# Patient Record
Sex: FEMALE | Race: WHITE | NOT HISPANIC OR LATINO | Employment: OTHER | URBAN - METROPOLITAN AREA
[De-identification: names, ages, dates, MRNs, and addresses within clinical notes are randomized per-mention and may not be internally consistent; named-entity substitution may affect disease eponyms.]

---

## 2020-11-24 ENCOUNTER — HOSPITAL ENCOUNTER (OUTPATIENT)
Dept: RADIOLOGY | Facility: HOSPITAL | Age: 65
Discharge: HOME | End: 2020-11-24
Attending: FAMILY MEDICINE
Payer: COMMERCIAL

## 2020-11-24 ENCOUNTER — OFFICE VISIT (OUTPATIENT)
Dept: PRIMARY CARE CLINIC | Facility: CLINIC | Age: 65
End: 2020-11-24
Payer: COMMERCIAL

## 2020-11-24 VITALS
WEIGHT: 232.56 LBS | BODY MASS INDEX: 38.75 KG/M2 | HEIGHT: 65 IN | HEART RATE: 79 BPM | SYSTOLIC BLOOD PRESSURE: 135 MMHG | DIASTOLIC BLOOD PRESSURE: 74 MMHG

## 2020-11-24 DIAGNOSIS — I10 ESSENTIAL HYPERTENSION: ICD-10-CM

## 2020-11-24 DIAGNOSIS — G47.30 SLEEP APNEA, UNSPECIFIED TYPE: ICD-10-CM

## 2020-11-24 DIAGNOSIS — K21.9 GASTROESOPHAGEAL REFLUX DISEASE, UNSPECIFIED WHETHER ESOPHAGITIS PRESENT: ICD-10-CM

## 2020-11-24 DIAGNOSIS — G89.29 CHRONIC PAIN OF LEFT KNEE: ICD-10-CM

## 2020-11-24 DIAGNOSIS — Z00.00 ROUTINE MEDICAL EXAM: Primary | ICD-10-CM

## 2020-11-24 DIAGNOSIS — E66.9 OBESITY, UNSPECIFIED CLASSIFICATION, UNSPECIFIED OBESITY TYPE, UNSPECIFIED WHETHER SERIOUS COMORBIDITY PRESENT: ICD-10-CM

## 2020-11-24 DIAGNOSIS — M25.562 CHRONIC PAIN OF LEFT KNEE: ICD-10-CM

## 2020-11-24 PROCEDURE — 73562 X-RAY EXAM OF KNEE 3: CPT | Mod: TC,PN,RT

## 2020-11-24 PROCEDURE — 99386 PREV VISIT NEW AGE 40-64: CPT | Mod: S$GLB,,, | Performed by: FAMILY MEDICINE

## 2020-11-24 PROCEDURE — 73564 X-RAY EXAM KNEE 4 OR MORE: CPT | Mod: 26,LT,, | Performed by: RADIOLOGY

## 2020-11-24 PROCEDURE — 3008F BODY MASS INDEX DOCD: CPT | Mod: CPTII,S$GLB,, | Performed by: FAMILY MEDICINE

## 2020-11-24 PROCEDURE — 73562 X-RAY EXAM OF KNEE 3: CPT | Mod: 26,59,RT, | Performed by: RADIOLOGY

## 2020-11-24 PROCEDURE — 3008F PR BODY MASS INDEX (BMI) DOCUMENTED: ICD-10-PCS | Mod: CPTII,S$GLB,, | Performed by: FAMILY MEDICINE

## 2020-11-24 PROCEDURE — 99999 PR PBB SHADOW E&M-NEW PATIENT-LVL IV: CPT | Mod: PBBFAC,,, | Performed by: FAMILY MEDICINE

## 2020-11-24 PROCEDURE — 99999 PR PBB SHADOW E&M-NEW PATIENT-LVL IV: ICD-10-PCS | Mod: PBBFAC,,, | Performed by: FAMILY MEDICINE

## 2020-11-24 PROCEDURE — 73564 XR KNEE ORTHO LEFT WITH FLEXION: ICD-10-PCS | Mod: 26,LT,, | Performed by: RADIOLOGY

## 2020-11-24 PROCEDURE — 73562 XR KNEE ORTHO LEFT WITH FLEXION: ICD-10-PCS | Mod: 26,59,RT, | Performed by: RADIOLOGY

## 2020-11-24 PROCEDURE — 99386 PR PREVENTIVE VISIT,NEW,40-64: ICD-10-PCS | Mod: S$GLB,,, | Performed by: FAMILY MEDICINE

## 2020-11-24 RX ORDER — LOVASTATIN 20 MG/1
20 TABLET ORAL NIGHTLY
COMMUNITY
End: 2020-11-25 | Stop reason: SDUPTHER

## 2020-11-24 RX ORDER — NICOTINE POLACRILEX 2 MG
GUM BUCCAL
COMMUNITY

## 2020-11-24 RX ORDER — HYDROGEN PEROXIDE 3 %
20 SOLUTION, NON-ORAL MISCELLANEOUS
COMMUNITY
End: 2022-11-01

## 2020-11-24 RX ORDER — PHENYLEPHRINE HCL 10 MG
500 TABLET ORAL DAILY
COMMUNITY
End: 2023-03-06

## 2020-11-24 RX ORDER — AMOXICILLIN 500 MG
CAPSULE ORAL DAILY
COMMUNITY

## 2020-11-24 RX ORDER — BENAZEPRIL/HYDROCHLOROTHIAZIDE 20 MG-25MG
1 TABLET ORAL DAILY
COMMUNITY
End: 2020-11-24

## 2020-11-24 NOTE — PROGRESS NOTES
Subjective:   Patient ID: Diane Garcia is a 65 y.o. female.    Chief Complaint: Establish Care      HPI    65-year-old female here to establish with new PCP has essential hypertension and hyperlipidemia      Has had some chronic left knee pain.    Health maintenance discussed    Had outside mammogram this year in [place]    Had colorectal cancer screening in [facility] 8 years ago with recommendations for having it again 10 years    Tetanus is up-to-date  Had recent influenza vaccine.  Had a reaction with her upper arm swollen and not just in a localized circular area        Patient queried and denies any further complaints.    ALLERGIES AND MEDICATIONS: updated and reviewed.  Review of patient's allergies indicates:   Allergen Reactions    Penicillins      nausea       Current Outpatient Medications:     biotin 1 mg Cap, Take by mouth., Disp: , Rfl:     cartilage/collagen/bor/hyalur (JOINT HEALTH ORAL), Take by mouth., Disp: , Rfl:     cinnamon bark (CINNAMON) 500 mg capsule, Take 500 mg by mouth once daily., Disp: , Rfl:     esomeprazole (NEXIUM) 20 MG capsule, Take 20 mg by mouth before breakfast., Disp: , Rfl:     lovastatin (MEVACOR) 40 MG tablet, Take 1 tablet (40 mg total) by mouth every evening., Disp: 90 tablet, Rfl: 3    omega-3 fatty acids/fish oil (FISH OIL-OMEGA-3 FATTY ACIDS) 300-1,000 mg capsule, Take by mouth once daily., Disp: , Rfl:     TURMERIC ORAL, Take by mouth., Disp: , Rfl:     hydroCHLOROthiazide (HYDRODIURIL) 25 MG tablet, Take 1 tablet (25 mg total) by mouth once daily., Disp: 90 tablet, Rfl: 3    olmesartan (BENICAR) 40 MG tablet, Take 1 tablet (40 mg total) by mouth once daily., Disp: 90 tablet, Rfl: 3    Review of Systems   Constitutional: Negative for activity change, appetite change, chills, diaphoresis, fatigue, fever and unexpected weight change.   HENT: Negative for congestion, ear discharge, ear pain, facial swelling, hearing loss, nosebleeds, postnasal drip, rhinorrhea,  "sinus pressure, sneezing, sore throat, tinnitus, trouble swallowing and voice change.    Eyes: Negative for photophobia, pain, discharge, redness, itching and visual disturbance.   Respiratory: Negative for cough, chest tightness, shortness of breath and wheezing.    Cardiovascular: Negative for chest pain, palpitations and leg swelling.   Gastrointestinal: Negative for abdominal distention, abdominal pain, anal bleeding, blood in stool, constipation, diarrhea, nausea, rectal pain and vomiting.   Endocrine: Negative for cold intolerance, heat intolerance, polydipsia, polyphagia and polyuria.   Genitourinary: Negative for difficulty urinating, dysuria and flank pain.   Musculoskeletal: Negative for arthralgias, back pain, joint swelling, myalgias and neck pain.   Skin: Negative for rash.   Neurological: Negative for dizziness, tremors, seizures, syncope, speech difficulty, weakness, light-headedness, numbness and headaches.   Psychiatric/Behavioral: Negative for behavioral problems, confusion, decreased concentration, dysphoric mood, sleep disturbance and suicidal ideas. The patient is not nervous/anxious and is not hyperactive.        No results found for: WBC, HGB, HCT, MCV, PLT      CMP  No results found for: NA, K, CL, CO2, GLU, BUN, CREATININE, CALCIUM, PROT, ALBUMIN, BILITOT, ALKPHOS, AST, ALT, ANIONGAP, ESTGFRAFRICA, EGFRNONAA     No results found for: LABA1C, HGBA1C     Objective:     Vitals:    11/24/20 1310   BP: 135/74   Pulse: 79   Weight: 105.5 kg (232 lb 9.4 oz)   Height: 5' 5" (1.651 m)   PF: 96 L/min     Body mass index is 38.7 kg/m².    Physical Exam  Vitals signs and nursing note reviewed.   Constitutional:       General: She is not in acute distress.     Appearance: She is well-developed. She is not diaphoretic.   HENT:      Head: Normocephalic and atraumatic.      Right Ear: Hearing, tympanic membrane, ear canal and external ear normal. No tenderness.      Left Ear: Hearing, tympanic membrane, ear " canal and external ear normal. No tenderness.      Nose: Nose normal.   Eyes:      General: Lids are normal. No scleral icterus.        Right eye: No discharge.         Left eye: No discharge.      Extraocular Movements:      Right eye: Normal extraocular motion.      Left eye: Normal extraocular motion.      Conjunctiva/sclera: Conjunctivae normal.      Right eye: Right conjunctiva is not injected.      Left eye: Left conjunctiva is not injected.      Pupils: Pupils are equal, round, and reactive to light.   Neck:      Musculoskeletal: Normal range of motion and neck supple. No edema.      Thyroid: No thyromegaly.      Vascular: No carotid bruit or JVD.      Trachea: No tracheal deviation.   Cardiovascular:      Rate and Rhythm: Normal rate and regular rhythm.      Pulses: Normal pulses.      Heart sounds: Normal heart sounds. No murmur. No friction rub.   Pulmonary:      Effort: Pulmonary effort is normal. No accessory muscle usage or respiratory distress.      Breath sounds: Normal breath sounds. No wheezing, rhonchi or rales.   Abdominal:      General: Bowel sounds are normal. There is no distension or abdominal bruit.      Palpations: Abdomen is soft. There is no mass or pulsatile mass.      Tenderness: There is no abdominal tenderness. There is no guarding or rebound. Negative signs include Gill's sign and McBurney's sign.   Lymphadenopathy:      Head:      Right side of head: No submandibular, preauricular or posterior auricular adenopathy.      Left side of head: No submandibular, preauricular or posterior auricular adenopathy.      Cervical: No cervical adenopathy.   Skin:     General: Skin is warm and dry.      Findings: No ecchymosis, erythema or rash. Rash is not urticarial.      Nails: There is no clubbing.     Neurological:      Mental Status: She is alert and oriented to person, place, and time.      GCS: GCS eye subscore is 4. GCS verbal subscore is 5. GCS motor subscore is 6.   Psychiatric:          Mood and Affect: Mood is not anxious or depressed. Affect is not angry or inappropriate.         Speech: Speech normal.         Behavior: Behavior normal. Behavior is cooperative.         Thought Content: Thought content normal.         Assessment and Plan:   Diane was seen today for establish care.    Diagnoses and all orders for this visit:    Routine medical exam  -     CBC Without Differential; Future  -     Comprehensive Metabolic Panel; Future  -     Hemoglobin A1C; Future  -     Lipid Panel; Future  -     TSH; Future  -     T4, Free; Future    Essential hypertension    Chronic pain of left knee  -     X-ray Knee Ortho Left with Flexion; Future  -     Ambulatory referral/consult to Physical/Occupational Therapy; Future  -     Ambulatory referral/consult to Orthopedics; Future    Obesity, unspecified classification, unspecified obesity type, unspecified whether serious comorbidity present    Sleep apnea, unspecified type  -     CPAP/BIPAP SUPPLIES    Gastroesophageal reflux disease, unspecified whether esophagitis present    Other orders  -     lovastatin (MEVACOR) 40 MG tablet; Take 1 tablet (40 mg total) by mouth every evening.  -     olmesartan (BENICAR) 40 MG tablet; Take 1 tablet (40 mg total) by mouth once daily.  -     hydroCHLOROthiazide (HYDRODIURIL) 25 MG tablet; Take 1 tablet (25 mg total) by mouth once daily.        No follow-ups on file.    THIS NOTE WILL BE SHARED WITH THE PATIENT.

## 2020-11-25 RX ORDER — LOVASTATIN 40 MG/1
40 TABLET ORAL NIGHTLY
Qty: 90 TABLET | Refills: 3 | Status: SHIPPED | OUTPATIENT
Start: 2020-11-25 | End: 2022-01-04 | Stop reason: SDUPTHER

## 2020-11-25 RX ORDER — OLMESARTAN MEDOXOMIL 40 MG/1
40 TABLET ORAL DAILY
Qty: 90 TABLET | Refills: 3 | Status: SHIPPED | OUTPATIENT
Start: 2020-11-25 | End: 2021-09-29

## 2020-11-25 RX ORDER — HYDROCHLOROTHIAZIDE 25 MG/1
25 TABLET ORAL DAILY
Qty: 90 TABLET | Refills: 3 | Status: SHIPPED | OUTPATIENT
Start: 2020-11-25 | End: 2021-09-27

## 2020-12-09 DIAGNOSIS — Z12.31 OTHER SCREENING MAMMOGRAM: ICD-10-CM

## 2021-01-04 ENCOUNTER — OFFICE VISIT (OUTPATIENT)
Dept: SPORTS MEDICINE | Facility: CLINIC | Age: 66
End: 2021-01-04
Payer: COMMERCIAL

## 2021-01-04 ENCOUNTER — PATIENT MESSAGE (OUTPATIENT)
Dept: ADMINISTRATIVE | Facility: HOSPITAL | Age: 66
End: 2021-01-04

## 2021-01-04 VITALS
SYSTOLIC BLOOD PRESSURE: 136 MMHG | HEIGHT: 65 IN | WEIGHT: 235.88 LBS | DIASTOLIC BLOOD PRESSURE: 79 MMHG | HEART RATE: 74 BPM | BODY MASS INDEX: 39.3 KG/M2

## 2021-01-04 DIAGNOSIS — M17.12 PRIMARY OSTEOARTHRITIS OF LEFT KNEE: Primary | ICD-10-CM

## 2021-01-04 PROCEDURE — 99999 PR PBB SHADOW E&M-EST. PATIENT-LVL III: ICD-10-PCS | Mod: PBBFAC,,, | Performed by: ORTHOPAEDIC SURGERY

## 2021-01-04 PROCEDURE — 3075F PR MOST RECENT SYSTOLIC BLOOD PRESS GE 130-139MM HG: ICD-10-PCS | Mod: CPTII,S$GLB,, | Performed by: ORTHOPAEDIC SURGERY

## 2021-01-04 PROCEDURE — 99203 PR OFFICE/OUTPT VISIT, NEW, LEVL III, 30-44 MIN: ICD-10-PCS | Mod: S$GLB,,, | Performed by: ORTHOPAEDIC SURGERY

## 2021-01-04 PROCEDURE — 99203 OFFICE O/P NEW LOW 30 MIN: CPT | Mod: S$GLB,,, | Performed by: ORTHOPAEDIC SURGERY

## 2021-01-04 PROCEDURE — 3008F PR BODY MASS INDEX (BMI) DOCUMENTED: ICD-10-PCS | Mod: CPTII,S$GLB,, | Performed by: ORTHOPAEDIC SURGERY

## 2021-01-04 PROCEDURE — 99999 PR PBB SHADOW E&M-EST. PATIENT-LVL III: CPT | Mod: PBBFAC,,, | Performed by: ORTHOPAEDIC SURGERY

## 2021-01-04 PROCEDURE — 3008F BODY MASS INDEX DOCD: CPT | Mod: CPTII,S$GLB,, | Performed by: ORTHOPAEDIC SURGERY

## 2021-01-04 PROCEDURE — 3078F DIAST BP <80 MM HG: CPT | Mod: CPTII,S$GLB,, | Performed by: ORTHOPAEDIC SURGERY

## 2021-01-04 PROCEDURE — 1126F AMNT PAIN NOTED NONE PRSNT: CPT | Mod: S$GLB,,, | Performed by: ORTHOPAEDIC SURGERY

## 2021-01-04 PROCEDURE — 3078F PR MOST RECENT DIASTOLIC BLOOD PRESSURE < 80 MM HG: ICD-10-PCS | Mod: CPTII,S$GLB,, | Performed by: ORTHOPAEDIC SURGERY

## 2021-01-04 PROCEDURE — 1126F PR PAIN SEVERITY QUANTIFIED, NO PAIN PRESENT: ICD-10-PCS | Mod: S$GLB,,, | Performed by: ORTHOPAEDIC SURGERY

## 2021-01-04 PROCEDURE — 3075F SYST BP GE 130 - 139MM HG: CPT | Mod: CPTII,S$GLB,, | Performed by: ORTHOPAEDIC SURGERY

## 2021-01-06 ENCOUNTER — TELEPHONE (OUTPATIENT)
Dept: PRIMARY CARE CLINIC | Facility: CLINIC | Age: 66
End: 2021-01-06

## 2021-01-27 ENCOUNTER — TELEPHONE (OUTPATIENT)
Dept: SPORTS MEDICINE | Facility: CLINIC | Age: 66
End: 2021-01-27

## 2021-01-29 ENCOUNTER — OFFICE VISIT (OUTPATIENT)
Dept: SPORTS MEDICINE | Facility: CLINIC | Age: 66
End: 2021-01-29
Payer: COMMERCIAL

## 2021-01-29 VITALS
DIASTOLIC BLOOD PRESSURE: 78 MMHG | WEIGHT: 236.88 LBS | HEIGHT: 65 IN | SYSTOLIC BLOOD PRESSURE: 134 MMHG | BODY MASS INDEX: 39.47 KG/M2 | HEART RATE: 69 BPM

## 2021-01-29 DIAGNOSIS — M17.12 PRIMARY OSTEOARTHRITIS OF LEFT KNEE: Primary | ICD-10-CM

## 2021-01-29 PROCEDURE — 99499 UNLISTED E&M SERVICE: CPT | Mod: S$GLB,,, | Performed by: PHYSICIAN ASSISTANT

## 2021-01-29 PROCEDURE — 3008F PR BODY MASS INDEX (BMI) DOCUMENTED: ICD-10-PCS | Mod: CPTII,S$GLB,, | Performed by: PHYSICIAN ASSISTANT

## 2021-01-29 PROCEDURE — 99999 PR PBB SHADOW E&M-EST. PATIENT-LVL III: ICD-10-PCS | Mod: PBBFAC,,, | Performed by: PHYSICIAN ASSISTANT

## 2021-01-29 PROCEDURE — 20610 PR DRAIN/INJECT LARGE JOINT/BURSA: ICD-10-PCS | Mod: LT,S$GLB,, | Performed by: PHYSICIAN ASSISTANT

## 2021-01-29 PROCEDURE — 1125F PR PAIN SEVERITY QUANTIFIED, PAIN PRESENT: ICD-10-PCS | Mod: S$GLB,,, | Performed by: PHYSICIAN ASSISTANT

## 2021-01-29 PROCEDURE — 3008F BODY MASS INDEX DOCD: CPT | Mod: CPTII,S$GLB,, | Performed by: PHYSICIAN ASSISTANT

## 2021-01-29 PROCEDURE — 99499 NO LOS: ICD-10-PCS | Mod: S$GLB,,, | Performed by: PHYSICIAN ASSISTANT

## 2021-01-29 PROCEDURE — 99999 PR PBB SHADOW E&M-EST. PATIENT-LVL III: CPT | Mod: PBBFAC,,, | Performed by: PHYSICIAN ASSISTANT

## 2021-01-29 PROCEDURE — 1125F AMNT PAIN NOTED PAIN PRSNT: CPT | Mod: S$GLB,,, | Performed by: PHYSICIAN ASSISTANT

## 2021-01-29 PROCEDURE — 20610 DRAIN/INJ JOINT/BURSA W/O US: CPT | Mod: LT,S$GLB,, | Performed by: PHYSICIAN ASSISTANT

## 2021-02-01 ENCOUNTER — OFFICE VISIT (OUTPATIENT)
Dept: URGENT CARE | Facility: CLINIC | Age: 66
End: 2021-02-01
Payer: COMMERCIAL

## 2021-02-01 VITALS
BODY MASS INDEX: 39.15 KG/M2 | HEART RATE: 69 BPM | SYSTOLIC BLOOD PRESSURE: 150 MMHG | WEIGHT: 235 LBS | TEMPERATURE: 98 F | OXYGEN SATURATION: 97 % | RESPIRATION RATE: 18 BRPM | DIASTOLIC BLOOD PRESSURE: 86 MMHG | HEIGHT: 65 IN

## 2021-02-01 DIAGNOSIS — R51.9 NONINTRACTABLE HEADACHE, UNSPECIFIED CHRONICITY PATTERN, UNSPECIFIED HEADACHE TYPE: ICD-10-CM

## 2021-02-01 DIAGNOSIS — H66.90 OTITIS MEDIA, UNSPECIFIED LATERALITY, UNSPECIFIED OTITIS MEDIA TYPE: ICD-10-CM

## 2021-02-01 DIAGNOSIS — Q24.8 PERICARDIAL CYST: Primary | ICD-10-CM

## 2021-02-01 DIAGNOSIS — R06.02 SHORTNESS OF BREATH: ICD-10-CM

## 2021-02-01 LAB
CTP QC/QA: YES
SARS-COV-2 RDRP RESP QL NAA+PROBE: NEGATIVE

## 2021-02-01 PROCEDURE — 3008F PR BODY MASS INDEX (BMI) DOCUMENTED: ICD-10-PCS | Mod: CPTII,S$GLB,, | Performed by: NURSE PRACTITIONER

## 2021-02-01 PROCEDURE — 71046 XR CHEST PA AND LATERAL: ICD-10-PCS | Mod: S$GLB,,, | Performed by: RADIOLOGY

## 2021-02-01 PROCEDURE — 3008F BODY MASS INDEX DOCD: CPT | Mod: CPTII,S$GLB,, | Performed by: NURSE PRACTITIONER

## 2021-02-01 PROCEDURE — 71046 X-RAY EXAM CHEST 2 VIEWS: CPT | Mod: S$GLB,,, | Performed by: RADIOLOGY

## 2021-02-01 PROCEDURE — 99214 PR OFFICE/OUTPT VISIT, EST, LEVL IV, 30-39 MIN: ICD-10-PCS | Mod: S$GLB,CS,, | Performed by: NURSE PRACTITIONER

## 2021-02-01 PROCEDURE — U0002: ICD-10-PCS | Mod: QW,S$GLB,, | Performed by: NURSE PRACTITIONER

## 2021-02-01 PROCEDURE — 99214 OFFICE O/P EST MOD 30 MIN: CPT | Mod: S$GLB,CS,, | Performed by: NURSE PRACTITIONER

## 2021-02-01 PROCEDURE — U0002 COVID-19 LAB TEST NON-CDC: HCPCS | Mod: QW,S$GLB,, | Performed by: NURSE PRACTITIONER

## 2021-02-01 RX ORDER — ALBUTEROL SULFATE 90 UG/1
2 AEROSOL, METERED RESPIRATORY (INHALATION) EVERY 6 HOURS PRN
Qty: 6.7 G | Refills: 0 | Status: SHIPPED | OUTPATIENT
Start: 2021-02-01 | End: 2021-02-23

## 2021-02-01 RX ORDER — DOXYCYCLINE 100 MG/1
100 CAPSULE ORAL 2 TIMES DAILY
Qty: 20 CAPSULE | Refills: 0 | Status: SHIPPED | OUTPATIENT
Start: 2021-02-01 | End: 2021-02-11

## 2021-02-02 ENCOUNTER — HOSPITAL ENCOUNTER (OUTPATIENT)
Dept: RADIOLOGY | Facility: HOSPITAL | Age: 66
Discharge: HOME | End: 2021-02-02
Attending: INTERNAL MEDICINE
Payer: COMMERCIAL

## 2021-02-02 ENCOUNTER — OFFICE VISIT (OUTPATIENT)
Dept: INTERNAL MEDICINE | Facility: CLINIC | Age: 66
End: 2021-02-02
Payer: COMMERCIAL

## 2021-02-02 VITALS
WEIGHT: 231 LBS | DIASTOLIC BLOOD PRESSURE: 60 MMHG | SYSTOLIC BLOOD PRESSURE: 120 MMHG | HEIGHT: 65 IN | BODY MASS INDEX: 38.49 KG/M2

## 2021-02-02 DIAGNOSIS — I10 ESSENTIAL HYPERTENSION: ICD-10-CM

## 2021-02-02 DIAGNOSIS — R91.8 LUNG MASS: ICD-10-CM

## 2021-02-02 DIAGNOSIS — G47.33 OSA (OBSTRUCTIVE SLEEP APNEA): ICD-10-CM

## 2021-02-02 DIAGNOSIS — Q24.8 PERICARDIAL CYST: Primary | ICD-10-CM

## 2021-02-02 DIAGNOSIS — K21.9 GASTROESOPHAGEAL REFLUX DISEASE, UNSPECIFIED WHETHER ESOPHAGITIS PRESENT: ICD-10-CM

## 2021-02-02 DIAGNOSIS — E78.2 MIXED HYPERLIPIDEMIA: ICD-10-CM

## 2021-02-02 DIAGNOSIS — Z87.891 FORMER SMOKER: ICD-10-CM

## 2021-02-02 DIAGNOSIS — Q24.8 PERICARDIAL CYST: ICD-10-CM

## 2021-02-02 LAB
CREAT SERPL-MCNC: 0.4 MG/DL (ref 0.5–1.4)
SAMPLE: ABNORMAL

## 2021-02-02 PROCEDURE — 25500020 PHARM REV CODE 255: Performed by: INTERNAL MEDICINE

## 2021-02-02 PROCEDURE — 99999 PR PBB SHADOW E&M-EST. PATIENT-LVL III: CPT | Mod: PBBFAC,,, | Performed by: INTERNAL MEDICINE

## 2021-02-02 PROCEDURE — 1126F AMNT PAIN NOTED NONE PRSNT: CPT | Mod: S$GLB,,, | Performed by: INTERNAL MEDICINE

## 2021-02-02 PROCEDURE — 3074F PR MOST RECENT SYSTOLIC BLOOD PRESSURE < 130 MM HG: ICD-10-PCS | Mod: CPTII,S$GLB,, | Performed by: INTERNAL MEDICINE

## 2021-02-02 PROCEDURE — 3078F DIAST BP <80 MM HG: CPT | Mod: CPTII,S$GLB,, | Performed by: INTERNAL MEDICINE

## 2021-02-02 PROCEDURE — 99214 OFFICE O/P EST MOD 30 MIN: CPT | Mod: S$GLB,,, | Performed by: INTERNAL MEDICINE

## 2021-02-02 PROCEDURE — 3074F SYST BP LT 130 MM HG: CPT | Mod: CPTII,S$GLB,, | Performed by: INTERNAL MEDICINE

## 2021-02-02 PROCEDURE — 3008F PR BODY MASS INDEX (BMI) DOCUMENTED: ICD-10-PCS | Mod: CPTII,S$GLB,, | Performed by: INTERNAL MEDICINE

## 2021-02-02 PROCEDURE — 71260 CT CHEST WITH CONTRAST: ICD-10-PCS | Mod: 26,,, | Performed by: RADIOLOGY

## 2021-02-02 PROCEDURE — 71260 CT THORAX DX C+: CPT | Mod: TC

## 2021-02-02 PROCEDURE — 3078F PR MOST RECENT DIASTOLIC BLOOD PRESSURE < 80 MM HG: ICD-10-PCS | Mod: CPTII,S$GLB,, | Performed by: INTERNAL MEDICINE

## 2021-02-02 PROCEDURE — 3008F BODY MASS INDEX DOCD: CPT | Mod: CPTII,S$GLB,, | Performed by: INTERNAL MEDICINE

## 2021-02-02 PROCEDURE — 1126F PR PAIN SEVERITY QUANTIFIED, NO PAIN PRESENT: ICD-10-PCS | Mod: S$GLB,,, | Performed by: INTERNAL MEDICINE

## 2021-02-02 PROCEDURE — 99214 PR OFFICE/OUTPT VISIT, EST, LEVL IV, 30-39 MIN: ICD-10-PCS | Mod: S$GLB,,, | Performed by: INTERNAL MEDICINE

## 2021-02-02 PROCEDURE — 99999 PR PBB SHADOW E&M-EST. PATIENT-LVL III: ICD-10-PCS | Mod: PBBFAC,,, | Performed by: INTERNAL MEDICINE

## 2021-02-02 PROCEDURE — 71260 CT THORAX DX C+: CPT | Mod: 26,,, | Performed by: RADIOLOGY

## 2021-02-02 RX ORDER — NICOTINE 4 MG/1
INHALANT RESPIRATORY (INHALATION)
COMMUNITY
Start: 2021-01-11 | End: 2021-02-23

## 2021-02-02 RX ADMIN — IOHEXOL 85 ML: 350 INJECTION, SOLUTION INTRAVENOUS at 11:02

## 2021-02-03 ENCOUNTER — PATIENT MESSAGE (OUTPATIENT)
Dept: INTERNAL MEDICINE | Facility: CLINIC | Age: 66
End: 2021-02-03

## 2021-02-03 ENCOUNTER — TELEPHONE (OUTPATIENT)
Dept: INTERNAL MEDICINE | Facility: CLINIC | Age: 66
End: 2021-02-03

## 2021-02-03 DIAGNOSIS — Q79.0 MORGAGNI HERNIA: ICD-10-CM

## 2021-02-03 DIAGNOSIS — A49.8 BACTERIAL INFECTION DUE TO MORGANELLA MORGANII: Primary | ICD-10-CM

## 2021-02-03 DIAGNOSIS — R91.8 MULTIPLE LUNG NODULES: ICD-10-CM

## 2021-02-08 ENCOUNTER — OFFICE VISIT (OUTPATIENT)
Dept: SPORTS MEDICINE | Facility: CLINIC | Age: 66
End: 2021-02-08
Payer: COMMERCIAL

## 2021-02-08 ENCOUNTER — OFFICE VISIT (OUTPATIENT)
Dept: SURGERY | Facility: CLINIC | Age: 66
End: 2021-02-08
Payer: COMMERCIAL

## 2021-02-08 VITALS
OXYGEN SATURATION: 97 % | RESPIRATION RATE: 18 BRPM | BODY MASS INDEX: 38.72 KG/M2 | DIASTOLIC BLOOD PRESSURE: 73 MMHG | HEIGHT: 65 IN | HEART RATE: 77 BPM | SYSTOLIC BLOOD PRESSURE: 158 MMHG | WEIGHT: 232.38 LBS

## 2021-02-08 VITALS
SYSTOLIC BLOOD PRESSURE: 141 MMHG | DIASTOLIC BLOOD PRESSURE: 81 MMHG | HEART RATE: 68 BPM | BODY MASS INDEX: 38.49 KG/M2 | WEIGHT: 231 LBS | HEIGHT: 65 IN

## 2021-02-08 DIAGNOSIS — Q79.0 MORGAGNI HERNIA: ICD-10-CM

## 2021-02-08 DIAGNOSIS — M17.12 PRIMARY OSTEOARTHRITIS OF LEFT KNEE: Primary | ICD-10-CM

## 2021-02-08 PROCEDURE — 3077F SYST BP >= 140 MM HG: CPT | Mod: CPTII,S$GLB,, | Performed by: SURGERY

## 2021-02-08 PROCEDURE — 3008F PR BODY MASS INDEX (BMI) DOCUMENTED: ICD-10-PCS | Mod: CPTII,S$GLB,, | Performed by: SURGERY

## 2021-02-08 PROCEDURE — 1126F PR PAIN SEVERITY QUANTIFIED, NO PAIN PRESENT: ICD-10-PCS | Mod: S$GLB,,, | Performed by: PHYSICIAN ASSISTANT

## 2021-02-08 PROCEDURE — 20610 DRAIN/INJ JOINT/BURSA W/O US: CPT | Mod: LT,S$GLB,, | Performed by: PHYSICIAN ASSISTANT

## 2021-02-08 PROCEDURE — 99204 PR OFFICE/OUTPT VISIT, NEW, LEVL IV, 45-59 MIN: ICD-10-PCS | Mod: S$GLB,,, | Performed by: SURGERY

## 2021-02-08 PROCEDURE — 20610 PR DRAIN/INJECT LARGE JOINT/BURSA: ICD-10-PCS | Mod: LT,S$GLB,, | Performed by: PHYSICIAN ASSISTANT

## 2021-02-08 PROCEDURE — 3078F DIAST BP <80 MM HG: CPT | Mod: CPTII,S$GLB,, | Performed by: SURGERY

## 2021-02-08 PROCEDURE — 99999 PR PBB SHADOW E&M-EST. PATIENT-LVL III: ICD-10-PCS | Mod: PBBFAC,,, | Performed by: PHYSICIAN ASSISTANT

## 2021-02-08 PROCEDURE — 3078F PR MOST RECENT DIASTOLIC BLOOD PRESSURE < 80 MM HG: ICD-10-PCS | Mod: CPTII,S$GLB,, | Performed by: SURGERY

## 2021-02-08 PROCEDURE — 3008F BODY MASS INDEX DOCD: CPT | Mod: CPTII,S$GLB,, | Performed by: SURGERY

## 2021-02-08 PROCEDURE — 99204 OFFICE O/P NEW MOD 45 MIN: CPT | Mod: S$GLB,,, | Performed by: SURGERY

## 2021-02-08 PROCEDURE — 1126F AMNT PAIN NOTED NONE PRSNT: CPT | Mod: S$GLB,,, | Performed by: SURGERY

## 2021-02-08 PROCEDURE — 99999 PR PBB SHADOW E&M-EST. PATIENT-LVL IV: CPT | Mod: PBBFAC,,, | Performed by: SURGERY

## 2021-02-08 PROCEDURE — 99499 NO LOS: ICD-10-PCS | Mod: S$GLB,,, | Performed by: PHYSICIAN ASSISTANT

## 2021-02-08 PROCEDURE — 3008F PR BODY MASS INDEX (BMI) DOCUMENTED: ICD-10-PCS | Mod: CPTII,S$GLB,, | Performed by: PHYSICIAN ASSISTANT

## 2021-02-08 PROCEDURE — 3008F BODY MASS INDEX DOCD: CPT | Mod: CPTII,S$GLB,, | Performed by: PHYSICIAN ASSISTANT

## 2021-02-08 PROCEDURE — 1126F AMNT PAIN NOTED NONE PRSNT: CPT | Mod: S$GLB,,, | Performed by: PHYSICIAN ASSISTANT

## 2021-02-08 PROCEDURE — 99999 PR PBB SHADOW E&M-EST. PATIENT-LVL IV: ICD-10-PCS | Mod: PBBFAC,,, | Performed by: SURGERY

## 2021-02-08 PROCEDURE — 3077F PR MOST RECENT SYSTOLIC BLOOD PRESSURE >= 140 MM HG: ICD-10-PCS | Mod: CPTII,S$GLB,, | Performed by: SURGERY

## 2021-02-08 PROCEDURE — 99499 UNLISTED E&M SERVICE: CPT | Mod: S$GLB,,, | Performed by: PHYSICIAN ASSISTANT

## 2021-02-08 PROCEDURE — 99999 PR PBB SHADOW E&M-EST. PATIENT-LVL III: CPT | Mod: PBBFAC,,, | Performed by: PHYSICIAN ASSISTANT

## 2021-02-08 PROCEDURE — 1126F PR PAIN SEVERITY QUANTIFIED, NO PAIN PRESENT: ICD-10-PCS | Mod: S$GLB,,, | Performed by: SURGERY

## 2021-02-11 ENCOUNTER — PATIENT OUTREACH (OUTPATIENT)
Dept: ADMINISTRATIVE | Facility: OTHER | Age: 66
End: 2021-02-11

## 2021-02-11 DIAGNOSIS — Z12.11 ENCOUNTER FOR FIT (FECAL IMMUNOCHEMICAL TEST) SCREENING: Primary | ICD-10-CM

## 2021-02-19 ENCOUNTER — OFFICE VISIT (OUTPATIENT)
Dept: SPORTS MEDICINE | Facility: CLINIC | Age: 66
End: 2021-02-19
Payer: COMMERCIAL

## 2021-02-19 ENCOUNTER — OFFICE VISIT (OUTPATIENT)
Dept: URGENT CARE | Facility: CLINIC | Age: 66
End: 2021-02-19
Payer: COMMERCIAL

## 2021-02-19 VITALS
TEMPERATURE: 98 F | SYSTOLIC BLOOD PRESSURE: 129 MMHG | DIASTOLIC BLOOD PRESSURE: 88 MMHG | OXYGEN SATURATION: 98 % | WEIGHT: 220 LBS | RESPIRATION RATE: 16 BRPM | HEART RATE: 78 BPM | HEIGHT: 65 IN | BODY MASS INDEX: 36.65 KG/M2

## 2021-02-19 VITALS — HEIGHT: 65 IN | WEIGHT: 220 LBS | BODY MASS INDEX: 36.65 KG/M2

## 2021-02-19 DIAGNOSIS — M17.11 PRIMARY OSTEOARTHRITIS OF RIGHT KNEE: ICD-10-CM

## 2021-02-19 DIAGNOSIS — R19.7 DIARRHEA, UNSPECIFIED TYPE: Primary | ICD-10-CM

## 2021-02-19 DIAGNOSIS — M17.12 PRIMARY OSTEOARTHRITIS OF LEFT KNEE: Primary | ICD-10-CM

## 2021-02-19 PROCEDURE — 3078F DIAST BP <80 MM HG: CPT | Mod: CPTII,S$GLB,, | Performed by: PHYSICIAN ASSISTANT

## 2021-02-19 PROCEDURE — 87209 SMEAR COMPLEX STAIN: CPT

## 2021-02-19 PROCEDURE — 3078F PR MOST RECENT DIASTOLIC BLOOD PRESSURE < 80 MM HG: ICD-10-PCS | Mod: CPTII,S$GLB,, | Performed by: PHYSICIAN ASSISTANT

## 2021-02-19 PROCEDURE — 99213 PR OFFICE/OUTPT VISIT, EST, LEVL III, 20-29 MIN: ICD-10-PCS | Mod: S$GLB,,, | Performed by: NURSE PRACTITIONER

## 2021-02-19 PROCEDURE — 1125F PR PAIN SEVERITY QUANTIFIED, PAIN PRESENT: ICD-10-PCS | Mod: S$GLB,,, | Performed by: PHYSICIAN ASSISTANT

## 2021-02-19 PROCEDURE — 99213 PR OFFICE/OUTPT VISIT, EST, LEVL III, 20-29 MIN: ICD-10-PCS | Mod: 25,S$GLB,, | Performed by: PHYSICIAN ASSISTANT

## 2021-02-19 PROCEDURE — 99213 OFFICE O/P EST LOW 20 MIN: CPT | Mod: 25,S$GLB,, | Performed by: PHYSICIAN ASSISTANT

## 2021-02-19 PROCEDURE — 87324 CLOSTRIDIUM AG IA: CPT

## 2021-02-19 PROCEDURE — 3008F BODY MASS INDEX DOCD: CPT | Mod: CPTII,S$GLB,, | Performed by: PHYSICIAN ASSISTANT

## 2021-02-19 PROCEDURE — 3074F SYST BP LT 130 MM HG: CPT | Mod: CPTII,S$GLB,, | Performed by: PHYSICIAN ASSISTANT

## 2021-02-19 PROCEDURE — 20610 DRAIN/INJ JOINT/BURSA W/O US: CPT | Mod: LT,S$GLB,, | Performed by: PHYSICIAN ASSISTANT

## 2021-02-19 PROCEDURE — 20610 PR DRAIN/INJECT LARGE JOINT/BURSA: ICD-10-PCS | Mod: LT,S$GLB,, | Performed by: PHYSICIAN ASSISTANT

## 2021-02-19 PROCEDURE — 99213 OFFICE O/P EST LOW 20 MIN: CPT | Mod: S$GLB,,, | Performed by: NURSE PRACTITIONER

## 2021-02-19 PROCEDURE — 99999 PR PBB SHADOW E&M-EST. PATIENT-LVL III: ICD-10-PCS | Mod: PBBFAC,,, | Performed by: PHYSICIAN ASSISTANT

## 2021-02-19 PROCEDURE — 3074F PR MOST RECENT SYSTOLIC BLOOD PRESSURE < 130 MM HG: ICD-10-PCS | Mod: CPTII,S$GLB,, | Performed by: PHYSICIAN ASSISTANT

## 2021-02-19 PROCEDURE — 99999 PR PBB SHADOW E&M-EST. PATIENT-LVL III: CPT | Mod: PBBFAC,,, | Performed by: PHYSICIAN ASSISTANT

## 2021-02-19 PROCEDURE — 3008F BODY MASS INDEX DOCD: CPT | Mod: CPTII,S$GLB,, | Performed by: NURSE PRACTITIONER

## 2021-02-19 PROCEDURE — 1125F AMNT PAIN NOTED PAIN PRSNT: CPT | Mod: S$GLB,,, | Performed by: PHYSICIAN ASSISTANT

## 2021-02-19 PROCEDURE — 3008F PR BODY MASS INDEX (BMI) DOCUMENTED: ICD-10-PCS | Mod: CPTII,S$GLB,, | Performed by: PHYSICIAN ASSISTANT

## 2021-02-19 PROCEDURE — 3008F PR BODY MASS INDEX (BMI) DOCUMENTED: ICD-10-PCS | Mod: CPTII,S$GLB,, | Performed by: NURSE PRACTITIONER

## 2021-02-19 PROCEDURE — 87449 NOS EACH ORGANISM AG IA: CPT

## 2021-02-19 RX ORDER — VANCOMYCIN HYDROCHLORIDE 125 MG/1
125 CAPSULE ORAL EVERY 6 HOURS
Qty: 40 CAPSULE | Refills: 0 | Status: SHIPPED | OUTPATIENT
Start: 2021-02-19 | End: 2021-03-01

## 2021-02-20 LAB
C DIFF GDH STL QL: NEGATIVE
C DIFF TOX A+B STL QL IA: NEGATIVE

## 2021-02-22 ENCOUNTER — TELEPHONE (OUTPATIENT)
Dept: URGENT CARE | Facility: CLINIC | Age: 66
End: 2021-02-22

## 2021-02-22 DIAGNOSIS — R19.7 DIARRHEA, UNSPECIFIED TYPE: Primary | ICD-10-CM

## 2021-02-23 ENCOUNTER — OFFICE VISIT (OUTPATIENT)
Dept: GASTROENTEROLOGY | Facility: CLINIC | Age: 66
End: 2021-02-23
Payer: COMMERCIAL

## 2021-02-23 ENCOUNTER — LAB VISIT (OUTPATIENT)
Dept: LAB | Facility: HOSPITAL | Age: 66
End: 2021-02-23
Attending: INTERNAL MEDICINE
Payer: COMMERCIAL

## 2021-02-23 VITALS — HEIGHT: 65 IN | WEIGHT: 226.44 LBS | BODY MASS INDEX: 37.73 KG/M2

## 2021-02-23 DIAGNOSIS — R19.7 ACUTE DIARRHEA: ICD-10-CM

## 2021-02-23 DIAGNOSIS — Q79.0 MORGAGNI HERNIA: ICD-10-CM

## 2021-02-23 DIAGNOSIS — R19.7 ACUTE DIARRHEA: Primary | ICD-10-CM

## 2021-02-23 LAB
BASOPHILS # BLD AUTO: 0.06 K/UL (ref 0–0.2)
BASOPHILS NFR BLD: 0.8 % (ref 0–1.9)
CRP SERPL-MCNC: 20 MG/L (ref 0–8.2)
DIFFERENTIAL METHOD: ABNORMAL
EOSINOPHIL # BLD AUTO: 0.3 K/UL (ref 0–0.5)
EOSINOPHIL NFR BLD: 4.2 % (ref 0–8)
ERYTHROCYTE [DISTWIDTH] IN BLOOD BY AUTOMATED COUNT: 11.9 % (ref 11.5–14.5)
HCT VFR BLD AUTO: 35.8 % (ref 37–48.5)
HGB BLD-MCNC: 12.3 G/DL (ref 12–16)
IMM GRANULOCYTES # BLD AUTO: 0.07 K/UL (ref 0–0.04)
IMM GRANULOCYTES NFR BLD AUTO: 0.9 % (ref 0–0.5)
LYMPHOCYTES # BLD AUTO: 1.9 K/UL (ref 1–4.8)
LYMPHOCYTES NFR BLD: 24.4 % (ref 18–48)
MCH RBC QN AUTO: 30.5 PG (ref 27–31)
MCHC RBC AUTO-ENTMCNC: 34.4 G/DL (ref 32–36)
MCV RBC AUTO: 89 FL (ref 82–98)
MONOCYTES # BLD AUTO: 0.7 K/UL (ref 0.3–1)
MONOCYTES NFR BLD: 9.2 % (ref 4–15)
NEUTROPHILS # BLD AUTO: 4.7 K/UL (ref 1.8–7.7)
NEUTROPHILS NFR BLD: 60.5 % (ref 38–73)
NRBC BLD-RTO: 0 /100 WBC
O+P STL MICRO: NORMAL
PLATELET # BLD AUTO: 341 K/UL (ref 150–350)
PMV BLD AUTO: 10.1 FL (ref 9.2–12.9)
RBC # BLD AUTO: 4.03 M/UL (ref 4–5.4)
WBC # BLD AUTO: 7.8 K/UL (ref 3.9–12.7)

## 2021-02-23 PROCEDURE — 83516 IMMUNOASSAY NONANTIBODY: CPT

## 2021-02-23 PROCEDURE — 99999 PR PBB SHADOW E&M-EST. PATIENT-LVL III: CPT | Mod: PBBFAC,,, | Performed by: INTERNAL MEDICINE

## 2021-02-23 PROCEDURE — 1126F PR PAIN SEVERITY QUANTIFIED, NO PAIN PRESENT: ICD-10-PCS | Mod: S$GLB,,, | Performed by: INTERNAL MEDICINE

## 2021-02-23 PROCEDURE — 86140 C-REACTIVE PROTEIN: CPT

## 2021-02-23 PROCEDURE — 99204 PR OFFICE/OUTPT VISIT, NEW, LEVL IV, 45-59 MIN: ICD-10-PCS | Mod: S$GLB,,, | Performed by: INTERNAL MEDICINE

## 2021-02-23 PROCEDURE — 85025 COMPLETE CBC W/AUTO DIFF WBC: CPT

## 2021-02-23 PROCEDURE — 99999 PR PBB SHADOW E&M-EST. PATIENT-LVL III: ICD-10-PCS | Mod: PBBFAC,,, | Performed by: INTERNAL MEDICINE

## 2021-02-23 PROCEDURE — 99204 OFFICE O/P NEW MOD 45 MIN: CPT | Mod: S$GLB,,, | Performed by: INTERNAL MEDICINE

## 2021-02-23 PROCEDURE — 1126F AMNT PAIN NOTED NONE PRSNT: CPT | Mod: S$GLB,,, | Performed by: INTERNAL MEDICINE

## 2021-02-23 PROCEDURE — 3008F PR BODY MASS INDEX (BMI) DOCUMENTED: ICD-10-PCS | Mod: CPTII,S$GLB,, | Performed by: INTERNAL MEDICINE

## 2021-02-23 PROCEDURE — 36415 COLL VENOUS BLD VENIPUNCTURE: CPT

## 2021-02-23 PROCEDURE — 82784 ASSAY IGA/IGD/IGG/IGM EACH: CPT

## 2021-02-23 PROCEDURE — 3008F BODY MASS INDEX DOCD: CPT | Mod: CPTII,S$GLB,, | Performed by: INTERNAL MEDICINE

## 2021-02-23 RX ORDER — DICYCLOMINE HYDROCHLORIDE 10 MG/1
10 CAPSULE ORAL
Qty: 60 CAPSULE | Refills: 1 | Status: SHIPPED | OUTPATIENT
Start: 2021-02-23 | End: 2021-07-19

## 2021-02-24 LAB — IGA SERPL-MCNC: 285 MG/DL (ref 40–350)

## 2021-02-25 LAB — TTG IGA SER-ACNC: 4 UNITS

## 2021-03-14 ENCOUNTER — PATIENT OUTREACH (OUTPATIENT)
Dept: ADMINISTRATIVE | Facility: OTHER | Age: 66
End: 2021-03-14

## 2021-03-15 ENCOUNTER — OFFICE VISIT (OUTPATIENT)
Dept: SPORTS MEDICINE | Facility: CLINIC | Age: 66
End: 2021-03-15
Payer: COMMERCIAL

## 2021-03-15 VITALS
DIASTOLIC BLOOD PRESSURE: 82 MMHG | HEIGHT: 65 IN | WEIGHT: 226 LBS | SYSTOLIC BLOOD PRESSURE: 142 MMHG | HEART RATE: 75 BPM | BODY MASS INDEX: 37.65 KG/M2

## 2021-03-15 DIAGNOSIS — M17.11 PRIMARY OSTEOARTHRITIS OF RIGHT KNEE: Primary | ICD-10-CM

## 2021-03-15 PROCEDURE — 99999 PR PBB SHADOW E&M-EST. PATIENT-LVL III: CPT | Mod: PBBFAC,,, | Performed by: PHYSICIAN ASSISTANT

## 2021-03-15 PROCEDURE — 99499 NO LOS: ICD-10-PCS | Mod: S$GLB,,, | Performed by: PHYSICIAN ASSISTANT

## 2021-03-15 PROCEDURE — 20610 DRAIN/INJ JOINT/BURSA W/O US: CPT | Mod: RT,S$GLB,, | Performed by: PHYSICIAN ASSISTANT

## 2021-03-15 PROCEDURE — 3008F BODY MASS INDEX DOCD: CPT | Mod: CPTII,S$GLB,, | Performed by: PHYSICIAN ASSISTANT

## 2021-03-15 PROCEDURE — 20610 PR DRAIN/INJECT LARGE JOINT/BURSA: ICD-10-PCS | Mod: RT,S$GLB,, | Performed by: PHYSICIAN ASSISTANT

## 2021-03-15 PROCEDURE — 1126F AMNT PAIN NOTED NONE PRSNT: CPT | Mod: S$GLB,,, | Performed by: PHYSICIAN ASSISTANT

## 2021-03-15 PROCEDURE — 99999 PR PBB SHADOW E&M-EST. PATIENT-LVL III: ICD-10-PCS | Mod: PBBFAC,,, | Performed by: PHYSICIAN ASSISTANT

## 2021-03-15 PROCEDURE — 99499 UNLISTED E&M SERVICE: CPT | Mod: S$GLB,,, | Performed by: PHYSICIAN ASSISTANT

## 2021-03-15 PROCEDURE — 3008F PR BODY MASS INDEX (BMI) DOCUMENTED: ICD-10-PCS | Mod: CPTII,S$GLB,, | Performed by: PHYSICIAN ASSISTANT

## 2021-03-15 PROCEDURE — 1126F PR PAIN SEVERITY QUANTIFIED, NO PAIN PRESENT: ICD-10-PCS | Mod: S$GLB,,, | Performed by: PHYSICIAN ASSISTANT

## 2021-03-16 DIAGNOSIS — Z12.11 SPECIAL SCREENING FOR MALIGNANT NEOPLASM OF COLON: Primary | ICD-10-CM

## 2021-03-22 ENCOUNTER — OFFICE VISIT (OUTPATIENT)
Dept: SPORTS MEDICINE | Facility: CLINIC | Age: 66
End: 2021-03-22
Payer: COMMERCIAL

## 2021-03-22 VITALS
BODY MASS INDEX: 36.74 KG/M2 | WEIGHT: 220.81 LBS | SYSTOLIC BLOOD PRESSURE: 138 MMHG | DIASTOLIC BLOOD PRESSURE: 77 MMHG | HEART RATE: 71 BPM

## 2021-03-22 DIAGNOSIS — M17.11 PRIMARY OSTEOARTHRITIS OF RIGHT KNEE: Primary | ICD-10-CM

## 2021-03-22 PROCEDURE — 99499 UNLISTED E&M SERVICE: CPT | Mod: S$GLB,,, | Performed by: PHYSICIAN ASSISTANT

## 2021-03-22 PROCEDURE — 99499 NO LOS: ICD-10-PCS | Mod: S$GLB,,, | Performed by: PHYSICIAN ASSISTANT

## 2021-03-22 PROCEDURE — 20610 PR DRAIN/INJECT LARGE JOINT/BURSA: ICD-10-PCS | Mod: RT,S$GLB,, | Performed by: PHYSICIAN ASSISTANT

## 2021-03-22 PROCEDURE — 99999 PR PBB SHADOW E&M-EST. PATIENT-LVL III: ICD-10-PCS | Mod: PBBFAC,,, | Performed by: PHYSICIAN ASSISTANT

## 2021-03-22 PROCEDURE — 3008F PR BODY MASS INDEX (BMI) DOCUMENTED: ICD-10-PCS | Mod: CPTII,S$GLB,, | Performed by: PHYSICIAN ASSISTANT

## 2021-03-22 PROCEDURE — 1126F PR PAIN SEVERITY QUANTIFIED, NO PAIN PRESENT: ICD-10-PCS | Mod: S$GLB,,, | Performed by: PHYSICIAN ASSISTANT

## 2021-03-22 PROCEDURE — 99999 PR PBB SHADOW E&M-EST. PATIENT-LVL III: CPT | Mod: PBBFAC,,, | Performed by: PHYSICIAN ASSISTANT

## 2021-03-22 PROCEDURE — 3008F BODY MASS INDEX DOCD: CPT | Mod: CPTII,S$GLB,, | Performed by: PHYSICIAN ASSISTANT

## 2021-03-22 PROCEDURE — 1126F AMNT PAIN NOTED NONE PRSNT: CPT | Mod: S$GLB,,, | Performed by: PHYSICIAN ASSISTANT

## 2021-03-22 PROCEDURE — 20610 DRAIN/INJ JOINT/BURSA W/O US: CPT | Mod: RT,S$GLB,, | Performed by: PHYSICIAN ASSISTANT

## 2021-03-24 ENCOUNTER — PATIENT OUTREACH (OUTPATIENT)
Dept: ADMINISTRATIVE | Facility: HOSPITAL | Age: 66
End: 2021-03-24

## 2021-03-26 ENCOUNTER — PATIENT OUTREACH (OUTPATIENT)
Dept: ADMINISTRATIVE | Facility: HOSPITAL | Age: 66
End: 2021-03-26

## 2021-03-29 ENCOUNTER — OFFICE VISIT (OUTPATIENT)
Dept: SPORTS MEDICINE | Facility: CLINIC | Age: 66
End: 2021-03-29
Payer: COMMERCIAL

## 2021-03-29 VITALS
HEART RATE: 74 BPM | BODY MASS INDEX: 36.8 KG/M2 | SYSTOLIC BLOOD PRESSURE: 148 MMHG | WEIGHT: 220.88 LBS | HEIGHT: 65 IN | DIASTOLIC BLOOD PRESSURE: 81 MMHG

## 2021-03-29 DIAGNOSIS — M17.11 PRIMARY OSTEOARTHRITIS OF RIGHT KNEE: Primary | ICD-10-CM

## 2021-03-29 PROCEDURE — 20610 DRAIN/INJ JOINT/BURSA W/O US: CPT | Mod: RT,S$GLB,, | Performed by: PHYSICIAN ASSISTANT

## 2021-03-29 PROCEDURE — 1126F PR PAIN SEVERITY QUANTIFIED, NO PAIN PRESENT: ICD-10-PCS | Mod: S$GLB,,, | Performed by: PHYSICIAN ASSISTANT

## 2021-03-29 PROCEDURE — 99999 PR PBB SHADOW E&M-EST. PATIENT-LVL III: CPT | Mod: PBBFAC,,, | Performed by: PHYSICIAN ASSISTANT

## 2021-03-29 PROCEDURE — 99499 NO LOS: ICD-10-PCS | Mod: S$GLB,,, | Performed by: PHYSICIAN ASSISTANT

## 2021-03-29 PROCEDURE — 1126F AMNT PAIN NOTED NONE PRSNT: CPT | Mod: S$GLB,,, | Performed by: PHYSICIAN ASSISTANT

## 2021-03-29 PROCEDURE — 3008F BODY MASS INDEX DOCD: CPT | Mod: CPTII,S$GLB,, | Performed by: PHYSICIAN ASSISTANT

## 2021-03-29 PROCEDURE — 3008F PR BODY MASS INDEX (BMI) DOCUMENTED: ICD-10-PCS | Mod: CPTII,S$GLB,, | Performed by: PHYSICIAN ASSISTANT

## 2021-03-29 PROCEDURE — 20610 PR DRAIN/INJECT LARGE JOINT/BURSA: ICD-10-PCS | Mod: RT,S$GLB,, | Performed by: PHYSICIAN ASSISTANT

## 2021-03-29 PROCEDURE — 99499 UNLISTED E&M SERVICE: CPT | Mod: S$GLB,,, | Performed by: PHYSICIAN ASSISTANT

## 2021-03-29 PROCEDURE — 99999 PR PBB SHADOW E&M-EST. PATIENT-LVL III: ICD-10-PCS | Mod: PBBFAC,,, | Performed by: PHYSICIAN ASSISTANT

## 2021-03-31 ENCOUNTER — IMMUNIZATION (OUTPATIENT)
Dept: PRIMARY CARE CLINIC | Facility: CLINIC | Age: 66
End: 2021-03-31

## 2021-03-31 DIAGNOSIS — Z23 NEED FOR VACCINATION: Primary | ICD-10-CM

## 2021-03-31 PROCEDURE — 91303 PR SARSCOV2 VAC AD26 .5ML IM: ICD-10-PCS | Mod: S$GLB,,, | Performed by: INTERNAL MEDICINE

## 2021-03-31 PROCEDURE — 91303 PR SARSCOV2 VAC AD26 .5ML IM: CPT | Mod: S$GLB,,, | Performed by: INTERNAL MEDICINE

## 2021-03-31 PROCEDURE — 0031A PR IMMUNIZ ADMIN, SARS-COV-2 COVID-19 VACC, 5X10VP/0.5ML: ICD-10-PCS | Mod: CV19,S$GLB,, | Performed by: INTERNAL MEDICINE

## 2021-03-31 PROCEDURE — 0031A PR IMMUNIZ ADMIN, SARS-COV-2 COVID-19 VACC, 5X10VP/0.5ML: CPT | Mod: CV19,S$GLB,, | Performed by: INTERNAL MEDICINE

## 2021-04-05 ENCOUNTER — PATIENT MESSAGE (OUTPATIENT)
Dept: ADMINISTRATIVE | Facility: HOSPITAL | Age: 66
End: 2021-04-05

## 2021-04-21 ENCOUNTER — PATIENT OUTREACH (OUTPATIENT)
Dept: ADMINISTRATIVE | Facility: OTHER | Age: 66
End: 2021-04-21

## 2021-05-11 ENCOUNTER — HOSPITAL ENCOUNTER (OUTPATIENT)
Dept: RADIOLOGY | Facility: HOSPITAL | Age: 66
Discharge: HOME | End: 2021-05-11
Attending: INTERNAL MEDICINE
Payer: COMMERCIAL

## 2021-05-11 ENCOUNTER — PATIENT MESSAGE (OUTPATIENT)
Dept: INTERNAL MEDICINE | Facility: CLINIC | Age: 66
End: 2021-05-11

## 2021-05-11 DIAGNOSIS — R91.8 MULTIPLE LUNG NODULES: ICD-10-CM

## 2021-05-11 PROCEDURE — 71250 CT THORAX DX C-: CPT | Mod: 26,,, | Performed by: RADIOLOGY

## 2021-05-11 PROCEDURE — 71250 CT CHEST WITHOUT CONTRAST: ICD-10-PCS | Mod: 26,,, | Performed by: RADIOLOGY

## 2021-05-11 PROCEDURE — 71250 CT THORAX DX C-: CPT | Mod: TC

## 2021-06-03 ENCOUNTER — PATIENT OUTREACH (OUTPATIENT)
Dept: ADMINISTRATIVE | Facility: OTHER | Age: 66
End: 2021-06-03

## 2021-06-03 ENCOUNTER — HOSPITAL ENCOUNTER (OUTPATIENT)
Dept: RADIOLOGY | Facility: HOSPITAL | Age: 66
Discharge: HOME | End: 2021-06-03
Attending: FAMILY MEDICINE
Payer: COMMERCIAL

## 2021-06-03 DIAGNOSIS — Z12.31 OTHER SCREENING MAMMOGRAM: ICD-10-CM

## 2021-06-03 PROCEDURE — 77063 MAMMO DIGITAL SCREENING BILAT WITH TOMO: ICD-10-PCS | Mod: 26,,, | Performed by: RADIOLOGY

## 2021-06-03 PROCEDURE — 77067 MAMMO DIGITAL SCREENING BILAT WITH TOMO: ICD-10-PCS | Mod: 26,,, | Performed by: RADIOLOGY

## 2021-06-03 PROCEDURE — 77067 SCR MAMMO BI INCL CAD: CPT | Mod: 26,,, | Performed by: RADIOLOGY

## 2021-06-03 PROCEDURE — 77067 SCR MAMMO BI INCL CAD: CPT | Mod: TC,PO

## 2021-06-03 PROCEDURE — 77063 BREAST TOMOSYNTHESIS BI: CPT | Mod: 26,,, | Performed by: RADIOLOGY

## 2021-06-04 ENCOUNTER — OFFICE VISIT (OUTPATIENT)
Dept: SPORTS MEDICINE | Facility: CLINIC | Age: 66
End: 2021-06-04
Payer: COMMERCIAL

## 2021-06-04 VITALS
BODY MASS INDEX: 37.13 KG/M2 | HEIGHT: 65 IN | WEIGHT: 222.88 LBS | DIASTOLIC BLOOD PRESSURE: 74 MMHG | HEART RATE: 69 BPM | SYSTOLIC BLOOD PRESSURE: 149 MMHG

## 2021-06-04 DIAGNOSIS — M17.11 PRIMARY OSTEOARTHRITIS OF RIGHT KNEE: Primary | ICD-10-CM

## 2021-06-04 DIAGNOSIS — M17.12 PRIMARY OSTEOARTHRITIS OF LEFT KNEE: ICD-10-CM

## 2021-06-04 PROCEDURE — 99999 PR PBB SHADOW E&M-EST. PATIENT-LVL IV: CPT | Mod: PBBFAC,,, | Performed by: PHYSICIAN ASSISTANT

## 2021-06-04 PROCEDURE — 99999 PR PBB SHADOW E&M-EST. PATIENT-LVL IV: ICD-10-PCS | Mod: PBBFAC,,, | Performed by: PHYSICIAN ASSISTANT

## 2021-06-04 PROCEDURE — 3008F PR BODY MASS INDEX (BMI) DOCUMENTED: ICD-10-PCS | Mod: CPTII,S$GLB,, | Performed by: PHYSICIAN ASSISTANT

## 2021-06-04 PROCEDURE — 3008F BODY MASS INDEX DOCD: CPT | Mod: CPTII,S$GLB,, | Performed by: PHYSICIAN ASSISTANT

## 2021-06-04 PROCEDURE — 1125F AMNT PAIN NOTED PAIN PRSNT: CPT | Mod: S$GLB,,, | Performed by: PHYSICIAN ASSISTANT

## 2021-06-04 PROCEDURE — 99214 PR OFFICE/OUTPT VISIT, EST, LEVL IV, 30-39 MIN: ICD-10-PCS | Mod: 25,S$GLB,, | Performed by: PHYSICIAN ASSISTANT

## 2021-06-04 PROCEDURE — 99214 OFFICE O/P EST MOD 30 MIN: CPT | Mod: 25,S$GLB,, | Performed by: PHYSICIAN ASSISTANT

## 2021-06-04 PROCEDURE — 1125F PR PAIN SEVERITY QUANTIFIED, PAIN PRESENT: ICD-10-PCS | Mod: S$GLB,,, | Performed by: PHYSICIAN ASSISTANT

## 2021-06-04 PROCEDURE — 20610 PR DRAIN/INJECT LARGE JOINT/BURSA: ICD-10-PCS | Mod: 50,S$GLB,, | Performed by: PHYSICIAN ASSISTANT

## 2021-06-04 PROCEDURE — 20610 DRAIN/INJ JOINT/BURSA W/O US: CPT | Mod: 50,S$GLB,, | Performed by: PHYSICIAN ASSISTANT

## 2021-06-04 RX ORDER — MELOXICAM 15 MG/1
TABLET ORAL
Qty: 21 TABLET | Refills: 0 | Status: SHIPPED | OUTPATIENT
Start: 2021-06-04 | End: 2021-06-28

## 2021-06-04 RX ADMIN — TRIAMCINOLONE ACETONIDE 40 MG: 40 INJECTION, SUSPENSION INTRA-ARTICULAR; INTRAMUSCULAR at 03:06

## 2021-06-06 RX ORDER — TRIAMCINOLONE ACETONIDE 40 MG/ML
40 INJECTION, SUSPENSION INTRA-ARTICULAR; INTRAMUSCULAR
Status: COMPLETED | OUTPATIENT
Start: 2021-06-04 | End: 2021-06-04

## 2021-06-21 ENCOUNTER — TELEPHONE (OUTPATIENT)
Dept: PRIMARY CARE CLINIC | Facility: CLINIC | Age: 66
End: 2021-06-21

## 2021-06-22 ENCOUNTER — PATIENT MESSAGE (OUTPATIENT)
Dept: PRIMARY CARE CLINIC | Facility: CLINIC | Age: 66
End: 2021-06-22

## 2021-06-28 ENCOUNTER — TELEPHONE (OUTPATIENT)
Dept: PRIMARY CARE CLINIC | Facility: CLINIC | Age: 66
End: 2021-06-28

## 2021-07-13 ENCOUNTER — PATIENT MESSAGE (OUTPATIENT)
Dept: ENDOSCOPY | Facility: HOSPITAL | Age: 66
End: 2021-07-13

## 2021-07-19 ENCOUNTER — OFFICE VISIT (OUTPATIENT)
Dept: PRIMARY CARE CLINIC | Facility: CLINIC | Age: 66
End: 2021-07-19
Payer: COMMERCIAL

## 2021-07-19 VITALS
SYSTOLIC BLOOD PRESSURE: 132 MMHG | HEART RATE: 71 BPM | WEIGHT: 225.75 LBS | HEIGHT: 65 IN | DIASTOLIC BLOOD PRESSURE: 72 MMHG | TEMPERATURE: 98 F | BODY MASS INDEX: 37.61 KG/M2 | OXYGEN SATURATION: 97 %

## 2021-07-19 DIAGNOSIS — Z87.891 FORMER SMOKER: ICD-10-CM

## 2021-07-19 DIAGNOSIS — M79.645 PAIN IN FINGER OF BOTH HANDS: Primary | ICD-10-CM

## 2021-07-19 DIAGNOSIS — G89.29 CHRONIC PAIN OF LEFT KNEE: ICD-10-CM

## 2021-07-19 DIAGNOSIS — E78.2 MIXED HYPERLIPIDEMIA: ICD-10-CM

## 2021-07-19 DIAGNOSIS — M79.644 PAIN IN FINGER OF BOTH HANDS: Primary | ICD-10-CM

## 2021-07-19 DIAGNOSIS — I10 ESSENTIAL HYPERTENSION: ICD-10-CM

## 2021-07-19 DIAGNOSIS — G47.33 OSA (OBSTRUCTIVE SLEEP APNEA): ICD-10-CM

## 2021-07-19 DIAGNOSIS — Z12.83 SKIN CANCER SCREENING: ICD-10-CM

## 2021-07-19 DIAGNOSIS — E66.9 OBESITY, UNSPECIFIED CLASSIFICATION, UNSPECIFIED OBESITY TYPE, UNSPECIFIED WHETHER SERIOUS COMORBIDITY PRESENT: ICD-10-CM

## 2021-07-19 DIAGNOSIS — K21.9 GASTROESOPHAGEAL REFLUX DISEASE, UNSPECIFIED WHETHER ESOPHAGITIS PRESENT: ICD-10-CM

## 2021-07-19 DIAGNOSIS — M25.562 CHRONIC PAIN OF LEFT KNEE: ICD-10-CM

## 2021-07-19 PROCEDURE — 99999 PR PBB SHADOW E&M-EST. PATIENT-LVL V: CPT | Mod: PBBFAC,,, | Performed by: FAMILY MEDICINE

## 2021-07-19 PROCEDURE — 1125F AMNT PAIN NOTED PAIN PRSNT: CPT | Mod: CPTII,S$GLB,, | Performed by: FAMILY MEDICINE

## 2021-07-19 PROCEDURE — 3078F PR MOST RECENT DIASTOLIC BLOOD PRESSURE < 80 MM HG: ICD-10-PCS | Mod: CPTII,S$GLB,, | Performed by: FAMILY MEDICINE

## 2021-07-19 PROCEDURE — 3008F PR BODY MASS INDEX (BMI) DOCUMENTED: ICD-10-PCS | Mod: CPTII,S$GLB,, | Performed by: FAMILY MEDICINE

## 2021-07-19 PROCEDURE — 99214 OFFICE O/P EST MOD 30 MIN: CPT | Mod: S$GLB,,, | Performed by: FAMILY MEDICINE

## 2021-07-19 PROCEDURE — 3008F BODY MASS INDEX DOCD: CPT | Mod: CPTII,S$GLB,, | Performed by: FAMILY MEDICINE

## 2021-07-19 PROCEDURE — 1125F PR PAIN SEVERITY QUANTIFIED, PAIN PRESENT: ICD-10-PCS | Mod: CPTII,S$GLB,, | Performed by: FAMILY MEDICINE

## 2021-07-19 PROCEDURE — 3075F SYST BP GE 130 - 139MM HG: CPT | Mod: CPTII,S$GLB,, | Performed by: FAMILY MEDICINE

## 2021-07-19 PROCEDURE — 99999 PR PBB SHADOW E&M-EST. PATIENT-LVL V: ICD-10-PCS | Mod: PBBFAC,,, | Performed by: FAMILY MEDICINE

## 2021-07-19 PROCEDURE — 99214 PR OFFICE/OUTPT VISIT, EST, LEVL IV, 30-39 MIN: ICD-10-PCS | Mod: S$GLB,,, | Performed by: FAMILY MEDICINE

## 2021-07-19 PROCEDURE — 3078F DIAST BP <80 MM HG: CPT | Mod: CPTII,S$GLB,, | Performed by: FAMILY MEDICINE

## 2021-07-19 PROCEDURE — 3075F PR MOST RECENT SYSTOLIC BLOOD PRESS GE 130-139MM HG: ICD-10-PCS | Mod: CPTII,S$GLB,, | Performed by: FAMILY MEDICINE

## 2021-07-19 RX ORDER — ALPRAZOLAM 0.25 MG/1
0.25 TABLET ORAL DAILY
COMMUNITY
Start: 2021-06-24 | End: 2021-08-05 | Stop reason: SDUPTHER

## 2021-07-19 RX ORDER — NICOTINE 4 MG/1
INHALANT RESPIRATORY (INHALATION)
COMMUNITY
Start: 2021-06-28 | End: 2021-11-11 | Stop reason: SDUPTHER

## 2021-07-23 ENCOUNTER — CLINICAL SUPPORT (OUTPATIENT)
Dept: REHABILITATION | Facility: HOSPITAL | Age: 66
End: 2021-07-23
Payer: COMMERCIAL

## 2021-07-23 DIAGNOSIS — M25.562 CHRONIC PAIN OF BOTH KNEES: ICD-10-CM

## 2021-07-23 DIAGNOSIS — M25.561 CHRONIC PAIN OF BOTH KNEES: ICD-10-CM

## 2021-07-23 DIAGNOSIS — G89.29 CHRONIC PAIN OF BOTH KNEES: ICD-10-CM

## 2021-07-23 DIAGNOSIS — R26.9 ABNORMAL GAIT: ICD-10-CM

## 2021-07-23 DIAGNOSIS — M17.11 PRIMARY OSTEOARTHRITIS OF RIGHT KNEE: ICD-10-CM

## 2021-07-23 DIAGNOSIS — R53.1 WEAKNESS: ICD-10-CM

## 2021-07-23 DIAGNOSIS — M17.12 PRIMARY OSTEOARTHRITIS OF LEFT KNEE: ICD-10-CM

## 2021-07-23 PROCEDURE — 97110 THERAPEUTIC EXERCISES: CPT | Performed by: PHYSICAL THERAPIST

## 2021-07-23 PROCEDURE — 97161 PT EVAL LOW COMPLEX 20 MIN: CPT | Performed by: PHYSICAL THERAPIST

## 2021-07-26 PROBLEM — M25.562 CHRONIC PAIN OF BOTH KNEES: Status: ACTIVE | Noted: 2021-07-26

## 2021-07-26 PROBLEM — R26.9 ABNORMAL GAIT: Status: ACTIVE | Noted: 2021-07-26

## 2021-07-26 PROBLEM — G89.29 CHRONIC PAIN OF BOTH KNEES: Status: ACTIVE | Noted: 2021-07-26

## 2021-07-26 PROBLEM — R53.1 WEAKNESS: Status: ACTIVE | Noted: 2021-07-26

## 2021-07-26 PROBLEM — M25.561 CHRONIC PAIN OF BOTH KNEES: Status: ACTIVE | Noted: 2021-07-26

## 2021-07-28 DIAGNOSIS — R52 PAIN: Primary | ICD-10-CM

## 2021-07-30 ENCOUNTER — PATIENT OUTREACH (OUTPATIENT)
Dept: ADMINISTRATIVE | Facility: OTHER | Age: 66
End: 2021-07-30

## 2021-08-02 ENCOUNTER — TELEPHONE (OUTPATIENT)
Dept: ORTHOPEDICS | Facility: CLINIC | Age: 66
End: 2021-08-02

## 2021-08-03 ENCOUNTER — PATIENT MESSAGE (OUTPATIENT)
Dept: PRIMARY CARE CLINIC | Facility: CLINIC | Age: 66
End: 2021-08-03

## 2021-08-05 ENCOUNTER — PATIENT MESSAGE (OUTPATIENT)
Dept: PRIMARY CARE CLINIC | Facility: CLINIC | Age: 66
End: 2021-08-05

## 2021-08-05 RX ORDER — ALPRAZOLAM 0.25 MG/1
0.25 TABLET ORAL DAILY
Qty: 30 TABLET | OUTPATIENT
Start: 2021-08-05

## 2021-08-05 RX ORDER — ALPRAZOLAM 0.25 MG/1
0.25 TABLET ORAL DAILY
Qty: 15 TABLET | Refills: 0 | Status: SHIPPED | OUTPATIENT
Start: 2021-08-05 | End: 2021-11-11

## 2021-10-05 ENCOUNTER — PATIENT MESSAGE (OUTPATIENT)
Dept: DERMATOLOGY | Facility: CLINIC | Age: 66
End: 2021-10-05

## 2021-10-06 ENCOUNTER — PATIENT OUTREACH (OUTPATIENT)
Dept: ADMINISTRATIVE | Facility: OTHER | Age: 66
End: 2021-10-06

## 2021-10-07 ENCOUNTER — OFFICE VISIT (OUTPATIENT)
Dept: DERMATOLOGY | Facility: CLINIC | Age: 66
End: 2021-10-07
Payer: COMMERCIAL

## 2021-10-07 DIAGNOSIS — D23.9 DERMATOFIBROMA: ICD-10-CM

## 2021-10-07 DIAGNOSIS — L81.4 LENTIGINES: ICD-10-CM

## 2021-10-07 DIAGNOSIS — L57.0 ACTINIC KERATOSIS: ICD-10-CM

## 2021-10-07 DIAGNOSIS — D22.30 MELANOCYTIC NEVI OF FACE: ICD-10-CM

## 2021-10-07 DIAGNOSIS — D22.5 MULTIPLE BENIGN MELANOCYTIC NEVI OF UPPER EXTREMITY, LOWER EXTREMITY, AND TRUNK: ICD-10-CM

## 2021-10-07 DIAGNOSIS — D22.70 MULTIPLE BENIGN MELANOCYTIC NEVI OF UPPER EXTREMITY, LOWER EXTREMITY, AND TRUNK: ICD-10-CM

## 2021-10-07 DIAGNOSIS — D18.01 ANGIOMA OF SKIN: ICD-10-CM

## 2021-10-07 DIAGNOSIS — D48.5 NEOPLASM OF UNCERTAIN BEHAVIOR OF SKIN: Primary | ICD-10-CM

## 2021-10-07 DIAGNOSIS — D22.60 MULTIPLE BENIGN MELANOCYTIC NEVI OF UPPER EXTREMITY, LOWER EXTREMITY, AND TRUNK: ICD-10-CM

## 2021-10-07 PROCEDURE — 4010F ACE/ARB THERAPY RXD/TAKEN: CPT | Mod: CPTII,S$GLB,, | Performed by: DERMATOLOGY

## 2021-10-07 PROCEDURE — 99203 OFFICE O/P NEW LOW 30 MIN: CPT | Mod: 25,S$GLB,, | Performed by: DERMATOLOGY

## 2021-10-07 PROCEDURE — 1160F PR REVIEW ALL MEDS BY PRESCRIBER/CLIN PHARMACIST DOCUMENTED: ICD-10-PCS | Mod: CPTII,S$GLB,, | Performed by: DERMATOLOGY

## 2021-10-07 PROCEDURE — 1160F RVW MEDS BY RX/DR IN RCRD: CPT | Mod: CPTII,S$GLB,, | Performed by: DERMATOLOGY

## 2021-10-07 PROCEDURE — 1159F PR MEDICATION LIST DOCUMENTED IN MEDICAL RECORD: ICD-10-PCS | Mod: CPTII,S$GLB,, | Performed by: DERMATOLOGY

## 2021-10-07 PROCEDURE — 11102 TANGNTL BX SKIN SINGLE LES: CPT | Mod: S$GLB,,, | Performed by: DERMATOLOGY

## 2021-10-07 PROCEDURE — 17000 PR DESTRUCTION(LASER SURGERY,CRYOSURGERY,CHEMOSURGERY),PREMALIGNANT LESIONS,FIRST LESION: ICD-10-PCS | Mod: 59,S$GLB,, | Performed by: DERMATOLOGY

## 2021-10-07 PROCEDURE — 1159F MED LIST DOCD IN RCRD: CPT | Mod: CPTII,S$GLB,, | Performed by: DERMATOLOGY

## 2021-10-07 PROCEDURE — 3044F HG A1C LEVEL LT 7.0%: CPT | Mod: CPTII,S$GLB,, | Performed by: DERMATOLOGY

## 2021-10-07 PROCEDURE — 17003 DESTRUCT PREMALG LES 2-14: CPT | Mod: S$GLB,,, | Performed by: DERMATOLOGY

## 2021-10-07 PROCEDURE — 11102 PR TANGENTIAL BIOPSY, SKIN, SINGLE LESION: ICD-10-PCS | Mod: S$GLB,,, | Performed by: DERMATOLOGY

## 2021-10-07 PROCEDURE — 17003 DESTRUCTION, PREMALIGNANT LESIONS; SECOND THROUGH 14 LESIONS: ICD-10-PCS | Mod: S$GLB,,, | Performed by: DERMATOLOGY

## 2021-10-07 PROCEDURE — 4010F PR ACE/ARB THEARPY RXD/TAKEN: ICD-10-PCS | Mod: CPTII,S$GLB,, | Performed by: DERMATOLOGY

## 2021-10-07 PROCEDURE — 3044F PR MOST RECENT HEMOGLOBIN A1C LEVEL <7.0%: ICD-10-PCS | Mod: CPTII,S$GLB,, | Performed by: DERMATOLOGY

## 2021-10-07 PROCEDURE — 88305 TISSUE EXAM BY PATHOLOGIST: ICD-10-PCS | Mod: 26,,, | Performed by: PATHOLOGY

## 2021-10-07 PROCEDURE — 17000 DESTRUCT PREMALG LESION: CPT | Mod: 59,S$GLB,, | Performed by: DERMATOLOGY

## 2021-10-07 PROCEDURE — 88305 TISSUE EXAM BY PATHOLOGIST: CPT | Performed by: PATHOLOGY

## 2021-10-07 PROCEDURE — 88305 TISSUE EXAM BY PATHOLOGIST: CPT | Mod: 26,,, | Performed by: PATHOLOGY

## 2021-10-07 PROCEDURE — 99203 PR OFFICE/OUTPT VISIT, NEW, LEVL III, 30-44 MIN: ICD-10-PCS | Mod: 25,S$GLB,, | Performed by: DERMATOLOGY

## 2021-10-11 ENCOUNTER — TELEPHONE (OUTPATIENT)
Dept: DERMATOLOGY | Facility: CLINIC | Age: 66
End: 2021-10-11

## 2021-10-11 LAB
FINAL PATHOLOGIC DIAGNOSIS: NORMAL
GROSS: NORMAL
Lab: NORMAL
MICROSCOPIC EXAM: NORMAL

## 2021-10-14 ENCOUNTER — PROCEDURE VISIT (OUTPATIENT)
Dept: DERMATOLOGY | Facility: CLINIC | Age: 66
End: 2021-10-14
Payer: COMMERCIAL

## 2021-10-14 DIAGNOSIS — D04.62 SQUAMOUS CELL CARCINOMA IN SITU (SCCIS) OF SKIN OF LEFT UPPER ARM: Primary | ICD-10-CM

## 2021-10-14 PROCEDURE — 99499 UNLISTED E&M SERVICE: CPT | Mod: S$GLB,,, | Performed by: DERMATOLOGY

## 2021-10-14 PROCEDURE — 17262 PR DESTR MALIG TRUNK,EXTREM 1.1-2 CM: ICD-10-PCS | Mod: 59,S$GLB,, | Performed by: DERMATOLOGY

## 2021-10-14 PROCEDURE — 99499 NO LOS: ICD-10-PCS | Mod: S$GLB,,, | Performed by: DERMATOLOGY

## 2021-10-14 PROCEDURE — 17262 DSTRJ MAL LES T/A/L 1.1-2.0: CPT | Mod: 59,S$GLB,, | Performed by: DERMATOLOGY

## 2021-11-11 ENCOUNTER — OFFICE VISIT (OUTPATIENT)
Dept: INTERNAL MEDICINE | Facility: CLINIC | Age: 66
End: 2021-11-11
Payer: COMMERCIAL

## 2021-11-11 VITALS
HEIGHT: 65 IN | OXYGEN SATURATION: 97 % | SYSTOLIC BLOOD PRESSURE: 110 MMHG | WEIGHT: 221.56 LBS | BODY MASS INDEX: 36.91 KG/M2 | HEART RATE: 77 BPM | DIASTOLIC BLOOD PRESSURE: 70 MMHG

## 2021-11-11 DIAGNOSIS — K21.9 GASTROESOPHAGEAL REFLUX DISEASE, UNSPECIFIED WHETHER ESOPHAGITIS PRESENT: ICD-10-CM

## 2021-11-11 DIAGNOSIS — F51.01 PRIMARY INSOMNIA: ICD-10-CM

## 2021-11-11 DIAGNOSIS — Q79.0 MORGAGNI HERNIA: ICD-10-CM

## 2021-11-11 DIAGNOSIS — I10 ESSENTIAL HYPERTENSION: ICD-10-CM

## 2021-11-11 DIAGNOSIS — Z87.891 FORMER SMOKER: ICD-10-CM

## 2021-11-11 DIAGNOSIS — Z12.11 COLON CANCER SCREENING: ICD-10-CM

## 2021-11-11 DIAGNOSIS — Z76.89 ENCOUNTER TO ESTABLISH CARE WITH NEW DOCTOR: Primary | ICD-10-CM

## 2021-11-11 DIAGNOSIS — M17.0 PRIMARY OSTEOARTHRITIS OF BOTH KNEES: ICD-10-CM

## 2021-11-11 DIAGNOSIS — E66.01 SEVERE OBESITY (BMI 35.0-39.9) WITH COMORBIDITY: ICD-10-CM

## 2021-11-11 PROCEDURE — 4010F PR ACE/ARB THEARPY RXD/TAKEN: ICD-10-PCS | Mod: CPTII,S$GLB,, | Performed by: STUDENT IN AN ORGANIZED HEALTH CARE EDUCATION/TRAINING PROGRAM

## 2021-11-11 PROCEDURE — 3078F PR MOST RECENT DIASTOLIC BLOOD PRESSURE < 80 MM HG: ICD-10-PCS | Mod: CPTII,S$GLB,, | Performed by: STUDENT IN AN ORGANIZED HEALTH CARE EDUCATION/TRAINING PROGRAM

## 2021-11-11 PROCEDURE — 1159F MED LIST DOCD IN RCRD: CPT | Mod: CPTII,S$GLB,, | Performed by: STUDENT IN AN ORGANIZED HEALTH CARE EDUCATION/TRAINING PROGRAM

## 2021-11-11 PROCEDURE — 3074F SYST BP LT 130 MM HG: CPT | Mod: CPTII,S$GLB,, | Performed by: STUDENT IN AN ORGANIZED HEALTH CARE EDUCATION/TRAINING PROGRAM

## 2021-11-11 PROCEDURE — 3074F PR MOST RECENT SYSTOLIC BLOOD PRESSURE < 130 MM HG: ICD-10-PCS | Mod: CPTII,S$GLB,, | Performed by: STUDENT IN AN ORGANIZED HEALTH CARE EDUCATION/TRAINING PROGRAM

## 2021-11-11 PROCEDURE — 4010F ACE/ARB THERAPY RXD/TAKEN: CPT | Mod: CPTII,S$GLB,, | Performed by: STUDENT IN AN ORGANIZED HEALTH CARE EDUCATION/TRAINING PROGRAM

## 2021-11-11 PROCEDURE — 99999 PR PBB SHADOW E&M-EST. PATIENT-LVL V: ICD-10-PCS | Mod: PBBFAC,,, | Performed by: STUDENT IN AN ORGANIZED HEALTH CARE EDUCATION/TRAINING PROGRAM

## 2021-11-11 PROCEDURE — 1160F RVW MEDS BY RX/DR IN RCRD: CPT | Mod: CPTII,S$GLB,, | Performed by: STUDENT IN AN ORGANIZED HEALTH CARE EDUCATION/TRAINING PROGRAM

## 2021-11-11 PROCEDURE — 99214 OFFICE O/P EST MOD 30 MIN: CPT | Mod: S$GLB,,, | Performed by: STUDENT IN AN ORGANIZED HEALTH CARE EDUCATION/TRAINING PROGRAM

## 2021-11-11 PROCEDURE — 99214 PR OFFICE/OUTPT VISIT, EST, LEVL IV, 30-39 MIN: ICD-10-PCS | Mod: S$GLB,,, | Performed by: STUDENT IN AN ORGANIZED HEALTH CARE EDUCATION/TRAINING PROGRAM

## 2021-11-11 PROCEDURE — 99999 PR PBB SHADOW E&M-EST. PATIENT-LVL V: CPT | Mod: PBBFAC,,, | Performed by: STUDENT IN AN ORGANIZED HEALTH CARE EDUCATION/TRAINING PROGRAM

## 2021-11-11 PROCEDURE — 3008F BODY MASS INDEX DOCD: CPT | Mod: CPTII,S$GLB,, | Performed by: STUDENT IN AN ORGANIZED HEALTH CARE EDUCATION/TRAINING PROGRAM

## 2021-11-11 PROCEDURE — 1159F PR MEDICATION LIST DOCUMENTED IN MEDICAL RECORD: ICD-10-PCS | Mod: CPTII,S$GLB,, | Performed by: STUDENT IN AN ORGANIZED HEALTH CARE EDUCATION/TRAINING PROGRAM

## 2021-11-11 PROCEDURE — 3044F HG A1C LEVEL LT 7.0%: CPT | Mod: CPTII,S$GLB,, | Performed by: STUDENT IN AN ORGANIZED HEALTH CARE EDUCATION/TRAINING PROGRAM

## 2021-11-11 PROCEDURE — 3008F PR BODY MASS INDEX (BMI) DOCUMENTED: ICD-10-PCS | Mod: CPTII,S$GLB,, | Performed by: STUDENT IN AN ORGANIZED HEALTH CARE EDUCATION/TRAINING PROGRAM

## 2021-11-11 PROCEDURE — 1160F PR REVIEW ALL MEDS BY PRESCRIBER/CLIN PHARMACIST DOCUMENTED: ICD-10-PCS | Mod: CPTII,S$GLB,, | Performed by: STUDENT IN AN ORGANIZED HEALTH CARE EDUCATION/TRAINING PROGRAM

## 2021-11-11 PROCEDURE — 3044F PR MOST RECENT HEMOGLOBIN A1C LEVEL <7.0%: ICD-10-PCS | Mod: CPTII,S$GLB,, | Performed by: STUDENT IN AN ORGANIZED HEALTH CARE EDUCATION/TRAINING PROGRAM

## 2021-11-11 PROCEDURE — 3078F DIAST BP <80 MM HG: CPT | Mod: CPTII,S$GLB,, | Performed by: STUDENT IN AN ORGANIZED HEALTH CARE EDUCATION/TRAINING PROGRAM

## 2021-11-11 RX ORDER — MELOXICAM 15 MG/1
TABLET ORAL
Qty: 21 TABLET | Refills: 3 | Status: SHIPPED | OUTPATIENT
Start: 2021-11-11 | End: 2022-01-27

## 2021-11-11 RX ORDER — NICOTINE 4 MG/1
1 INHALANT RESPIRATORY (INHALATION)
Qty: 168 EACH | Refills: 0 | Status: SHIPPED | OUTPATIENT
Start: 2021-11-11 | End: 2022-09-07 | Stop reason: ALTCHOICE

## 2021-11-11 RX ORDER — OLMESARTAN MEDOXOMIL 40 MG/1
40 TABLET ORAL DAILY
Qty: 90 TABLET | Refills: 3 | Status: SHIPPED | OUTPATIENT
Start: 2021-11-11 | End: 2022-12-12

## 2021-11-11 RX ORDER — TRAZODONE HYDROCHLORIDE 50 MG/1
50 TABLET ORAL NIGHTLY PRN
Qty: 90 TABLET | Refills: 3 | Status: SHIPPED | OUTPATIENT
Start: 2021-11-11 | End: 2022-03-07 | Stop reason: ALTCHOICE

## 2022-01-04 ENCOUNTER — PATIENT MESSAGE (OUTPATIENT)
Dept: INTERNAL MEDICINE | Facility: CLINIC | Age: 67
End: 2022-01-04
Payer: COMMERCIAL

## 2022-01-04 RX ORDER — LOVASTATIN 40 MG/1
40 TABLET ORAL NIGHTLY
Qty: 90 TABLET | Refills: 3 | Status: SHIPPED | OUTPATIENT
Start: 2022-01-04 | End: 2022-09-06

## 2022-01-04 RX ORDER — HYDROCHLOROTHIAZIDE 25 MG/1
25 TABLET ORAL DAILY
Qty: 90 TABLET | Refills: 0 | Status: SHIPPED | OUTPATIENT
Start: 2022-01-04 | End: 2022-04-25

## 2022-01-04 NOTE — TELEPHONE ENCOUNTER
No new care gaps identified.  Powered by [employer]. Reference number: [id number].   1/04/2022 2:57:19 PM CST

## 2022-01-04 NOTE — TELEPHONE ENCOUNTER
Refill Routing Note   Medication(s) are not appropriate for processing by [facility] for the following reason(s):      - Medication not previously prescribed by PCP    [facility] action(s):  Defer Medication-related problems identified: Requires labs     Medication Therapy Plan: pt est care 11/11/21; not prev written by pcp;   --->Care Gap information included in message below if applicable.   Medication reconciliation completed: No   Automatic Epic Generated Protocol Data:        Requested Prescriptions   Pending Prescriptions Disp Refills    hydroCHLOROthiazide (HYDRODIURIL) 25 MG tablet [Pharmacy Med Name: HYDROCHLOROTHIAZIDE 25MG TABLETS] 90 tablet 0     Sig: Take 1 tablet (25 mg total) by mouth once daily.       Cardiovascular: Diuretics - Thiazide Passed - 1/4/2022  8:08 AM        Passed - Patient is at least 18 years old        Passed - Last BP in normal range within 360 days     BP Readings from Last 1 Encounters:   11/11/21 110/70               Passed - Valid encounter within last 15 months     Recent Visits  Date Type Provider Dept   11/11/21 Office Visit Lacie Hinton MD [facility] Internal Medicine   07/19/21 Office Visit Cornell Giraldo MD [facility] Primary Care   11/24/20 Office Visit Cornell Giraldo MD [facility] Primary Care   Showing recent visits within past 720 days and meeting all other requirements  Future Appointments  No visits were found meeting these conditions.  Showing future appointments within next 150 days and meeting all other requirements      Future Appointments              In 1 week Carla Mojica MD [address] - Dermatology, [facility]                Passed - Cr is 1.39 or below and within 360 days     Lab Results   Component Value Date    CREATININE 0.6 02/02/2021    CREATININE 0.6 02/02/2021    POCCRE 0.4 (L) 02/02/2021              Passed - K in normal range and within 360 days     Potassium   Date Value Ref Range Status   02/02/2021 3.9 3.5 - 5.1 mmol/L Final               Passed - Na is between 130 and 148 and within 360 days     Sodium   Date Value Ref Range Status   02/02/2021 143 136 - 145 mmol/L Final              Passed - eGFR within 360 days     Lab Results   Component Value Date    EGFRNONAA >60.0 02/02/2021    EGFRNONAA >60.0 02/02/2021                      Appointments  past 12m or future 3m with PCP    Date Provider   Last Visit   11/11/2021 Lacie Hinton MD   Next Visit   Visit date not found Lacie Hinton MD   ED visits in past 90 days: 0        Note composed:10:36 AM 01/04/2022

## 2022-01-04 NOTE — TELEPHONE ENCOUNTER
No new care gaps identified.  Powered by [employer]. Reference number: [id number].   1/04/2022 8:09:20 AM CST

## 2022-01-11 ENCOUNTER — PATIENT MESSAGE (OUTPATIENT)
Dept: DERMATOLOGY | Facility: CLINIC | Age: 67
End: 2022-01-11
Payer: COMMERCIAL

## 2022-01-12 ENCOUNTER — PATIENT OUTREACH (OUTPATIENT)
Dept: ADMINISTRATIVE | Facility: OTHER | Age: 67
End: 2022-01-12
Payer: COMMERCIAL

## 2022-01-12 NOTE — PROGRESS NOTES
Care Everywhere: updated  Immunization: updated  Health Maintenance: updated  Media Review:  Legacy Review:   DIS:  Order placed:   Upcoming appts:  EFAX:  Task Tickets:  Referrals:

## 2022-02-15 ENCOUNTER — PATIENT MESSAGE (OUTPATIENT)
Dept: INTERNAL MEDICINE | Facility: CLINIC | Age: 67
End: 2022-02-15
Payer: COMMERCIAL

## 2022-02-16 ENCOUNTER — PATIENT OUTREACH (OUTPATIENT)
Dept: ADMINISTRATIVE | Facility: OTHER | Age: 67
End: 2022-02-16
Payer: COMMERCIAL

## 2022-02-18 ENCOUNTER — OFFICE VISIT (OUTPATIENT)
Dept: DERMATOLOGY | Facility: CLINIC | Age: 67
End: 2022-02-18
Payer: COMMERCIAL

## 2022-02-18 DIAGNOSIS — L82.1 SEBORRHEIC KERATOSIS: ICD-10-CM

## 2022-02-18 DIAGNOSIS — D48.5 NEOPLASM OF UNCERTAIN BEHAVIOR OF SKIN: Primary | ICD-10-CM

## 2022-02-18 DIAGNOSIS — L57.0 ACTINIC KERATOSIS: ICD-10-CM

## 2022-02-18 DIAGNOSIS — Z85.828 HISTORY OF NONMELANOMA SKIN CANCER: ICD-10-CM

## 2022-02-18 DIAGNOSIS — L81.4 LENTIGINES: ICD-10-CM

## 2022-02-18 PROCEDURE — 17000 DESTRUCT PREMALG LESION: CPT | Mod: 59,S$GLB,, | Performed by: DERMATOLOGY

## 2022-02-18 PROCEDURE — 4010F ACE/ARB THERAPY RXD/TAKEN: CPT | Mod: CPTII,S$GLB,, | Performed by: DERMATOLOGY

## 2022-02-18 PROCEDURE — 88342 IMHCHEM/IMCYTCHM 1ST ANTB: CPT | Performed by: PATHOLOGY

## 2022-02-18 PROCEDURE — 17003 DESTRUCTION, PREMALIGNANT LESIONS; SECOND THROUGH 14 LESIONS: ICD-10-PCS | Mod: S$GLB,,, | Performed by: DERMATOLOGY

## 2022-02-18 PROCEDURE — 88305 TISSUE EXAM BY PATHOLOGIST: ICD-10-PCS | Mod: 26,,, | Performed by: PATHOLOGY

## 2022-02-18 PROCEDURE — 88342 IMHCHEM/IMCYTCHM 1ST ANTB: CPT | Mod: 26,,, | Performed by: PATHOLOGY

## 2022-02-18 PROCEDURE — 4010F PR ACE/ARB THEARPY RXD/TAKEN: ICD-10-PCS | Mod: CPTII,S$GLB,, | Performed by: DERMATOLOGY

## 2022-02-18 PROCEDURE — 11301 PR SHAV SKIN LES 0.6-1.0 CM TRUNK,ARM,LEG: ICD-10-PCS | Mod: S$GLB,,, | Performed by: DERMATOLOGY

## 2022-02-18 PROCEDURE — 88305 TISSUE EXAM BY PATHOLOGIST: CPT | Performed by: PATHOLOGY

## 2022-02-18 PROCEDURE — 11301 SHAVE SKIN LESION 0.6-1.0 CM: CPT | Mod: S$GLB,,, | Performed by: DERMATOLOGY

## 2022-02-18 PROCEDURE — 17003 DESTRUCT PREMALG LES 2-14: CPT | Mod: S$GLB,,, | Performed by: DERMATOLOGY

## 2022-02-18 PROCEDURE — 88342 CHG IMMUNOCYTOCHEMISTRY: ICD-10-PCS | Mod: 26,,, | Performed by: PATHOLOGY

## 2022-02-18 PROCEDURE — 1159F PR MEDICATION LIST DOCUMENTED IN MEDICAL RECORD: ICD-10-PCS | Mod: CPTII,S$GLB,, | Performed by: DERMATOLOGY

## 2022-02-18 PROCEDURE — 88305 TISSUE EXAM BY PATHOLOGIST: CPT | Mod: 26,,, | Performed by: PATHOLOGY

## 2022-02-18 PROCEDURE — 99213 OFFICE O/P EST LOW 20 MIN: CPT | Mod: 25,S$GLB,, | Performed by: DERMATOLOGY

## 2022-02-18 PROCEDURE — 17000 PR DESTRUCTION(LASER SURGERY,CRYOSURGERY,CHEMOSURGERY),PREMALIGNANT LESIONS,FIRST LESION: ICD-10-PCS | Mod: 59,S$GLB,, | Performed by: DERMATOLOGY

## 2022-02-18 PROCEDURE — 1160F PR REVIEW ALL MEDS BY PRESCRIBER/CLIN PHARMACIST DOCUMENTED: ICD-10-PCS | Mod: CPTII,S$GLB,, | Performed by: DERMATOLOGY

## 2022-02-18 PROCEDURE — 1159F MED LIST DOCD IN RCRD: CPT | Mod: CPTII,S$GLB,, | Performed by: DERMATOLOGY

## 2022-02-18 PROCEDURE — 99213 PR OFFICE/OUTPT VISIT, EST, LEVL III, 20-29 MIN: ICD-10-PCS | Mod: 25,S$GLB,, | Performed by: DERMATOLOGY

## 2022-02-18 PROCEDURE — 1160F RVW MEDS BY RX/DR IN RCRD: CPT | Mod: CPTII,S$GLB,, | Performed by: DERMATOLOGY

## 2022-02-18 NOTE — PROGRESS NOTES
Patient Information  Name: Diane Garcia  : 1955  MRN: [id number]     Referring Physician:  No ref. provider found   Primary Care Physician:  Lacie Hinton MD   Date of Visit: 2022      Subjective:     History of Present lllness:    Diane Garcia is a 66 y.o. female who presents with a chief complaint of SCC and lesion.    Location: nose, upper lip  Duration: 1 year  Signs/Symptoms: rough  Relieving factors/Prior treatments: cryo x1    Location: left upper arm  Duration: 1 year  Signs/Symptoms: pink  Relieving factors/Prior treatments: ED&C    Patient was last seen: 10/14/2021.  Prior notes by myself reviewed.   Clinical documentation obtained by nursing staff reviewed.    Review of Systems   Skin: Negative for itching and rash.       Objective:   Physical Exam   Constitutional: She appears well-developed and well-nourished. No distress.   Neurological: She is alert and oriented to person, place, and time. She is not disoriented.   Psychiatric: She has a normal mood and affect.   Skin:   Areas Examined (abnormalities noted in diagram):   Head / Face Inspection Performed  RUE Inspected  LUE Inspection Performed                 Diagram Legend     Erythematous scaling macule/papule c/w actinic keratosis       Vascular papule c/w angioma      Pigmented verrucoid papule/plaque c/w seborrheic keratosis      Yellow umbilicated papule c/w sebaceous hyperplasia      Irregularly shaped tan macule c/w lentigo     1-2 mm smooth white papules consistent with Milia      Movable subcutaneous cyst with punctum c/w epidermal inclusion cyst      Subcutaneous movable cyst c/w pilar cyst      Firm pink to brown papule c/w dermatofibroma      Pedunculated fleshy papule(s) c/w skin tag(s)      Evenly pigmented macule c/w junctional nevus     Mildly variegated pigmented, slightly irregular-bordered macule c/w mildly atypical nevus      Flesh colored to evenly pigmented papule c/w intradermal nevus       Pink pearly  papule/plaque c/w basal cell carcinoma      Erythematous hyperkeratotic cursted plaque c/w SCC      Surgical scar with no sign of skin cancer recurrence      Open and closed comedones      Inflammatory papules and pustules      Verrucoid papule consistent consistent with wart     Erythematous eczematous patches and plaques     Dystrophic onycholytic nail with subungual debris c/w onychomycosis     Umbilicated papule    Erythematous-base heme-crusted tan verrucoid plaque consistent with inflamed seborrheic keratosis     Erythematous Silvery Scaling Plaque c/w Psoriasis     See annotation          [] Data reviewed  [] Prior external notes reviewed  [] Independent review of test  [] Management discussed with another provider  [] Independent historian    Assessment / Plan:      Pathology Orders:     Normal Orders This Visit    Specimen to Pathology, Dermatology     Comments:    Number of Specimens:->1  ------------------------->-------------------------  Spec 1 Procedure:->Biopsy  Spec 1 Clinical Impression:->r/o dysplastic nevus  Spec 1 Source:->left proximal upper arm    Questions:    Procedure Type: Dermatology and skin neoplasms    Number of Specimens: 1    ------------------------: -------------------------    Spec 1 Procedure: Biopsy    Spec 1 Clinical Impression: r/o dysplastic nevus    Spec 1 Source: left proximal upper arm    Release to patient:         Neoplasm of uncertain behavior of skin  -     Specimen to Pathology, Dermatology    Shave removal procedure note:  Risk, benefits, and alternatives of shave removal are discussed with the patient, including risk of infection, scar, recurrence, and need for additional treatment of site. The patient agrees to the procedure by verbal consent. The area is marked and prepped with alcohol.  Approximately 1 mL of lidocaine 1% with epinephrine is used for local anesthesia. A sharp blade is used to remove the entire lesion with a minimal margin of normal-appearing skin.  The specimen is sent to pathology for histologic confirmation. Hemostasis is obtained with aluminum chloride and/or monopolar hyfrecation if needed. The area is then dressed and bandaged. The patient tolerated the procedure well without adverse event. Written instructions on wound care were given and were reviewed with the patient, who is to call for any signs of bleeding or infection. The patient will be notified of the pathology results.  Size of lesion: 8 x 6 mm    Actinic keratosis  Cryosurgery procedure note:  Risk, benefits, and alternatives of cryosurgery are discussed with the patient, including but not limited to the risks of hypopigmentation, hyperpigmentation, scar, infection, recurrence of lesion(s), development of new lesion(s), and need for additional treatment of the lesion(s). Verbal consent obtained from patient. Liquid nitrogen cryosurgery applied to 2 lesion(s) to produce a freeze injury. Counseled patient that blisters may form, and instructed patient on wound care with gentle cleansing and use of Vaseline ointment to keep moist until healed. Handout was provided, and patient was instructed to return to clinic in 1-2 months if lesions do not completely resolve.    Seborrheic keratosis  These are benign, inherited growths without a malignant potential. Reassurance given to patient. No treatment is necessary.    Lentigines  These are benign sun spots which should be monitored for changes. Daily sun protection will reduce the number of new lesions.   Recommend using a broad-spectrum, water-resistant sunscreen with SPF of 30 or higher--reapply every 2 hours. Seek shade, wear sun-protective clothing, and perform regular skin self-exams.    History of nonmelanoma skin cancer   - stable and chronic  Area(s) of previous nonmelanoma skin cancer evaluated with no evidence of recurrence. Reassurance provided.  Recommend using a broad-spectrum, water-resistant sunscreen with SPF of 30 or higher--reapply  every 2 hours. Seek shade, wear sun-protective clothing, and perform regular skin self-exams.    Follow up in about 6 months (around 8/18/2022) for TBSE, or sooner dependent on pathology results, or if any new problems or changing lesions.      Carla Mojica MD, FAAD  [facility]

## 2022-02-28 LAB
FINAL PATHOLOGIC DIAGNOSIS: NORMAL
GROSS: NORMAL
Lab: NORMAL
MICROSCOPIC EXAM: NORMAL

## 2022-03-02 NOTE — PROGRESS NOTES
Final Pathologic Diagnosis Skin, left proximal upper arm, shave biopsy:   -LENTIGINOUS MELANOCYTIC NEVUS, IRRITATED

## 2022-03-07 ENCOUNTER — OFFICE VISIT (OUTPATIENT)
Dept: PRIMARY CARE CLINIC | Facility: CLINIC | Age: 67
End: 2022-03-07
Payer: COMMERCIAL

## 2022-03-07 VITALS
SYSTOLIC BLOOD PRESSURE: 132 MMHG | TEMPERATURE: 98 F | HEART RATE: 67 BPM | OXYGEN SATURATION: 98 % | BODY MASS INDEX: 37.57 KG/M2 | HEIGHT: 65 IN | DIASTOLIC BLOOD PRESSURE: 70 MMHG | WEIGHT: 225.5 LBS

## 2022-03-07 DIAGNOSIS — Z12.31 OTHER SCREENING MAMMOGRAM: Primary | ICD-10-CM

## 2022-03-07 DIAGNOSIS — K21.9 GASTROESOPHAGEAL REFLUX DISEASE, UNSPECIFIED WHETHER ESOPHAGITIS PRESENT: ICD-10-CM

## 2022-03-07 DIAGNOSIS — Z79.2 PROPHYLACTIC ANTIBIOTIC: ICD-10-CM

## 2022-03-07 DIAGNOSIS — K44.9 HIATAL HERNIA: ICD-10-CM

## 2022-03-07 DIAGNOSIS — I10 HYPERTENSION, UNSPECIFIED TYPE: ICD-10-CM

## 2022-03-07 DIAGNOSIS — R63.4 WEIGHT LOSS: ICD-10-CM

## 2022-03-07 DIAGNOSIS — Z00.00 ROUTINE GENERAL MEDICAL EXAMINATION AT A HEALTH CARE FACILITY: ICD-10-CM

## 2022-03-07 DIAGNOSIS — R91.1 LUNG NODULE: ICD-10-CM

## 2022-03-07 PROCEDURE — 99999 PR PBB SHADOW E&M-EST. PATIENT-LVL V: CPT | Mod: PBBFAC,,, | Performed by: INTERNAL MEDICINE

## 2022-03-07 PROCEDURE — 1159F PR MEDICATION LIST DOCUMENTED IN MEDICAL RECORD: ICD-10-PCS | Mod: CPTII,S$GLB,, | Performed by: INTERNAL MEDICINE

## 2022-03-07 PROCEDURE — 4010F ACE/ARB THERAPY RXD/TAKEN: CPT | Mod: CPTII,S$GLB,, | Performed by: INTERNAL MEDICINE

## 2022-03-07 PROCEDURE — 3008F BODY MASS INDEX DOCD: CPT | Mod: CPTII,S$GLB,, | Performed by: INTERNAL MEDICINE

## 2022-03-07 PROCEDURE — 3075F SYST BP GE 130 - 139MM HG: CPT | Mod: CPTII,S$GLB,, | Performed by: INTERNAL MEDICINE

## 2022-03-07 PROCEDURE — 99214 PR OFFICE/OUTPT VISIT, EST, LEVL IV, 30-39 MIN: ICD-10-PCS | Mod: S$GLB,,, | Performed by: INTERNAL MEDICINE

## 2022-03-07 PROCEDURE — 3008F PR BODY MASS INDEX (BMI) DOCUMENTED: ICD-10-PCS | Mod: CPTII,S$GLB,, | Performed by: INTERNAL MEDICINE

## 2022-03-07 PROCEDURE — 4010F PR ACE/ARB THEARPY RXD/TAKEN: ICD-10-PCS | Mod: CPTII,S$GLB,, | Performed by: INTERNAL MEDICINE

## 2022-03-07 PROCEDURE — 1160F PR REVIEW ALL MEDS BY PRESCRIBER/CLIN PHARMACIST DOCUMENTED: ICD-10-PCS | Mod: CPTII,S$GLB,, | Performed by: INTERNAL MEDICINE

## 2022-03-07 PROCEDURE — 3078F DIAST BP <80 MM HG: CPT | Mod: CPTII,S$GLB,, | Performed by: INTERNAL MEDICINE

## 2022-03-07 PROCEDURE — 99999 PR PBB SHADOW E&M-EST. PATIENT-LVL V: ICD-10-PCS | Mod: PBBFAC,,, | Performed by: INTERNAL MEDICINE

## 2022-03-07 PROCEDURE — 3075F PR MOST RECENT SYSTOLIC BLOOD PRESS GE 130-139MM HG: ICD-10-PCS | Mod: CPTII,S$GLB,, | Performed by: INTERNAL MEDICINE

## 2022-03-07 PROCEDURE — 1160F RVW MEDS BY RX/DR IN RCRD: CPT | Mod: CPTII,S$GLB,, | Performed by: INTERNAL MEDICINE

## 2022-03-07 PROCEDURE — 3078F PR MOST RECENT DIASTOLIC BLOOD PRESSURE < 80 MM HG: ICD-10-PCS | Mod: CPTII,S$GLB,, | Performed by: INTERNAL MEDICINE

## 2022-03-07 PROCEDURE — 1159F MED LIST DOCD IN RCRD: CPT | Mod: CPTII,S$GLB,, | Performed by: INTERNAL MEDICINE

## 2022-03-07 PROCEDURE — 99214 OFFICE O/P EST MOD 30 MIN: CPT | Mod: S$GLB,,, | Performed by: INTERNAL MEDICINE

## 2022-03-07 RX ORDER — PREDNISONE 20 MG/1
TABLET ORAL
COMMUNITY
Start: 2021-10-27 | End: 2022-03-07 | Stop reason: ALTCHOICE

## 2022-03-07 RX ORDER — FAMOTIDINE 40 MG/1
TABLET, FILM COATED ORAL
COMMUNITY
Start: 2021-10-27 | End: 2022-03-07 | Stop reason: ALTCHOICE

## 2022-03-07 RX ORDER — AZITHROMYCIN 250 MG/1
TABLET, FILM COATED ORAL
Qty: 6 TABLET | Refills: 0 | Status: SHIPPED | OUTPATIENT
Start: 2022-03-07 | End: 2022-03-12

## 2022-03-07 RX ORDER — HYDROXYZINE HYDROCHLORIDE 25 MG/1
TABLET, FILM COATED ORAL
COMMUNITY
Start: 2021-10-27 | End: 2022-03-07 | Stop reason: ALTCHOICE

## 2022-03-07 NOTE — PATIENT INSTRUCTIONS
Tdap today  Schedule for shingles vaccine #1  Schedule for PPSV23 vaccine (please check your records to make sure that you received PCV15)  Labs are fasting, no eating for 6-8 hrs

## 2022-03-07 NOTE — PROGRESS NOTES
Answers for HPI/ROS submitted by the patient on 3/1/2022  activity change: No  unexpected weight change: No  rhinorrhea: No  trouble swallowing: No  visual disturbance: No  chest tightness: No  polyuria: No  difficulty urinating: No  menstrual problem: No  joint swelling: No  arthralgias: No  confusion: No  dysphoric mood: No    Subjective:      Patient ID: Diane Garcia is a 66 y.o. female.    Chief Complaint: Establish Care    Here today for general exam. Recently moved here from [place]    HTN: BP at office well controlled at 132/70. Normally runs around similar number at home. Denies CP/SOB/lightheadedness/dizziness. Continue HCTZ and Olmesartan    GERD:  Does have a history of gastric reflux most likely secondary to large hiatal hernia. She was seeing gastroenterologist for opinions in regards to this but since she has recently moved she would like to be set up with one in [facility]. Continues on nexium at this time.     HLD: Continue lovastatin and omega 3    Obesity with BMI 37.5, Interested in seeing bariatrics to help with weight loss. Referrals made    Travel:  She is going to travel to [place] for 3 weeks. Requesting z-kahlil to take with her to travel.     Up to date with colonoscopy. Mammogram due June. Would like tetanus booster today.     Denies any chest pain, shortness of breath, nausea vomiting constipation diarrhea, blood in stool, heartburn    Review of Systems   Constitutional: Negative for chills, fever and weight loss.   HENT: Negative for congestion, ear pain, hearing loss and sore throat.    Eyes: Negative for double vision and discharge.   Respiratory: Negative for cough, shortness of breath and wheezing.    Cardiovascular: Negative for chest pain, palpitations and leg swelling.   Gastrointestinal: Negative for abdominal pain, blood in stool, constipation, diarrhea, heartburn, nausea and vomiting.   Genitourinary: Negative for dysuria and hematuria.   Musculoskeletal: Negative for neck pain.    Skin: Negative for rash.   Neurological: Negative for dizziness, tingling, weakness and headaches.   Endo/Heme/Allergies: Negative for polydipsia.   Psychiatric/Behavioral: Negative for depression.          Current Outpatient Medications:     biotin 1 mg Cap, Take by mouth., Disp: , Rfl:     cartilage/collagen/bor/hyalur (JOINT HEALTH ORAL), Take by mouth., Disp: , Rfl:     cinnamon bark 500 mg capsule, Take 500 mg by mouth once daily., Disp: , Rfl:     esomeprazole (NEXIUM) 20 MG capsule, Take 20 mg by mouth before breakfast., Disp: , Rfl:     hydroCHLOROthiazide (HYDRODIURIL) 25 MG tablet, Take 1 tablet (25 mg total) by mouth once daily., Disp: 90 tablet, Rfl: 0    lovastatin (MEVACOR) 40 MG tablet, Take 1 tablet (40 mg total) by mouth every evening., Disp: 90 tablet, Rfl: 3    NICOTROL 10 mg Crtg, Inhale 1 puff into the lungs as needed (nicotine cravings)., Disp: 168 each, Rfl: 0    olmesartan (BENICAR) 40 MG tablet, Take 1 tablet (40 mg total) by mouth once daily., Disp: 90 tablet, Rfl: 3    omega-3 fatty acids/fish oil (FISH OIL-OMEGA-3 FATTY ACIDS) 300-1,000 mg capsule, Take by mouth once daily., Disp: , Rfl:     TURMERIC ORAL, Take by mouth., Disp: , Rfl:     azithromycin (Z-SARY) 250 MG tablet, Take 2 tablets by mouth on day 1; Take 1 tablet by mouth on days 2-5, Disp: 6 tablet, Rfl: 0    Lab Results   Component Value Date    HGBA1C 5.6 02/02/2021     No results found for: MICALBCREAT  Lab Results   Component Value Date    LDLCALC 94.8 02/02/2021    CHOL 171 02/02/2021    HDL 40 02/02/2021    TRIG 181 (H) 02/02/2021       Lab Results   Component Value Date     02/02/2021    K 3.9 02/02/2021     02/02/2021    CO2 27 02/02/2021     (H) 02/02/2021    BUN 12 02/02/2021    CREATININE 0.6 02/02/2021    CREATININE 0.6 02/02/2021    CALCIUM 9.3 02/02/2021    PROT 7.2 02/02/2021    ALBUMIN 4.0 02/02/2021    BILITOT 1.1 (H) 02/02/2021    ALKPHOS 52 (L) 02/02/2021    AST 16 02/02/2021     "ALT 30 02/02/2021    ANIONGAP 11 02/02/2021    ESTGFRAFRICA >60.0 02/02/2021    ESTGFRAFRICA >60.0 02/02/2021    EGFRNONAA >60.0 02/02/2021    EGFRNONAA >60.0 02/02/2021    WBC 7.80 02/23/2021    HGB 12.3 02/23/2021    HGB 12.4 02/02/2021    HCT 35.8 (L) 02/23/2021    MCV 89 02/23/2021     02/23/2021    TSH 1.554 02/02/2021       No results found for: LH, FSH, TOTALTESTOST, PROGESTERONE, ESTRADIOL, VWLYDZEX28FX, AIHBCQMH67, FERRITIN, IRON, TRANSFERRIN, TIBC, FESATURATED, ZINC      Past Medical History:   Diagnosis Date    Hyperlipidemia     Hypertension      Past Surgical History:   Procedure Laterality Date    HYSTERECTOMY       Social History     Social History Narrative    Not on file     Family History   Problem Relation Age of Onset    Cancer Mother         Lung    COPD Father     No Known Problems Sister     Melanoma Neg Hx      Vitals:    03/07/22 1053 03/07/22 1125   BP: (!) 144/78 132/70   Pulse: 67    Temp: 98.1 °F (36.7 °C)    TempSrc: Oral    SpO2: 98%    Weight: 102.3 kg (225 lb 8.5 oz)    Height: 5' 5" (1.651 m)    PainSc: 0-No pain      Objective:   Physical Exam  Vitals reviewed.   Constitutional:       Appearance: Normal appearance.   HENT:      Head: Normocephalic.      Right Ear: Tympanic membrane, ear canal and external ear normal.      Left Ear: Tympanic membrane, ear canal and external ear normal.      Nose: Nose normal.      Mouth/Throat:      Mouth: Mucous membranes are moist.      Pharynx: Oropharynx is clear.   Eyes:      Conjunctiva/sclera: Conjunctivae normal.      Pupils: Pupils are equal, round, and reactive to light.   Cardiovascular:      Rate and Rhythm: Normal rate and regular rhythm.      Pulses: Normal pulses.   Pulmonary:      Effort: Pulmonary effort is normal.      Breath sounds: Normal breath sounds.   Abdominal:      General: Abdomen is flat. Bowel sounds are normal.      Palpations: Abdomen is soft.   Musculoskeletal:      Cervical back: Neck supple.   Skin:   "   General: Skin is warm.   Neurological:      General: No focal deficit present.      Mental Status: She is alert.   Psychiatric:         Mood and Affect: Mood normal.       Assessment/Plan     Diane Garcia is a 66 y.o.female with:    Other screening mammogram  -     Mammo Digital Screening Bilat; Future; Expected date: 06/07/2022    Gastroesophageal reflux disease, unspecified whether esophagitis present  -     Ambulatory referral/consult to Gastroenterology; Future; Expected date: 03/14/2022    Hiatal hernia  -     Ambulatory referral/consult to Gastroenterology; Future; Expected date: 03/14/2022    Lung nodule  -     X-Ray Chest PA And Lateral; Future; Expected date: 03/07/2022    Routine general medical examination at a health care facility  -     CBC Auto Differential; Future; Expected date: 03/07/2022  -     TSH; Future; Expected date: 03/07/2022  -     Hepatitis C Antibody; Future; Expected date: 03/07/2022    Hypertension, unspecified type  -     Comprehensive Metabolic Panel; Future; Expected date: 03/07/2022  -     TSH; Future; Expected date: 03/07/2022  -     Lipid Panel; Future; Expected date: 03/07/2022    Weight loss  -     Ambulatory referral/consult to Bariatric Medicine; Future; Expected date: 03/14/2022    Prophylactic antibiotic  -     azithromycin (Z-SARY) 250 MG tablet; Take 2 tablets by mouth on day 1; Take 1 tablet by mouth on days 2-5  Dispense: 6 tablet; Refill: 0         Chronic conditions status updated as per HPI.  Other than changes above, cont current medications and maintain follow up with specialists.  Return to clinic in Follow up in about 6 months (around 9/7/2022).      Beverly Oakes MD  [facility]    Patient Instructions   1. Tdap today  2. Schedule for shingles vaccine #1  3. Schedule for PPSV23 vaccine (please check your records to make sure that you received PCV15)  4. Labs are fasting, no eating for 6-8 hrs

## 2022-03-09 ENCOUNTER — PATIENT MESSAGE (OUTPATIENT)
Dept: PRIMARY CARE CLINIC | Facility: CLINIC | Age: 67
End: 2022-03-09
Payer: COMMERCIAL

## 2022-03-09 RX ORDER — MELOXICAM 15 MG/1
15 TABLET ORAL DAILY PRN
Qty: 30 TABLET | Refills: 6 | Status: SHIPPED | OUTPATIENT
Start: 2022-03-09 | End: 2022-06-29 | Stop reason: SDUPTHER

## 2022-03-09 NOTE — TELEPHONE ENCOUNTER
Medication put back in to your chart. I also went ahead and ordered some refills in case you need them.

## 2022-03-09 NOTE — TELEPHONE ENCOUNTER
Patient noticed that during her last visit, Meloxicam 15MG, was discontinued on 3/7/2022.   Patient stated that she still need the medication for Knee inflammation.   She is requesting that Meloxicam 15MG is replaced back on her medication list please.

## 2022-03-09 NOTE — TELEPHONE ENCOUNTER
The following is the original script;    meloxicam (MOBIC) 15 MG tablet (Discontinued) 21 tablet 3 1/27/2022 3/7/2022 No   Sig: TAKE 1 TABLET BY MOUTH EVERY DAY AS NEEDED FOR KNEE PAIN   Sent to pharmacy as: meloxicam (MOBIC) 15 MG tablet   Class: Normal   Notes to Pharmacy: ZERO refills remain on this prescription. Your patient is requesting advance approval of refills for this medication to PREVENT ANY MISSED DOSES   Reason for Discontinue: Therapy completed   Order: [id number]   Date/Time Signed: 1/27/2022 16:37       E-Prescribing Status: Receipt confirmed by pharmacy (1/27/2022  4:37 PM CST)

## 2022-03-11 ENCOUNTER — HOSPITAL ENCOUNTER (OUTPATIENT)
Dept: RADIOLOGY | Facility: HOSPITAL | Age: 67
Discharge: HOME | End: 2022-03-11
Attending: INTERNAL MEDICINE
Payer: COMMERCIAL

## 2022-03-11 DIAGNOSIS — R91.1 LUNG NODULE: ICD-10-CM

## 2022-03-11 PROCEDURE — 71046 XR CHEST PA AND LATERAL: ICD-10-PCS | Mod: 26,,, | Performed by: RADIOLOGY

## 2022-03-11 PROCEDURE — 71046 X-RAY EXAM CHEST 2 VIEWS: CPT | Mod: TC,PN

## 2022-03-11 PROCEDURE — 71046 X-RAY EXAM CHEST 2 VIEWS: CPT | Mod: 26,,, | Performed by: RADIOLOGY

## 2022-03-11 NOTE — PROGRESS NOTES
Hello!    Just got your chest X ray results. They look great. They do show some arthritis on your bones. No significant nodules were observed in the x-ray. We will continue to monitor your hernia as well.

## 2022-03-15 ENCOUNTER — PATIENT MESSAGE (OUTPATIENT)
Dept: ENDOSCOPY | Facility: HOSPITAL | Age: 67
End: 2022-03-15
Payer: COMMERCIAL

## 2022-03-25 DIAGNOSIS — M25.562 PAIN IN BOTH KNEES, UNSPECIFIED CHRONICITY: Primary | ICD-10-CM

## 2022-03-25 DIAGNOSIS — M25.561 PAIN IN BOTH KNEES, UNSPECIFIED CHRONICITY: Primary | ICD-10-CM

## 2022-03-27 ENCOUNTER — PATIENT OUTREACH (OUTPATIENT)
Dept: ADMINISTRATIVE | Facility: OTHER | Age: 67
End: 2022-03-27
Payer: COMMERCIAL

## 2022-03-28 ENCOUNTER — HOSPITAL ENCOUNTER (OUTPATIENT)
Dept: RADIOLOGY | Facility: HOSPITAL | Age: 67
Discharge: HOME | End: 2022-03-28
Attending: PHYSICIAN ASSISTANT
Payer: COMMERCIAL

## 2022-03-28 ENCOUNTER — OFFICE VISIT (OUTPATIENT)
Dept: SPORTS MEDICINE | Facility: CLINIC | Age: 67
End: 2022-03-28
Payer: COMMERCIAL

## 2022-03-28 VITALS
SYSTOLIC BLOOD PRESSURE: 127 MMHG | WEIGHT: 228.19 LBS | HEIGHT: 65 IN | BODY MASS INDEX: 38.02 KG/M2 | DIASTOLIC BLOOD PRESSURE: 83 MMHG | HEART RATE: 64 BPM

## 2022-03-28 DIAGNOSIS — M25.561 PAIN IN BOTH KNEES, UNSPECIFIED CHRONICITY: ICD-10-CM

## 2022-03-28 DIAGNOSIS — M17.12 PRIMARY OSTEOARTHRITIS OF LEFT KNEE: ICD-10-CM

## 2022-03-28 DIAGNOSIS — M25.562 PAIN IN BOTH KNEES, UNSPECIFIED CHRONICITY: ICD-10-CM

## 2022-03-28 DIAGNOSIS — M17.11 PRIMARY OSTEOARTHRITIS OF RIGHT KNEE: Primary | ICD-10-CM

## 2022-03-28 PROCEDURE — 73564 X-RAY EXAM KNEE 4 OR MORE: CPT | Mod: 26,,, | Performed by: RADIOLOGY

## 2022-03-28 PROCEDURE — 3074F PR MOST RECENT SYSTOLIC BLOOD PRESSURE < 130 MM HG: ICD-10-PCS | Mod: CPTII,S$GLB,, | Performed by: PHYSICIAN ASSISTANT

## 2022-03-28 PROCEDURE — 20610 PR DRAIN/INJECT LARGE JOINT/BURSA: ICD-10-PCS | Mod: 50,S$GLB,, | Performed by: PHYSICIAN ASSISTANT

## 2022-03-28 PROCEDURE — 4010F PR ACE/ARB THEARPY RXD/TAKEN: ICD-10-PCS | Mod: CPTII,S$GLB,, | Performed by: PHYSICIAN ASSISTANT

## 2022-03-28 PROCEDURE — 73564 X-RAY EXAM KNEE 4 OR MORE: CPT | Mod: TC,50,PN

## 2022-03-28 PROCEDURE — 73564 XR KNEE ORTHO BILAT WITH FLEXION: ICD-10-PCS | Mod: 26,,, | Performed by: RADIOLOGY

## 2022-03-28 PROCEDURE — 99214 OFFICE O/P EST MOD 30 MIN: CPT | Mod: 25,S$GLB,, | Performed by: PHYSICIAN ASSISTANT

## 2022-03-28 PROCEDURE — 20610 DRAIN/INJ JOINT/BURSA W/O US: CPT | Mod: 50,S$GLB,, | Performed by: PHYSICIAN ASSISTANT

## 2022-03-28 PROCEDURE — 1160F PR REVIEW ALL MEDS BY PRESCRIBER/CLIN PHARMACIST DOCUMENTED: ICD-10-PCS | Mod: CPTII,S$GLB,, | Performed by: PHYSICIAN ASSISTANT

## 2022-03-28 PROCEDURE — 3008F PR BODY MASS INDEX (BMI) DOCUMENTED: ICD-10-PCS | Mod: CPTII,S$GLB,, | Performed by: PHYSICIAN ASSISTANT

## 2022-03-28 PROCEDURE — 3079F PR MOST RECENT DIASTOLIC BLOOD PRESSURE 80-89 MM HG: ICD-10-PCS | Mod: CPTII,S$GLB,, | Performed by: PHYSICIAN ASSISTANT

## 2022-03-28 PROCEDURE — 99999 PR PBB SHADOW E&M-EST. PATIENT-LVL III: ICD-10-PCS | Mod: PBBFAC,,, | Performed by: PHYSICIAN ASSISTANT

## 2022-03-28 PROCEDURE — 3079F DIAST BP 80-89 MM HG: CPT | Mod: CPTII,S$GLB,, | Performed by: PHYSICIAN ASSISTANT

## 2022-03-28 PROCEDURE — 99214 PR OFFICE/OUTPT VISIT, EST, LEVL IV, 30-39 MIN: ICD-10-PCS | Mod: 25,S$GLB,, | Performed by: PHYSICIAN ASSISTANT

## 2022-03-28 PROCEDURE — 4010F ACE/ARB THERAPY RXD/TAKEN: CPT | Mod: CPTII,S$GLB,, | Performed by: PHYSICIAN ASSISTANT

## 2022-03-28 PROCEDURE — 99999 PR PBB SHADOW E&M-EST. PATIENT-LVL III: CPT | Mod: PBBFAC,,, | Performed by: PHYSICIAN ASSISTANT

## 2022-03-28 PROCEDURE — 1159F MED LIST DOCD IN RCRD: CPT | Mod: CPTII,S$GLB,, | Performed by: PHYSICIAN ASSISTANT

## 2022-03-28 PROCEDURE — 3008F BODY MASS INDEX DOCD: CPT | Mod: CPTII,S$GLB,, | Performed by: PHYSICIAN ASSISTANT

## 2022-03-28 PROCEDURE — 1160F RVW MEDS BY RX/DR IN RCRD: CPT | Mod: CPTII,S$GLB,, | Performed by: PHYSICIAN ASSISTANT

## 2022-03-28 PROCEDURE — 3074F SYST BP LT 130 MM HG: CPT | Mod: CPTII,S$GLB,, | Performed by: PHYSICIAN ASSISTANT

## 2022-03-28 PROCEDURE — 1159F PR MEDICATION LIST DOCUMENTED IN MEDICAL RECORD: ICD-10-PCS | Mod: CPTII,S$GLB,, | Performed by: PHYSICIAN ASSISTANT

## 2022-03-28 RX ORDER — TRIAMCINOLONE ACETONIDE 40 MG/ML
40 INJECTION, SUSPENSION INTRA-ARTICULAR; INTRAMUSCULAR
Status: COMPLETED | OUTPATIENT
Start: 2022-03-28 | End: 2022-03-28

## 2022-03-28 RX ADMIN — TRIAMCINOLONE ACETONIDE 40 MG: 40 INJECTION, SUSPENSION INTRA-ARTICULAR; INTRAMUSCULAR at 11:03

## 2022-03-28 NOTE — PROGRESS NOTES
CC: Left knee pain, patient is retired, referred by her neighbor Lawrence/Kojo Ferreira    66 y.o. Female with a history of Left pain who She states that the pain is severe and not responding to any conservative care.  Plans to travel to [place] tomorrow.    Pain has been present many years.     She completed bilateral knee euflexxa injections with no pain relief.     She is here today requesting bilateral knee CSI injections.     Previous:  Patient notes history of meniscus surgery in June of 2019 when she was living in [place]   She notes being prescribed a hinged knee brace that she wears when she goes for walks or plays pickle ball   She notes that steroid injection or gel injections were discussed prior to her moving to [place] and was never able to proceed with those treatment options     Neg mechanical symptoms, no instability  Denies any swelling   She notes being unable to pinpoint the location of her knee pain     Is affecting ADLs.    She notes pain with stairs or with activity     She notes taking motrin for her pain    SANE: 50    Review of Systems   Constitution: Negative. Negative for chills, fever and night sweats.   HENT: Negative for congestion and headaches.    Eyes: Negative for blurred vision, left vision loss and right vision loss.   Cardiovascular: Negative for chest pain and syncope.   Respiratory: Negative for cough and shortness of breath.    Endocrine: Negative for polydipsia, polyphagia and polyuria.   Hematologic/Lymphatic: Negative for bleeding problem. Does not bruise/bleed easily.   Skin: Negative for dry skin, itching and rash.   Musculoskeletal: Negative for falls. Positive for knee pain and muscle weakness.   Gastrointestinal: Negative for abdominal pain and bowel incontinence.   Genitourinary: Negative for bladder incontinence and nocturia.   Neurological: Negative for disturbances in coordination, loss of balance and seizures.   Psychiatric/Behavioral: Negative for  depression. The patient does not have insomnia.    Allergic/Immunologic: Negative for hives and persistent infections.     PAST MEDICAL HISTORY:   Past Medical History:   Diagnosis Date    Hyperlipidemia     Hypertension      PAST SURGICAL HISTORY:   Past Surgical History:   Procedure Laterality Date    HYSTERECTOMY       FAMILY HISTORY:   Family History   Problem Relation Age of Onset    Cancer Mother         Lung    COPD Father     No Known Problems Sister     Melanoma Neg Hx      SOCIAL HISTORY:   Social History     Socioeconomic History    Marital status:     Number of children: 1   Tobacco Use    Smoking status: Former Smoker     Packs/day: 2.00     Years: 25.00     Pack years: 50.00     Quit date: 2000     Years since quittin.3    Smokeless tobacco: Never Used   Substance and Sexual Activity    Alcohol use: Yes     Comment: A few drinks a week    Drug use: Not Currently     Types: Marijuana     Comment: last use >30 yrs ago     Sexual activity: Yes     Partners: Male       MEDICATIONS:   Current Outpatient Medications:     biotin 1 mg Cap, Take by mouth., Disp: , Rfl:     cartilage/collagen/bor/hyalur (JOINT HEALTH ORAL), Take by mouth., Disp: , Rfl:     cinnamon bark 500 mg capsule, Take 500 mg by mouth once daily., Disp: , Rfl:     diphth,pertus,acell,,tetanus (BOOSTRIX TDAP) 2.5-8-5 Lf-mcg-Lf/0.5mL Syrg injection, Inject into the muscle., Disp: 0.5 mL, Rfl: 0    esomeprazole (NEXIUM) 20 MG capsule, Take 20 mg by mouth before breakfast., Disp: , Rfl:     hydroCHLOROthiazide (HYDRODIURIL) 25 MG tablet, Take 1 tablet (25 mg total) by mouth once daily., Disp: 90 tablet, Rfl: 0    lovastatin (MEVACOR) 40 MG tablet, Take 1 tablet (40 mg total) by mouth every evening., Disp: 90 tablet, Rfl: 3    meloxicam (MOBIC) 15 MG tablet, Take 1 tablet (15 mg total) by mouth daily as needed for Pain., Disp: 30 tablet, Rfl: 6    NICOTROL 10 mg Crtg, Inhale 1 puff into the lungs as  needed (nicotine cravings)., Disp: 168 each, Rfl: 0    olmesartan (BENICAR) 40 MG tablet, Take 1 tablet (40 mg total) by mouth once daily., Disp: 90 tablet, Rfl: 3    omega-3 fatty acids/fish oil (FISH OIL-OMEGA-3 FATTY ACIDS) 300-1,000 mg capsule, Take by mouth once daily., Disp: , Rfl:     TURMERIC ORAL, Take by mouth., Disp: , Rfl:   ALLERGIES:   Review of patient's allergies indicates:   Allergen Reactions    Penicillins      nausea    Tetanus vaccines and toxoid Other (See Comments)     Arm swelling  No angioedema or anaphylaxis       VITAL SIGNS: There were no vitals taken for this visit.     PHYSICAL EXAMINATION  VITAL SIGNS: There were no vitals taken for this visit.   General:  The patient is alert and oriented x 3.  Mood is pleasant.  Observation of ears, eyes and nose reveal no gross abnormalities.  HEENT: NCAT, sclera nonicteric  Lungs: Respirations are equal and unlabored.    Left KNEE EXAMINATION     OBSERVATION / INSPECTION   Gait:   Nonantalgic   Alignment:  Neutral   Scars:   None   Muscle atrophy: Mild  Effusion:  None   Warmth:  None   Discoloration:   none     TENDERNESS / CREPITUS (T / C):          T / C      T / C   Patella   - / -   Lateral joint line   - / -    Peripatellar medial  -  Medial joint line    - / -    Peripatellar lateral -  Medial plica   - / -    Patellar tendon -   Popliteal fossa   - / -    Quad tendon   -   Gastrocnemius   -   Prepatellar Bursa - / -   Quadricep   -   Tibial tubercle  -  Thigh/hamstring  -   Pes anserine/HS -  Fibula    -   ITB   - / -  Tibia     -   Tib/fib joint  - / -  LCL    -     MFC   - / -   MCL: Proximal  -    LFC   - / -    Distal   -          ROM: (* = pain)  PASSIVE   ACTIVE    Left :   5 / 0 / 135   5 / 0 / 135     Right :    5 / 0 / 135   5 / 0 / 135    PATELLOFEMORAL EXAMINATION:  See above noted areas of tenderness.   Patella position    Subluxation / dislocation: Centered           Sup. / Inf;   Normal   Crepitus (PF):    Absent    Patellar Mobility:       Medial-lateral:   Normal    Superior-inferior:  Normal    Inferior tilt   Normal    Patellar tendon:  Normal   Lateral tilt:    Normal   J-sign:     None   Patellofemoral grind:   No pain       MENISCAL SIGNS:     Pain on terminal extension:  Neg  Pain on terminal flexion:  Neg  Fans maneuver:  Neg  Squat     NT    LIGAMENT EXAMINATION:  ACL / Lachman:  normal (-1 to 2mm)    PCL-Post.  drawer: normal 0 to 2mm  MCL- Valgus:  normal 0 to 2mm  LCL- Varus:  normal 0 to 2mm  Pivot shift: normal (Equal)   Dial Test: difference c/w other side   At 30° flexion: normal (< 5°)    At 90° flexion: normal (< 5°)   Reverse Pivot Shift:   normal (Equal)     STRENGTH: (* = with pain) PAINFUL SIDE   Quadricep   5/5   Hamstrin/5    EXTREMITY NEURO-VASCULAR EXAMINATION:   Sensation:  Grossly intact to light touch all dermatomal regions.   Motor Function:  Fully intact motor function at hip, knee, foot and ankle    DTRs;  quadriceps and  achilles 2+.  No clonus and downgoing Babinski.    Vascular status:  DP and PT pulses 2+, brisk capillary refill, symmetric.     Other Findings:       X-rays:  including standing, weight bearing AP and flexion bilateral knees, lateral and merchant views ordered and images reviewed by me show:  No fracture, dislocation  Osteoarthritis with mild-to-moderate joint space narrowing.     ASSESSMENT:    Left Knee osteoarthritis   Right knee osteoarthritis     PLAN:     1. PROCEDURE NOTE: bilateral KNEE INJECTION  After time out was performed, including verification of patient ID, procedure, site and side, availability of information and equipment, review of safety issues, and agreement with consent, the procedure site was marked and the patient was prepped aseptically. A diagnostic and therapeutic injection of 1cc 40 mg kenalog and 2cc of 1% lidocaine and 2cc of 0.25% bupivacaine was given under sterile technique using a 22g x 1.5 needle into the anterior lateral  aspect of the bilateral knees in seated position.   The patient had no adverse reactions to the medication. Pain decreased. The patient was instructed to apply ice to the joint for 20 minutes and avoid strenuous activities for 24-36 hours following the injection. Patient was warned of possible blood sugar and/or blood pressure changes during that time. Following that time, patient can resume regular activities.    2. Mobic daily with food as needed for pain.     3. PT ordered for pain for bilateral knee pain.     RTC prn pain.     Weight loss was discussed with the patient today. She currently weighs 235 lbs, we se a goal for her  to weigh 200 lbs by the end of the year.   All questions were answered, pt will contact us for questions or concerns in the interim.

## 2022-04-25 RX ORDER — HYDROCHLOROTHIAZIDE 25 MG/1
TABLET ORAL
Qty: 90 TABLET | Refills: 3 | Status: SHIPPED | OUTPATIENT
Start: 2022-04-25 | End: 2023-06-05

## 2022-04-25 NOTE — TELEPHONE ENCOUNTER
Refill Routing Note   Medication(s) are not appropriate for processing by [facility] for the following reason(s):      - Non-participating provider    [facility] action(s):  Route          Medication reconciliation completed: No     Appointments  past 12m or future 3m with PCP    Date Provider   Last Visit   11/11/2021 Lacie Hinton MD   Next Visit   Visit date not found Lacie Hinotn MD   ED visits in past 90 days: 0        Note composed:1:29 PM 04/25/2022

## 2022-05-05 ENCOUNTER — PATIENT OUTREACH (OUTPATIENT)
Dept: ADMINISTRATIVE | Facility: OTHER | Age: 67
End: 2022-05-05
Payer: COMMERCIAL

## 2022-05-05 NOTE — PROGRESS NOTES
Care Everywhere: updated  Immunization: updated  Health Maintenance: updated  Media Review:   Legacy Review:   DIS:  Order placed:   Upcoming appts:mammogram 6.30  EFAX:  Task Tickets:  Referrals:

## 2022-05-10 ENCOUNTER — TELEPHONE (OUTPATIENT)
Dept: ENDOSCOPY | Facility: HOSPITAL | Age: 67
End: 2022-05-10
Payer: COMMERCIAL

## 2022-05-10 NOTE — TELEPHONE ENCOUNTER
----- Message from Erica Floyd sent at 5/10/2022 10:20 AM CDT -----  Type:  Sooner Apoointment Request    Caller is requesting a sooner appointment.  Caller declined first available appointment listed below.  Caller will not accept being placed on the waitlist and is requesting a message be sent to doctor.  Name of Caller:Diane  When is the first available appointment?n/a  Symptoms:large hernia  Would the patient rather a call back or a response via [facility]?call back  Best Call Back Number:[phone]  Additional Information: Patient would like a sooner appointment if possible.

## 2022-05-11 NOTE — TELEPHONE ENCOUNTER
----- Message from Alberto Cordero sent at 5/10/2022  1:42 PM CDT -----  Regarding: Appt Access  Pt called to reschedule her missed her appt. Requesting a call back to schedule.      [phone] (home)

## 2022-05-20 ENCOUNTER — PATIENT MESSAGE (OUTPATIENT)
Dept: SPORTS MEDICINE | Facility: CLINIC | Age: 67
End: 2022-05-20
Payer: COMMERCIAL

## 2022-06-28 ENCOUNTER — OFFICE VISIT (OUTPATIENT)
Dept: URGENT CARE | Facility: CLINIC | Age: 67
End: 2022-06-28
Payer: COMMERCIAL

## 2022-06-28 VITALS
WEIGHT: 228 LBS | HEART RATE: 72 BPM | RESPIRATION RATE: 20 BRPM | SYSTOLIC BLOOD PRESSURE: 155 MMHG | TEMPERATURE: 99 F | OXYGEN SATURATION: 97 % | BODY MASS INDEX: 37.99 KG/M2 | DIASTOLIC BLOOD PRESSURE: 82 MMHG | HEIGHT: 65 IN
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
## 2022-06-28 DIAGNOSIS — M62.830 MUSCLE SPASM OF BACK: ICD-10-CM

## 2022-06-28 DIAGNOSIS — M54.6 ACUTE RIGHT-SIDED THORACIC BACK PAIN: Primary | ICD-10-CM

## 2022-06-28 PROCEDURE — 1159F MED LIST DOCD IN RCRD: CPT | Mod: CPTII,S$GLB,, | Performed by: NURSE PRACTITIONER

## 2022-06-28 PROCEDURE — 3008F BODY MASS INDEX DOCD: CPT | Mod: CPTII,S$GLB,, | Performed by: NURSE PRACTITIONER

## 2022-06-28 PROCEDURE — 3079F PR MOST RECENT DIASTOLIC BLOOD PRESSURE 80-89 MM HG: ICD-10-PCS | Mod: CPTII,S$GLB,, | Performed by: NURSE PRACTITIONER

## 2022-06-28 PROCEDURE — 3077F PR MOST RECENT SYSTOLIC BLOOD PRESSURE >= 140 MM HG: ICD-10-PCS | Mod: CPTII,S$GLB,, | Performed by: NURSE PRACTITIONER

## 2022-06-28 PROCEDURE — 3008F PR BODY MASS INDEX (BMI) DOCUMENTED: ICD-10-PCS | Mod: CPTII,S$GLB,, | Performed by: NURSE PRACTITIONER

## 2022-06-28 PROCEDURE — 72070 X-RAY EXAM THORAC SPINE 2VWS: CPT | Mod: FY,S$GLB,, | Performed by: RADIOLOGY

## 2022-06-28 PROCEDURE — 72070 XR THORACIC SPINE AP LATERAL: ICD-10-PCS | Mod: FY,S$GLB,, | Performed by: RADIOLOGY

## 2022-06-28 PROCEDURE — 99214 PR OFFICE/OUTPT VISIT, EST, LEVL IV, 30-39 MIN: ICD-10-PCS | Mod: S$GLB,,, | Performed by: NURSE PRACTITIONER

## 2022-06-28 PROCEDURE — 3077F SYST BP >= 140 MM HG: CPT | Mod: CPTII,S$GLB,, | Performed by: NURSE PRACTITIONER

## 2022-06-28 PROCEDURE — 4010F ACE/ARB THERAPY RXD/TAKEN: CPT | Mod: CPTII,S$GLB,, | Performed by: NURSE PRACTITIONER

## 2022-06-28 PROCEDURE — 1159F PR MEDICATION LIST DOCUMENTED IN MEDICAL RECORD: ICD-10-PCS | Mod: CPTII,S$GLB,, | Performed by: NURSE PRACTITIONER

## 2022-06-28 PROCEDURE — 4010F PR ACE/ARB THEARPY RXD/TAKEN: ICD-10-PCS | Mod: CPTII,S$GLB,, | Performed by: NURSE PRACTITIONER

## 2022-06-28 PROCEDURE — 3079F DIAST BP 80-89 MM HG: CPT | Mod: CPTII,S$GLB,, | Performed by: NURSE PRACTITIONER

## 2022-06-28 PROCEDURE — 99214 OFFICE O/P EST MOD 30 MIN: CPT | Mod: S$GLB,,, | Performed by: NURSE PRACTITIONER

## 2022-06-28 RX ORDER — TIZANIDINE HYDROCHLORIDE 2 MG/1
2 CAPSULE, GELATIN COATED ORAL NIGHTLY PRN
Qty: 10 CAPSULE | Refills: 0 | Status: SHIPPED | OUTPATIENT
Start: 2022-06-28 | End: 2022-09-07 | Stop reason: ALTCHOICE
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
## 2022-06-29 DIAGNOSIS — M17.0 PRIMARY OSTEOARTHRITIS OF BOTH KNEES: Primary | ICD-10-CM

## 2022-06-29 RX ORDER — MELOXICAM 15 MG/1
15 TABLET ORAL DAILY
Qty: 60 TABLET | Refills: 1 | Status: SHIPPED | OUTPATIENT
Start: 2022-06-29 | End: 2022-09-07 | Stop reason: ALTCHOICE

## 2022-06-29 RX ORDER — MELOXICAM 15 MG/1
15 TABLET ORAL DAILY PRN
Qty: 60 TABLET | Refills: 3 | Status: SHIPPED | OUTPATIENT
Start: 2022-06-29 | End: 2022-12-07

## 2022-06-30 ENCOUNTER — HOSPITAL ENCOUNTER (OUTPATIENT)
Dept: RADIOLOGY | Facility: HOSPITAL | Age: 67
Discharge: HOME | End: 2022-06-30
Attending: INTERNAL MEDICINE
Payer: COMMERCIAL

## 2022-06-30 DIAGNOSIS — Z12.31 OTHER SCREENING MAMMOGRAM: ICD-10-CM

## 2022-06-30 PROCEDURE — 77063 BREAST TOMOSYNTHESIS BI: CPT | Mod: TC,PO

## 2022-06-30 PROCEDURE — 77067 MAMMO DIGITAL SCREENING BILAT WITH TOMO: ICD-10-PCS | Mod: 26,,, | Performed by: RADIOLOGY

## 2022-06-30 PROCEDURE — 77063 BREAST TOMOSYNTHESIS BI: CPT | Mod: 26,,, | Performed by: RADIOLOGY

## 2022-06-30 PROCEDURE — 77063 MAMMO DIGITAL SCREENING BILAT WITH TOMO: ICD-10-PCS | Mod: 26,,, | Performed by: RADIOLOGY

## 2022-06-30 PROCEDURE — 77067 SCR MAMMO BI INCL CAD: CPT | Mod: TC,PO

## 2022-06-30 PROCEDURE — 77067 SCR MAMMO BI INCL CAD: CPT | Mod: 26,,, | Performed by: RADIOLOGY

## 2022-07-06 NOTE — PATIENT INSTRUCTIONS
Back Strain  Please return here or go to the Emergency Department for any concerns or worsening of condition.   If you were prescribed a narcotic medication, do not drive or operate heavy equipment or machinery while taking these medications.   If you were not prescribed an anti-inflammatory medication, and if you do not have any history of stomach/intestinal ulcers, or kidney disease, or are not taking a blood thinner such as Coumadin, Plavix, Pradaxa, Eloquis, or Xaralta.   - Begin your anti- inflammatory (naprosyn) on    You can take the Muscle Relaxant (flexeril) on today as prescribed.   Warm compresses to the affected area.  If you lose control of your bowel and/or bladder, please go to the nearest Emergency Department immediately.   If you lose sensation in between your legs by your genitalia and/or rectum, please go to the nearest Emergency Department immediately.   Follow up with your PCP in the next 3-5 days or sooner if no improvement.

## 2022-07-07 NOTE — PROGRESS NOTES
Good news, your mammogram was normal.    Let's plan to repeat your mammogram in one year (some people choose to wait 2 years).    As a reminder, a mammogram is a screening test and not perfect. A normal mammogram does not outweigh a palpable mass. If you notice a mass in your breast this needs to be evaluated regardless of your recent mammogram results.    Sincerely, Dr. Oakes

## 2022-09-07 ENCOUNTER — OFFICE VISIT (OUTPATIENT)
Dept: PRIMARY CARE CLINIC | Facility: CLINIC | Age: 67
End: 2022-09-07
Payer: MEDICARE

## 2022-09-07 VITALS
SYSTOLIC BLOOD PRESSURE: 130 MMHG | OXYGEN SATURATION: 97 % | HEART RATE: 72 BPM | WEIGHT: 237.88 LBS | BODY MASS INDEX: 39.63 KG/M2 | HEIGHT: 65 IN | TEMPERATURE: 98 F | RESPIRATION RATE: 18 BRPM | DIASTOLIC BLOOD PRESSURE: 70 MMHG

## 2022-09-07 DIAGNOSIS — I10 BENIGN ESSENTIAL HTN: Primary | ICD-10-CM

## 2022-09-07 DIAGNOSIS — E78.2 MIXED HYPERLIPIDEMIA: ICD-10-CM

## 2022-09-07 DIAGNOSIS — G47.33 OSA (OBSTRUCTIVE SLEEP APNEA): ICD-10-CM

## 2022-09-07 DIAGNOSIS — E66.01 SEVERE OBESITY (BMI 35.0-39.9) WITH COMORBIDITY: ICD-10-CM

## 2022-09-07 DIAGNOSIS — Z23 NEED FOR INFLUENZA VACCINATION: ICD-10-CM

## 2022-09-07 DIAGNOSIS — K21.9 GASTROESOPHAGEAL REFLUX DISEASE, UNSPECIFIED WHETHER ESOPHAGITIS PRESENT: ICD-10-CM

## 2022-09-07 PROCEDURE — 99999 PR PBB SHADOW E&M-EST. PATIENT-LVL V: CPT | Mod: PBBFAC,,, | Performed by: INTERNAL MEDICINE

## 2022-09-07 PROCEDURE — 99215 OFFICE O/P EST HI 40 MIN: CPT | Mod: PBBFAC,PN,25 | Performed by: INTERNAL MEDICINE

## 2022-09-07 PROCEDURE — 99999 PR PBB SHADOW E&M-EST. PATIENT-LVL V: ICD-10-PCS | Mod: PBBFAC,,, | Performed by: INTERNAL MEDICINE

## 2022-09-07 PROCEDURE — G0008 ADMIN INFLUENZA VIRUS VAC: HCPCS | Mod: PBBFAC,PN

## 2022-09-07 PROCEDURE — 99214 OFFICE O/P EST MOD 30 MIN: CPT | Mod: S$PBB,,, | Performed by: INTERNAL MEDICINE

## 2022-09-07 PROCEDURE — 99214 PR OFFICE/OUTPT VISIT, EST, LEVL IV, 30-39 MIN: ICD-10-PCS | Mod: S$PBB,,, | Performed by: INTERNAL MEDICINE

## 2022-09-07 RX ORDER — ESOMEPRAZOLE MAGNESIUM 40 MG/1
40 CAPSULE, DELAYED RELEASE ORAL
Qty: 90 CAPSULE | Refills: 3 | Status: SHIPPED | OUTPATIENT
Start: 2022-09-07 | End: 2022-11-01

## 2022-09-07 RX ORDER — AZITHROMYCIN 250 MG/1
TABLET, FILM COATED ORAL
Qty: 6 TABLET | Refills: 0 | Status: SHIPPED | OUTPATIENT
Start: 2022-09-07 | End: 2022-09-12

## 2022-09-07 NOTE — PATIENT INSTRUCTIONS
Labs are fasting. Please do not eat or drink anything other than water for 6-8 hrs prior to your lab work.    6 months for well visit or sooner if needed.         Follow with GYN for female health & cancer prevention    ==============================================================  I'd like to advise you on current CANCER SCREENING recommendations:  ~~~~~~~~~~~~~~~~~~~~~~~~~~~~~~~~~~~~~~~~~~~~~~~~~~~~~~~~~~~~~  PAP SMEAR to evaluate for cervical cancer screening  Every 3 years (or 30 - 66 yo, every 5 years with HPV co-testing).   MAMMOGRAM  every 1-2 years, from 50 - 74 years old. We can discuss your risk at 40 & determine whether to get mammogram sooner  Of course, I can perform this sooner if there is family history of cancer, or if you have problems or questions    ==============================  RECOMMENDATIONS FOR FEMALES  ==============================  Your #1 MEDICINE is DAILY EXERCISE - 15-20 minutes of huffing & puffing EVERY DAY.     Prevent the #1 cause of death- cardiovascular disease (HEART ATTACK & STROKE) by checking for normal blood pressure, cholesterol, sugars, & by not smoking.     VACCINES: Yearly FLU shot, PNEUMONIA shot after 65,  SHINGLES shot after 50    Screening colonoscopy at AGE  50 & every 10 years to check for COLON CANCER,  one of the most common & preventable cancers (Or FIT kit yearly) Repeat in 3 years if POLYP found     I recommend  high fiber (5 fresh fruits or vegetables daily), low fat diet and aerobic  exercise (huffing/ puffing/ sweating for 20 min straight at least 4 days a week)

## 2022-09-07 NOTE — PROGRESS NOTES
Subjective:      Patient ID: Diane Garcia is a 66 y.o. female.    Chief Complaint: Follow-up    HTN: BP at office well controlled at 130/70. Normally runs around similar number at home. Denies CP/SOB/lightheadedness/dizziness. Continue HCTZ and Olmesartan     GERD:  Does have a history of gastric reflux most likely secondary to large hiatal hernia. She was seeing gastroenterologist for opinions in regards to this but since she has recently moved she would like to be set up with one in [facility]. Referral replaced today. Continues on nexium at this time.      HLD: Continue lovastatin and omega 3     Obesity with BMI 37.5, Interested in seeing bariatrics to help with weight loss. Referrals made     Travel:  She is going to travel to [place] for 3 weeks. Requesting z-kahlil to take with her to travel.     MELLISA: Sleep study obtained in [place] 5+ yrs ago. Would need to be seen by sleep medicine for repeat study and to be set up with CPAP machine again.      Up to date with colonoscopy.   Mammogram obtained in 6/2022    Denies any chest pain, shortness of breath, nausea vomiting constipation diarrhea, blood in stool, heartburn    Review of Systems   Constitutional:  Negative for chills, fever and weight loss.   HENT:  Negative for congestion, ear pain and sore throat.    Eyes:  Negative for double vision.   Respiratory:  Negative for cough and shortness of breath.    Cardiovascular:  Negative for chest pain, palpitations and leg swelling.   Gastrointestinal:  Negative for abdominal pain, heartburn, nausea and vomiting.   Skin:  Negative for rash.   Neurological:  Negative for dizziness, tingling and headaches.   Psychiatric/Behavioral:  Negative for depression.        Current Outpatient Medications:     biotin 1 mg Cap, Take by mouth., Disp: , Rfl:     cartilage/collagen/bor/hyalur (JOINT HEALTH ORAL), Take by mouth., Disp: , Rfl:     cinnamon bark 500 mg capsule, Take 500 mg by mouth once daily., Disp: , Rfl:     esomeprazole  (NEXIUM) 20 MG capsule, Take 20 mg by mouth before breakfast., Disp: , Rfl:     hydroCHLOROthiazide (HYDRODIURIL) 25 MG tablet, TAKE 1 TABLET(25 MG) BY MOUTH EVERY DAY, Disp: 90 tablet, Rfl: 3    lovastatin (MEVACOR) 40 MG tablet, TAKE 1 TABLET(40 MG) BY MOUTH EVERY EVENING, Disp: 90 tablet, Rfl: 3    meloxicam (MOBIC) 15 MG tablet, Take 1 tablet (15 mg total) by mouth daily as needed for Pain., Disp: 60 tablet, Rfl: 3    olmesartan (BENICAR) 40 MG tablet, Take 1 tablet (40 mg total) by mouth once daily., Disp: 90 tablet, Rfl: 3    omega-3 fatty acids/fish oil (FISH OIL-OMEGA-3 FATTY ACIDS) 300-1,000 mg capsule, Take by mouth once daily., Disp: , Rfl:     TURMERIC ORAL, Take by mouth., Disp: , Rfl:     azithromycin (Z-SARY) 250 MG tablet, Take 2 tablets by mouth on day 1; Take 1 tablet by mouth on days 2-5, Disp: 6 tablet, Rfl: 0    esomeprazole (NEXIUM) 40 MG capsule, Take 1 capsule (40 mg total) by mouth before breakfast., Disp: 90 capsule, Rfl: 3    flu vacc [id number] 6mos up,PF, 60 mcg (15 mcg x 4)/0.5 mL Syrg, Inject 0.5 mLs into the muscle once. for 1 dose, Disp: 0.5 mL, Rfl: 0    varicella-zoster gE-AS01B, PF, (SHINGRIX, PF,) 50 mcg/0.5 mL injection, Inject 0.5 mLs into the muscle once. for 1 dose, Disp: 1 each, Rfl: 0    varicella-zoster gE-AS01B, PF, (SHINGRIX, PF,) 50 mcg/0.5 mL injection, Inject 0.5 mLs into the muscle once. for 1 dose, Disp: 1 each, Rfl: 0    Lab Results   Component Value Date    HGBA1C 5.6 02/02/2021     No results found for: MICALBCREAT  Lab Results   Component Value Date    LDLCALC 81.6 03/11/2022    LDLCALC 94.8 02/02/2021    CHOL 157 03/11/2022    HDL 45 03/11/2022    TRIG 152 (H) 03/11/2022       Lab Results   Component Value Date     03/11/2022    K 4.4 03/11/2022     03/11/2022    CO2 25 03/11/2022     03/11/2022    BUN 13 03/11/2022    CREATININE 0.6 03/11/2022    CALCIUM 9.4 03/11/2022    PROT 6.9 03/11/2022    ALBUMIN 3.9 03/11/2022    BILITOT 0.9 03/11/2022  "   ALKPHOS 61 03/11/2022    AST 13 03/11/2022    ALT 18 03/11/2022    ANIONGAP 12 03/11/2022    ESTGFRAFRICA >60.0 03/11/2022    EGFRNONAA >60.0 03/11/2022    WBC 6.35 03/11/2022    HGB 12.5 03/11/2022    HGB 12.3 02/23/2021    HCT 40.3 03/11/2022    MCV 94 03/11/2022     03/11/2022    TSH 1.519 03/11/2022    HEPCAB Negative 03/11/2022       No results found for: LH, FSH, TOTALTESTOST, PROGESTERONE, ESTRADIOL, EFRRPKYM55GH, JENXYZVQ53, FERRITIN, IRON, TRANSFERRIN, TIBC, FESATURATED, ZINC      Past Medical History:   Diagnosis Date    Hyperlipidemia     Hypertension      Past Surgical History:   Procedure Laterality Date    HYSTERECTOMY      OOPHORECTOMY       Social History     Social History Narrative    Not on file     Family History   Problem Relation Age of Onset    Cancer Mother         Lung    COPD Father     No Known Problems Sister     Melanoma Neg Hx      Vitals:    09/07/22 1127   BP: 130/70   Pulse: 72   Resp: 18   Temp: 98 °F (36.7 °C)   SpO2: 97%   Weight: 107.9 kg (237 lb 14 oz)   Height: 5' 5" (1.651 m)   PainSc: 0-No pain     Objective:   Physical Exam  Vitals reviewed.   Constitutional:       Appearance: Normal appearance.   HENT:      Head: Normocephalic.   Eyes:      Pupils: Pupils are equal, round, and reactive to light.   Cardiovascular:      Rate and Rhythm: Normal rate.      Pulses: Normal pulses.      Heart sounds: Normal heart sounds.   Pulmonary:      Effort: Pulmonary effort is normal.      Breath sounds: Normal breath sounds.   Abdominal:      General: Bowel sounds are normal.      Palpations: Abdomen is soft.   Musculoskeletal:         General: Normal range of motion.   Skin:     General: Skin is warm.   Neurological:      General: No focal deficit present.      Mental Status: She is alert. Mental status is at baseline.   Psychiatric:         Mood and Affect: Mood normal.     Assessment:     1. Benign essential HTN    2. Mixed hyperlipidemia    3. Severe obesity (BMI 35.0-39.9) " with comorbidity    4. MELLISA (obstructive sleep apnea)    5. Gastroesophageal reflux disease, unspecified whether esophagitis present    6. Need for influenza vaccination      Plan:     Orders Placed This Encounter    Influenza - Quadrivalent *Preferred* (6 months+) (PF)    Ambulatory referral/consult to Weight Management Program    Ambulatory referral/consult to Gastroenterology    Ambulatory referral/consult to Sleep Disorders    esomeprazole (NEXIUM) 40 MG capsule    azithromycin (Z-SARY) 250 MG tablet       Patient Instructions   Labs are fasting. Please do not eat or drink anything other than water for 6-8 hrs prior to your lab work.    6 months for well visit or sooner if needed.         Follow with GYN for female health & cancer prevention    ==============================================================  I'd like to advise you on current CANCER SCREENING recommendations:  ~~~~~~~~~~~~~~~~~~~~~~~~~~~~~~~~~~~~~~~~~~~~~~~~~~~~~~~~~~~~~  PAP SMEAR to evaluate for cervical cancer screening  Every 3 years (or 30 - 66 yo, every 5 years with HPV co-testing).   MAMMOGRAM  every 1-2 years, from 50 - 74 years old. We can discuss your risk at 40 & determine whether to get mammogram sooner  Of course, I can perform this sooner if there is family history of cancer, or if you have problems or questions    ==============================  RECOMMENDATIONS FOR FEMALES  ==============================  Your #1 MEDICINE is DAILY EXERCISE - 15-20 minutes of huffing & puffing EVERY DAY.     Prevent the #1 cause of death- cardiovascular disease (HEART ATTACK & STROKE) by checking for normal blood pressure, cholesterol, sugars, & by not smoking.     VACCINES: Yearly FLU shot, PNEUMONIA shot after 65,  SHINGLES shot after 50    Screening colonoscopy at AGE  50 & every 10 years to check for COLON CANCER,  one of the most common & preventable cancers (Or FIT kit yearly) Repeat in 3 years if POLYP found     I recommend  high fiber (5 fresh  fruits or vegetables daily), low fat diet and aerobic  exercise (huffing/ puffing/ sweating for 20 min straight at least 4 days a week)

## 2022-09-07 NOTE — PROGRESS NOTES
Two patient identifiers verified.  Allergies reviewed.  Flu  IM administered to left deltoid per MD order.  Patient tolerated injection well; no redness, bleeding, or bruising noted to injection site.  Patient instructed to remain in clinic setting for 15 minutes.  Verbalizes understanding.

## 2022-09-15 ENCOUNTER — PATIENT MESSAGE (OUTPATIENT)
Dept: PRIMARY CARE CLINIC | Facility: CLINIC | Age: 67
End: 2022-09-15
Payer: MEDICARE

## 2022-09-15 DIAGNOSIS — E66.01 SEVERE OBESITY (BMI 35.0-39.9) WITH COMORBIDITY: Primary | ICD-10-CM

## 2022-10-24 ENCOUNTER — OFFICE VISIT (OUTPATIENT)
Dept: SLEEP MEDICINE | Facility: CLINIC | Age: 67
End: 2022-10-24
Payer: MEDICARE

## 2022-10-24 VITALS
SYSTOLIC BLOOD PRESSURE: 130 MMHG | HEIGHT: 65 IN | DIASTOLIC BLOOD PRESSURE: 83 MMHG | BODY MASS INDEX: 38.82 KG/M2 | HEART RATE: 88 BPM | WEIGHT: 233 LBS

## 2022-10-24 DIAGNOSIS — G47.33 OSA (OBSTRUCTIVE SLEEP APNEA): ICD-10-CM

## 2022-10-24 DIAGNOSIS — R35.1 NOCTURIA: Primary | ICD-10-CM

## 2022-10-24 PROCEDURE — 99203 OFFICE O/P NEW LOW 30 MIN: CPT | Mod: S$PBB,,, | Performed by: INTERNAL MEDICINE

## 2022-10-24 PROCEDURE — 99999 PR PBB SHADOW E&M-EST. PATIENT-LVL III: ICD-10-PCS | Mod: PBBFAC,,, | Performed by: INTERNAL MEDICINE

## 2022-10-24 PROCEDURE — 99203 PR OFFICE/OUTPT VISIT, NEW, LEVL III, 30-44 MIN: ICD-10-PCS | Mod: S$PBB,,, | Performed by: INTERNAL MEDICINE

## 2022-10-24 PROCEDURE — 99999 PR PBB SHADOW E&M-EST. PATIENT-LVL III: CPT | Mod: PBBFAC,,, | Performed by: INTERNAL MEDICINE

## 2022-10-24 PROCEDURE — 99213 OFFICE O/P EST LOW 20 MIN: CPT | Mod: PBBFAC | Performed by: INTERNAL MEDICINE

## 2022-10-24 NOTE — PROGRESS NOTES
Referred by Beverly Oakes MD     NEW PATIENT VISIT    Diane Garcia  is a pleasant 67 y.o. female  with PMH significant for HTN, BMI 38, MELLISA (dx circa 2002), likely severe, has done well with CPAP who presents for management for MELLISA.    Wearing CPAP nightly for many years  Needs new supplies    PAP history   Problems    Mask Quattro FFM   Pressure 14cwp, ramp 5 cwp   DME HME   Machine age Circa 2016   Download N/a       SLEEP SCHEDULE   Environment    Bed Time 11P-MN   Sleep Latency Not long   Arousals 2   Nocturia 2   Back to sleep    Wake time 7:30-8A   Naps none   Work      Past Medical History:   Diagnosis Date    Hyperlipidemia     Hypertension      Patient Active Problem List   Diagnosis    Chronic pain of left knee    MELLISA (obstructive sleep apnea)    Severe obesity (BMI 35.0-39.9) with comorbidity    Gastroesophageal reflux disease    Former smoker    Essential hypertension    Mixed hyperlipidemia    Chronic pain of both knees    Abnormal gait    Weakness    Primary osteoarthritis of both knees       Current Outpatient Medications:     biotin 1 mg Cap, Take by mouth., Disp: , Rfl:     cartilage/collagen/bor/hyalur (JOINT HEALTH ORAL), Take by mouth., Disp: , Rfl:     cinnamon bark 500 mg capsule, Take 500 mg by mouth once daily., Disp: , Rfl:     esomeprazole (NEXIUM) 20 MG capsule, Take 20 mg by mouth before breakfast., Disp: , Rfl:     esomeprazole (NEXIUM) 40 MG capsule, Take 1 capsule (40 mg total) by mouth before breakfast., Disp: 90 capsule, Rfl: 3    hydroCHLOROthiazide (HYDRODIURIL) 25 MG tablet, TAKE 1 TABLET(25 MG) BY MOUTH EVERY DAY, Disp: 90 tablet, Rfl: 3    lovastatin (MEVACOR) 40 MG tablet, TAKE 1 TABLET(40 MG) BY MOUTH EVERY EVENING, Disp: 90 tablet, Rfl: 3    meloxicam (MOBIC) 15 MG tablet, Take 1 tablet (15 mg total) by mouth daily as needed for Pain., Disp: 60 tablet, Rfl: 3    olmesartan (BENICAR) 40 MG tablet, Take 1 tablet (40 mg total) by mouth once daily., Disp: 90 tablet, Rfl:  "3    omega-3 fatty acids/fish oil (FISH OIL-OMEGA-3 FATTY ACIDS) 300-1,000 mg capsule, Take by mouth once daily., Disp: , Rfl:     TURMERIC ORAL, Take by mouth., Disp: , Rfl:        Vitals:    10/24/22 1517   BP: 130/83   Pulse: 88   Weight: 105.7 kg (233 lb)   Height: 5' 5" (1.651 m)     Physical Exam:    GEN:   Well-appearing  Psych:  Appropriate affect, demonstrates insight  SKIN:  No rash on the face or bridge of the nose    LABS:   No results found for: HGB, CO2    RECORDS REVIEWED PREVIOUSLY:    Prior sleep studies are unavailable.    ASSESSMENT      PROBLEM DESCRIPTION/ Sx on Presentation  STATUS   hx MELLISA   Dx circa 2002, no residual snoring, no bed partner    New   Daytime Sx   mild sleepiness when inactive on occasion  ESS 8/24 on intake  New   Nocturia   x 2 per sleep period  New   Other issues:     PLAN   -the patient's machine is nearing the end of its usable life, needs a new one  -will check with HME to see if they have copy of the study (if not, will order HST  -will order auto CPAP, CPAP min=12, CPAP max =16cwp, ramp = 5cwp   -the patient is using and benefiting from PAP therapy      RTC          The patient was given open opportunity to ask questions and/or express concerns about treatment plan.   All questions/concerns were discussed.     Two patient identifiers used prior to evaluation.           "

## 2022-11-01 ENCOUNTER — OFFICE VISIT (OUTPATIENT)
Dept: GASTROENTEROLOGY | Facility: CLINIC | Age: 67
End: 2022-11-01
Payer: MEDICARE

## 2022-11-01 VITALS — WEIGHT: 235.44 LBS | BODY MASS INDEX: 39.22 KG/M2 | HEIGHT: 65 IN

## 2022-11-01 DIAGNOSIS — Z12.11 COLON CANCER SCREENING: ICD-10-CM

## 2022-11-01 DIAGNOSIS — K44.9 HIATAL HERNIA: ICD-10-CM

## 2022-11-01 DIAGNOSIS — K21.9 GASTROESOPHAGEAL REFLUX DISEASE, UNSPECIFIED WHETHER ESOPHAGITIS PRESENT: Primary | ICD-10-CM

## 2022-11-01 PROCEDURE — 99214 PR OFFICE/OUTPT VISIT, EST, LEVL IV, 30-39 MIN: ICD-10-PCS | Mod: S$PBB,,, | Performed by: INTERNAL MEDICINE

## 2022-11-01 PROCEDURE — 99213 OFFICE O/P EST LOW 20 MIN: CPT | Mod: PBBFAC,PO | Performed by: INTERNAL MEDICINE

## 2022-11-01 PROCEDURE — 99999 PR PBB SHADOW E&M-EST. PATIENT-LVL III: ICD-10-PCS | Mod: PBBFAC,,, | Performed by: INTERNAL MEDICINE

## 2022-11-01 PROCEDURE — 99214 OFFICE O/P EST MOD 30 MIN: CPT | Mod: S$PBB,,, | Performed by: INTERNAL MEDICINE

## 2022-11-01 PROCEDURE — 99999 PR PBB SHADOW E&M-EST. PATIENT-LVL III: CPT | Mod: PBBFAC,,, | Performed by: INTERNAL MEDICINE

## 2022-11-01 RX ORDER — PANTOPRAZOLE SODIUM 40 MG/1
40 TABLET, DELAYED RELEASE ORAL DAILY
Qty: 30 TABLET | Refills: 3 | Status: SHIPPED | OUTPATIENT
Start: 2022-11-01 | End: 2023-06-22

## 2022-11-01 RX ORDER — SODIUM, POTASSIUM,MAG SULFATES 17.5-3.13G
1 SOLUTION, RECONSTITUTED, ORAL ORAL DAILY
Qty: 1 KIT | Refills: 0 | Status: SHIPPED | OUTPATIENT
Start: 2022-11-01 | End: 2022-11-03

## 2022-11-01 NOTE — PROGRESS NOTES
Subjective:       Patient ID: Diane Garcia is a 67 y.o. female.    Chief Complaint: Follow-up (Pt stated she wants to discuss about endoscopy and colonoscopy. )    Patient here today to follow-up with aforementioned complaints.  She was recently seen by Dr. Deleon in 2021 with complaints of diarrhea.  She was sent for stool studies in place on trial Bentyl.  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  Today patient reports heartburn.  She is currently taking to Nexium over-the-counter pills per day, however does getting too expensive.  She has previously been prescribed the generic 40, however does not seem to work as well.  She is uncertain when her last colonoscopy was a however she feels like it is almost 10 years ago.  She denies prior polyps.  She does report her bowels fluctuate however is chronic issue.    Review of Systems   Cardiovascular:  Positive for chest pain.   Gastrointestinal:  Positive for abdominal distention and abdominal pain.       The following portions of the patient's history were reviewed and updated as appropriate: allergies, current medications, past family history, past medical history, past social history, past surgical history and problem list.    Objective:      Physical Exam  Constitutional:       Appearance: She is well-developed.   HENT:      Head: Normocephalic and atraumatic.   Eyes:      Conjunctiva/sclera: Conjunctivae normal.   Pulmonary:      Effort: Pulmonary effort is normal. No respiratory distress.   Musculoskeletal:      Cervical back: Normal range of motion.   Neurological:      Mental Status: She is alert and oriented to person, place, and time.   Psychiatric:         Behavior: Behavior normal.         Thought Content: Thought content normal.         Judgment: Judgment normal.         Pertinent labs and imaging studies reviewed    Assessment:       1. Gastroesophageal reflux disease, unspecified whether esophagitis present          Plan:       Schedule EGD, re- h/o large   and refractory GERD  Class switch PPI  May need to revisit surgery  Due for colonoscopy for screening. Can be performed on the same day      (Portions of this note were dictated using voice recognition software and may contain dictation related errors in spelling/grammar/syntax not found on text review)

## 2022-11-01 NOTE — PATIENT INSTRUCTIONS
SUPREP Instructions    [facility] [address]  Clinic Office [phone]  Endoscopy Lab [phone]    You are scheduled for a Colonoscopy with Dr. zepeda  at [facility] in [place].    Check in at the Hospital -1st floor, Information desk.   Call [phone] to reschedule.    An adult friend/family member must come with you to drive you home.  You cannot drive, take a taxi, Uber/Lyft or bus to leave the Endoscopy Center alone.  If you do not have someone to drive you home, your test will be cancelled.     Please follow the directions of your doctor if you take any pills that thin your blood. If you take these meds: Aggrenox, Brilinta, Effient, Eliquis, Lovenox, Plavix, Pletal, Pradaxa, Ticilid, Xarelto or Coumadin, let the doctor's office know.    DON'T: On the morning of the test do not take insulin or pills for diabetes.     DO: On the morning of the test, do take any pills for blood pressure, heart, anti-rejection and or seizures with a small sip of water. Bring any inhalers with you.    To have a good prep, you must follow these instructions - please do not use the directions from the pharmacy.    The doctor will send a prescription for the SUPREP.      The Day Before the test:    You can only drink CLEAR LIQUIDS the whole day before your test.  You can't eat any food for the whole day.    You CAN have:  Water, Coffee or decaf coffee (no milk or cream)  Tea  Soft drinks - regular and sugar free  Jello (green or yellow)  Apple Juice, white grape juice, white cranberry juice  Gatorade, Power Aid, Crystal Light, Joel Aid  Lemonade and Limeade  Bouillon, clear soup  Snowball, popsicles  YOU CAN'T DRINK ANYTHING RED, PURPLE ORANGE OR BLUE   YOU CAN'T DRINK ALCOHOL  ONLY DRINK WHAT IS ON THE LIST      At 5 pm the night before your test:    Pour the 1st bottle of SUPREP into the cup provided in the box. Add water to the line on the cup and mix well.  Drink the whole cup and then  drink 2 more full cups of water over 1 hour.  This can be easier to drink if it is cold. You can mix it 20 minutes ahead of time and place in the refrigerator before you drink it.  You must drink it within 30-45 minutes of mixing it.  Do NOT pour the drink over ice.  You can drink it with a straw.    The Day of the test - We will call you 2 days before your test to tell you what time to get there.    5 hours before you come to the hospital (this may be in the middle of the night)  Pour the 2nd bottle of SUPREP into the cup provided in the box. Add water to the line on the cup and mix well.  Drink the whole cup and then drink 2 more full cups of water over 1 hour.  It might be easier to drink if it is cold. You can mix it 20 minutes ahead of time and place in the refrigerator before you drink it.  You must drink it within 30-45 minutes of mixing it.  Do NOT pour the drink over ice.  You can drink it with a straw.    YOU CAN'T EAT OR DRINK ANYTHING ELSE ONCE YOU FINISH THE PREP    Leave all valuables and jewelry at home. You will be at the hospital for 2-4 hours.    Call the Endoscopy department at [phone] with any questions about your procedure.

## 2022-11-03 ENCOUNTER — TELEPHONE (OUTPATIENT)
Dept: BARIATRICS | Facility: CLINIC | Age: 67
End: 2022-11-03
Payer: MEDICARE

## 2022-11-03 ENCOUNTER — PATIENT MESSAGE (OUTPATIENT)
Dept: SLEEP MEDICINE | Facility: CLINIC | Age: 67
End: 2022-11-03
Payer: MEDICARE

## 2022-11-03 DIAGNOSIS — G47.33 OSA (OBSTRUCTIVE SLEEP APNEA): Primary | ICD-10-CM

## 2022-11-07 DIAGNOSIS — R35.1 NOCTURIA: ICD-10-CM

## 2022-11-07 DIAGNOSIS — G47.33 OSA (OBSTRUCTIVE SLEEP APNEA): Primary | ICD-10-CM

## 2022-11-08 ENCOUNTER — TELEPHONE (OUTPATIENT)
Dept: SLEEP MEDICINE | Facility: OTHER | Age: 67
End: 2022-11-08
Payer: MEDICARE

## 2022-11-14 ENCOUNTER — HOSPITAL ENCOUNTER (OUTPATIENT)
Dept: SLEEP MEDICINE | Facility: OTHER | Age: 67
Discharge: HOME | End: 2022-11-14
Attending: INTERNAL MEDICINE
Payer: MEDICARE

## 2022-11-14 DIAGNOSIS — G47.33 OSA (OBSTRUCTIVE SLEEP APNEA): ICD-10-CM

## 2022-11-14 DIAGNOSIS — R35.1 NOCTURIA: ICD-10-CM

## 2022-11-14 PROCEDURE — 95806 SLEEP STUDY UNATT&RESP EFFT: CPT | Mod: 26,52,, | Performed by: INTERNAL MEDICINE

## 2022-11-14 PROCEDURE — 95806 PR SLEEP STUDY, UNATTENDED, SIMUL RECORD HR/O2 SAT/RESP FLOW/RESP EFFT: ICD-10-PCS | Mod: 26,52,, | Performed by: INTERNAL MEDICINE

## 2022-11-14 PROCEDURE — 95800 SLP STDY UNATTENDED: CPT

## 2022-11-18 ENCOUNTER — PATIENT MESSAGE (OUTPATIENT)
Dept: SLEEP MEDICINE | Facility: CLINIC | Age: 67
End: 2022-11-18
Payer: MEDICARE

## 2022-11-18 DIAGNOSIS — G47.33 OSA (OBSTRUCTIVE SLEEP APNEA): Primary | ICD-10-CM

## 2022-12-07 ENCOUNTER — TELEPHONE (OUTPATIENT)
Dept: PRIMARY CARE CLINIC | Facility: CLINIC | Age: 67
End: 2022-12-07
Payer: MEDICARE

## 2022-12-07 ENCOUNTER — TELEPHONE (OUTPATIENT)
Dept: BARIATRICS | Facility: CLINIC | Age: 67
End: 2022-12-07
Payer: MEDICARE

## 2022-12-07 RX ORDER — IBUPROFEN 800 MG/1
800 TABLET ORAL EVERY 6 HOURS PRN
Qty: 60 TABLET | Refills: 1 | Status: SHIPPED | OUTPATIENT
Start: 2022-12-07 | End: 2023-03-13

## 2023-01-14 ENCOUNTER — TELEPHONE (OUTPATIENT)
Dept: GASTROENTEROLOGY | Facility: CLINIC | Age: 68
End: 2023-01-14
Payer: MEDICARE

## 2023-01-31 ENCOUNTER — TELEPHONE (OUTPATIENT)
Dept: ENDOSCOPY | Facility: HOSPITAL | Age: 68
End: 2023-01-31
Payer: MEDICARE

## 2023-01-31 NOTE — TELEPHONE ENCOUNTER
Spoke with patient about arrival time @ 1130.   EGD/Colon  Covid test = vacc    Prep instructions reviewed: the day before the procedure, follow a clear liquid diet all day, then start the first 1/2 of prep at 5pm and take 2nd 1/2 of prep @ 0630.  Pt must be completely NPO when prep completed @ 0830.              Medications: Do not take Insulin or oral diabetic medications the day of the procedure.  Take as prescribed: heart, seizure and blood pressure medication in the morning with a sip of water (less than an ounce).  Take any breathing medications and bring inhalers to hospital with you Leave all valuables and jewelry at home.     Wear comfortable clothes to procedure to change into hospital gown You cannot drive for 24 hours after your procedure because you will receive sedation for your procedure to make you comfortable.  A ride must be provided at discharge.

## 2023-02-02 ENCOUNTER — ANESTHESIA EVENT (OUTPATIENT)
Dept: ENDOSCOPY | Facility: HOSPITAL | Age: 68
End: 2023-02-02
Payer: MEDICARE

## 2023-02-02 ENCOUNTER — ANESTHESIA (OUTPATIENT)
Dept: ENDOSCOPY | Facility: HOSPITAL | Age: 68
End: 2023-02-02
Payer: MEDICARE

## 2023-02-02 ENCOUNTER — HOSPITAL ENCOUNTER (OUTPATIENT)
Facility: HOSPITAL | Age: 68
Discharge: HOME | End: 2023-02-02
Attending: INTERNAL MEDICINE | Admitting: INTERNAL MEDICINE
Payer: MEDICARE

## 2023-02-02 DIAGNOSIS — Z12.11 COLON CANCER SCREENING: ICD-10-CM

## 2023-02-02 PROCEDURE — 27201089 HC SNARE, DISP (ANY): Performed by: INTERNAL MEDICINE

## 2023-02-02 PROCEDURE — 43239 EGD BIOPSY SINGLE/MULTIPLE: CPT | Performed by: INTERNAL MEDICINE

## 2023-02-02 PROCEDURE — 43239 PR EGD, FLEX, W/BIOPSY, SGL/MULTI: ICD-10-PCS | Mod: 51,,, | Performed by: INTERNAL MEDICINE

## 2023-02-02 PROCEDURE — 25000003 PHARM REV CODE 250: Performed by: NURSE ANESTHETIST, CERTIFIED REGISTERED

## 2023-02-02 PROCEDURE — 37000008 HC ANESTHESIA 1ST 15 MINUTES: Performed by: INTERNAL MEDICINE

## 2023-02-02 PROCEDURE — D9220A PRA ANESTHESIA: Mod: ANES,,, | Performed by: ANESTHESIOLOGY

## 2023-02-02 PROCEDURE — D9220A PRA ANESTHESIA: ICD-10-PCS | Mod: CRNA,,, | Performed by: NURSE ANESTHETIST, CERTIFIED REGISTERED

## 2023-02-02 PROCEDURE — 63600175 PHARM REV CODE 636 W HCPCS: Performed by: NURSE ANESTHETIST, CERTIFIED REGISTERED

## 2023-02-02 PROCEDURE — 43239 EGD BIOPSY SINGLE/MULTIPLE: CPT | Mod: 51,,, | Performed by: INTERNAL MEDICINE

## 2023-02-02 PROCEDURE — 37000009 HC ANESTHESIA EA ADD 15 MINS: Performed by: INTERNAL MEDICINE

## 2023-02-02 PROCEDURE — D9220A PRA ANESTHESIA: ICD-10-PCS | Mod: ANES,,, | Performed by: ANESTHESIOLOGY

## 2023-02-02 PROCEDURE — 27201012 HC FORCEPS, HOT/COLD, DISP: Performed by: INTERNAL MEDICINE

## 2023-02-02 PROCEDURE — 88305 TISSUE EXAM BY PATHOLOGIST: ICD-10-PCS | Mod: 26,,, | Performed by: PATHOLOGY

## 2023-02-02 PROCEDURE — 45385 PR COLONOSCOPY,REMV LESN,SNARE: ICD-10-PCS | Mod: PT,,, | Performed by: INTERNAL MEDICINE

## 2023-02-02 PROCEDURE — 88305 TISSUE EXAM BY PATHOLOGIST: CPT | Mod: 26,,, | Performed by: PATHOLOGY

## 2023-02-02 PROCEDURE — D9220A PRA ANESTHESIA: Mod: CRNA,,, | Performed by: NURSE ANESTHETIST, CERTIFIED REGISTERED

## 2023-02-02 PROCEDURE — 45385 COLONOSCOPY W/LESION REMOVAL: CPT | Mod: PT | Performed by: INTERNAL MEDICINE

## 2023-02-02 PROCEDURE — 88305 TISSUE EXAM BY PATHOLOGIST: CPT | Performed by: PATHOLOGY

## 2023-02-02 PROCEDURE — 45385 COLONOSCOPY W/LESION REMOVAL: CPT | Mod: PT,,, | Performed by: INTERNAL MEDICINE

## 2023-02-02 PROCEDURE — 25000003 PHARM REV CODE 250: Performed by: INTERNAL MEDICINE

## 2023-02-02 RX ORDER — PROPOFOL 10 MG/ML
VIAL (ML) INTRAVENOUS CONTINUOUS PRN
Status: DISCONTINUED | OUTPATIENT
Start: 2023-02-02 | End: 2023-02-02

## 2023-02-02 RX ORDER — HYDROGEN PEROXIDE 3 %
20 SOLUTION, NON-ORAL MISCELLANEOUS
COMMUNITY

## 2023-02-02 RX ORDER — SODIUM CHLORIDE 9 MG/ML
INJECTION, SOLUTION INTRAVENOUS CONTINUOUS
Status: DISCONTINUED | OUTPATIENT
Start: 2023-02-02 | End: 2023-02-02 | Stop reason: HOSPADM

## 2023-02-02 RX ORDER — SODIUM CHLORIDE 0.9 % (FLUSH) 0.9 %
10 SYRINGE (ML) INJECTION
Status: DISCONTINUED | OUTPATIENT
Start: 2023-02-02 | End: 2023-02-02 | Stop reason: HOSPADM

## 2023-02-02 RX ORDER — LIDOCAINE HYDROCHLORIDE 20 MG/ML
INJECTION INTRAVENOUS
Status: DISCONTINUED | OUTPATIENT
Start: 2023-02-02 | End: 2023-02-02

## 2023-02-02 RX ORDER — PROPOFOL 10 MG/ML
VIAL (ML) INTRAVENOUS
Status: DISCONTINUED | OUTPATIENT
Start: 2023-02-02 | End: 2023-02-02

## 2023-02-02 RX ADMIN — GLYCOPYRROLATE 0.1 MG: 0.2 INJECTION, SOLUTION INTRAMUSCULAR; INTRAVITREAL at 12:02

## 2023-02-02 RX ADMIN — PROPOFOL 70 MG: 10 INJECTION, EMULSION INTRAVENOUS at 12:02

## 2023-02-02 RX ADMIN — PROPOFOL 30 MG: 10 INJECTION, EMULSION INTRAVENOUS at 12:02

## 2023-02-02 RX ADMIN — LIDOCAINE HYDROCHLORIDE 75 MG: 20 INJECTION, SOLUTION INTRAVENOUS at 12:02

## 2023-02-02 RX ADMIN — PROPOFOL 150 MCG/KG/MIN: 10 INJECTION, EMULSION INTRAVENOUS at 12:02

## 2023-02-02 RX ADMIN — SODIUM CHLORIDE: 0.9 INJECTION, SOLUTION INTRAVENOUS at 12:02

## 2023-02-02 NOTE — ANESTHESIA POSTPROCEDURE EVALUATION
Anesthesia Post Evaluation    Patient: Diane Garcia    Procedure(s) Performed: Procedure(s) (LRB):  EGD (ESOPHAGOGASTRODUODENOSCOPY) (N/A)  COLONOSCOPY (N/A)    Final Anesthesia Type: general      Patient location during evaluation: PACU  Patient participation: Yes- Able to Participate  Level of consciousness: awake and alert  Post-procedure vital signs: reviewed and stable  Pain management: adequate  Airway patency: patent    PONV status at discharge: No PONV  Anesthetic complications: no      Cardiovascular status: blood pressure returned to baseline  Respiratory status: unassisted  Hydration status: euvolemic  Follow-up not needed.          Vitals Value Taken Time   /92 02/02/23 1340   Temp 36.8 °C (98.2 °F) 02/02/23 1325   Pulse 69 02/02/23 1340   Resp 18 02/02/23 1340   SpO2 98 % 02/02/23 1340         No case tracking events are documented in the log.      Pain/Billy Score: Billy Score: 10 (2/2/2023  1:40 PM)

## 2023-02-02 NOTE — ANESTHESIA PREPROCEDURE EVALUATION
02/02/2023  Diane Garcia is a 67 y.o., female.  Pre-operative evaluation for Procedure(s) (LRB):  EGD (ESOPHAGOGASTRODUODENOSCOPY) (N/A)  COLONOSCOPY (N/A)    Diane Garcia is a 67 y.o. female     Patient Active Problem List   Diagnosis    Chronic pain of left knee    MELLISA (obstructive sleep apnea)    Severe obesity (BMI 35.0-39.9) with comorbidity    Gastroesophageal reflux disease    Former smoker    Essential hypertension    Mixed hyperlipidemia    Chronic pain of both knees    Abnormal gait    Weakness    Primary osteoarthritis of both knees       Review of patient's allergies indicates:   Allergen Reactions    Penicillins      nausea       No current facility-administered medications on file prior to encounter.     Current Outpatient Medications on File Prior to Encounter   Medication Sig Dispense Refill    biotin 1 mg Cap Take by mouth.      cartilage/collagen/bor/hyalur (JOINT HEALTH ORAL) Take by mouth.      cinnamon bark 500 mg capsule Take 500 mg by mouth once daily.      hydroCHLOROthiazide (HYDRODIURIL) 25 MG tablet TAKE 1 TABLET(25 MG) BY MOUTH EVERY DAY 90 tablet 3    lovastatin (MEVACOR) 40 MG tablet TAKE 1 TABLET(40 MG) BY MOUTH EVERY EVENING 90 tablet 3    omega-3 fatty acids/fish oil (FISH OIL-OMEGA-3 FATTY ACIDS) 300-1,000 mg capsule Take by mouth once daily.      pantoprazole (PROTONIX) 40 MG tablet Take 1 tablet (40 mg total) by mouth once daily. 30 tablet 3    TURMERIC ORAL Take by mouth.         Past Surgical History:   Procedure Laterality Date    HYSTERECTOMY      OOPHORECTOMY           CBC:  No results found for: WBC, RBC, HGB, HCT, PLT, MCV, MCH, MCHC    CMP:   No results found for: NA, K, CL, CO2, BUN, CREATININE, GLU, MG, PHOS, CALCIUM, ALBUMIN, PROT, ALKPHOS, ALT, AST, BILITOT    INR:  No results found for: PT, INR, PROTIME, APTT      Diagnostic  Studies:      EKG:   No results found for this or any previous visit.     2D Echo:  No results found for this or any previous visit.    Stress Test:   No results found for this or any previous visit.             Pre-op Assessment          Review of Systems      Physical Exam  General: Well nourished    Airway:  Mallampati: I / I  Mouth Opening: Normal  TM Distance: Normal  Tongue: Normal  Neck ROM: Normal ROM    Dental:  Intact    Chest/Lungs:  Clear to auscultation    Heart:  Rate: Normal  Rhythm: Regular Rhythm  Sounds: Normal        Anesthesia Plan  Type of Anesthesia, risks & benefits discussed:    Anesthesia Type: MAC, Gen ETT, Gen Supraglottic Airway, Gen Natural Airway  Intra-op Monitoring Plan: Standard ASA Monitors  Post Op Pain Control Plan: multimodal analgesia and peripheral nerve block  Induction:  IV  Informed Consent: Informed consent signed with the Patient and all parties understand the risks and agree with anesthesia plan.  All questions answered.   ASA Score: 3  Day of Surgery Review of History & Physical: H&P Update referred to the surgeon/provider.    Ready For Surgery From Anesthesia Perspective.     .

## 2023-02-02 NOTE — TRANSFER OF CARE
"Anesthesia Transfer of Care Note    Patient: Diane Garcia    Procedure(s) Performed: Procedure(s) (LRB):  EGD (ESOPHAGOGASTRODUODENOSCOPY) (N/A)  COLONOSCOPY (N/A)    Patient location: GI    Anesthesia Type: general    Transport from OR: Transported from OR on room air with adequate spontaneous ventilation    Post pain: adequate analgesia    Post assessment: no apparent anesthetic complications and tolerated procedure well    Post vital signs: stable    Level of consciousness: responds to stimulation and sedated    Nausea/Vomiting: no nausea/vomiting    Complications: none    Transfer of care protocol was followed      Last vitals:   Visit Vitals  BP (!) 165/79 (BP Location: Left arm, Patient Position: Lying)   Pulse 71   Temp 36.1 °C (97 °F) (Skin)   Resp 18   Ht 5' 5" (1.651 m)   Wt 106.6 kg (235 lb)   SpO2 97%   Breastfeeding No   BMI 39.11 kg/m²     "

## 2023-02-02 NOTE — H&P
Short Stay Endoscopy History and Physical    PCP - Beverly Oakes MD    Procedure - EGD/colonoscopy  ASA - III  Mallampati - per anesthesia  History of Anesthesia problems - no  Family history Anesthesia problems - no     HPI:  This is a 67 y.o. female here for evaluation of : GERD. CRC screen    ROS:  Constitutional: No fevers, chills, No weight loss  ENT: No allergies  CV: No chest pain  Pulm: No shortness of breath  GI: see HPI  Derm: No rash    Medical History:  has a past medical history of Hyperlipidemia and Hypertension.    Surgical History:  has a past surgical history that includes Hysterectomy and Oophorectomy.    Family History: family history includes COPD in her father; Cancer in her mother; No Known Problems in her sister.. Otherwise no colon cancer, inflammatory bowel disease, or GI malignancies.    Social History:  reports that she quit smoking about 22 years ago. Her smoking use included cigarettes. She has a 50.00 pack-year smoking history. She has never used smokeless tobacco. She reports current alcohol use. She reports that she does not use drugs.    Review of patient's allergies indicates:   Allergen Reactions    Penicillins      nausea       Medications:   Medications Prior to Admission   Medication Sig Dispense Refill Last Dose    biotin 1 mg Cap Take by mouth.   Past Week    cartilage/collagen/bor/hyalur (JOINT HEALTH ORAL) Take by mouth.   2/1/2023    esomeprazole (NEXIUM) 20 MG capsule Take 20 mg by mouth before breakfast.   2/1/2023    hydroCHLOROthiazide (HYDRODIURIL) 25 MG tablet TAKE 1 TABLET(25 MG) BY MOUTH EVERY DAY 90 tablet 3 2/1/2023    ibuprofen (ADVIL,MOTRIN) 800 MG tablet Take 1 tablet (800 mg total) by mouth every 6 (six) hours as needed for Pain. 60 tablet 1 Past Week    lovastatin (MEVACOR) 40 MG tablet TAKE 1 TABLET(40 MG) BY MOUTH EVERY EVENING 90 tablet 3 2/1/2023    olmesartan (BENICAR) 40 MG tablet TAKE 1 TABLET(40 MG) BY MOUTH EVERY DAY 90 tablet 3 2/2/2023 at 0800     omega-3 fatty acids/fish oil (FISH OIL-OMEGA-3 FATTY ACIDS) 300-1,000 mg capsule Take by mouth once daily.   2/1/2023    TURMERIC ORAL Take by mouth.   2/1/2023    cinnamon bark 500 mg capsule Take 500 mg by mouth once daily.   More than a month    pantoprazole (PROTONIX) 40 MG tablet Take 1 tablet (40 mg total) by mouth once daily. 30 tablet 3          Objective Findings:    Vital Signs: see nursing notes  Physical Exam:  General Appearance: Well appearing in no acute distress  Eyes:    No scleral icterus  ENT: Neck supple  Lungs: CTA anteriorly  Heart:  S1, S2 normal, no murmurs heard  Abdomen: Soft, non tender, non distended with positive bowel sounds. No hepatosplenomegaly, ascites, or mass  Extremities: no edema  Skin: No rash      Labs:  Lab Results   Component Value Date    WBC 6.35 03/11/2022    HGB 12.5 03/11/2022    HCT 40.3 03/11/2022     03/11/2022    CHOL 157 03/11/2022    TRIG 152 (H) 03/11/2022    HDL 45 03/11/2022    ALT 18 03/11/2022    AST 13 03/11/2022     03/11/2022    K 4.4 03/11/2022     03/11/2022    CREATININE 0.6 03/11/2022    BUN 13 03/11/2022    CO2 25 03/11/2022    TSH 1.519 03/11/2022    HGBA1C 5.6 02/02/2021       I have explained the risks and benefits of endoscopy procedures to the patient including but not limited to bleeding, perforation, infection, and death.    Bob Grayson MD

## 2023-02-02 NOTE — PROVATION PATIENT INSTRUCTIONS
Discharge Summary/Instructions after an Endoscopic Procedure  Patient Name: Diane Garcia  Patient MRN: [id number]  Patient YOB: 1955 Thursday, February 2, 2023  Bob Grayson MD  Dear patient,  As a result of recent federal legislation (The Federal Cures Act), you may   receive lab or pathology results from your procedure in your [facility]   account before your physician is able to contact you. Your physician or   their representative will relay the results to you with their   recommendations at their soonest availability.  Thank you,  Your health is very important to us during the Covid Crisis. Following your   procedure today, you will receive a daily text for 2 weeks asking about   signs or symptoms of Covid 19.  Please respond to this text when you   receive it so we can follow up and keep you as safe as possible.   RESTRICTIONS:  During your procedure today, you received medications for sedation.  These   medications may affect your judgment, balance and coordination.  Therefore,   for 24 hours, you have the following restrictions:   - DO NOT drive a car, operate machinery, make legal/financial decisions,   sign important papers or drink alcohol.    ACTIVITY:  Today: no heavy lifting, straining or running due to procedural   sedation/anesthesia.  The following day: return to full activity including work.  DIET:  Eat and drink normally unless instructed otherwise.     TREATMENT FOR COMMON SIDE EFFECTS:  - Mild abdominal pain, nausea, belching, bloating or excessive gas:  rest,   eat lightly and use a heating pad.  - Sore Throat: treat with throat lozenges and/or gargle with warm salt   water.  - Because air was used during the procedure, expelling large amounts of air   from your rectum or belching is normal.  - If a bowel prep was taken, you may not have a bowel movement for 1-3 days.    This is normal.  SYMPTOMS TO WATCH FOR AND REPORT TO YOUR PHYSICIAN:  1. Abdominal pain or  bloating, other than gas cramps.  2. Chest pain.  3. Back pain.  4. Signs of infection such as: chills or fever occurring within 24 hours   after the procedure.  5. Rectal bleeding, which would show as bright red, maroon, or black stools.   (A tablespoon of blood from the rectum is not serious, especially if   hemorrhoids are present.)  6. Vomiting.  7. Weakness or dizziness.  GO DIRECTLY TO THE NEAREST EMERGENCY ROOM IF YOU HAVE ANY OF THE FOLLOWING:      Difficulty breathing              Chills and/or fever over 101 F   Persistent vomiting and/or vomiting blood   Severe abdominal pain   Severe chest pain   Black, tarry stools   Bleeding- more than one tablespoon   Any other symptom or condition that you feel may need urgent attention  Your doctor recommends these additional instructions:  If any biopsies were taken, your doctors clinic will contact you in 1 to 2   weeks with any results.  - Discharge patient to home.   - Resume previous diet.   - Continue present medications.   - Await pathology results.   - Repeat colonoscopy in 3 years for surveillance.   - Patient has a contact number available for emergencies.  The signs and   symptoms of potential delayed complications were discussed with the   patient.  Return to normal activities tomorrow.  Written discharge   instructions were provided to the patient.  For questions, problems or results please call your physician - Bob Grayson MD.  EMERGENCY PHONE NUMBER: [phone],  LAB RESULTS: [phone]  IF A COMPLICATION OR EMERGENCY SITUATION ARISES AND YOU ARE UNABLE TO REACH   YOUR PHYSICIAN - GO DIRECTLY TO THE EMERGENCY ROOM.  Bob Grayson MD  2/2/2023 1:27:25 PM  This report has been verified and signed electronically.  Dear patient,  As a result of recent federal legislation (The Federal Cures Act), you may   receive lab or pathology results from your procedure in your [facility]   account before your physician is able to  contact you. Your physician or   their representative will relay the results to you with their   recommendations at their soonest availability.  Thank you,  PROVATION

## 2023-02-02 NOTE — PROVATION PATIENT INSTRUCTIONS
Discharge Summary/Instructions after an Endoscopic Procedure  Patient Name: Diane Garcia  Patient MRN: [id number]  Patient YOB: 1955 Thursday, February 2, 2023  Bob Grayson MD  Dear patient,  As a result of recent federal legislation (The Federal Cures Act), you may   receive lab or pathology results from your procedure in your [facility]   account before your physician is able to contact you. Your physician or   their representative will relay the results to you with their   recommendations at their soonest availability.  Thank you,  Your health is very important to us during the Covid Crisis. Following your   procedure today, you will receive a daily text for 2 weeks asking about   signs or symptoms of Covid 19.  Please respond to this text when you   receive it so we can follow up and keep you as safe as possible.   RESTRICTIONS:  During your procedure today, you received medications for sedation.  These   medications may affect your judgment, balance and coordination.  Therefore,   for 24 hours, you have the following restrictions:   - DO NOT drive a car, operate machinery, make legal/financial decisions,   sign important papers or drink alcohol.    ACTIVITY:  Today: no heavy lifting, straining or running due to procedural   sedation/anesthesia.  The following day: return to full activity including work.  DIET:  Eat and drink normally unless instructed otherwise.     TREATMENT FOR COMMON SIDE EFFECTS:  - Mild abdominal pain, nausea, belching, bloating or excessive gas:  rest,   eat lightly and use a heating pad.  - Sore Throat: treat with throat lozenges and/or gargle with warm salt   water.  - Because air was used during the procedure, expelling large amounts of air   from your rectum or belching is normal.  - If a bowel prep was taken, you may not have a bowel movement for 1-3 days.    This is normal.  SYMPTOMS TO WATCH FOR AND REPORT TO YOUR PHYSICIAN:  1. Abdominal pain or  bloating, other than gas cramps.  2. Chest pain.  3. Back pain.  4. Signs of infection such as: chills or fever occurring within 24 hours   after the procedure.  5. Rectal bleeding, which would show as bright red, maroon, or black stools.   (A tablespoon of blood from the rectum is not serious, especially if   hemorrhoids are present.)  6. Vomiting.  7. Weakness or dizziness.  GO DIRECTLY TO THE NEAREST EMERGENCY ROOM IF YOU HAVE ANY OF THE FOLLOWING:      Difficulty breathing              Chills and/or fever over 101 F   Persistent vomiting and/or vomiting blood   Severe abdominal pain   Severe chest pain   Black, tarry stools   Bleeding- more than one tablespoon   Any other symptom or condition that you feel may need urgent attention  Your doctor recommends these additional instructions:  If any biopsies were taken, your doctors clinic will contact you in 1 to 2   weeks with any results.  - Discharge patient to home.   - Resume previous diet.   - Continue present medications.   - Await pathology results.   - Perform a colonoscopy as previously scheduled.  For questions, problems or results please call your physician - Bob Grayson MD.  EMERGENCY PHONE NUMBER: [phone],  LAB RESULTS: [phone]  IF A COMPLICATION OR EMERGENCY SITUATION ARISES AND YOU ARE UNABLE TO REACH   YOUR PHYSICIAN - GO DIRECTLY TO THE EMERGENCY ROOM.  Bob Grayson MD  2/2/2023 12:59:46 PM  This report has been verified and signed electronically.  Dear patient,  As a result of recent federal legislation (The Federal Cures Act), you may   receive lab or pathology results from your procedure in your [facility]   account before your physician is able to contact you. Your physician or   their representative will relay the results to you with their   recommendations at their soonest availability.  Thank you,  PROVATION

## 2023-02-03 VITALS
TEMPERATURE: 98 F | WEIGHT: 235 LBS | HEIGHT: 65 IN | HEART RATE: 74 BPM | BODY MASS INDEX: 39.15 KG/M2 | RESPIRATION RATE: 18 BRPM | SYSTOLIC BLOOD PRESSURE: 123 MMHG | OXYGEN SATURATION: 99 % | DIASTOLIC BLOOD PRESSURE: 96 MMHG

## 2023-02-08 LAB
FINAL PATHOLOGIC DIAGNOSIS: NORMAL
GROSS: NORMAL
Lab: NORMAL

## 2023-02-15 ENCOUNTER — OFFICE VISIT (OUTPATIENT)
Dept: URGENT CARE | Facility: CLINIC | Age: 68
End: 2023-02-15
Payer: MEDICARE

## 2023-02-15 VITALS
TEMPERATURE: 98 F | WEIGHT: 230 LBS | BODY MASS INDEX: 38.32 KG/M2 | SYSTOLIC BLOOD PRESSURE: 156 MMHG | HEIGHT: 65 IN | RESPIRATION RATE: 17 BRPM | HEART RATE: 67 BPM | DIASTOLIC BLOOD PRESSURE: 76 MMHG | OXYGEN SATURATION: 97 %

## 2023-02-15 DIAGNOSIS — R05.1 ACUTE COUGH: Primary | ICD-10-CM

## 2023-02-15 DIAGNOSIS — J40 BRONCHITIS: ICD-10-CM

## 2023-02-15 LAB
CTP QC/QA: YES
SARS-COV-2 AG RESP QL IA.RAPID: NEGATIVE

## 2023-02-15 PROCEDURE — 99213 PR OFFICE/OUTPT VISIT, EST, LEVL III, 20-29 MIN: ICD-10-PCS | Mod: S$GLB,CS,, | Performed by: FAMILY MEDICINE

## 2023-02-15 PROCEDURE — 99213 OFFICE O/P EST LOW 20 MIN: CPT | Mod: S$GLB,CS,, | Performed by: FAMILY MEDICINE

## 2023-02-15 PROCEDURE — 87811 SARS-COV-2 COVID19 W/OPTIC: CPT | Mod: QW,S$GLB,, | Performed by: FAMILY MEDICINE

## 2023-02-15 PROCEDURE — 87811 SARS CORONAVIRUS 2 ANTIGEN POCT, MANUAL READ: ICD-10-PCS | Mod: QW,S$GLB,, | Performed by: FAMILY MEDICINE

## 2023-02-15 RX ORDER — PROMETHAZINE HYDROCHLORIDE AND DEXTROMETHORPHAN HYDROBROMIDE 6.25; 15 MG/5ML; MG/5ML
5 SYRUP ORAL EVERY 4 HOURS PRN
Qty: 240 ML | Refills: 0 | Status: SHIPPED | OUTPATIENT
Start: 2023-02-15 | End: 2023-02-25

## 2023-02-15 RX ORDER — PREDNISONE 20 MG/1
40 TABLET ORAL DAILY
Qty: 10 TABLET | Refills: 0 | Status: SHIPPED | OUTPATIENT
Start: 2023-02-15 | End: 2023-02-20

## 2023-02-15 RX ORDER — BENZONATATE 200 MG/1
200 CAPSULE ORAL 3 TIMES DAILY PRN
Qty: 30 CAPSULE | Refills: 0 | Status: SHIPPED | OUTPATIENT
Start: 2023-02-15 | End: 2023-02-25

## 2023-02-15 RX ORDER — ALBUTEROL SULFATE 90 UG/1
2 AEROSOL, METERED RESPIRATORY (INHALATION) EVERY 6 HOURS PRN
Qty: 18 G | Refills: 0 | Status: SHIPPED | OUTPATIENT
Start: 2023-02-15 | End: 2023-03-06

## 2023-02-15 NOTE — PROGRESS NOTES
"Subjective:       Patient ID: Diane Garcia is a 67 y.o. female.    Vitals:  height is 5' 5" (1.651 m) and weight is 104.3 kg (230 lb). Her oral temperature is 98.1 °F (36.7 °C). Her blood pressure is 156/76 (abnormal) and her pulse is 67. Her respiration is 17 and oxygen saturation is 97%.     Chief Complaint: Cough (Entered by patient) and Sinus Problem    Pt states cough and congestion x 6 days.    Cough  This is a new problem. The current episode started in the past 7 days. The problem has been gradually worsening. The problem occurs every few minutes. The cough is Non-productive. Associated symptoms include nasal congestion. Pertinent negatives include no chest pain, chills, ear congestion, ear pain, fever, headaches, heartburn, hemoptysis, myalgias, postnasal drip, rash, rhinorrhea, sore throat, shortness of breath, sweats, weight loss or wheezing. The symptoms are aggravated by lying down. Treatments tried: Zpak.   Sinus Problem  Associated symptoms include coughing. Pertinent negatives include no chills, ear pain, headaches, shortness of breath or sore throat.     Constitution: Negative for chills and fever.   HENT:  Negative for ear pain, postnasal drip and sore throat.    Cardiovascular:  Negative for chest pain.   Respiratory:  Positive for cough. Negative for bloody sputum, shortness of breath and wheezing.    Gastrointestinal:  Negative for heartburn.   Musculoskeletal:  Negative for muscle ache.   Skin:  Negative for rash.   Neurological:  Negative for headaches.     Objective:      Vitals:    02/15/23 1244   BP: (!) 156/76   Pulse: 67   Resp: 17   Temp: 98.1 °F (36.7 °C)   TempSrc: Oral   SpO2: 97%   Weight: 104.3 kg (230 lb)   Height: 5' 5" (1.651 m)      Physical Exam   Constitutional: She is oriented to person, place, and time. She appears well-developed. She is cooperative.  Non-toxic appearance. She does not appear ill. No distress.   HENT:   Head: Normocephalic and atraumatic.   Ears:   Right " Ear: Hearing, tympanic membrane, external ear and ear canal normal.   Left Ear: Hearing, tympanic membrane, external ear and ear canal normal.   Nose: Congestion present. No mucosal edema, rhinorrhea or nasal deformity. No epistaxis. Right sinus exhibits no maxillary sinus tenderness and no frontal sinus tenderness. Left sinus exhibits no maxillary sinus tenderness and no frontal sinus tenderness.   Mouth/Throat: Uvula is midline, oropharynx is clear and moist and mucous membranes are normal. No trismus in the jaw. Normal dentition. No uvula swelling. No oropharyngeal exudate, posterior oropharyngeal edema or posterior oropharyngeal erythema.   Eyes: Conjunctivae and lids are normal. No scleral icterus.   Neck: Trachea normal and phonation normal. Neck supple. No edema present. No erythema present. No neck rigidity present.   Cardiovascular: Normal rate, regular rhythm, normal heart sounds and normal pulses.   Pulmonary/Chest: Effort normal and breath sounds normal. No respiratory distress. She has no decreased breath sounds. She has no rhonchi.   Abdominal: Normal appearance and bowel sounds are normal. Soft.   Musculoskeletal: Normal range of motion.         General: Normal range of motion.   Neurological: no focal deficit. She is alert and oriented to person, place, and time. She exhibits normal muscle tone. Coordination normal.   Skin: Skin is warm, intact and not diaphoretic. Capillary refill takes less than 2 seconds.   Psychiatric: Her speech is normal and behavior is normal. Judgment and thought content normal.   Nursing note and vitals reviewed.      Results for orders placed or performed in visit on 02/15/23   SARS Coronavirus 2 Antigen, POCT Manual Read   Result Value Ref Range    SARS Coronavirus 2 Antigen Negative Negative     Acceptable Yes       Assessment:       1. Acute cough    2. Bronchitis          Plan:         Acute cough  -     SARS Coronavirus 2 Antigen, POCT Manual Read  -      promethazine-dextromethorphan (PROMETHAZINE-DM) 6.25-15 mg/5 mL Syrp; Take 5 mLs by mouth every 4 (four) hours as needed (cough).  Dispense: 240 mL; Refill: 0  -     benzonatate (TESSALON) 200 MG capsule; Take 1 capsule (200 mg total) by mouth 3 (three) times daily as needed for Cough.  Dispense: 30 capsule; Refill: 0    2. Bronchitis  -     albuterol (VENTOLIN HFA) 90 mcg/actuation inhaler; Inhale 2 puffs into the lungs every 6 (six) hours as needed for Wheezing. Rescue. Ok to substitute based on availability: Proair, Proventil, ventolin  Dispense: 18 g; Refill: 0  -     promethazine-dextromethorphan (PROMETHAZINE-DM) 6.25-15 mg/5 mL Syrp; Take 5 mLs by mouth every 4 (four) hours as needed (cough).  Dispense: 240 mL; Refill: 0  -     benzonatate (TESSALON) 200 MG capsule; Take 1 capsule (200 mg total) by mouth 3 (three) times daily as needed for Cough.  Dispense: 30 capsule; Refill: 0  -     predniSONE (DELTASONE) 20 MG tablet; Take 2 tablets (40 mg total) by mouth once daily. for 5 days  Dispense: 10 tablet; Refill: 0  Discussed alarm symptoms which will require reevaluation + Chest X-ray. She will finish her course of Azithromycin which she already started.    Patient Instructions   Follow up with primary care provider within 7 days  Seek immediate care in the emergency room in the event of severe abdominal pain, chest pain, respiratory distress, fever unresponsive to antipyretic, dehydration, loss of consciousness, seizure.

## 2023-02-15 NOTE — PATIENT INSTRUCTIONS
Follow up with primary care provider within 7 days  Seek immediate care in the emergency room in the event of severe abdominal pain, chest pain, respiratory distress, fever unresponsive to antipyretic, dehydration, loss of consciousness, seizure.

## 2023-02-22 NOTE — PROGRESS NOTES
Pathologic findings for recent EGD/colonoscopy reviewed:    - No evidence of H pylori infection identified   - Some mild inflammation was noted.    - Colon polyp(s) benign    Recommendations  - Continue current medications  - Repeat colonoscopy in 3 years  - Follow up as needed

## 2023-02-28 ENCOUNTER — TELEPHONE (OUTPATIENT)
Dept: GASTROENTEROLOGY | Facility: CLINIC | Age: 68
End: 2023-02-28
Payer: MEDICARE

## 2023-03-06 ENCOUNTER — OFFICE VISIT (OUTPATIENT)
Dept: PRIMARY CARE CLINIC | Facility: CLINIC | Age: 68
End: 2023-03-06
Payer: MEDICARE

## 2023-03-06 VITALS
DIASTOLIC BLOOD PRESSURE: 65 MMHG | WEIGHT: 239.63 LBS | RESPIRATION RATE: 18 BRPM | SYSTOLIC BLOOD PRESSURE: 140 MMHG | TEMPERATURE: 98 F | HEART RATE: 78 BPM | BODY MASS INDEX: 38.51 KG/M2 | HEIGHT: 66 IN | OXYGEN SATURATION: 99 %

## 2023-03-06 DIAGNOSIS — I10 ESSENTIAL HYPERTENSION: ICD-10-CM

## 2023-03-06 DIAGNOSIS — E66.01 SEVERE OBESITY (BMI 35.0-39.9) WITH COMORBIDITY: ICD-10-CM

## 2023-03-06 DIAGNOSIS — Z87.891 FORMER SMOKER: ICD-10-CM

## 2023-03-06 DIAGNOSIS — E78.2 MIXED HYPERLIPIDEMIA: ICD-10-CM

## 2023-03-06 DIAGNOSIS — H04.129 DRY EYE: ICD-10-CM

## 2023-03-06 DIAGNOSIS — K21.9 GASTROESOPHAGEAL REFLUX DISEASE, UNSPECIFIED WHETHER ESOPHAGITIS PRESENT: ICD-10-CM

## 2023-03-06 DIAGNOSIS — Z00.00 ROUTINE GENERAL MEDICAL EXAMINATION AT A HEALTH CARE FACILITY: Primary | ICD-10-CM

## 2023-03-06 DIAGNOSIS — R78.71 ABNORMAL LEAD LEVEL IN BLOOD: ICD-10-CM

## 2023-03-06 DIAGNOSIS — G47.33 OSA (OBSTRUCTIVE SLEEP APNEA): ICD-10-CM

## 2023-03-06 DIAGNOSIS — Z12.31 OTHER SCREENING MAMMOGRAM: ICD-10-CM

## 2023-03-06 PROCEDURE — 99397 PER PM REEVAL EST PAT 65+ YR: CPT | Mod: GZ,S$PBB,, | Performed by: INTERNAL MEDICINE

## 2023-03-06 PROCEDURE — 90677 PCV20 VACCINE IM: CPT | Mod: PBBFAC,PN

## 2023-03-06 PROCEDURE — 99397 PR PREVENTIVE VISIT,EST,65 & OVER: ICD-10-PCS | Mod: GZ,S$PBB,, | Performed by: INTERNAL MEDICINE

## 2023-03-06 PROCEDURE — 99215 OFFICE O/P EST HI 40 MIN: CPT | Mod: PBBFAC,PN | Performed by: INTERNAL MEDICINE

## 2023-03-06 PROCEDURE — 99999 PR PBB SHADOW E&M-EST. PATIENT-LVL V: ICD-10-PCS | Mod: PBBFAC,,, | Performed by: INTERNAL MEDICINE

## 2023-03-06 PROCEDURE — 99999 PR PBB SHADOW E&M-EST. PATIENT-LVL V: CPT | Mod: PBBFAC,,, | Performed by: INTERNAL MEDICINE

## 2023-03-06 NOTE — PROGRESS NOTES
Subjective:      Patient ID: Diane Garcia is a 67 y.o. female.    Chief Complaint: Annual Exam and Establish Care    68 yo female here for follow up.     HTN: BP at office well controlled at 130/70. Normally runs around similar number at home. Denies CP/SOB/lightheadedness/dizziness. Continue HCTZ and Olmesartan     GERD:  Does have a history of gastric reflux most likely secondary to large hiatal hernia. She has followed with Dr. Grayson. EGD/colonoscopy scheduled for 2/2023 which were completed. She reports that she is managing well as far as hernia is concerned. She also discussed this with Dr. Grayson who did not recommend re-surgery for the hiatal hernia.     TA and Hyperplastic polyp: Colonoscopy revealed TA and HP. Colonoscopy recommended in 3 years for follow up.      HLD: Continue lovastatin and omega 3     Obesity with BMI 37.5, Interested in seeing bariatrics to help with weight loss. Referrals made, appointment pending for 3/13/2023.      MELLISA: She has followed up with sleep medicine, moderately severe MELLISA confirmed via sleep test. Compliant with CPAP.     Knee pain: Hx of meniscus tear on left knee. Does have bilateral OA of knee. Knee X-ray from 2022 reviewed. Patient interested in joint injection.     Up to date with colonoscopy.   Mammogram obtained in 6/2022, due 6/2023     Denies any chest pain, shortness of breath, nausea vomiting constipation diarrhea, blood in stool, heartburn    Review of Systems   Constitutional:  Negative for chills, fever and weight loss.   HENT:  Negative for congestion, ear pain, hearing loss and sore throat.    Eyes:  Negative for double vision and discharge.   Respiratory:  Negative for cough, shortness of breath and wheezing.    Cardiovascular:  Negative for chest pain, palpitations and leg swelling.   Gastrointestinal:  Negative for abdominal pain, blood in stool, constipation, diarrhea, heartburn, nausea and vomiting.   Genitourinary:  Negative for dysuria and  hematuria.   Musculoskeletal:  Negative for neck pain.   Skin:  Negative for rash.   Neurological:  Negative for dizziness, tingling, weakness and headaches.   Endo/Heme/Allergies:  Negative for polydipsia.   Psychiatric/Behavioral:  Negative for depression.        Current Outpatient Medications:     biotin 1 mg Cap, Take by mouth., Disp: , Rfl:     cartilage/collagen/bor/hyalur (JOINT HEALTH ORAL), Take by mouth., Disp: , Rfl:     esomeprazole (NEXIUM) 20 MG capsule, Take 20 mg by mouth before breakfast., Disp: , Rfl:     hydroCHLOROthiazide (HYDRODIURIL) 25 MG tablet, TAKE 1 TABLET(25 MG) BY MOUTH EVERY DAY, Disp: 90 tablet, Rfl: 3    ibuprofen (ADVIL,MOTRIN) 800 MG tablet, Take 1 tablet (800 mg total) by mouth every 6 (six) hours as needed for Pain., Disp: 60 tablet, Rfl: 1    lovastatin (MEVACOR) 40 MG tablet, TAKE 1 TABLET(40 MG) BY MOUTH EVERY EVENING, Disp: 90 tablet, Rfl: 3    olmesartan (BENICAR) 40 MG tablet, TAKE 1 TABLET(40 MG) BY MOUTH EVERY DAY, Disp: 90 tablet, Rfl: 3    omega-3 fatty acids/fish oil (FISH OIL-OMEGA-3 FATTY ACIDS) 300-1,000 mg capsule, Take by mouth once daily., Disp: , Rfl:     pantoprazole (PROTONIX) 40 MG tablet, Take 1 tablet (40 mg total) by mouth once daily., Disp: 30 tablet, Rfl: 3    TURMERIC ORAL, Take by mouth., Disp: , Rfl:     Lab Results   Component Value Date    HGBA1C 5.6 02/02/2021     No results found for: MICALBCREAT  Lab Results   Component Value Date    LDLCALC 81.6 03/11/2022    LDLCALC 94.8 02/02/2021    CHOL 157 03/11/2022    HDL 45 03/11/2022    TRIG 152 (H) 03/11/2022       Lab Results   Component Value Date     03/11/2022    K 4.4 03/11/2022     03/11/2022    CO2 25 03/11/2022     03/11/2022    BUN 13 03/11/2022    CREATININE 0.6 03/11/2022    CALCIUM 9.4 03/11/2022    PROT 6.9 03/11/2022    ALBUMIN 3.9 03/11/2022    BILITOT 0.9 03/11/2022    ALKPHOS 61 03/11/2022    AST 13 03/11/2022    ALT 18 03/11/2022    ANIONGAP 12 03/11/2022     "ESTGFRAFRICA >60.0 03/11/2022    EGFRNONAA >60.0 03/11/2022    WBC 6.35 03/11/2022    HGB 12.5 03/11/2022    HGB 12.3 02/23/2021    HCT 40.3 03/11/2022    MCV 94 03/11/2022     03/11/2022    TSH 1.519 03/11/2022    HEPCAB Negative 03/11/2022       No results found for: LH, FSH, TOTALTESTOST, PROGESTERONE, ESTRADIOL, IWUMKLQE33CV, VHNFJANE56, FERRITIN, IRON, TRANSFERRIN, TIBC, FESATURATED, ZINC      Past Medical History:   Diagnosis Date    Hyperlipidemia     Hypertension      Past Surgical History:   Procedure Laterality Date    COLONOSCOPY N/A 2/2/2023    Procedure: COLONOSCOPY;  Surgeon: Bob Grayson MD;  Location: [facility];  Service: Endoscopy;  Laterality: N/A;    ESOPHAGOGASTRODUODENOSCOPY N/A 2/2/2023    Procedure: EGD (ESOPHAGOGASTRODUODENOSCOPY);  Surgeon: Bob Grayson MD;  Location: [facility];  Service: Endoscopy;  Laterality: N/A;    HYSTERECTOMY      OOPHORECTOMY       Social History     Social History Narrative    Not on file     Family History   Problem Relation Age of Onset    Cancer Mother         Lung    COPD Father     No Known Problems Sister     Melanoma Neg Hx      Vitals:    03/06/23 1435   BP: (!) 145/74   Pulse: 77   Resp: 18   Temp: 98 °F (36.7 °C)   SpO2: 99%   Weight: 108.7 kg (239 lb 10.2 oz)   Height: 5' 6" (1.676 m)   PainSc:   4     Objective:   Physical Exam  Vitals reviewed.   Constitutional:       Appearance: Normal appearance.   HENT:      Head: Normocephalic.      Right Ear: Tympanic membrane, ear canal and external ear normal.      Left Ear: Tympanic membrane, ear canal and external ear normal.      Nose: Nose normal.      Mouth/Throat:      Mouth: Mucous membranes are moist.      Pharynx: Oropharynx is clear.   Eyes:      Conjunctiva/sclera: Conjunctivae normal.      Pupils: Pupils are equal, round, and reactive to light.   Cardiovascular:      Rate and Rhythm: Normal rate and regular rhythm.      Pulses: Normal pulses.   Pulmonary:      Effort: " Pulmonary effort is normal.      Breath sounds: Normal breath sounds.   Abdominal:      General: Abdomen is flat. Bowel sounds are normal.      Palpations: Abdomen is soft.   Musculoskeletal:      Cervical back: Neck supple.   Skin:     General: Skin is warm.   Neurological:      General: No focal deficit present.      Mental Status: She is alert.   Psychiatric:         Mood and Affect: Mood normal.     Assessment:     1. Routine general medical examination at a health care facility    2. Essential hypertension    3. Mixed hyperlipidemia    4. Severe obesity (BMI 35.0-39.9) with comorbidity    5. Gastroesophageal reflux disease, unspecified whether esophagitis present    6. MELLISA (obstructive sleep apnea)    7. Former smoker    8. Dry eye    9. Other screening mammogram    10. Abnormal lead level in blood      Plan:     Orders Placed This Encounter    (In Office Administered) Pneumococcal Conjugate Vaccine (20 Valent) (IM)    CBC Auto Differential    Comprehensive Metabolic Panel    TSH    Lipid Panel    Hemoglobin A1C    Ambulatory referral/consult to Optometry    Ambulatory referral/consult to Dermatology       There are no Patient Instructions on file for this visit.  Tests to Keep You Healthy    Mammogram: Met on 6/30/2022  Colon Cancer Screening: Met on 2/2/2023  Last Blood Pressure <= 139/89 (3/6/2023): NO                          Answers submitted by the patient for this visit:  Review of Systems Questionnaire (Submitted on 3/4/2023)  activity change: No  unexpected weight change: Yes  rhinorrhea: No  trouble swallowing: No  visual disturbance: No  chest tightness: No  polyuria: No  difficulty urinating: No  menstrual problem: No  joint swelling: Yes  arthralgias: Yes  confusion: No  dysphoric mood: No

## 2023-03-06 NOTE — PROGRESS NOTES
Two patient identifiers verified.  Allergies reviewed. Parva  20 IM administered to Left arm per MD order.  Patient tolerated injection well; no redness, bleeding, or bruising noted to injection site.  Patient instructed to remain in clinic setting for 15 minutes.

## 2023-03-09 ENCOUNTER — LAB VISIT (OUTPATIENT)
Dept: LAB | Facility: HOSPITAL | Age: 68
End: 2023-03-09
Attending: INTERNAL MEDICINE
Payer: MEDICARE

## 2023-03-09 DIAGNOSIS — R78.71 ABNORMAL LEAD LEVEL IN BLOOD: ICD-10-CM

## 2023-03-09 DIAGNOSIS — E78.2 MIXED HYPERLIPIDEMIA: ICD-10-CM

## 2023-03-09 DIAGNOSIS — Z00.00 ROUTINE GENERAL MEDICAL EXAMINATION AT A HEALTH CARE FACILITY: ICD-10-CM

## 2023-03-09 DIAGNOSIS — I10 ESSENTIAL HYPERTENSION: ICD-10-CM

## 2023-03-09 DIAGNOSIS — E66.01 SEVERE OBESITY (BMI 35.0-39.9) WITH COMORBIDITY: ICD-10-CM

## 2023-03-09 LAB
ALBUMIN SERPL BCP-MCNC: 3.9 G/DL (ref 3.5–5.2)
ALP SERPL-CCNC: 67 U/L (ref 55–135)
ALT SERPL W/O P-5'-P-CCNC: 19 U/L (ref 10–44)
ANION GAP SERPL CALC-SCNC: 11 MMOL/L (ref 8–16)
AST SERPL-CCNC: 13 U/L (ref 10–40)
BASOPHILS # BLD AUTO: 0.06 K/UL (ref 0–0.2)
BASOPHILS NFR BLD: 0.8 % (ref 0–1.9)
BILIRUB SERPL-MCNC: 1.2 MG/DL (ref 0.1–1)
BUN SERPL-MCNC: 14 MG/DL (ref 8–23)
CALCIUM SERPL-MCNC: 9.5 MG/DL (ref 8.7–10.5)
CHLORIDE SERPL-SCNC: 104 MMOL/L (ref 95–110)
CHOLEST SERPL-MCNC: 154 MG/DL (ref 120–199)
CHOLEST/HDLC SERPL: 3.7 {RATIO} (ref 2–5)
CO2 SERPL-SCNC: 26 MMOL/L (ref 23–29)
CREAT SERPL-MCNC: 0.6 MG/DL (ref 0.5–1.4)
DIFFERENTIAL METHOD: NORMAL
EOSINOPHIL # BLD AUTO: 0.4 K/UL (ref 0–0.5)
EOSINOPHIL NFR BLD: 4.9 % (ref 0–8)
ERYTHROCYTE [DISTWIDTH] IN BLOOD BY AUTOMATED COUNT: 13.3 % (ref 11.5–14.5)
EST. GFR  (NO RACE VARIABLE): >60 ML/MIN/1.73 M^2
ESTIMATED AVG GLUCOSE: 126 MG/DL (ref 68–131)
GLUCOSE SERPL-MCNC: 129 MG/DL (ref 70–110)
HBA1C MFR BLD: 6 % (ref 4–5.6)
HCT VFR BLD AUTO: 38.4 % (ref 37–48.5)
HDLC SERPL-MCNC: 42 MG/DL (ref 40–75)
HDLC SERPL: 27.3 % (ref 20–50)
HGB BLD-MCNC: 12.3 G/DL (ref 12–16)
IMM GRANULOCYTES # BLD AUTO: 0.02 K/UL (ref 0–0.04)
IMM GRANULOCYTES NFR BLD AUTO: 0.3 % (ref 0–0.5)
LDLC SERPL CALC-MCNC: 80.8 MG/DL (ref 63–159)
LYMPHOCYTES # BLD AUTO: 1.6 K/UL (ref 1–4.8)
LYMPHOCYTES NFR BLD: 21.3 % (ref 18–48)
MCH RBC QN AUTO: 29.7 PG (ref 27–31)
MCHC RBC AUTO-ENTMCNC: 32 G/DL (ref 32–36)
MCV RBC AUTO: 93 FL (ref 82–98)
MONOCYTES # BLD AUTO: 0.5 K/UL (ref 0.3–1)
MONOCYTES NFR BLD: 6.1 % (ref 4–15)
NEUTROPHILS # BLD AUTO: 4.9 K/UL (ref 1.8–7.7)
NEUTROPHILS NFR BLD: 66.6 % (ref 38–73)
NONHDLC SERPL-MCNC: 112 MG/DL
NRBC BLD-RTO: 0 /100 WBC
PLATELET # BLD AUTO: 319 K/UL (ref 150–450)
PMV BLD AUTO: 10.5 FL (ref 9.2–12.9)
POTASSIUM SERPL-SCNC: 4.2 MMOL/L (ref 3.5–5.1)
PROT SERPL-MCNC: 7.3 G/DL (ref 6–8.4)
RBC # BLD AUTO: 4.14 M/UL (ref 4–5.4)
SODIUM SERPL-SCNC: 141 MMOL/L (ref 136–145)
TRIGL SERPL-MCNC: 156 MG/DL (ref 30–150)
TSH SERPL DL<=0.005 MIU/L-ACNC: 1.57 UIU/ML (ref 0.4–4)
WBC # BLD AUTO: 7.38 K/UL (ref 3.9–12.7)

## 2023-03-09 PROCEDURE — 84443 ASSAY THYROID STIM HORMONE: CPT | Performed by: INTERNAL MEDICINE

## 2023-03-09 PROCEDURE — 85025 COMPLETE CBC W/AUTO DIFF WBC: CPT | Performed by: INTERNAL MEDICINE

## 2023-03-09 PROCEDURE — 83036 HEMOGLOBIN GLYCOSYLATED A1C: CPT | Performed by: INTERNAL MEDICINE

## 2023-03-09 PROCEDURE — 36415 COLL VENOUS BLD VENIPUNCTURE: CPT | Mod: PN | Performed by: INTERNAL MEDICINE

## 2023-03-09 PROCEDURE — 80053 COMPREHEN METABOLIC PANEL: CPT | Performed by: INTERNAL MEDICINE

## 2023-03-09 PROCEDURE — 80061 LIPID PANEL: CPT | Performed by: INTERNAL MEDICINE

## 2023-03-13 ENCOUNTER — OFFICE VISIT (OUTPATIENT)
Dept: BARIATRICS | Facility: CLINIC | Age: 68
End: 2023-03-13
Payer: MEDICARE

## 2023-03-13 VITALS
DIASTOLIC BLOOD PRESSURE: 80 MMHG | HEART RATE: 71 BPM | BODY MASS INDEX: 39.67 KG/M2 | SYSTOLIC BLOOD PRESSURE: 142 MMHG | HEIGHT: 65 IN | OXYGEN SATURATION: 95 % | WEIGHT: 238.13 LBS

## 2023-03-13 DIAGNOSIS — M17.0 PRIMARY OSTEOARTHRITIS OF BOTH KNEES: ICD-10-CM

## 2023-03-13 DIAGNOSIS — I10 ESSENTIAL HYPERTENSION: ICD-10-CM

## 2023-03-13 DIAGNOSIS — Z71.3 ENCOUNTER FOR WEIGHT LOSS COUNSELING: ICD-10-CM

## 2023-03-13 DIAGNOSIS — E66.01 CLASS 2 SEVERE OBESITY DUE TO EXCESS CALORIES WITH SERIOUS COMORBIDITY AND BODY MASS INDEX (BMI) OF 39.0 TO 39.9 IN ADULT: Primary | ICD-10-CM

## 2023-03-13 DIAGNOSIS — E78.2 MIXED HYPERLIPIDEMIA: ICD-10-CM

## 2023-03-13 DIAGNOSIS — R73.03 PREDIABETES: ICD-10-CM

## 2023-03-13 DIAGNOSIS — G47.33 OSA (OBSTRUCTIVE SLEEP APNEA): ICD-10-CM

## 2023-03-13 PROCEDURE — 99204 OFFICE O/P NEW MOD 45 MIN: CPT | Mod: S$PBB,,, | Performed by: STUDENT IN AN ORGANIZED HEALTH CARE EDUCATION/TRAINING PROGRAM

## 2023-03-13 PROCEDURE — 99999 PR PBB SHADOW E&M-EST. PATIENT-LVL IV: ICD-10-PCS | Mod: PBBFAC,,, | Performed by: STUDENT IN AN ORGANIZED HEALTH CARE EDUCATION/TRAINING PROGRAM

## 2023-03-13 PROCEDURE — 99214 OFFICE O/P EST MOD 30 MIN: CPT | Mod: PBBFAC | Performed by: STUDENT IN AN ORGANIZED HEALTH CARE EDUCATION/TRAINING PROGRAM

## 2023-03-13 PROCEDURE — 99204 PR OFFICE/OUTPT VISIT, NEW, LEVL IV, 45-59 MIN: ICD-10-PCS | Mod: S$PBB,,, | Performed by: STUDENT IN AN ORGANIZED HEALTH CARE EDUCATION/TRAINING PROGRAM

## 2023-03-13 PROCEDURE — 99999 PR PBB SHADOW E&M-EST. PATIENT-LVL IV: CPT | Mod: PBBFAC,,, | Performed by: STUDENT IN AN ORGANIZED HEALTH CARE EDUCATION/TRAINING PROGRAM

## 2023-03-13 RX ORDER — SEMAGLUTIDE 1.34 MG/ML
0.5 INJECTION, SOLUTION SUBCUTANEOUS
Qty: 1.5 ML | Refills: 2 | Status: SHIPPED | OUTPATIENT
Start: 2023-03-13 | End: 2023-05-18

## 2023-03-13 NOTE — PATIENT INSTRUCTIONS
Start Ozempic once a week. Start with 0.25mg once a week x 4 weeks, then 0.5 mg weekly.     Decrease portions as soon as you start Ozempic. Avoid fried, greasy, fatty foods.     Some nausea in the first 2 weeks is not unusual.     If you get pain across the upper abdomen and around to your back, please call the office.             Copyright © 2011, [facility]. For more information about The Healthy Eating Plate, please see The Nutrition Source, Department of Nutrition, [employer], www.thenutritionsource.org, and [employer], www.health.[place].edu.      Meal Planning & Grocery Shopping    Meal planning builds the foundation for healthy eating. When you have structured ideas for healthy meals and foods available at home to prepare those meals, weight control becomes easier.  If only healthy foods are available at home, then you will be much more likely to eat healthy foods. And you will be less likely to go to a restaurant or  a fast food meal, which tend to be unhealthy and higher in calories than meals prepared at home.      Take 5-10 minutes each week to plan meals for the next 7 days.  Make a grocery list based on the meal plan.    Grocery Shopping Tips:  Shop on a full stomach.  Schedule your shopping for times when you are most motivated and able to be disciplined about your purchases. For example, after a stressful day at work it may be difficult to make the healthiest choices. Shopping at other times, such as early in the morning or after dinner, may be easier.  Focus your shopping on the outside aisles of the store, which tend to contain more fresh foods and lower calorie foods. The inside aisles tend to have more processed foods.  Stick to your list. Avoid buying unhealthy items just because they are on sale.   Compare nutrition labels to check the number of calories and percentage of fat.      What to buy:    Vegetables  Fresh vegetables  Frozen  vegetables with no sauce or added salt  Canned vegetables with no sauce or added salt    Protein  Lean meats, such as chicken and turkey  Limit red meats, such as beef to no more than 1x/week  Limit processed meats, such as cold cuts, culver, sausage, and hot dogs. Look for brands that have no nitrites and are minimally processed. Consider turkey sausage or turkey culver.  Fish and Shellfish  Eggs  Dried beans  Canned beans (reduced sodium)    Fat  Use healthy oils, such as olive oil or canola oil, for cooking, salad dressings, etc.  Unflavored nuts and seeds  Nut butters (no added sugar)    Dairy  Yogurt (no sugar added)  Cheese  Low-fat milk  Unsweetened nondairy milks (almond milk, soy milk, etc)    Fruit  Fresh Fruit  Frozen fruit with no added sugar  Canned fruit with no added sugar  Dried fruit with no added sugar  100% fruit juice    Whole Grains  Single ingredient grains, such as oats, quinoa, brown rice  Whole-wheat pasta  Sprouted whole-grain bread    What to avoid:  - Avoid fried foods  - Avoid foods with added sugar  - Avoid sugar-sweetened beverages  - Avoid ultra-processed foods      Lifestyle Activity    Lifestyle activity consists of all the activities that burn calories during the course of a normal day. Using the stairs, washing the dishes, or even getting up to turn off the television are all examples of lifestyle activity. All activities, no matter how small, burn calories. Increasing lifestyle activity can help with weight control, so building physical activity into your everyday routine is important.     A pedometer is a tool that can help you track how much lifestyle activity you are getting. It can help you stay active. A pedometer counts each step you take and displays your total steps on the screen. Many smartphones, including iPhones and Androids, have applications that automatically count your steps. If you decide to use this method, make sure you are holding or wearing your smartphone so  it can count all of your steps.     During the next 2 weeks, aim for 5,000 or more steps per day. Each week aim to increase your daily step goal by 250 so that you will reach an average of 10,000 steps per day (equal to walking 4 to 5 miles).     Start making more active choices in your routine in order to increase the amount of walking you do each day.     Strategies to Increase Lifestyle Activity:    Take several 5-10-minute walks during the day.   Set a reminder to take a 5 minute break from your desk every hour.   Choose the farthest entrance to a building that you are entering.   Host walking meetings at work.   Walk down the galo to contact co-workers face-to-face, instead of emailing or calling.  Walk to a restroom, water cooler, or copy machine on a different floor at work.   Walk during your lunch break.   Park farther away in parking lots.   Get off the bus or train earlier and walk farther to your destination.   Take the stairs rather than the elevator or the escalator.   Start a walking club with co-workers or neighbors.   Walk - don't drive - for trips less than one mile.   Take an after-dinner walk with family.   Go for a walk while talking on your wireless phone.   Walk the dog more often.   If you prefer to stay indoors because of the weather, try walking in a shopping mall or doing laps around a large store.   Purchase a treadmill to use at home.   Schedule time for walking every week and stick to it like any other appointment.   Or think of your own strategy: _______________________________       Physical Activity    Physical activity improves your health and helps with weight control, especially weight loss maintenance.      When starting to incorporate an exercise routine into your day, it is helpful to set specific goals.  For example, I will walk at moderate intensity from 12:30-12:45pm on Monday, Wednesday, and Friday.  Schedule your exercise time into your calendar.  Think of any  potential barriers that may come up and prevent you from exercising.  Develop solutions or back-up plans for when these barriers may arise.    Walking is an ideal activity to start with if you do not currently have an exercise routine. You can make the activity more fun by doing it with a friend or listening to music or a book on tape while you do it. If you don't enjoy walking, think of other activities you like, such as bike riding or swimming.  Other alternatives include group fitness classes or exercise at home using DVDs, Apps, etc.    If you are new to exercising, then start with 10 minutes 3-4 days per week.  After two weeks, increase to 15 minutes 3-4 days per week.  If you already exercise more than this, continue at your higher level, or increase by 10-15 minutes if able.         Aerobic Activity  Aerobic Activity gets you breathing harder and your heart beating faster.  Eventually, you should work up to 150 - 300 minutes of moderate-intensity aerobic activity every week or 75 - 150 minutes of vigorous-intensity aerobic activity every week.  An easy way to gauge the intensity of your activity is with the Talk Test.  During moderate activity, you should be able to talk, but not sing without having to pause for a breath.  During vigorous activity, you shouldn't be able to say more than a few words without pausing for a breath.  You should not push yourself until you are completely out of breath, tired, or sore.    Examples of Aerobic Activity:  Walking  Running  Swimming  Biking  Playing sports like tennis or basketball  Dancing  Jumping Rope      Strength Training  It is also recommended that you perform muscle-strengthening activities at least 2 days per week.  Be sure to include activities that work all major muscle groups (legs, arms, abdomen, back, buttocks, chest, and shoulders)    Examples of Strength Training  Lifting weights  Working with resistance band  Using your body weight for resistance  (squats, push-ups, sit-ups)  Pilates  Yoga      https://health.gov/moveyourway/activity-planner/      Remember to keep a record of your activity to track your progress.

## 2023-03-13 NOTE — PROGRESS NOTES
Subjective:       Patient ID: Diane Garcia is a 67 y.o. female.    Chief Complaint: Consult and Obesity    Patient presents for treatment of obesity.   Has tried multiple diets, but always gains weight back plus some    Co-morbidities   HTN  HLD  Prediabetes  MELLISA  OA, bilateral knees      Current Physical Activity  Cycling 3 nights per week, 10-12 miles (social)  Pickle ball 3x/week    Current Eating Habits  Breakfast - oatmeal with yogurt; eggs and english muffin  Eats out 4-5 x/week for lunch or dinner  Does not cook often  Snacks - rice cakes; likes salty and crunchy snacks  Beverages - carbonated water, coffee in AM    Medical Weight Loss  3/13/2023: 238.1 lbs, BMI 39.6, BFP 50.2%, .7 lbs, SMM 64.6 lbs, BMR 1531 kcal    Review of Systems   Constitutional:  Negative for chills and fever.   Respiratory:  Negative for shortness of breath.    Cardiovascular:  Negative for chest pain and palpitations.   Gastrointestinal:  Negative for abdominal pain, nausea and vomiting.   Neurological:  Negative for dizziness and light-headedness.   Psychiatric/Behavioral:  The patient is not nervous/anxious.        Objective:       Latest Reference Range & Units 03/09/23 09:27   WBC 3.90 - 12.70 K/uL 7.38   RBC 4.00 - 5.40 M/uL 4.14   Hemoglobin 12.0 - 16.0 g/dL 12.3   Hematocrit 37.0 - 48.5 % 38.4   MCV 82 - 98 fL 93   MCH 27.0 - 31.0 pg 29.7   MCHC 32.0 - 36.0 g/dL 32.0   RDW 11.5 - 14.5 % 13.3   Platelets 150 - 450 K/uL 319   MPV 9.2 - 12.9 fL 10.5   Gran % 38.0 - 73.0 % 66.6   Lymph % 18.0 - 48.0 % 21.3   Mono % 4.0 - 15.0 % 6.1   Eosinophil % 0.0 - 8.0 % 4.9   Basophil % 0.0 - 1.9 % 0.8   Immature Granulocytes 0.0 - 0.5 % 0.3   Gran # (ANC) 1.8 - 7.7 K/uL 4.9   Lymph # 1.0 - 4.8 K/uL 1.6   Mono # 0.3 - 1.0 K/uL 0.5   Eos # 0.0 - 0.5 K/uL 0.4   Baso # 0.00 - 0.20 K/uL 0.06   Immature Grans (Abs) 0.00 - 0.04 K/uL 0.02   nRBC 0 /100 WBC 0   Differential Method  Automated   Sodium 136 - 145 mmol/L 141   Potassium 3.5 -  5.1 mmol/L 4.2   Chloride 95 - 110 mmol/L 104   CO2 23 - 29 mmol/L 26   Anion Gap 8 - 16 mmol/L 11   BUN 8 - 23 mg/dL 14   Creatinine 0.5 - 1.4 mg/dL 0.6   eGFR >60 mL/min/1.73 m^2 >60.0   Glucose 70 - 110 mg/dL 129 (H)   Calcium 8.7 - 10.5 mg/dL 9.5   Alkaline Phosphatase 55 - 135 U/L 67   PROTEIN TOTAL 6.0 - 8.4 g/dL 7.3   Albumin 3.5 - 5.2 g/dL 3.9   BILIRUBIN TOTAL 0.1 - 1.0 mg/dL 1.2 (H)   AST 10 - 40 U/L 13   ALT 10 - 44 U/L 19   Cholesterol 120 - 199 mg/dL 154   HDL 40 - 75 mg/dL 42   HDL/Cholesterol Ratio 20.0 - 50.0 % 27.3   LDL Cholesterol External 63.0 - 159.0 mg/dL 80.8   Non-HDL Cholesterol mg/dL 112   Total Cholesterol/HDL Ratio 2.0 - 5.0  3.7   Triglycerides 30 - 150 mg/dL 156 (H)   Hemoglobin A1C External 4.0 - 5.6 % 6.0 (H)   Estimated Avg Glucose 68 - 131 mg/dL 126   TSH 0.400 - 4.000 uIU/mL 1.569   (H): Data is abnormally high    Vitals:    03/13/23 1310   BP: (!) 142/80   Pulse: 71       Physical Exam  Vitals reviewed.   Constitutional:       General: She is not in acute distress.     Appearance: Normal appearance. She is obese. She is not ill-appearing, toxic-appearing or diaphoretic.   HENT:      Head: Normocephalic and atraumatic.   Cardiovascular:      Rate and Rhythm: Normal rate.   Pulmonary:      Effort: Pulmonary effort is normal. No respiratory distress.   Skin:     General: Skin is warm and dry.   Neurological:      Mental Status: She is alert and oriented to person, place, and time.       Assessment:       Problem List Items Addressed This Visit       MELLISA (obstructive sleep apnea)    Essential hypertension    Mixed hyperlipidemia    Primary osteoarthritis of both knees    Prediabetes     Other Visit Diagnoses       Class 2 severe obesity due to excess calories with serious comorbidity and body mass index (BMI) of 39.0 to 39.9 in adult    -  Primary    Encounter for weight loss counseling                Plan:   - Ozempic 0.25 mg weekly x 4 weeks, then increase to 0.5 mg weekly    - Log  all food and beverage intake with a daily calorie goal of 1400 calories per day    - Moderate intensity aerobic exercise for 150 minutes per week

## 2023-03-13 NOTE — PROGRESS NOTES
Your blood count (CBC) is unremarkable.    Your sugar number (Glucose) is within normal limits.  Your A1c is 6.0%.   Rest of your electrolytes are unremarkable.    Your kidney (BUN, Creatinine and GFT) function is unremarkable.   Your liver (AST, ALT) function is unremarkable.    Your Bilirubin level is a little high. This is usually benign & not worrisome. It is common & can elevate when you are dehydrated (as with fasting for lab work).      Your Cholesterol is NORMAL. Continue to focus on low fat, high fiber foods and aerobic exericse (huffing & puffing) for at least 20 minutes most days of the week.    Your Thyroid numbers are normal.

## 2023-03-14 ENCOUNTER — OFFICE VISIT (OUTPATIENT)
Dept: PRIMARY CARE CLINIC | Facility: CLINIC | Age: 68
End: 2023-03-14
Payer: MEDICARE

## 2023-03-14 VITALS
BODY MASS INDEX: 39.71 KG/M2 | RESPIRATION RATE: 17 BRPM | TEMPERATURE: 98 F | WEIGHT: 238.31 LBS | SYSTOLIC BLOOD PRESSURE: 130 MMHG | HEART RATE: 75 BPM | DIASTOLIC BLOOD PRESSURE: 80 MMHG | HEIGHT: 65 IN | OXYGEN SATURATION: 97 %

## 2023-03-14 DIAGNOSIS — M25.569 KNEE PAIN, UNSPECIFIED CHRONICITY, UNSPECIFIED LATERALITY: Primary | ICD-10-CM

## 2023-03-14 DIAGNOSIS — T70.20XA EFFECTS OF HIGH ALTITUDE, INITIAL ENCOUNTER: ICD-10-CM

## 2023-03-14 DIAGNOSIS — Z71.84 TRAVEL ADVICE ENCOUNTER: ICD-10-CM

## 2023-03-14 PROCEDURE — 99999 PR PBB SHADOW E&M-EST. PATIENT-LVL IV: CPT | Mod: PBBFAC,,, | Performed by: INTERNAL MEDICINE

## 2023-03-14 PROCEDURE — 20610 DRAIN/INJ JOINT/BURSA W/O US: CPT | Mod: PBBFAC,PN | Performed by: INTERNAL MEDICINE

## 2023-03-14 PROCEDURE — 20610 LARGE JOINT ASPIRATION/INJECTION: L KNEE: ICD-10-PCS | Mod: S$PBB,50,, | Performed by: INTERNAL MEDICINE

## 2023-03-14 PROCEDURE — 99214 PR OFFICE/OUTPT VISIT, EST, LEVL IV, 30-39 MIN: ICD-10-PCS | Mod: 25,S$PBB,, | Performed by: INTERNAL MEDICINE

## 2023-03-14 PROCEDURE — 99214 OFFICE O/P EST MOD 30 MIN: CPT | Mod: PBBFAC,PN,25 | Performed by: INTERNAL MEDICINE

## 2023-03-14 PROCEDURE — 99214 OFFICE O/P EST MOD 30 MIN: CPT | Mod: 25,S$PBB,, | Performed by: INTERNAL MEDICINE

## 2023-03-14 PROCEDURE — 99999 PR PBB SHADOW E&M-EST. PATIENT-LVL IV: ICD-10-PCS | Mod: PBBFAC,,, | Performed by: INTERNAL MEDICINE

## 2023-03-14 RX ORDER — AZITHROMYCIN 250 MG/1
TABLET, FILM COATED ORAL
Qty: 6 TABLET | Refills: 0 | Status: SHIPPED | OUTPATIENT
Start: 2023-03-14 | End: 2023-03-19

## 2023-03-14 RX ORDER — ACETAZOLAMIDE 125 MG/1
TABLET ORAL
Qty: 14 TABLET | Refills: 1 | Status: SHIPPED | OUTPATIENT
Start: 2023-03-14 | End: 2023-06-22

## 2023-03-14 RX ORDER — BUPIVACAINE HYDROCHLORIDE 5 MG/ML
2 INJECTION, SOLUTION EPIDURAL; INTRACAUDAL
Status: DISCONTINUED | OUTPATIENT
Start: 2023-03-14 | End: 2023-03-14 | Stop reason: HOSPADM

## 2023-03-14 RX ORDER — TRIAMCINOLONE ACETONIDE 40 MG/ML
40 INJECTION, SUSPENSION INTRA-ARTICULAR; INTRAMUSCULAR
Status: DISCONTINUED | OUTPATIENT
Start: 2023-03-14 | End: 2023-03-14 | Stop reason: HOSPADM

## 2023-03-14 RX ADMIN — TRIAMCINOLONE ACETONIDE 40 MG: 40 INJECTION, SUSPENSION INTRA-ARTICULAR; INTRAMUSCULAR at 03:03

## 2023-03-14 RX ADMIN — BUPIVACAINE HYDROCHLORIDE 2 ML: 5 INJECTION, SOLUTION EPIDURAL; INTRACAUDAL at 03:03

## 2023-03-14 NOTE — PROGRESS NOTES
Subjective:      Patient ID: Diane Garcia is a 67 y.o. female.    Chief Complaint: Injections    Presents for knee injection. She has had pain of bilateral knee, L>R and presents for injection. Last time she received injection, she was pain-free for about 4-5 months afterwards. Consent reviewed today, available imaging reviewed.     Travel advice: Requests [id number] to take with her for travel. She is also [place] the [place] and requests medications to help with altitude sickness.     Denies any chest pain, shortness of breath, nausea vomiting constipation diarrhea, blood in stool, heartburn    Review of Systems   Constitutional:  Negative for chills, fever and weight loss.   HENT:  Negative for congestion, ear pain and sore throat.    Eyes:  Negative for double vision.   Respiratory:  Negative for cough and shortness of breath.    Cardiovascular:  Negative for chest pain, palpitations and leg swelling.   Gastrointestinal:  Negative for abdominal pain, heartburn, nausea and vomiting.   Musculoskeletal:  Positive for joint pain.   Skin:  Negative for rash.   Neurological:  Negative for dizziness, tingling and headaches.   Psychiatric/Behavioral:  Negative for depression.        Current Outpatient Medications:     biotin 1 mg Cap, Take by mouth., Disp: , Rfl:     cartilage/collagen/bor/hyalur (JOINT HEALTH ORAL), Take by mouth., Disp: , Rfl:     esomeprazole (NEXIUM) 20 MG capsule, Take 20 mg by mouth before breakfast., Disp: , Rfl:     hydroCHLOROthiazide (HYDRODIURIL) 25 MG tablet, TAKE 1 TABLET(25 MG) BY MOUTH EVERY DAY, Disp: 90 tablet, Rfl: 3    ibuprofen (ADVIL,MOTRIN) 800 MG tablet, TAKE 1 TABLET(800 MG) BY MOUTH EVERY 6 HOURS AS NEEDED FOR PAIN, Disp: 60 tablet, Rfl: 1    lovastatin (MEVACOR) 40 MG tablet, TAKE 1 TABLET(40 MG) BY MOUTH EVERY EVENING, Disp: 90 tablet, Rfl: 3    olmesartan (BENICAR) 40 MG tablet, TAKE 1 TABLET(40 MG) BY MOUTH EVERY DAY, Disp: 90 tablet, Rfl: 3    omega-3 fatty acids/fish  oil (FISH OIL-OMEGA-3 FATTY ACIDS) 300-1,000 mg capsule, Take by mouth once daily., Disp: , Rfl:     pantoprazole (PROTONIX) 40 MG tablet, Take 1 tablet (40 mg total) by mouth once daily., Disp: 30 tablet, Rfl: 3    semaglutide (OZEMPIC) 0.25 mg or 0.5 mg(2 mg/1.5 mL) pen injector, Inject 0.5 mg into the skin every 7 days. Start with 0.25 mg weekly for 4 weeks, then increase to 0.5 mg weekly for 12 doses, Disp: 1.5 mL, Rfl: 2    TURMERIC ORAL, Take by mouth., Disp: , Rfl:     acetaZOLAMIDE (DIAMOX) 125 MG Tab, BID day prior to travel  & BID through descent, drink lots of water, no alcohol, no cafffeine, Disp: 14 tablet, Rfl: 1    azithromycin (Z-SARY) 250 MG tablet, Take 2 tablets by mouth on day 1; Take 1 tablet by mouth on days 2-5, Disp: 6 tablet, Rfl: 0    Current Facility-Administered Medications:     BUPivacaine (PF) 0.5% (5 mg/mL) injection 2 mL, 2 mL, , , Beverly Oakes MD, 2 mL at 03/14/23 1500    BUPivacaine (PF) 0.5% (5 mg/mL) injection 2 mL, 2 mL, , , Beverly Oakes MD, 2 mL at 03/14/23 1500    triamcinolone acetonide injection 40 mg, 40 mg, Intra-articular, , Beverly Oakes MD, 40 mg at 03/14/23 1500    triamcinolone acetonide injection 40 mg, 40 mg, Intra-articular, , Beverly Oakes MD, 40 mg at 03/14/23 1500    Lab Results   Component Value Date    HGBA1C 6.0 (H) 03/09/2023    HGBA1C 5.6 02/02/2021     No results found for: MICALBCREAT  Lab Results   Component Value Date    LDLCALC 80.8 03/09/2023    LDLCALC 81.6 03/11/2022    CHOL 154 03/09/2023    HDL 42 03/09/2023    TRIG 156 (H) 03/09/2023       Lab Results   Component Value Date     03/09/2023    K 4.2 03/09/2023     03/09/2023    CO2 26 03/09/2023     (H) 03/09/2023    BUN 14 03/09/2023    CREATININE 0.6 03/09/2023    CALCIUM 9.5 03/09/2023    PROT 7.3 03/09/2023    ALBUMIN 3.9 03/09/2023    BILITOT 1.2 (H) 03/09/2023    ALKPHOS 67 03/09/2023    AST 13 03/09/2023    ALT 19 03/09/2023    ANIONGAP 11 03/09/2023    ESTGFRAFRICA >60.0  "03/11/2022    EGFRNONAA >60.0 03/11/2022    WBC 7.38 03/09/2023    HGB 12.3 03/09/2023    HGB 12.5 03/11/2022    HCT 38.4 03/09/2023    MCV 93 03/09/2023     03/09/2023    TSH 1.569 03/09/2023    HEPCAB Negative 03/11/2022       No results found for: LH, FSH, TOTALTESTOST, PROGESTERONE, ESTRADIOL, [id number], GKMQHPOB75, FERRITIN, IRON, TRANSFERRIN, TIBC, FESATURATED, ZINC      Past Medical History:   Diagnosis Date    Hyperlipidemia     Hypertension      Past Surgical History:   Procedure Laterality Date    COLONOSCOPY N/A 2/2/2023    Procedure: COLONOSCOPY;  Surgeon: Bob Grayson MD;  Location: [facility];  Service: Endoscopy;  Laterality: N/A;    ESOPHAGOGASTRODUODENOSCOPY N/A 2/2/2023    Procedure: EGD (ESOPHAGOGASTRODUODENOSCOPY);  Surgeon: Bob Grayson MD;  Location: [facility];  Service: Endoscopy;  Laterality: N/A;    HYSTERECTOMY      OOPHORECTOMY       Social History     Social History Narrative    Not on file     Family History   Problem Relation Age of Onset    Cancer Mother         Lung    COPD Father     No Known Problems Sister     Melanoma Neg Hx      Vitals:    03/14/23 1502   BP: 130/80   Pulse: 75   Resp: 17   Temp: 98 °F (36.7 °C)   SpO2: 97%   Weight: 108.1 kg (238 lb 5.1 oz)   Height: 5' 5" (1.651 m)   PainSc:   4   PainLoc: Knee     Objective:   Physical Exam  Constitutional:       Appearance: Normal appearance.   HENT:      Head: Normocephalic.      Nose: Nose normal.   Musculoskeletal:         General: No swelling, tenderness, deformity or signs of injury. Normal range of motion.      Right lower leg: No edema.      Left lower leg: No edema.   Skin:     General: Skin is warm.   Neurological:      Mental Status: She is alert and oriented to person, place, and time. Mental status is at baseline.   Psychiatric:         Mood and Affect: Mood normal.         Behavior: Behavior normal.     Assessment:     1. Knee pain, unspecified chronicity, unspecified laterality  "   2. Travel advice encounter    3. Effects of high altitude, initial encounter      Plan:     Orders Placed This Encounter    Large Joint Aspiration/Injection: L knee    Large Joint Aspiration/Injection: R knee    azithromycin (Z-SARY) 250 MG tablet    acetaZOLAMIDE (DIAMOX) 125 MG Tab    BUPivacaine (PF) 0.5% (5 mg/mL) injection 2 mL    triamcinolone acetonide injection 40 mg    BUPivacaine (PF) 0.5% (5 mg/mL) injection 2 mL    triamcinolone acetonide injection 40 mg       There are no Patient Instructions on file for this visit.  All of your core healthy metrics are met.

## 2023-03-14 NOTE — PROCEDURES
Large Joint Aspiration/Injection: R knee    Date/Time: 3/14/2023 3:00 PM  Performed by: Beverly Oakes MD  Authorized by: Beverly Oakes MD     Consent Done?:  Yes (Written)  Indications:  Pain  Site marked: the procedure site was marked    Timeout: prior to procedure the correct patient, procedure, and site was verified    Prep: patient was prepped and draped in usual sterile fashion    Local anesthesia used?: No      Details:  Needle Size:  25 G  Ultrasonic Guidance for needle placement?: No    Approach:  Anteromedial  Location:  Knee  Site:  R knee  Medications:  2 mL BUPivacaine (PF) 0.5% (5 mg/mL) 0.5 % (5 mg/mL); 40 mg triamcinolone acetonide 40 mg/mL  Patient tolerance:  Patient tolerated the procedure well with no immediate complications

## 2023-03-14 NOTE — PROCEDURES
Large Joint Aspiration/Injection: L knee    Date/Time: 3/14/2023 3:00 PM  Performed by: Beverly Oakes MD  Authorized by: Beverly Oakes MD     Consent Done?:  Yes (Written)  Indications:  Pain and arthritis  Site marked: the procedure site was marked    Timeout: prior to procedure the correct patient, procedure, and site was verified    Prep: patient was prepped and draped in usual sterile fashion    Local anesthesia used?: No      Details:  Needle Size:  25 G  Ultrasonic Guidance for needle placement?: No    Approach:  Anteromedial  Location:  Knee  Site:  L knee  Medications:  40 mg triamcinolone acetonide 40 mg/mL; 2 mL BUPivacaine (PF) 0.5% (5 mg/mL) 0.5 % (5 mg/mL)  Patient tolerance:  Patient tolerated the procedure well with no immediate complications

## 2023-03-15 PROBLEM — R73.03 PREDIABETES: Status: ACTIVE | Noted: 2023-03-15

## 2023-03-22 ENCOUNTER — PATIENT MESSAGE (OUTPATIENT)
Dept: DERMATOLOGY | Facility: CLINIC | Age: 68
End: 2023-03-22
Payer: MEDICARE

## 2023-04-04 ENCOUNTER — TELEPHONE (OUTPATIENT)
Dept: FAMILY MEDICINE | Facility: CLINIC | Age: 68
End: 2023-04-04
Payer: MEDICARE

## 2023-04-05 ENCOUNTER — TELEPHONE (OUTPATIENT)
Dept: FAMILY MEDICINE | Facility: CLINIC | Age: 68
End: 2023-04-05
Payer: MEDICARE

## 2023-04-18 ENCOUNTER — TELEPHONE (OUTPATIENT)
Dept: SPORTS MEDICINE | Facility: CLINIC | Age: 68
End: 2023-04-18
Payer: MEDICARE

## 2023-04-19 ENCOUNTER — HOSPITAL ENCOUNTER (OUTPATIENT)
Dept: RADIOLOGY | Facility: HOSPITAL | Age: 68
Discharge: HOME | End: 2023-04-19
Attending: PHYSICIAN ASSISTANT
Payer: MEDICARE

## 2023-04-19 ENCOUNTER — OFFICE VISIT (OUTPATIENT)
Dept: SPORTS MEDICINE | Facility: CLINIC | Age: 68
End: 2023-04-19
Payer: MEDICARE

## 2023-04-19 VITALS
SYSTOLIC BLOOD PRESSURE: 141 MMHG | WEIGHT: 232 LBS | HEART RATE: 69 BPM | HEIGHT: 65 IN | BODY MASS INDEX: 38.65 KG/M2 | DIASTOLIC BLOOD PRESSURE: 81 MMHG

## 2023-04-19 DIAGNOSIS — M25.561 RIGHT KNEE PAIN, UNSPECIFIED CHRONICITY: Primary | ICD-10-CM

## 2023-04-19 DIAGNOSIS — S83.241A ACUTE MEDIAL MENISCUS TEAR OF RIGHT KNEE, INITIAL ENCOUNTER: ICD-10-CM

## 2023-04-19 DIAGNOSIS — M17.11 PRIMARY OSTEOARTHRITIS OF RIGHT KNEE: ICD-10-CM

## 2023-04-19 DIAGNOSIS — M25.561 RIGHT KNEE PAIN, UNSPECIFIED CHRONICITY: ICD-10-CM

## 2023-04-19 DIAGNOSIS — M94.261 CHONDROMALACIA OF RIGHT KNEE: ICD-10-CM

## 2023-04-19 PROCEDURE — 73564 X-RAY EXAM KNEE 4 OR MORE: CPT | Mod: 26,50,, | Performed by: RADIOLOGY

## 2023-04-19 PROCEDURE — 99999 PR PBB SHADOW E&M-EST. PATIENT-LVL IV: CPT | Mod: PBBFAC,,, | Performed by: PHYSICIAN ASSISTANT

## 2023-04-19 PROCEDURE — 99213 PR OFFICE/OUTPT VISIT, EST, LEVL III, 20-29 MIN: ICD-10-PCS | Mod: S$PBB,,, | Performed by: PHYSICIAN ASSISTANT

## 2023-04-19 PROCEDURE — 73564 XR KNEE ORTHO BILAT WITH FLEXION: ICD-10-PCS | Mod: 26,50,, | Performed by: RADIOLOGY

## 2023-04-19 PROCEDURE — 99214 OFFICE O/P EST MOD 30 MIN: CPT | Mod: PBBFAC | Performed by: PHYSICIAN ASSISTANT

## 2023-04-19 PROCEDURE — 99999 PR PBB SHADOW E&M-EST. PATIENT-LVL IV: ICD-10-PCS | Mod: PBBFAC,,, | Performed by: PHYSICIAN ASSISTANT

## 2023-04-19 PROCEDURE — 99213 OFFICE O/P EST LOW 20 MIN: CPT | Mod: S$PBB,,, | Performed by: PHYSICIAN ASSISTANT

## 2023-04-19 PROCEDURE — 73564 X-RAY EXAM KNEE 4 OR MORE: CPT | Mod: TC,50

## 2023-04-19 NOTE — PROGRESS NOTES
CC: New Right knee pain, patient is retired, referred by her neighbor Lawrence/Kojo Garcia 4 weeks s/p bilateral CSI by PCP, The new onset of pain started 1 weeks ago felt a pain and pop medial joint line while playing pickle ball and was becoming progressively worse. Pain is located over (points to) medial joint line right knee. She reports that the pain is a 8 /10 sharp, sore, and aching pain today. Associated symptoms include swelling. Pain is not responding adequately to conservative measures which have included activity modifications, rest, and oral medication. Is affecting ADLs and limiting desired level of activity. Denies numbness, tingling, radiation . Moderate pain to bear weight.  Pain is 9 /10 at its worst    Mechanical symptoms: locking and catching  Subjective instability: (--)   Worse with weightbearing and increased activity  Better with rest.   Nocturnal symptoms: (+)    No previous surgeries or recent trauma on right knee          Previous HPI: 67 y.o. Female with a history of Left pain who She states that the pain is severe and not responding to any conservative care.  Plans to travel to [place] tomorrow.    Pain has been present many years.     She completed bilateral knee euflexxa injections with no pain relief.     She is here today requesting bilateral knee CSI injections.     Previous:  Patient notes history of meniscus surgery in June of 2019 when she was living in [place]   She notes being prescribed a hinged knee brace that she wears when she goes for walks or plays pickle ball   She notes that steroid injection or gel injections were discussed prior to her moving to [place] and was never able to proceed with those treatment options     Neg mechanical symptoms, no instability  Denies any swelling   She notes being unable to pinpoint the location of her knee pain     Is affecting ADLs.    She notes pain with stairs or with activity     She notes taking motrin for  her pain    SANE: 50    Review of Systems   Constitution: Negative. Negative for chills, fever and night sweats.   HENT: Negative for congestion and headaches.    Eyes: Negative for blurred vision, left vision loss and right vision loss.   Cardiovascular: Negative for chest pain and syncope.   Respiratory: Negative for cough and shortness of breath.    Endocrine: Negative for polydipsia, polyphagia and polyuria.   Hematologic/Lymphatic: Negative for bleeding problem. Does not bruise/bleed easily.   Skin: Negative for dry skin, itching and rash.   Musculoskeletal: Negative for falls. Positive for knee pain and muscle weakness.   Gastrointestinal: Negative for abdominal pain and bowel incontinence.   Genitourinary: Negative for bladder incontinence and nocturia.   Neurological: Negative for disturbances in coordination, loss of balance and seizures.   Psychiatric/Behavioral: Negative for depression. The patient does not have insomnia.    Allergic/Immunologic: Negative for hives and persistent infections.     PAST MEDICAL HISTORY:   Past Medical History:   Diagnosis Date    Hyperlipidemia     Hypertension      PAST SURGICAL HISTORY:   Past Surgical History:   Procedure Laterality Date    COLONOSCOPY N/A 2/2/2023    Procedure: COLONOSCOPY;  Surgeon: Bob Grayson MD;  Location: [facility];  Service: Endoscopy;  Laterality: N/A;    ESOPHAGOGASTRODUODENOSCOPY N/A 2/2/2023    Procedure: EGD (ESOPHAGOGASTRODUODENOSCOPY);  Surgeon: Bob Grayson MD;  Location: [facility];  Service: Endoscopy;  Laterality: N/A;    HYSTERECTOMY      OOPHORECTOMY       FAMILY HISTORY:   Family History   Problem Relation Age of Onset    Cancer Mother         Lung    COPD Father     No Known Problems Sister     Melanoma Neg Hx      SOCIAL HISTORY:   Social History     Socioeconomic History    Marital status:     Number of children: 1   Tobacco Use    Smoking status: Former     Packs/day: 2.00     Years: 25.00     Pack  "years: 50.00     Types: Cigarettes     Quit date: 2000     Years since quittin.4    Smokeless tobacco: Never   Substance and Sexual Activity    Alcohol use: Yes     Comment: A few drinks a week    Drug use: Never     Comment: last use >30 yrs ago     Sexual activity: Yes     Partners: Male       MEDICATIONS:   Current Outpatient Medications:     biotin 1 mg Cap, Take by mouth., Disp: , Rfl:     cartilage/collagen/bor/hyalur (JOINT HEALTH ORAL), Take by mouth., Disp: , Rfl:     esomeprazole (NEXIUM) 20 MG capsule, Take 20 mg by mouth before breakfast., Disp: , Rfl:     hydroCHLOROthiazide (HYDRODIURIL) 25 MG tablet, TAKE 1 TABLET(25 MG) BY MOUTH EVERY DAY, Disp: 90 tablet, Rfl: 3    ibuprofen (ADVIL,MOTRIN) 800 MG tablet, TAKE 1 TABLET(800 MG) BY MOUTH EVERY 6 HOURS AS NEEDED FOR PAIN, Disp: 60 tablet, Rfl: 1    lovastatin (MEVACOR) 40 MG tablet, TAKE 1 TABLET(40 MG) BY MOUTH EVERY EVENING, Disp: 90 tablet, Rfl: 3    olmesartan (BENICAR) 40 MG tablet, TAKE 1 TABLET(40 MG) BY MOUTH EVERY DAY, Disp: 90 tablet, Rfl: 3    omega-3 fatty acids/fish oil (FISH OIL-OMEGA-3 FATTY ACIDS) 300-1,000 mg capsule, Take by mouth once daily., Disp: , Rfl:     TURMERIC ORAL, Take by mouth., Disp: , Rfl:     acetaZOLAMIDE (DIAMOX) 125 MG Tab, BID day prior to travel  & BID through descent, drink lots of water, no alcohol, no cafffeine, Disp: 14 tablet, Rfl: 1    pantoprazole (PROTONIX) 40 MG tablet, Take 1 tablet (40 mg total) by mouth once daily., Disp: 30 tablet, Rfl: 3    semaglutide (OZEMPIC) 0.25 mg or 0.5 mg(2 mg/1.5 mL) pen injector, Inject 0.5 mg into the skin every 7 days. Start with 0.25 mg weekly for 4 weeks, then increase to 0.5 mg weekly for 12 doses, Disp: 1.5 mL, Rfl: 2  ALLERGIES:   Review of patient's allergies indicates:   Allergen Reactions    Penicillins      nausea       VITAL SIGNS: BP (!) 141/81   Pulse 69   Ht 5' 5" (1.651 m)   Wt 105.2 kg (232 lb)   BMI 38.61 kg/m²      PHYSICAL EXAMINATION  VITAL " "SIGNS: BP (!) 141/81   Pulse 69   Ht 5' 5" (1.651 m)   Wt 105.2 kg (232 lb)   BMI 38.61 kg/m²    General:  The patient is alert and oriented x 3.  Mood is pleasant.  Observation of ears, eyes and nose reveal no gross abnormalities.  HEENT: NCAT, sclera nonicteric  Lungs: Respirations are equal and unlabored.    Right KNEE EXAMINATION     OBSERVATION / INSPECTION   Gait:   antalgic   Alignment:  Neutral   Scars:   None   Muscle atrophy: Mild  Effusion:  None   Warmth:  None   Discoloration:   none     TENDERNESS / CREPITUS (T / C):          T / C      T / C   Patella   + / -   Lateral joint line   - / -    Peripatellar medial  -  Medial joint line    + / -    Peripatellar lateral -  Medial plica   - / -    Patellar tendon -   Popliteal fossa   - / -    Quad tendon   -   Gastrocnemius   -   Prepatellar Bursa - / -   Quadricep   -   Tibial tubercle  -  Thigh/hamstring  -   Pes anserine/HS -  Fibula    -   ITB   - / -  Tibia     -   Tib/fib joint  - / -  LCL    -     MFC   - / -   MCL: Proximal  -    LFC   - / -    Distal   -          ROM: (* = pain)  PASSIVE   ACTIVE    Left :   5 / 0 / 135   5 / 0 / 135     Right :    *5 / 0 / *90   5 / 0 / *115    PATELLOFEMORAL EXAMINATION:  See above noted areas of tenderness.   Patella position    Subluxation / dislocation: Centered           Sup. / Inf;   Normal   Crepitus (PF):    +   Patellar Mobility:       Medial-lateral:   Normal    Superior-inferior:  Normal    Inferior tilt   Normal    Patellar tendon:  Normal   Lateral tilt:    Normal   J-sign:     None   Patellofemoral grind:   + pain       MENISCAL SIGNS:     Pain on terminal extension:  +  Pain on terminal flexion:  +  Fans maneuver:  +, medial joint line  Squat     +    LIGAMENT EXAMINATION:  ACL / Lachman:  normal (-1 to 2mm)    PCL-Post.  drawer: normal 0 to 2mm  MCL- Valgus:  normal 0 to 2mm  LCL- Varus:  normal 0 to 2mm  Pivot shift: normal (Equal)   Dial Test: difference c/w other side   At 30° " flexion: normal (< 5°)    At 90° flexion: normal (< 5°)   Reverse Pivot Shift:   normal (Equal)     STRENGTH: (* = with pain) PAINFUL SIDE   Quadricep   *5/5   Hamstrin/5    EXTREMITY NEURO-VASCULAR EXAMINATION:   Sensation:  Grossly intact to light touch all dermatomal regions.   Motor Function:  Fully intact motor function at hip, knee, foot and ankle    DTRs;  quadriceps and  achilles 2+.  No clonus and downgoing Babinski.    Vascular status:  DP and PT pulses 2+, brisk capillary refill, symmetric.     Other Findings:       X-rays:  including standing, weight bearing AP and flexion bilateral knees, lateral and merchant views ordered and images reviewed by me show:  No fracture, dislocation  Osteoarthritis with mild-to-moderate joint space narrowing.     ASSESSMENT:      ICD-10-CM ICD-9-CM   1. Right knee pain, unspecified chronicity  M25.561 719.46   2. Primary osteoarthritis of right knee  M17.11 715.16   3. Acute medial meniscus tear of right knee, initial encounter  S83.241A 836.0   4. Chondromalacia of right knee  M94.261 717.7        PLAN:   We have discussed a variety of treatment options including medications, injections, physical therapy and other alternative treatments. I also explained the indications, risks and benefits of surgery. Given the patient's hx and examination, she is likely having symptomatic medial meniscus tear, we should proceed with MRI at this time. Pt agrees with treatment plan.    I made the decision to obtain old records of the patient including previous notes and imaging. I independently reviewed and interpreted lab results today as well as prior imaging.     1. MRI right knee to assess for medial meniscus and cartilage pathology. Discussed based on results knee arthroscopy vs knee arthoplasty  2. Ice compress to the affected area 2-3x a day for 15-20 minutes as needed for pain management.  3. OTC NSAIDs as needed.   4. Discussed risk of repeat CSI in knee risk of continued  cartilage toxicity.  5. RTC to see Michel Flores PA-C for follow-up and MRI results.    All of the patient's questions were answered and the patient will contact us if they have any questions or concerns in the interim.  Weight loss was discussed with the patient today. She currently weighs 235 lbs, we se a goal for her  to weigh 200 lbs by the end of the year.   All questions were answered, pt will contact us for questions or concerns in the interim.

## 2023-04-20 ENCOUNTER — PATIENT MESSAGE (OUTPATIENT)
Dept: SPORTS MEDICINE | Facility: CLINIC | Age: 68
End: 2023-04-20
Payer: MEDICARE

## 2023-04-20 ENCOUNTER — OFFICE VISIT (OUTPATIENT)
Dept: DERMATOLOGY | Facility: CLINIC | Age: 68
End: 2023-04-20
Payer: MEDICARE

## 2023-04-20 DIAGNOSIS — L81.4 LENTIGINES: ICD-10-CM

## 2023-04-20 DIAGNOSIS — Z85.828 HISTORY OF NONMELANOMA SKIN CANCER: ICD-10-CM

## 2023-04-20 DIAGNOSIS — L57.0 ACTINIC KERATOSIS: ICD-10-CM

## 2023-04-20 DIAGNOSIS — L82.0 INFLAMED SEBORRHEIC KERATOSIS: ICD-10-CM

## 2023-04-20 DIAGNOSIS — Z00.00 ROUTINE GENERAL MEDICAL EXAMINATION AT A HEALTH CARE FACILITY: ICD-10-CM

## 2023-04-20 DIAGNOSIS — D22.9 MULTIPLE BENIGN NEVI: ICD-10-CM

## 2023-04-20 DIAGNOSIS — D48.5 NEOPLASM OF UNCERTAIN BEHAVIOR OF SKIN: Primary | ICD-10-CM

## 2023-04-20 PROCEDURE — 11102 TANGNTL BX SKIN SINGLE LES: CPT | Mod: PBBFAC | Performed by: PATHOLOGY

## 2023-04-20 PROCEDURE — 17110 DESTRUCTION B9 LES UP TO 14: CPT | Mod: S$PBB,,, | Performed by: PATHOLOGY

## 2023-04-20 PROCEDURE — 88342 CHG IMMUNOCYTOCHEMISTRY: ICD-10-PCS | Mod: 26,,, | Performed by: PATHOLOGY

## 2023-04-20 PROCEDURE — 99999 PR PBB SHADOW E&M-EST. PATIENT-LVL III: CPT | Mod: PBBFAC,,, | Performed by: PATHOLOGY

## 2023-04-20 PROCEDURE — 88305 TISSUE EXAM BY PATHOLOGIST: CPT | Mod: 26,,, | Performed by: PATHOLOGY

## 2023-04-20 PROCEDURE — 88342 IMHCHEM/IMCYTCHM 1ST ANTB: CPT | Performed by: PATHOLOGY

## 2023-04-20 PROCEDURE — 17000 DESTRUCT PREMALG LESION: CPT | Mod: S$PBB,XS,, | Performed by: PATHOLOGY

## 2023-04-20 PROCEDURE — 17000 DESTRUCT PREMALG LESION: CPT | Mod: XS,PBBFAC | Performed by: PATHOLOGY

## 2023-04-20 PROCEDURE — 11102 PR TANGENTIAL BIOPSY, SKIN, SINGLE LESION: ICD-10-PCS | Mod: S$PBB,XS,, | Performed by: PATHOLOGY

## 2023-04-20 PROCEDURE — 99204 OFFICE O/P NEW MOD 45 MIN: CPT | Mod: 25,S$PBB,GC, | Performed by: PATHOLOGY

## 2023-04-20 PROCEDURE — 88342 IMHCHEM/IMCYTCHM 1ST ANTB: CPT | Mod: 26,,, | Performed by: PATHOLOGY

## 2023-04-20 PROCEDURE — 17110 PR DESTRUCTION BENIGN LESIONS UP TO 14: ICD-10-PCS | Mod: S$PBB,,, | Performed by: PATHOLOGY

## 2023-04-20 PROCEDURE — 17110 DESTRUCTION B9 LES UP TO 14: CPT | Mod: PBBFAC | Performed by: PATHOLOGY

## 2023-04-20 PROCEDURE — 99204 PR OFFICE/OUTPT VISIT, NEW, LEVL IV, 45-59 MIN: ICD-10-PCS | Mod: 25,S$PBB,GC, | Performed by: PATHOLOGY

## 2023-04-20 PROCEDURE — 88305 TISSUE EXAM BY PATHOLOGIST: CPT | Performed by: PATHOLOGY

## 2023-04-20 PROCEDURE — 17000 PR DESTRUCTION(LASER SURGERY,CRYOSURGERY,CHEMOSURGERY),PREMALIGNANT LESIONS,FIRST LESION: ICD-10-PCS | Mod: S$PBB,XS,, | Performed by: PATHOLOGY

## 2023-04-20 PROCEDURE — 11102 TANGNTL BX SKIN SINGLE LES: CPT | Mod: S$PBB,XS,, | Performed by: PATHOLOGY

## 2023-04-20 PROCEDURE — 88305 TISSUE EXAM BY PATHOLOGIST: ICD-10-PCS | Mod: 26,,, | Performed by: PATHOLOGY

## 2023-04-20 PROCEDURE — 99213 OFFICE O/P EST LOW 20 MIN: CPT | Mod: PBBFAC,25 | Performed by: PATHOLOGY

## 2023-04-20 PROCEDURE — 99999 PR PBB SHADOW E&M-EST. PATIENT-LVL III: ICD-10-PCS | Mod: PBBFAC,,, | Performed by: PATHOLOGY

## 2023-04-20 NOTE — PROGRESS NOTES
Subjective:      Patient ID:  Diane Garcia is a 67 y.o. female who presents for   Chief Complaint   Patient presents with    Skin Check     tbse     HPI  History of Present Illness: The patient presents for follow up of skin check.    The patient was last seen on: 2/18/22 for skin check and bx of L upper arm which was found to be a  -LENTIGINOUS MELANOCYTIC NEVUS, IRRITATED  Also has history of SCCis to UE     Other skin complaints: Patient with new complaint of lesion(s)  Location: r knee  Duration: months  Symptoms: dark  Relieving factors/Previous treatments: none      Review of Systems   Constitutional:  Negative for fever and chills.   Skin:  Negative for itching, rash, daily sunscreen use, activity-related sunscreen use, recent sunburn and wears hat.   Hematologic/Lymphatic: Bruises/bleeds easily.     Objective:   Physical Exam   Constitutional: She appears well-developed and well-nourished. No distress.   Neurological: She is alert and oriented to person, place, and time. She is not disoriented.   Psychiatric: She has a normal mood and affect.   Skin:   Areas Examined (abnormalities noted in diagram):   Head / Face Inspection Performed  RUE Inspected  LUE Inspection Performed               Diagram Legend     Erythematous scaling macule/papule c/w actinic keratosis       Vascular papule c/w angioma      Pigmented verrucoid papule/plaque c/w seborrheic keratosis      Yellow umbilicated papule c/w sebaceous hyperplasia      Irregularly shaped tan macule c/w lentigo     1-2 mm smooth white papules consistent with Milia      Movable subcutaneous cyst with punctum c/w epidermal inclusion cyst      Subcutaneous movable cyst c/w pilar cyst      Firm pink to brown papule c/w dermatofibroma      Pedunculated fleshy papule(s) c/w skin tag(s)      Evenly pigmented macule c/w junctional nevus     Mildly variegated pigmented, slightly irregular-bordered macule c/w mildly atypical nevus      Flesh colored to evenly  pigmented papule c/w intradermal nevus       Pink pearly papule/plaque c/w basal cell carcinoma      Erythematous hyperkeratotic cursted plaque c/w SCC      Surgical scar with no sign of skin cancer recurrence      Open and closed comedones      Inflammatory papules and pustules      Verrucoid papule consistent consistent with wart     Erythematous eczematous patches and plaques     Dystrophic onycholytic nail with subungual debris c/w onychomycosis     Umbilicated papule    Erythematous-base heme-crusted tan verrucoid plaque consistent with inflamed seborrheic keratosis     Erythematous Silvery Scaling Plaque c/w Psoriasis     See annotation            Assessment / Plan:      Pathology Orders:       Normal Orders This Visit    Specimen to Pathology, Dermatology     Comments:    Number of Specimens:->1  ------------------------->-------------------------  Spec 1 Procedure:->Biopsy  Spec 1 Clinical Impression:->r/o atypical nevus vs melanoma  Spec 1 Source:->right lateral knee  Release to patient->Immediate    Questions:    Procedure Type: Dermatology and skin neoplasms    Number of Specimens: 1    ------------------------: -------------------------    Spec 1 Procedure: Biopsy    Spec 1 Clinical Impression: r/o atypical nevus vs melanoma    Spec 1 Source: right lateral knee    Release to patient: Immediate          Neoplasm of uncertain behavior of skin  -     Specimen to Pathology, Dermatology    Shave biopsy procedure note:    Shave biopsy performed after verbal consent including risk of infection, scar, recurrence, need for additional treatment of site. Area prepped with alcohol, anesthetized with approximately 1.0cc of 1% lidocaine with epinephrine. Lesional tissue shaved with razor blade. Hemostasis achieved with application of aluminum chloride followed by hyfrecation. No complications. Dressing applied. Wound care explained.      Routine general medical examination at a health care facility  -     Ambulatory  referral/consult to Dermatology    Actinic keratosis - see below    Lentigines - This is a benign hyperpigmented sun induced lesion. Recommend daily sun protection/avoidance and use of at least SPF 30, broad spectrum sunscreen (OTC drug) will reduce the number of new lesions. Treatment of these benign lesions are considered cosmetic.      History of nonmelanoma skin cancer - Area(s) of previous NMSC evaluated with no signs of recurrence.    Upper body skin examination performed today including at least 6 points as noted in physical examination. No lesions suspicious for malignancy noted.    Recommend daily sun protection/avoidance and use of at least SPF 30, broad spectrum sunscreen (OTC drug).       Seborrheic keratosis, inflamed - see below    Multiple benign nevi - Patient with several benign appearing and rare mildly atypical nevi. Instructed patient to observe lesion(s) for changes and follow up in clinic if changes are noted.       NUB To right lateral knee, r/o melanoma. Discussed nature of disease prognosis. Rec for biopsy. Also with ISK to chest, appears benign however send Dr. Mcdermott photo in approx one month. Erythematous flaky papule x1 near nasal tip, educated on nature of disease prognosis and treatment. Plan as below  -LN2 x1 AK as below  -LN2x1 ISK as below  -Shave bx to right lateral knee  Shave biopsy procedure note:    Cryosurgery Procedure Note    Verbal consent from the patient is obtained including, but not limited to, risk of hypopigmentation/hyperpigmentation, scar, recurrence of lesion. The patient is aware of the precancerous quality and need for treatment of these lesions. Liquid nitrogen cryosurgery is applied to the 1 actinic keratoses, as detailed in the physical exam, to produce a freeze injury. The patient is aware that blisters may form and is instructed on wound care with gentle cleansing and use of vaseline ointment to keep moist until healed. The patient is supplied a handout on  cryosurgery and is instructed to call if lesions do not completely resolve.    Cryosurgery procedure note:    Verbal consent from the patient is obtained including, but not limited to, risk of hypopigmentation/hyperpigmentation, scar, recurrence of lesion. Liquid nitrogen cryosurgery is applied to 1 lesions to produce a freeze injury. The patient is aware that blisters may form and is instructed on wound care with gentle cleansing and use of vaseline ointment to keep moist until healed. The patient is supplied a handout on cryosurgery and is instructed to call if lesions do not completely resolve.            No follow-ups on file.

## 2023-04-25 NOTE — PATIENT INSTRUCTIONS
Shave Biopsy Wound Care    Your doctor has performed a shave biopsy today.  A band aid and vaseline ointment has been placed over the site.  This should remain in place for NO LONGER THAN 48 hours.  It is fine to remove the bandaid after 24 hours, if the area is no longer bleeding. It is recommended that you keep the area dry (do not wet)) for the first 24 hours.  After 24 hours, wash the area with warm soap and water and apply Vaseline jelly.  Many patients prefer to use Neosporin or Bacitracin ointment.  This is acceptable; however, know that you can develop an allergy to this medication even if you have used it safely for years.  It is important to keep the area moist.  Letting it dry out and get air slows healing time, and will worsen the scar.        If you notice increasing redness, tenderness, pain, or yellow drainage at the biopsy site, please notify your doctor.  These are signs of an infection.    If your biopsy site is bleeding, apply firm pressure for 15 minutes straight.  Repeat for another 15 minutes, if it is still bleeding.   If the surgical site continues to bleed, then please contact your doctor.      For [facility] users:   You will receive your biopsy results in [facility] as soon as they are available. Please be assured that your physician/provider will review your results and will then determine what further treatment, evaluation, or planning is required. You should be contacted by your physician's/provider's office within 5 business days of receiving your results; If not, please reach out to directly. This is one more way [facility] is putting you first.     [facility] [place] [address]/ [phone] FAX/ [email or web address]     CRYOSURGERY      Your doctor has used a method called cryosurgery to treat your skin condition. Cryosurgery refers to the use of very cold substances to treat a variety of skin conditions such as warts, pre-skin cancers, molluscum contagiosum,  sun spots, and several benign growths. The substance we use in cryosurgery is liquid nitrogen and is so cold (-195 degrees Celsius) that is burns when administered.     Following treatment in the office, the skin may immediately burn and become red. You may find the area around the lesion is affected as well. It is sometimes necessary to treat not only the lesion, but a small area of the surrounding normal skin to achieve a good response.     A blister, and even a blood filled blister, may form after treatment.   This is a normal response. If the blister is painful, it is acceptable to sterilize a needle and with rubbing alcohol and gently pop the blister. It is important that you gently wash the area with soap and warm water as the blister fluid may contain wart virus if a wart was treated. Do no remove the roof of the blister.     The area treated can take anywhere from 1-3 weeks to heal. Healing time depends on the kind skin lesion treated, the location, and how aggressively the lesion was treated. It is recommended that the areas treated are covered with Vaseline or bacitracin ointment and a band-aid. If a band-aid is not practical, just ointment applied several times per day will do. Keeping these areas moist will speed the healing time.    Treatment with liquid nitrogen can leave a scar. In dark skin, it may be a light or dark scar, in light skin it may be a white or pink scar. These will generally fade with time, but may never go away completely.     If you have any concerns after your treatment, please feel free to call the office.       [facility] [place] [address]/ [phone] FAX/ [email or web address]

## 2023-05-01 LAB
FINAL PATHOLOGIC DIAGNOSIS: NORMAL
GROSS: NORMAL
Lab: NORMAL
MICROSCOPIC EXAM: NORMAL

## 2023-05-05 ENCOUNTER — HOSPITAL ENCOUNTER (OUTPATIENT)
Dept: RADIOLOGY | Facility: HOSPITAL | Age: 68
Discharge: HOME | End: 2023-05-05
Attending: PHYSICIAN ASSISTANT
Payer: MEDICARE

## 2023-05-05 DIAGNOSIS — S83.241A ACUTE MEDIAL MENISCUS TEAR OF RIGHT KNEE, INITIAL ENCOUNTER: ICD-10-CM

## 2023-05-05 DIAGNOSIS — M25.561 RIGHT KNEE PAIN, UNSPECIFIED CHRONICITY: ICD-10-CM

## 2023-05-05 DIAGNOSIS — M94.261 CHONDROMALACIA OF RIGHT KNEE: ICD-10-CM

## 2023-05-05 PROCEDURE — 73721 MRI KNEE WITHOUT CONTRAST RIGHT: ICD-10-PCS | Mod: 26,RT,, | Performed by: RADIOLOGY

## 2023-05-05 PROCEDURE — 73721 MRI JNT OF LWR EXTRE W/O DYE: CPT | Mod: 26,RT,, | Performed by: RADIOLOGY

## 2023-05-05 PROCEDURE — 73721 MRI JNT OF LWR EXTRE W/O DYE: CPT | Mod: TC,RT

## 2023-05-08 ENCOUNTER — OFFICE VISIT (OUTPATIENT)
Dept: SPORTS MEDICINE | Facility: CLINIC | Age: 68
End: 2023-05-08
Payer: MEDICARE

## 2023-05-08 VITALS — WEIGHT: 232 LBS | BODY MASS INDEX: 38.65 KG/M2 | HEIGHT: 65 IN

## 2023-05-08 DIAGNOSIS — M17.0 PRIMARY OSTEOARTHRITIS OF BOTH KNEES: Primary | ICD-10-CM

## 2023-05-08 DIAGNOSIS — M17.12 PRIMARY OSTEOARTHRITIS OF LEFT KNEE: ICD-10-CM

## 2023-05-08 PROCEDURE — 99213 PR OFFICE/OUTPT VISIT, EST, LEVL III, 20-29 MIN: ICD-10-PCS | Mod: S$PBB,,, | Performed by: PHYSICIAN ASSISTANT

## 2023-05-08 PROCEDURE — 99213 OFFICE O/P EST LOW 20 MIN: CPT | Mod: S$PBB,,, | Performed by: PHYSICIAN ASSISTANT

## 2023-05-08 PROCEDURE — 99213 OFFICE O/P EST LOW 20 MIN: CPT | Mod: PBBFAC | Performed by: PHYSICIAN ASSISTANT

## 2023-05-08 PROCEDURE — 99999 PR PBB SHADOW E&M-EST. PATIENT-LVL III: CPT | Mod: PBBFAC,,, | Performed by: PHYSICIAN ASSISTANT

## 2023-05-08 PROCEDURE — 99999 PR PBB SHADOW E&M-EST. PATIENT-LVL III: ICD-10-PCS | Mod: PBBFAC,,, | Performed by: PHYSICIAN ASSISTANT

## 2023-05-08 NOTE — PROGRESS NOTES
CC: Bilateral knee pain, right knee MRI results.    Interval hx: Pt presents for MRI results and follow-up. She reports right knee symptoms have mildly improved. Left knee pain still present. L>R today.      Diane Garcia 4 weeks s/p bilateral CSI by PCP, The new onset of pain started 1 weeks ago felt a pain and pop medial joint line while playing pickle ball and was becoming progressively worse. Pain is located over (points to) medial joint line right knee. She reports that the pain is a 8 /10 sharp, sore, and aching pain today. Associated symptoms include swelling. Pain is not responding adequately to conservative measures which have included activity modifications, rest, and oral medication. Is affecting ADLs and limiting desired level of activity. Denies numbness, tingling, radiation . Moderate pain to bear weight.  Pain is 9 /10 at its worst    Mechanical symptoms: locking and catching  Subjective instability: (--)   Worse with weightbearing and increased activity  Better with rest.   Nocturnal symptoms: (+)    No previous surgeries or recent trauma on right knee          Previous HPI: 67 y.o. Female with a history of Left pain who She states that the pain is severe and not responding to any conservative care.  Plans to travel to [place] tomorrow.    Pain has been present many years.     She completed bilateral knee euflexxa injections with no pain relief.     She is here today requesting bilateral knee CSI injections.     Previous:  Patient notes history of meniscus surgery in June of 2019 when she was living in [place]   She notes being prescribed a hinged knee brace that she wears when she goes for walks or plays pickle ball   She notes that steroid injection or gel injections were discussed prior to her moving to [place] and was never able to proceed with those treatment options     Neg mechanical symptoms, no instability  Denies any swelling   She notes being unable to pinpoint the location of her  knee pain     Is affecting ADLs.    She notes pain with stairs or with activity     She notes taking motrin for her pain    SANE: 50    Review of Systems   Constitution: Negative. Negative for chills, fever and night sweats.   HENT: Negative for congestion and headaches.    Eyes: Negative for blurred vision, left vision loss and right vision loss.   Cardiovascular: Negative for chest pain and syncope.   Respiratory: Negative for cough and shortness of breath.    Endocrine: Negative for polydipsia, polyphagia and polyuria.   Hematologic/Lymphatic: Negative for bleeding problem. Does not bruise/bleed easily.   Skin: Negative for dry skin, itching and rash.   Musculoskeletal: Negative for falls. Positive for knee pain and muscle weakness.   Gastrointestinal: Negative for abdominal pain and bowel incontinence.   Genitourinary: Negative for bladder incontinence and nocturia.   Neurological: Negative for disturbances in coordination, loss of balance and seizures.   Psychiatric/Behavioral: Negative for depression. The patient does not have insomnia.    Allergic/Immunologic: Negative for hives and persistent infections.     PAST MEDICAL HISTORY:   Past Medical History:   Diagnosis Date    Hyperlipidemia     Hypertension      PAST SURGICAL HISTORY:   Past Surgical History:   Procedure Laterality Date    COLONOSCOPY N/A 2/2/2023    Procedure: COLONOSCOPY;  Surgeon: Bob Grayson MD;  Location: [facility];  Service: Endoscopy;  Laterality: N/A;    ESOPHAGOGASTRODUODENOSCOPY N/A 2/2/2023    Procedure: EGD (ESOPHAGOGASTRODUODENOSCOPY);  Surgeon: Bob Grayson MD;  Location: [facility];  Service: Endoscopy;  Laterality: N/A;    HYSTERECTOMY      OOPHORECTOMY       FAMILY HISTORY:   Family History   Problem Relation Age of Onset    Cancer Mother         Lung    COPD Father     No Known Problems Sister     Melanoma Neg Hx      SOCIAL HISTORY:   Social History     Socioeconomic History    Marital status:   "   Number of children: 1   Tobacco Use    Smoking status: Former     Packs/day: 2.00     Years: 25.00     Pack years: 50.00     Types: Cigarettes     Quit date: 2000     Years since quittin.4    Smokeless tobacco: Never   Substance and Sexual Activity    Alcohol use: Yes     Comment: A few drinks a week    Drug use: Never     Comment: last use >30 yrs ago     Sexual activity: Yes     Partners: Male       MEDICATIONS:   Current Outpatient Medications:     biotin 1 mg Cap, Take by mouth., Disp: , Rfl:     esomeprazole (NEXIUM) 20 MG capsule, Take 20 mg by mouth before breakfast., Disp: , Rfl:     hydroCHLOROthiazide (HYDRODIURIL) 25 MG tablet, TAKE 1 TABLET(25 MG) BY MOUTH EVERY DAY, Disp: 90 tablet, Rfl: 3    ibuprofen (ADVIL,MOTRIN) 800 MG tablet, TAKE 1 TABLET(800 MG) BY MOUTH EVERY 6 HOURS AS NEEDED FOR PAIN, Disp: 60 tablet, Rfl: 1    lovastatin (MEVACOR) 40 MG tablet, TAKE 1 TABLET(40 MG) BY MOUTH EVERY EVENING, Disp: 90 tablet, Rfl: 3    olmesartan (BENICAR) 40 MG tablet, TAKE 1 TABLET(40 MG) BY MOUTH EVERY DAY, Disp: 90 tablet, Rfl: 3    omega-3 fatty acids/fish oil (FISH OIL-OMEGA-3 FATTY ACIDS) 300-1,000 mg capsule, Take by mouth once daily., Disp: , Rfl:     TURMERIC ORAL, Take by mouth., Disp: , Rfl:     acetaZOLAMIDE (DIAMOX) 125 MG Tab, BID day prior to travel  & BID through descent, drink lots of water, no alcohol, no cafffeine, Disp: 14 tablet, Rfl: 1    cartilage/collagen/bor/hyalur (JOINT HEALTH ORAL), Take by mouth., Disp: , Rfl:     pantoprazole (PROTONIX) 40 MG tablet, Take 1 tablet (40 mg total) by mouth once daily., Disp: 30 tablet, Rfl: 3    semaglutide (OZEMPIC) 0.25 mg or 0.5 mg(2 mg/1.5 mL) pen injector, Inject 0.5 mg into the skin every 7 days. Start with 0.25 mg weekly for 4 weeks, then increase to 0.5 mg weekly for 12 doses, Disp: 1.5 mL, Rfl: 2  ALLERGIES:   Review of patient's allergies indicates:   Allergen Reactions    Penicillins      nausea       VITAL SIGNS: Ht 5' 5" " "(1.651 m)   Wt 105.2 kg (232 lb)   BMI 38.61 kg/m²      PHYSICAL EXAMINATION  VITAL SIGNS: Ht 5' 5" (1.651 m)   Wt 105.2 kg (232 lb)   BMI 38.61 kg/m²    General:  The patient is alert and oriented x 3.  Mood is pleasant.  Observation of ears, eyes and nose reveal no gross abnormalities.  HEENT: NCAT, sclera nonicteric  Lungs: Respirations are equal and unlabored.    B/L KNEE EXAMINATION     OBSERVATION / INSPECTION   Gait:   antalgic   Alignment:  Neutral   Scars:   None   Muscle atrophy: Mild  Effusion:  None   Warmth:  None   Discoloration:   none     TENDERNESS / CREPITUS (T / C):          T / C      T / C   Patella   + R&L / + R&L   Lateral joint line   - / -    Peripatellar medial  -  Medial joint line    + R&L / -    Peripatellar lateral -  Medial plica   - / -    Patellar tendon -   Popliteal fossa   - / -    Quad tendon   -   Gastrocnemius   -   Prepatellar Bursa - / -   Quadricep   -   Tibial tubercle  -  Thigh/hamstring  -   Pes anserine/HS -  Fibula    -   ITB   - / -  Tibia     -   Tib/fib joint  - / -  LCL    -     MFC   - / -   MCL: Proximal  -    LFC   + R&L / -    Distal   -          ROM: (* = pain)  PASSIVE   ACTIVE    Left :   *5 / 0 / *100   5 / 0 / *115     Right :    *5 / 0 / *90   5 / 0 / *115    PATELLOFEMORAL EXAMINATION:  See above noted areas of tenderness.   Patella position    Subluxation / dislocation: Centered           Sup. / Inf;   Normal   Crepitus (PF):    +   Patellar Mobility:       Medial-lateral:   Normal    Superior-inferior:  Normal    Inferior tilt   Normal    Patellar tendon:  Normal   Lateral tilt:    Normal   J-sign:     None   Patellofemoral grind:   + pain       MENISCAL SIGNS:     Pain on terminal extension:  +R&L   Pain on terminal flexion:  +R&L   Fans maneuver:  +, medial joint line  Squat     +    LIGAMENT EXAMINATION:  ACL / Lachman:  normal (-1 to 2mm)    PCL-Post.  drawer: normal 0 to 2mm  MCL- Valgus:  normal 0 to 2mm  LCL- Varus:  normal 0 to " 2mm  Pivot shift: normal (Equal)   Dial Test: difference c/w other side   At 30° flexion: normal (< 5°)    At 90° flexion: normal (< 5°)   Reverse Pivot Shift:   normal (Equal)     STRENGTH: (* = with pain) PAINFUL SIDE   Quadricep   *5/5   Hamstrin/5    EXTREMITY NEURO-VASCULAR EXAMINATION:   Sensation:  Grossly intact to light touch all dermatomal regions.   Motor Function:  Fully intact motor function at hip, knee, foot and ankle    DTRs;  quadriceps and  achilles 2+.  No clonus and downgoing Babinski.    Vascular status:  DP and PT pulses 2+, brisk capillary refill, symmetric.     Other Findings:   Results for orders placed during the hospital encounter of 23    MRI Knee Without Contrast Right    Narrative  EXAMINATION:  MRI KNEE WITHOUT CONTRAST RIGHT    CLINICAL HISTORY:  Meniscal tear, untreated, new symptoms;assess for medial meniscus tear and cartilage pathology;    TECHNIQUE:  Routine MRI evaluation of the right knee without contrast.    COMPARISON:  Radiograph 2023.    FINDINGS:  Menisci: There is a complex tear of the body segment and posterior horn of the medial meniscus with associated extrusion and surrounding synovitis.  There is a complex free edge tear of the posterior horn of the lateral meniscus and mild superior articular surface fraying of the adjacent posterior body.  Intact anterior and posterior root ligaments.    Ligaments: ACL mucoid degeneration with an associated intrasubstance ganglion cyst.  Edema signal about the superficial MCL likely representing reactive synovitis.  PCL and posterolateral corner structures are intact.    Extensor Mechanism: Patellofemoral alignment is maintained.  Quadriceps and patellar tendons are intact.  MPFL and medial/lateral retinacula are normal.    Cartilage:    *Patellofemoral: Prominent full-thickness chondral loss with scattered subchondral cystic change at the medial patellar facet, patellar eminence and medial/central  trochlea.  *Medial tibiofemoral: High-grade partial to full-thickness chondral loss at the weight-bearing femoral condyle with a small intra-articular/central osteophyte.  Partial-thickness chondral fissuring throughout the weight-bearing tibial plateau.  *Lateral tibiofemoral: Chondral signal heterogeneity and superficial chondral fissuring throughout the weight-bearing femoral condyle and tibial plateau.  No full-thickness defects or subchondral edema.  Bones: Tricompartmental marginal osteophytes.  No fracture.  No avascular necrosis.  No marrow infiltrative process.    Miscellaneous: There is a joint effusion with associated synovitis.  0.6 cm ossified loose body posterior to the posterior horn of the lateral meniscus, likely osteocartilaginous in nature.  No popliteal cyst.  Medial gastrocnemius, lateral gastrocnemius, distal semimembranosus and visualized pes anserine tendons are intact.  Distal iliotibial band is normal.  Synovitis along the course of the infrapatellar plica.  Visualized neurovascular structures demonstrate no significant abnormalities.    Impression  1. Tricompartmental osteoarthritis most pronounced at the patellofemoral and medial tibiofemoral compartments.  2. Complex tear body segment/posterior horn medial meniscus with associated extrusion and surrounding synovitis.  3. Complex free edge tear posterior horn lateral meniscus.  4. ACL mucoid degeneration with an intrasubstance ganglion cyst.  5. Joint effusion with synovitis.      Electronically signed by: Jorge L Santos MD  Date:    05/06/2023  Time:    10:35       X-rays:  including standing, weight bearing AP and flexion bilateral knees, lateral and merchant views ordered and images reviewed by me show:  No fracture, dislocation  Osteoarthritis with mild-to-moderate joint space narrowing.     ASSESSMENT:      ICD-10-CM ICD-9-CM   1. Primary osteoarthritis of both knees  M17.0 715.16   2. Primary osteoarthritis of left knee  M17.12  715.16          PLAN:   1. MRI results reviewed today. We reviewed with Diane Garcia today, the pathology and natural history of her diagnosis. We have discussed a variety of treatment options including medications, physical therapy and other alternative treatments. I also explained the indications, risks and benefits of surgery. After discussion, she decided to proceed with surgery. The decision was made to go forward with      Staged    Left total knee arthroplasty    Then Right total knee arthroplasty    We also discussed, given their PMHx, medically optimization and clearance from their primary care physician and/or preop center.     The details of the surgical procedure were explained, including the location of probable incisions and a description of likely hardware and/or grafts to be used.  The patient understands the likely convalescence after surgery.  Also, we have thoroughly discussed the risks, benefits and alternatives to surgery, including, but not limited to, the risk of infection, joint stiffness, blood clot (including DVT and/or pulmonary embolus), neurologic and vascular injury.  It was explained that, if tissue has been repaired or reconstructed, there is a chance of failure, which may require further management.    I made the decision to obtain old records of the patient including previous notes and imaging. I independently reviewed and interpreted lab results today as well as prior imaging.     2. Ice compress to the affected area 2-3x a day for 15-20 minutes as needed for pain management.  3. Ambulatory referral to physical therapy for pre-habilitation.  4. OTC NSAIDs as needed.  5. RTC to see NOY Greenwood MD for surgical discussion.      All of the patient's questions were answered and the patient will contact us if they have any questions or concerns in the interim.  Weight loss was discussed with the patient today. She currently weighs 235 lbs, we se a goal for her  to weigh 200 lbs by the  end of the year.   All questions were answered, pt will contact us for questions or concerns in the interim.

## 2023-05-18 ENCOUNTER — OFFICE VISIT (OUTPATIENT)
Dept: BARIATRICS | Facility: CLINIC | Age: 68
End: 2023-05-18
Payer: MEDICARE

## 2023-05-18 VITALS
DIASTOLIC BLOOD PRESSURE: 81 MMHG | SYSTOLIC BLOOD PRESSURE: 144 MMHG | WEIGHT: 236 LBS | HEART RATE: 67 BPM | HEIGHT: 65 IN | BODY MASS INDEX: 39.32 KG/M2 | OXYGEN SATURATION: 97 %

## 2023-05-18 DIAGNOSIS — E66.01 CLASS 2 SEVERE OBESITY DUE TO EXCESS CALORIES WITH SERIOUS COMORBIDITY AND BODY MASS INDEX (BMI) OF 39.0 TO 39.9 IN ADULT: Primary | ICD-10-CM

## 2023-05-18 DIAGNOSIS — E78.2 MIXED HYPERLIPIDEMIA: ICD-10-CM

## 2023-05-18 DIAGNOSIS — Z71.3 ENCOUNTER FOR WEIGHT LOSS COUNSELING: ICD-10-CM

## 2023-05-18 DIAGNOSIS — I10 ESSENTIAL HYPERTENSION: ICD-10-CM

## 2023-05-18 DIAGNOSIS — R73.03 PREDIABETES: ICD-10-CM

## 2023-05-18 DIAGNOSIS — G47.33 OSA (OBSTRUCTIVE SLEEP APNEA): ICD-10-CM

## 2023-05-18 PROCEDURE — 99999 PR PBB SHADOW E&M-EST. PATIENT-LVL III: ICD-10-PCS | Mod: PBBFAC,,, | Performed by: STUDENT IN AN ORGANIZED HEALTH CARE EDUCATION/TRAINING PROGRAM

## 2023-05-18 PROCEDURE — 99999 PR PBB SHADOW E&M-EST. PATIENT-LVL III: CPT | Mod: PBBFAC,,, | Performed by: STUDENT IN AN ORGANIZED HEALTH CARE EDUCATION/TRAINING PROGRAM

## 2023-05-18 PROCEDURE — 99213 PR OFFICE/OUTPT VISIT, EST, LEVL III, 20-29 MIN: ICD-10-PCS | Mod: S$PBB,,, | Performed by: STUDENT IN AN ORGANIZED HEALTH CARE EDUCATION/TRAINING PROGRAM

## 2023-05-18 PROCEDURE — 99213 OFFICE O/P EST LOW 20 MIN: CPT | Mod: PBBFAC | Performed by: STUDENT IN AN ORGANIZED HEALTH CARE EDUCATION/TRAINING PROGRAM

## 2023-05-18 PROCEDURE — 99213 OFFICE O/P EST LOW 20 MIN: CPT | Mod: S$PBB,,, | Performed by: STUDENT IN AN ORGANIZED HEALTH CARE EDUCATION/TRAINING PROGRAM

## 2023-05-18 RX ORDER — METFORMIN HYDROCHLORIDE 500 MG/1
500 TABLET, EXTENDED RELEASE ORAL
Qty: 90 TABLET | Refills: 0 | Status: SHIPPED | OUTPATIENT
Start: 2023-05-18 | End: 2023-08-31 | Stop reason: SDUPTHER

## 2023-05-18 NOTE — PROGRESS NOTES
Subjective:       Patient ID: Diane Garcia is a 67 y.o. female.    Chief Complaint: Follow-up, Obesity, and Weight Check    Patient presents for treatment of obesity.   Has tried multiple diets, but always gains weight back plus some    Co-morbidities   HTN  HLD  Prediabetes  MELLISA  GERD  OA, bilateral knees      Current Physical Activity  Cycling 3 nights per week, 10-12 miles (social)  Pickle ball 3x/week    Current Eating Habits  Breakfast - oatmeal with yogurt; eggs and english muffin  Eats out 4-5 x/week for lunch or dinner  Does not cook often  Snacks - rice cakes; likes salty and crunchy snacks  Beverages - carbonated water, coffee in AM    Medical Weight Loss  3/13/2023: 238.1 lbs, BMI 39.6, BFP 50.2%, .7 lbs, SMM 64.6 lbs, BMR 1531 kcal  5/18/2023: 236 lbs, BMI 39.3, BFP 50.2%, .5 lbs, SMM 63.7 lbs, BMR 1522 kcal    Insurance would not cover Ozempic    Review of Systems   Constitutional:  Negative for chills and fever.   Respiratory:  Negative for shortness of breath.    Cardiovascular:  Negative for chest pain and palpitations.   Gastrointestinal:  Negative for abdominal pain, nausea and vomiting.   Neurological:  Negative for dizziness and light-headedness.   Psychiatric/Behavioral:  The patient is not nervous/anxious.        Objective:       Latest Reference Range & Units 03/09/23 09:27   WBC 3.90 - 12.70 K/uL 7.38   RBC 4.00 - 5.40 M/uL 4.14   Hemoglobin 12.0 - 16.0 g/dL 12.3   Hematocrit 37.0 - 48.5 % 38.4   MCV 82 - 98 fL 93   MCH 27.0 - 31.0 pg 29.7   MCHC 32.0 - 36.0 g/dL 32.0   RDW 11.5 - 14.5 % 13.3   Platelets 150 - 450 K/uL 319   MPV 9.2 - 12.9 fL 10.5   Gran % 38.0 - 73.0 % 66.6   Lymph % 18.0 - 48.0 % 21.3   Mono % 4.0 - 15.0 % 6.1   Eosinophil % 0.0 - 8.0 % 4.9   Basophil % 0.0 - 1.9 % 0.8   Immature Granulocytes 0.0 - 0.5 % 0.3   Gran # (ANC) 1.8 - 7.7 K/uL 4.9   Lymph # 1.0 - 4.8 K/uL 1.6   Mono # 0.3 - 1.0 K/uL 0.5   Eos # 0.0 - 0.5 K/uL 0.4   Baso # 0.00 - 0.20 K/uL 0.06    Immature Grans (Abs) 0.00 - 0.04 K/uL 0.02   nRBC 0 /100 WBC 0   Differential Method  Automated   Sodium 136 - 145 mmol/L 141   Potassium 3.5 - 5.1 mmol/L 4.2   Chloride 95 - 110 mmol/L 104   CO2 23 - 29 mmol/L 26   Anion Gap 8 - 16 mmol/L 11   BUN 8 - 23 mg/dL 14   Creatinine 0.5 - 1.4 mg/dL 0.6   eGFR >60 mL/min/1.73 m^2 >60.0   Glucose 70 - 110 mg/dL 129 (H)   Calcium 8.7 - 10.5 mg/dL 9.5   Alkaline Phosphatase 55 - 135 U/L 67   PROTEIN TOTAL 6.0 - 8.4 g/dL 7.3   Albumin 3.5 - 5.2 g/dL 3.9   BILIRUBIN TOTAL 0.1 - 1.0 mg/dL 1.2 (H)   AST 10 - 40 U/L 13   ALT 10 - 44 U/L 19   Cholesterol 120 - 199 mg/dL 154   HDL 40 - 75 mg/dL 42   HDL/Cholesterol Ratio 20.0 - 50.0 % 27.3   LDL Cholesterol External 63.0 - 159.0 mg/dL 80.8   Non-HDL Cholesterol mg/dL 112   Total Cholesterol/HDL Ratio 2.0 - 5.0  3.7   Triglycerides 30 - 150 mg/dL 156 (H)   Hemoglobin A1C External 4.0 - 5.6 % 6.0 (H)   Estimated Avg Glucose 68 - 131 mg/dL 126   TSH 0.400 - 4.000 uIU/mL 1.569   (H): Data is abnormally high    Vitals:    05/18/23 1349   BP: (!) 144/81   Pulse: 67       Physical Exam  Vitals reviewed.   Constitutional:       General: She is not in acute distress.     Appearance: Normal appearance. She is obese. She is not ill-appearing, toxic-appearing or diaphoretic.   HENT:      Head: Normocephalic and atraumatic.   Cardiovascular:      Rate and Rhythm: Normal rate.   Pulmonary:      Effort: Pulmonary effort is normal. No respiratory distress.   Skin:     General: Skin is warm and dry.   Neurological:      Mental Status: She is alert and oriented to person, place, and time.       Assessment:       Problem List Items Addressed This Visit       MELLISA (obstructive sleep apnea)    Essential hypertension    Mixed hyperlipidemia    Prediabetes     Other Visit Diagnoses       Class 2 severe obesity due to excess calories with serious comorbidity and body mass index (BMI) of 39.0 to 39.9 in adult    -  Primary    Encounter for weight loss  counseling                  Plan:   - Metformin  mg daily    - Log all food and beverage intake with a daily calorie goal of 1400 calories per day    - Moderate intensity aerobic exercise for 150 minutes per week

## 2023-05-22 ENCOUNTER — TELEPHONE (OUTPATIENT)
Dept: DERMATOLOGY | Facility: CLINIC | Age: 68
End: 2023-05-22
Payer: MEDICARE

## 2023-05-22 NOTE — TELEPHONE ENCOUNTER
Appt scheduled for pt----- Message from Yesenia Waller MA sent at 4/20/2023  2:12 PM CDT -----  Good afternoon,     Would you be able get this pt in for a chemical peel? She has seen you'll before.

## 2023-05-24 NOTE — PROGRESS NOTES
CC: Left knee pain    67 y.o. Female who presents as a new patient to me. Complaint is left knee pain x 4 years with an atraumatic onset. Pain localizes to medial knee.  Endorses swelling and effusions. No prominent mechanical symptoms. Denies instability. Worse with activities. Better with rest. Treatment thus far has included rest, activity modifications, oral medications and multiple steroid injections (last 3/14/23) and has also had HA injection. She takes meloxicam or ibuprofen for the pain and without taking this medication pain is quite severe. Here today to discuss diagnosis and treatment options. She states left knee pain is severe, right knee pain is usually minimal and more recently started hurting. Due to recent pain, MRI of the right knee was recently ordered by the mid-level provider but patient states this is not her problem at this point versus the chronic left knee pain.     No back or radicular symptoms reported.  No ipsilateral groin or hip pain.    Referral from Michel Flores PA-C for possible L TKA.    PMHx notable for BMI 39  Negative for tobacco. (Remote smoking history)  Negative for diabetes.    REVIEW OF SYSTEMS:   Constitution: Negative. Negative for chills, fever and night sweats.    Hematologic/Lymphatic: Negative for bleeding problem. Does not bruise/bleed easily.   Skin: Negative for dry skin, itching and rash.   Musculoskeletal: Negative for falls. Positive for left knee pain and muscle weakness.     All other review of symptoms were reviewed and found to be noncontributory.     PAST MEDICAL HISTORY:   Past Medical History:   Diagnosis Date    Hyperlipidemia     Hypertension      PAST SURGICAL HISTORY:   Past Surgical History:   Procedure Laterality Date    COLONOSCOPY N/A 2/2/2023    Procedure: COLONOSCOPY;  Surgeon: Bob Grayson MD;  Location: [facility];  Service: Endoscopy;  Laterality: N/A;    ESOPHAGOGASTRODUODENOSCOPY N/A 2/2/2023    Procedure: EGD  (ESOPHAGOGASTRODUODENOSCOPY);  Surgeon: Bob Grayson MD;  Location: [facility];  Service: Endoscopy;  Laterality: N/A;    HYSTERECTOMY      OOPHORECTOMY       FAMILY HISTORY:   Family History   Problem Relation Age of Onset    Cancer Mother         Lung    COPD Father     No Known Problems Sister     Melanoma Neg Hx      SOCIAL HISTORY:   Social History     Socioeconomic History    Marital status:     Number of children: 1   Tobacco Use    Smoking status: Former     Packs/day: 2.00     Years: 25.00     Pack years: 50.00     Types: Cigarettes     Quit date: 2000     Years since quittin.5    Smokeless tobacco: Never   Substance and Sexual Activity    Alcohol use: Yes     Comment: A few drinks a week    Drug use: Never     Comment: last use >30 yrs ago     Sexual activity: Yes     Partners: Male     MEDICATIONS:     Current Outpatient Medications:     biotin 1 mg Cap, Take by mouth., Disp: , Rfl:     cartilage/collagen/bor/hyalur (JOINT HEALTH ORAL), Take by mouth., Disp: , Rfl:     esomeprazole (NEXIUM) 20 MG capsule, Take 20 mg by mouth before breakfast., Disp: , Rfl:     ibuprofen (ADVIL,MOTRIN) 800 MG tablet, TAKE 1 TABLET(800 MG) BY MOUTH EVERY 6 HOURS AS NEEDED FOR PAIN, Disp: 60 tablet, Rfl: 1    lovastatin (MEVACOR) 40 MG tablet, TAKE 1 TABLET(40 MG) BY MOUTH EVERY EVENING, Disp: 90 tablet, Rfl: 3    metFORMIN (GLUCOPHAGE-XR) 500 MG ER 24hr tablet, Take 1 tablet (500 mg total) by mouth daily with breakfast., Disp: 90 tablet, Rfl: 0    olmesartan (BENICAR) 40 MG tablet, TAKE 1 TABLET(40 MG) BY MOUTH EVERY DAY, Disp: 90 tablet, Rfl: 3    omega-3 fatty acids/fish oil (FISH OIL-OMEGA-3 FATTY ACIDS) 300-1,000 mg capsule, Take by mouth once daily., Disp: , Rfl:     TURMERIC ORAL, Take by mouth., Disp: , Rfl:     acetaZOLAMIDE (DIAMOX) 125 MG Tab, BID day prior to travel  & BID through descent, drink lots of water, no alcohol, no cafffeine, Disp: 14 tablet, Rfl: 1    hydroCHLOROthiazide  "(HYDRODIURIL) 25 MG tablet, TAKE 1 TABLET(25 MG) BY MOUTH EVERY DAY, Disp: 90 tablet, Rfl: 3    pantoprazole (PROTONIX) 40 MG tablet, Take 1 tablet (40 mg total) by mouth once daily., Disp: 30 tablet, Rfl: 3    ALLERGIES:   Review of patient's allergies indicates:   Allergen Reactions    Penicillins      nausea      PHYSICAL EXAMINATION:  BP (!) 154/78   Pulse 66   Ht 5' 5" (1.651 m)   Wt 107 kg (236 lb)   BMI 39.27 kg/m²   General: Well-developed well-nourished 67 y.o. femalein no acute distress   Cardiovascular: Regular rhythm by palpation of distal pulse, normal color and temperature, no concerning varicosities on symptomatic side   Lungs: No labored breathing or wheezing appreciated   Neuro: Alert and oriented ×3   Psychiatric: well oriented to person, place and time, demonstrates normal mood and affect   Skin: No rashes, lesions or ulcers, normal temperature, turgor, and texture on involved extremity    Ortho/SPM Exam  Examination of the left knee demonstrates intact extensor mechanism. No large effusion. Central patellar tracking. No patellar apprehension. Decreased patellar mobility.  5° short of full extension. Flexion to 120. No pain with forced flexion. Does have pain with forced extension. Prominent tenderness along the medial> lateral joint line.  Pain medially with varus load. Stable to varus/valgus stress testing at 0 and 30 deg. Ligamentously stable.  No significant peripheral vascular disease.  2+ dorsalis pedis pulse.    IMAGING:  X-rays including standing, weight bearing AP and flexion bilateral knees, left knee lateral and sunrise views ordered and images reviewed by me show:    Severe tricompartmental djd in bilateral knees worst on left. Worse in medial compartment.      Right Knee MRI 5/5/23:  1. Tricompartmental osteoarthritis most pronounced at the patellofemoral and medial tibiofemoral compartments.  2. Complex tear body segment/posterior horn medial meniscus with associated extrusion " and surrounding synovitis.  3. Complex free edge tear posterior horn lateral meniscus.  4. ACL mucoid degeneration with an intrasubstance ganglion cyst.  5. Joint effusion with synovitis.    ASSESSMENT:      ICD-10-CM ICD-9-CM   1. Primary osteoarthritis of left knee  M17.12 715.16       PLAN:     Findings discussed with the patient.  She has severe, advanced tricompartmental DJD, left and right knee involvement.  She has failed prior conservative treatment and now has been referred to me for discussion of a possible total knee replacement.  The details of that surgery were discussed include the expected postop rehab and recovery course.  Outlined risks of surgery include but are not limited to continued or recurrent pain and upwards of 30% of patients, fracture, aseptic loosening, DVT/PE, infection among others.  I expect her to do well.  Patient specific factors taken into account and the [employer] triathlon system would be best fitting in this case.  Given the extent and duration of her complaints she would like to proceed with the surgery.    Plan is left total knee arthroplasty with the [employer] triathlon system.  Universal base plate.  Non X 3 poly.  Regular cement.    Clearance per the preop center.  She denies any dental issues.    Informed Consent:    The details of the surgical procedure were explained, including the location of probable incisions and a description of possible hardware and/or grafts to be used. Alternatives to both operative and non-operative options with associated risks and benefits were discussed. The patient understands the likely convalescence after surgery and, in particular, the expected postop rehab and recovery course. The outlined risks and potential complications of the proposed procedure include but are not limited to: infection, poor wound healing, scarring, deformity, stiffness, swelling, continued or recurrent pain, instability, hardware or prosthetic failure if implanted,  symptomatic hardware requiring removal, weakness, neurovascular injury, numbness, chronic regional pain disorder, tissue nonhealing/irreparability/retear, subsequent contralateral limb injury or pathology, chondral injury, arthritis, fracture, blood clot formation, inability to return to previous level of activity, anesthetic or regional block complication up to death, need for additional procedure as indicated intraoperatively, and potential need for further surgery.    The patient was also informed and understands that the risks of surgery are greater for patients with a current condition or history of heart disease, obesity, clotting disorders, recurrent infections, steroid use, current or past smoking, and factors such as sedentary lifestyle and noncompliance with medications, therapy or follow-up. The degree of the increased risk is hard to estimate with any degree of precision. If applicable, smoking cessation was discussed.     All questions were answered. The patient has verbalized understanding of these issues and wishes to proceed with the surgery as discussed.

## 2023-05-25 ENCOUNTER — OFFICE VISIT (OUTPATIENT)
Dept: SPORTS MEDICINE | Facility: CLINIC | Age: 68
End: 2023-05-25
Payer: MEDICARE

## 2023-05-25 VITALS
HEART RATE: 66 BPM | DIASTOLIC BLOOD PRESSURE: 78 MMHG | HEIGHT: 65 IN | SYSTOLIC BLOOD PRESSURE: 154 MMHG | WEIGHT: 236 LBS | BODY MASS INDEX: 39.32 KG/M2

## 2023-05-25 DIAGNOSIS — M17.12 PRIMARY OSTEOARTHRITIS OF LEFT KNEE: Primary | ICD-10-CM

## 2023-05-25 PROCEDURE — 99214 PR OFFICE/OUTPT VISIT, EST, LEVL IV, 30-39 MIN: ICD-10-PCS | Mod: S$PBB,,, | Performed by: ORTHOPAEDIC SURGERY

## 2023-05-25 PROCEDURE — 99214 OFFICE O/P EST MOD 30 MIN: CPT | Mod: S$PBB,,, | Performed by: ORTHOPAEDIC SURGERY

## 2023-05-25 PROCEDURE — 99213 OFFICE O/P EST LOW 20 MIN: CPT | Mod: PBBFAC | Performed by: ORTHOPAEDIC SURGERY

## 2023-05-25 PROCEDURE — 99999 PR PBB SHADOW E&M-EST. PATIENT-LVL III: CPT | Mod: PBBFAC,,, | Performed by: ORTHOPAEDIC SURGERY

## 2023-05-25 PROCEDURE — 99999 PR PBB SHADOW E&M-EST. PATIENT-LVL III: ICD-10-PCS | Mod: PBBFAC,,, | Performed by: ORTHOPAEDIC SURGERY

## 2023-06-01 ENCOUNTER — PATIENT MESSAGE (OUTPATIENT)
Dept: SPORTS MEDICINE | Facility: CLINIC | Age: 68
End: 2023-06-01
Payer: MEDICARE

## 2023-06-05 RX ORDER — HYDROCHLOROTHIAZIDE 25 MG/1
TABLET ORAL
Qty: 90 TABLET | Refills: 3 | Status: SHIPPED | OUTPATIENT
Start: 2023-06-05

## 2023-06-07 ENCOUNTER — PATIENT MESSAGE (OUTPATIENT)
Dept: SLEEP MEDICINE | Facility: CLINIC | Age: 68
End: 2023-06-07
Payer: MEDICARE

## 2023-06-07 DIAGNOSIS — M17.12 PRIMARY OSTEOARTHRITIS OF LEFT KNEE: Primary | ICD-10-CM

## 2023-06-09 NOTE — PROGRESS NOTES
CC:  Right knee pain    I saw the patient recently for her left knee.  We decided to proceed with a left total knee arthroplasty in July.  She also has right knee pain with DJD.  She would like to discuss a possible bilateral knee replacement surgery in July.  She has longstanding pain and pathology in both knees.  Failed prior conservative treatments.  She also has multiple trips coming up.    REVIEW OF SYSTEMS:   Constitution: Negative. Negative for chills, fever and night sweats.    Hematologic/Lymphatic: Negative for bleeding problem. Does not bruise/bleed easily.   Skin: Negative for dry skin, itching and rash.   Musculoskeletal: Negative for falls. Positive for left and right knee pain and muscle weakness.     All other review of symptoms were reviewed and found to be noncontributory.     PAST MEDICAL HISTORY:   Past Medical History:   Diagnosis Date    Hyperlipidemia     Hypertension      PAST SURGICAL HISTORY:   Past Surgical History:   Procedure Laterality Date    COLONOSCOPY N/A 2023    Procedure: COLONOSCOPY;  Surgeon: Bob Grayson MD;  Location: [facility];  Service: Endoscopy;  Laterality: N/A;    ESOPHAGOGASTRODUODENOSCOPY N/A 2023    Procedure: EGD (ESOPHAGOGASTRODUODENOSCOPY);  Surgeon: Bob Grayson MD;  Location: [facility];  Service: Endoscopy;  Laterality: N/A;    HYSTERECTOMY      OOPHORECTOMY       FAMILY HISTORY:   Family History   Problem Relation Age of Onset    Cancer Mother         Lung    COPD Father     No Known Problems Sister     Melanoma Neg Hx      SOCIAL HISTORY:   Social History     Socioeconomic History    Marital status:     Number of children: 1   Tobacco Use    Smoking status: Former     Packs/day: 2.00     Years: 25.00     Pack years: 50.00     Types: Cigarettes     Quit date: 2000     Years since quittin.5    Smokeless tobacco: Never   Substance and Sexual Activity    Alcohol use: Yes     Comment: A few drinks a week    Drug use:  "Never     Comment: last use >30 yrs ago     Sexual activity: Yes     Partners: Male     MEDICATIONS:     Current Outpatient Medications:     biotin 1 mg Cap, Take by mouth., Disp: , Rfl:     cartilage/collagen/bor/hyalur (JOINT HEALTH ORAL), Take by mouth., Disp: , Rfl:     esomeprazole (NEXIUM) 20 MG capsule, Take 20 mg by mouth before breakfast., Disp: , Rfl:     hydroCHLOROthiazide (HYDRODIURIL) 25 MG tablet, TAKE 1 TABLET(25 MG) BY MOUTH EVERY DAY, Disp: 90 tablet, Rfl: 3    ibuprofen (ADVIL,MOTRIN) 800 MG tablet, TAKE 1 TABLET(800 MG) BY MOUTH EVERY 6 HOURS AS NEEDED FOR PAIN, Disp: 60 tablet, Rfl: 1    lovastatin (MEVACOR) 40 MG tablet, TAKE 1 TABLET(40 MG) BY MOUTH EVERY EVENING, Disp: 90 tablet, Rfl: 3    metFORMIN (GLUCOPHAGE-XR) 500 MG ER 24hr tablet, Take 1 tablet (500 mg total) by mouth daily with breakfast., Disp: 90 tablet, Rfl: 0    olmesartan (BENICAR) 40 MG tablet, TAKE 1 TABLET(40 MG) BY MOUTH EVERY DAY, Disp: 90 tablet, Rfl: 3    omega-3 fatty acids/fish oil (FISH OIL-OMEGA-3 FATTY ACIDS) 300-1,000 mg capsule, Take by mouth once daily., Disp: , Rfl:     TURMERIC ORAL, Take by mouth., Disp: , Rfl:     acetaZOLAMIDE (DIAMOX) 125 MG Tab, BID day prior to travel  & BID through descent, drink lots of water, no alcohol, no cafffeine, Disp: 14 tablet, Rfl: 1    pantoprazole (PROTONIX) 40 MG tablet, Take 1 tablet (40 mg total) by mouth once daily., Disp: 30 tablet, Rfl: 3    ALLERGIES:   Review of patient's allergies indicates:   Allergen Reactions    Penicillins      nausea      PHYSICAL EXAMINATION:  BP (!) 155/85   Pulse 71   Ht 5' 5" (1.651 m)   Wt 106.6 kg (235 lb)   BMI 39.11 kg/m²   General: Well-developed well-nourished 67 y.o. femalein no acute distress   Cardiovascular: Regular rhythm by palpation of distal pulse, normal color and temperature, no concerning varicosities on symptomatic side   Lungs: No labored breathing or wheezing appreciated   Neuro: Alert and oriented ×3   Psychiatric: " well oriented to person, place and time, demonstrates normal mood and affect   Skin: No rashes, lesions or ulcers, normal temperature, turgor, and texture on involved extremity    Ortho/SPM Exam  Examination of the right knee demonstrates varus standing alignment.  Pain over the medial joint line to direct palpation.  Pain medially with varus load.  Positive crepitus and patellar grind test.  Ligamentously stable otherwise.  Range of motion from full extension to 125° of flexion with pain.    IMAGING:  Prior X-rays including standing, weight bearing AP and flexion bilateral knees, BILATERAL knee lateral and sunrise views ordered and images reviewed by me show:    Tricompartmental DJD. KL3.     ASSESSMENT:      ICD-10-CM ICD-9-CM   1. Primary osteoarthritis of right knee  M17.11 715.16     PLAN:     We had a discussion today regarding considerations for bilateral knee replacement surgery.  I do not think she is a good candidate for that procedure with the associated risks.  We will plan to proceed with the left-sided total knee arthroplasty in July.  Right-sided corticosteroid injection given today.  Will try to schedule the right side replacement around her other trips in the fall and shoot for possibly in the October range for that procedure pending follow-up H&H check.    Large Joint Aspiration/Injection: R knee    Date/Time: 6/15/2023 1:15 PM  Performed by: NATALIO Greenwood MD  Authorized by: NATALIO Greenwood MD     Consent Done?:  Yes (Verbal)  Indications:  Pain  Site marked: the procedure site was marked    Timeout: prior to procedure the correct patient, procedure, and site was verified    Prep: patient was prepped and draped in usual sterile fashion      Local anesthesia used?: Yes    Local anesthetic:  Co-phenylcaine spray (0.2% Naropin)  Anesthetic total (ml):  4      Details:  Needle Size:  22 G  Ultrasonic Guidance for needle placement?: No    Approach:  Lateral  Location:  Knee  Site:  R  knee  Medications:  40 mg triamcinolone acetonide 40 mg/mL  Patient tolerance:  Patient tolerated the procedure well with no immediate complications

## 2023-06-15 ENCOUNTER — OFFICE VISIT (OUTPATIENT)
Dept: SPORTS MEDICINE | Facility: CLINIC | Age: 68
End: 2023-06-15
Payer: MEDICARE

## 2023-06-15 VITALS
DIASTOLIC BLOOD PRESSURE: 85 MMHG | HEART RATE: 71 BPM | SYSTOLIC BLOOD PRESSURE: 155 MMHG | BODY MASS INDEX: 39.15 KG/M2 | WEIGHT: 235 LBS | HEIGHT: 65 IN

## 2023-06-15 DIAGNOSIS — M17.11 PRIMARY OSTEOARTHRITIS OF RIGHT KNEE: Primary | ICD-10-CM

## 2023-06-15 PROCEDURE — 99214 OFFICE O/P EST MOD 30 MIN: CPT | Mod: 25,S$PBB,, | Performed by: ORTHOPAEDIC SURGERY

## 2023-06-15 PROCEDURE — 99214 PR OFFICE/OUTPT VISIT, EST, LEVL IV, 30-39 MIN: ICD-10-PCS | Mod: 25,S$PBB,, | Performed by: ORTHOPAEDIC SURGERY

## 2023-06-15 PROCEDURE — 20610 LARGE JOINT ASPIRATION/INJECTION: R KNEE: ICD-10-PCS | Mod: S$PBB,RT,, | Performed by: ORTHOPAEDIC SURGERY

## 2023-06-15 PROCEDURE — 99999 PR PBB SHADOW E&M-EST. PATIENT-LVL III: ICD-10-PCS | Mod: PBBFAC,,, | Performed by: ORTHOPAEDIC SURGERY

## 2023-06-15 PROCEDURE — 20610 DRAIN/INJ JOINT/BURSA W/O US: CPT | Mod: PBBFAC | Performed by: ORTHOPAEDIC SURGERY

## 2023-06-15 PROCEDURE — 99999 PR PBB SHADOW E&M-EST. PATIENT-LVL III: CPT | Mod: PBBFAC,,, | Performed by: ORTHOPAEDIC SURGERY

## 2023-06-15 PROCEDURE — 99213 OFFICE O/P EST LOW 20 MIN: CPT | Mod: PBBFAC,25 | Performed by: ORTHOPAEDIC SURGERY

## 2023-06-15 RX ORDER — TRIAMCINOLONE ACETONIDE 40 MG/ML
40 INJECTION, SUSPENSION INTRA-ARTICULAR; INTRAMUSCULAR
Status: DISCONTINUED | OUTPATIENT
Start: 2023-06-15 | End: 2023-06-15 | Stop reason: HOSPADM

## 2023-06-15 RX ADMIN — TRIAMCINOLONE ACETONIDE 40 MG: 40 INJECTION, SUSPENSION INTRA-ARTICULAR; INTRAMUSCULAR at 01:06

## 2023-06-16 ENCOUNTER — TELEPHONE (OUTPATIENT)
Dept: PRIMARY CARE CLINIC | Facility: CLINIC | Age: 68
End: 2023-06-16
Payer: MEDICARE

## 2023-06-19 ENCOUNTER — OFFICE VISIT (OUTPATIENT)
Dept: SLEEP MEDICINE | Facility: CLINIC | Age: 68
End: 2023-06-19
Payer: MEDICARE

## 2023-06-19 VITALS
BODY MASS INDEX: 38.93 KG/M2 | HEART RATE: 78 BPM | HEIGHT: 65 IN | DIASTOLIC BLOOD PRESSURE: 88 MMHG | WEIGHT: 233.69 LBS | SYSTOLIC BLOOD PRESSURE: 147 MMHG

## 2023-06-19 DIAGNOSIS — G47.33 OSA (OBSTRUCTIVE SLEEP APNEA): Primary | ICD-10-CM

## 2023-06-19 PROCEDURE — 99214 OFFICE O/P EST MOD 30 MIN: CPT | Mod: S$PBB,,, | Performed by: PHYSICIAN ASSISTANT

## 2023-06-19 PROCEDURE — 99999 PR PBB SHADOW E&M-EST. PATIENT-LVL III: ICD-10-PCS | Mod: PBBFAC,,, | Performed by: PHYSICIAN ASSISTANT

## 2023-06-19 PROCEDURE — 99213 OFFICE O/P EST LOW 20 MIN: CPT | Mod: PBBFAC | Performed by: PHYSICIAN ASSISTANT

## 2023-06-19 PROCEDURE — 99999 PR PBB SHADOW E&M-EST. PATIENT-LVL III: CPT | Mod: PBBFAC,,, | Performed by: PHYSICIAN ASSISTANT

## 2023-06-19 PROCEDURE — 99214 PR OFFICE/OUTPT VISIT, EST, LEVL IV, 30-39 MIN: ICD-10-PCS | Mod: S$PBB,,, | Performed by: PHYSICIAN ASSISTANT

## 2023-06-19 NOTE — PROGRESS NOTES
Referred by No ref. provider found     ESTABLISHED PATIENT VISIT  New to me. Followed by Dr Woodson.      Diane Garcia  is a pleasant 67 y.o. female  with PMH significant for HTN, HLD, preDM, GERD, former smoker, BMI 38+, MELLISA (dx circa 2002) on CPAP.      Here today for : CPAP supplies    PLAN last visit 10/24/22:   -the patient's machine is nearing the end of its usable life, needs a new one  -will check with HME to see if they have copy of the study (if not, will order HST  -will order auto CPAP, CPAP min=12, CPAP max =16cwp, ramp = 5cwp   -the patient is using and benefiting from PAP therapy      Since last visit:   States has been using CPAP nightly with good control of symptoms. States he is having TKA July 19th and is worried she will need higher pressures when she is on her back. Requesting machine pressures be changed from 12-16cwp to 12-18cwp. Denies air hunger currently, she is just concerned as she does not typically sleep on her back.   Reports current mask interface and pressure settings are comfortable.      PAP history   Problems    Mask FFM    Pressure 12-16cwp   DME HME   Machine age AirSense 11 12/14/22   Download 6/19/23: 30/30 x 7hrs 52mins, 12-16cwp (12.2/14/15), leak (0.1/14.4/54.3), AHI 0.9            SLEEP SCHEDULE   Environment     Bed Time 11P-MN   Sleep Latency Not long   Arousals 2   Nocturia 2   Back to sleep     Wake time 7:30-8A   Naps none   Work          Past Medical History:   Diagnosis Date    Hyperlipidemia     Hypertension      Patient Active Problem List   Diagnosis    Chronic pain of left knee    MELLISA (obstructive sleep apnea)    Severe obesity (BMI 35.0-39.9) with comorbidity    Gastroesophageal reflux disease    Former smoker    Essential hypertension    Mixed hyperlipidemia    Chronic pain of both knees    Abnormal gait    Weakness    Primary osteoarthritis of both knees    Prediabetes       Current Outpatient Medications:     biotin 1 mg Cap, Take by mouth., Disp: , Rfl:  "    cartilage/collagen/bor/hyalur (JOINT HEALTH ORAL), Take by mouth., Disp: , Rfl:     esomeprazole (NEXIUM) 20 MG capsule, Take 20 mg by mouth before breakfast., Disp: , Rfl:     hydroCHLOROthiazide (HYDRODIURIL) 25 MG tablet, TAKE 1 TABLET(25 MG) BY MOUTH EVERY DAY, Disp: 90 tablet, Rfl: 3    ibuprofen (ADVIL,MOTRIN) 800 MG tablet, TAKE 1 TABLET(800 MG) BY MOUTH EVERY 6 HOURS AS NEEDED FOR PAIN, Disp: 60 tablet, Rfl: 1    lovastatin (MEVACOR) 40 MG tablet, TAKE 1 TABLET(40 MG) BY MOUTH EVERY EVENING, Disp: 90 tablet, Rfl: 3    metFORMIN (GLUCOPHAGE-XR) 500 MG ER 24hr tablet, Take 1 tablet (500 mg total) by mouth daily with breakfast., Disp: 90 tablet, Rfl: 0    olmesartan (BENICAR) 40 MG tablet, TAKE 1 TABLET(40 MG) BY MOUTH EVERY DAY, Disp: 90 tablet, Rfl: 3    omega-3 fatty acids/fish oil (FISH OIL-OMEGA-3 FATTY ACIDS) 300-1,000 mg capsule, Take by mouth once daily., Disp: , Rfl:     TURMERIC ORAL, Take by mouth., Disp: , Rfl:     acetaZOLAMIDE (DIAMOX) 125 MG Tab, BID day prior to travel  & BID through descent, drink lots of water, no alcohol, no cafffeine, Disp: 14 tablet, Rfl: 1    pantoprazole (PROTONIX) 40 MG tablet, Take 1 tablet (40 mg total) by mouth once daily., Disp: 30 tablet, Rfl: 3       Vitals:    06/19/23 1323   BP: (!) 147/88   BP Location: Left arm   Patient Position: Sitting   BP Method: Medium (Automatic)   Pulse: 78   Weight: 106 kg (233 lb 11 oz)   Height: 5' 5" (1.651 m)     Physical Exam:    GEN:   Well-appearing  Psych:  Appropriate affect, demonstrates insight  SKIN:  No rash on the face or bridge of the nose      LABS:   Lab Results   Component Value Date    HGB 12.3 03/09/2023    CO2 26 03/09/2023       RECORDS REVIEWED PREVIOUSLY:    HST 11.14.22: AHI 19, RDI 37 (severe)    ASSESSMENT    ESS Today: 3/24    PROBLEM DESCRIPTION/ Sx on Presentation Interval Hx STATUS    MELLISA   Dx circa 2002, no residual snoring, no bed partner  HST 2022 AHI 19 Great usage and efficacy; AHI <1 Well " controlled   Daytime Sx   mild sleepiness when inactive on occasion   ESS 8/24 on intake Less sleepy on CPAP improved   Nocturia   x 2 per sleep period Roughly 2 x nightly persists   Other issues:     PLAN     -using and benefiting from CPAP therapy  -adjusted pressures 12-18cwp per patient request  -CPAP supplies ordered  -discussed MELLISA and CPAP with patient in detail, including possible complications of untreated MELLISA like heart attack/stroke  -advised on strict driving precautions; advised never to drive drowsy    Advised on plan of care. Answered all patient questions. Patient verbalized understanding and voiced agreement with plan of care.       RTC 12 months or as needed     The patient was given open opportunity to ask questions and/or express concerns about treatment plan.   All questions/concerns were discussed.     Two patient identifiers used prior to evaluation.

## 2023-06-22 ENCOUNTER — OFFICE VISIT (OUTPATIENT)
Dept: PRIMARY CARE CLINIC | Facility: CLINIC | Age: 68
End: 2023-06-22
Payer: MEDICARE

## 2023-06-22 VITALS
DIASTOLIC BLOOD PRESSURE: 78 MMHG | BODY MASS INDEX: 38.82 KG/M2 | HEIGHT: 65 IN | TEMPERATURE: 98 F | SYSTOLIC BLOOD PRESSURE: 138 MMHG | WEIGHT: 233 LBS | OXYGEN SATURATION: 98 % | HEART RATE: 78 BPM

## 2023-06-22 DIAGNOSIS — M25.562 CHRONIC PAIN OF LEFT KNEE: ICD-10-CM

## 2023-06-22 DIAGNOSIS — M17.0 PRIMARY OSTEOARTHRITIS OF BOTH KNEES: ICD-10-CM

## 2023-06-22 DIAGNOSIS — I70.0 AORTIC ATHEROSCLEROSIS: ICD-10-CM

## 2023-06-22 DIAGNOSIS — E78.2 MIXED HYPERLIPIDEMIA: ICD-10-CM

## 2023-06-22 DIAGNOSIS — R73.03 PREDIABETES: ICD-10-CM

## 2023-06-22 DIAGNOSIS — K21.9 GASTROESOPHAGEAL REFLUX DISEASE, UNSPECIFIED WHETHER ESOPHAGITIS PRESENT: ICD-10-CM

## 2023-06-22 DIAGNOSIS — G47.33 OSA (OBSTRUCTIVE SLEEP APNEA): ICD-10-CM

## 2023-06-22 DIAGNOSIS — G89.29 CHRONIC PAIN OF LEFT KNEE: ICD-10-CM

## 2023-06-22 DIAGNOSIS — M25.561 CHRONIC PAIN OF BOTH KNEES: ICD-10-CM

## 2023-06-22 DIAGNOSIS — I10 BENIGN ESSENTIAL HTN: Primary | ICD-10-CM

## 2023-06-22 DIAGNOSIS — M25.562 CHRONIC PAIN OF BOTH KNEES: ICD-10-CM

## 2023-06-22 DIAGNOSIS — G89.29 CHRONIC PAIN OF BOTH KNEES: ICD-10-CM

## 2023-06-22 PROCEDURE — 99214 PR OFFICE/OUTPT VISIT, EST, LEVL IV, 30-39 MIN: ICD-10-PCS | Mod: S$PBB,,, | Performed by: INTERNAL MEDICINE

## 2023-06-22 PROCEDURE — 99214 OFFICE O/P EST MOD 30 MIN: CPT | Mod: PBBFAC,PN | Performed by: INTERNAL MEDICINE

## 2023-06-22 PROCEDURE — 99999 PR PBB SHADOW E&M-EST. PATIENT-LVL IV: ICD-10-PCS | Mod: PBBFAC,,, | Performed by: INTERNAL MEDICINE

## 2023-06-22 PROCEDURE — 99999 PR PBB SHADOW E&M-EST. PATIENT-LVL IV: CPT | Mod: PBBFAC,,, | Performed by: INTERNAL MEDICINE

## 2023-06-22 PROCEDURE — 99214 OFFICE O/P EST MOD 30 MIN: CPT | Mod: S$PBB,,, | Performed by: INTERNAL MEDICINE

## 2023-06-22 RX ORDER — MELOXICAM 15 MG/1
15 TABLET ORAL
COMMUNITY
Start: 2023-06-04 | End: 2023-07-31

## 2023-06-22 RX ORDER — AMLODIPINE BESYLATE 5 MG/1
5 TABLET ORAL DAILY
Qty: 90 TABLET | Refills: 3 | Status: SHIPPED | OUTPATIENT
Start: 2023-06-22 | End: 2023-07-17

## 2023-06-22 NOTE — PROGRESS NOTES
Subjective:      Patient ID: Diane Garcia is a 67 y.o. female.    Chief Complaint: Hypertension      Diane Garcia is a 67 y.o. female with PMH significant for HTN, HLD, prediabetes, MELLISA, MELLISA, GERD, OA bilateral knees.  Presenting today for follow up. Date of last annual is 3/6/2023    HTN: BP at office 138/78.  Reports elevated readings to 160-180's at home. Denies CP/SOB/lightheadedness/dizziness. Given persistent elevated readings in different offices and at home, add amlodipine to regimen.      Knee pain: Scheduled for knee surgery on 7/19/2023. Pt has no active cardiac condition (ACS/USA, decompenstated CHF, significant arrhythmias or severe valvular disease) and can easily achieve 4 METS.  Pt does not require any further workup prior to knee replacement. These recommendations follow the most current Guideline on Perioperative Cardiovascular Evaluation and Management of Patients Undergoing Noncardiac Surgery released by the ACC/AHA. (JACC 2014.07.944).    GERD:  Does have a history of gastric reflux most likely secondary to large hiatal hernia. She was seeing gastroenterologist for opinions in regards to this but since she has recently moved she would like to be set up with one in [facility]. Continues on nexium at this time.      HLD: Continue lovastatin and omega 3     Prediabetes: Metformin 500mg qday.     Up to date with colonoscopy. Mammogram due June. Would like tetanus booster today.     Denies any chest pain, shortness of breath, nausea vomiting constipation diarrhea, blood in stool, heartburn    Review of Systems   Constitutional:  Negative for chills, fever and weight loss.   HENT:  Negative for congestion, ear pain and sore throat.    Eyes:  Negative for double vision.   Respiratory:  Negative for cough and shortness of breath.    Cardiovascular:  Negative for chest pain, palpitations and leg swelling.   Gastrointestinal:  Negative for abdominal pain, heartburn, nausea and vomiting.   Skin:   Negative for rash.   Neurological:  Negative for dizziness, tingling and headaches.   Psychiatric/Behavioral:  Negative for depression.         Current Outpatient Medications:     biotin 1 mg Cap, Take by mouth., Disp: , Rfl:     cartilage/collagen/bor/hyalur (JOINT HEALTH ORAL), Take by mouth., Disp: , Rfl:     esomeprazole (NEXIUM) 20 MG capsule, Take 20 mg by mouth before breakfast., Disp: , Rfl:     hydroCHLOROthiazide (HYDRODIURIL) 25 MG tablet, TAKE 1 TABLET(25 MG) BY MOUTH EVERY DAY, Disp: 90 tablet, Rfl: 3    ibuprofen (ADVIL,MOTRIN) 800 MG tablet, TAKE 1 TABLET(800 MG) BY MOUTH EVERY 6 HOURS AS NEEDED FOR PAIN, Disp: 60 tablet, Rfl: 1    lovastatin (MEVACOR) 40 MG tablet, TAKE 1 TABLET(40 MG) BY MOUTH EVERY EVENING, Disp: 90 tablet, Rfl: 3    meloxicam (MOBIC) 15 MG tablet, Take 15 mg by mouth., Disp: , Rfl:     metFORMIN (GLUCOPHAGE-XR) 500 MG ER 24hr tablet, Take 1 tablet (500 mg total) by mouth daily with breakfast., Disp: 90 tablet, Rfl: 0    olmesartan (BENICAR) 40 MG tablet, TAKE 1 TABLET(40 MG) BY MOUTH EVERY DAY, Disp: 90 tablet, Rfl: 3    omega-3 fatty acids/fish oil (FISH OIL-OMEGA-3 FATTY ACIDS) 300-1,000 mg capsule, Take by mouth once daily., Disp: , Rfl:     TURMERIC ORAL, Take by mouth., Disp: , Rfl:     amLODIPine (NORVASC) 5 MG tablet, Take 1 tablet (5 mg total) by mouth once daily., Disp: 90 tablet, Rfl: 3    Lab Results   Component Value Date    HGBA1C 6.0 (H) 03/09/2023    HGBA1C 5.6 02/02/2021     No results found for: MICALBCREAT  Lab Results   Component Value Date    LDLCALC 80.8 03/09/2023    LDLCALC 81.6 03/11/2022    CHOL 154 03/09/2023    HDL 42 03/09/2023    TRIG 156 (H) 03/09/2023       Lab Results   Component Value Date     03/09/2023    K 4.2 03/09/2023     03/09/2023    CO2 26 03/09/2023     (H) 03/09/2023    BUN 14 03/09/2023    CREATININE 0.6 03/09/2023    CALCIUM 9.5 03/09/2023    PROT 7.3 03/09/2023    ALBUMIN 3.9 03/09/2023    BILITOT 1.2 (H) 03/09/2023  "   ALKPHOS 67 03/09/2023    AST 13 03/09/2023    ALT 19 03/09/2023    ANIONGAP 11 03/09/2023    ESTGFRAFRICA >60.0 03/11/2022    EGFRNONAA >60.0 03/11/2022    WBC 7.38 03/09/2023    HGB 12.3 03/09/2023    HGB 12.5 03/11/2022    HCT 38.4 03/09/2023    MCV 93 03/09/2023     03/09/2023    TSH 1.569 03/09/2023    HEPCAB Negative 03/11/2022       No results found for: LH, FSH, TOTALTESTOST, PROGESTERONE, ESTRADIOL, [id number], JBAKGGIT84, FERRITIN, IRON, TRANSFERRIN, TIBC, FESATURATED, ZINC      Past Medical History:   Diagnosis Date    Hyperlipidemia     Hypertension      Past Surgical History:   Procedure Laterality Date    COLONOSCOPY N/A 2/2/2023    Procedure: COLONOSCOPY;  Surgeon: Bob Grayson MD;  Location: [facility];  Service: Endoscopy;  Laterality: N/A;    ESOPHAGOGASTRODUODENOSCOPY N/A 2/2/2023    Procedure: EGD (ESOPHAGOGASTRODUODENOSCOPY);  Surgeon: Bob Grayson MD;  Location: [facility];  Service: Endoscopy;  Laterality: N/A;    HYSTERECTOMY      OOPHORECTOMY       Social History     Social History Narrative    Not on file     Family History   Problem Relation Age of Onset    Cancer Mother         Lung    COPD Father     No Known Problems Sister     Melanoma Neg Hx      Vitals:    06/22/23 1302   BP: 138/78   Pulse: 78   Temp: 97.9 °F (36.6 °C)   SpO2: 98%   Weight: 105.7 kg (233 lb 0.4 oz)   Height: 5' 5" (1.651 m)   PainSc:   3     Objective:   Physical Exam  Vitals reviewed.   Constitutional:       Appearance: Normal appearance.   HENT:      Head: Normocephalic.   Eyes:      Pupils: Pupils are equal, round, and reactive to light.   Cardiovascular:      Rate and Rhythm: Normal rate.      Pulses: Normal pulses.      Heart sounds: Normal heart sounds.   Pulmonary:      Effort: Pulmonary effort is normal.      Breath sounds: Normal breath sounds.   Musculoskeletal:         General: Normal range of motion.   Skin:     General: Skin is warm.   Neurological:      General: No " focal deficit present.      Mental Status: She is alert. Mental status is at baseline.   Psychiatric:         Mood and Affect: Mood normal.     Assessment/Plan     Diane Garcia is a 67 y.o.female with:    Benign essential HTN  -     amLODIPine (NORVASC) 5 MG tablet; Take 1 tablet (5 mg total) by mouth once daily.  Dispense: 90 tablet; Refill: 3  - cont olmesartan and HCTZ    Aortic atherosclerosis    Mixed hyperlipidemia    Prediabetes  - metformin 500mg qday    Gastroesophageal reflux disease, unspecified whether esophagitis present    MELLISA (obstructive sleep apnea)    Primary osteoarthritis of both knees    Chronic pain of left knee    Chronic pain of both knees  - Pt has no active cardiac condition (ACS/USA, decompenstated CHF, significant arrhythmias or severe valvular disease) and can easily achieve 4 METS.  Pt does not require any further workup prior to knee replacement. These recommendations follow the most current Guideline on Perioperative Cardiovascular Evaluation and Management of Patients Undergoing Noncardiac Surgery released by the ACC/AHA. (JACC 2014.07.944).       Chronic conditions status updated as per HPI.  Other than changes above, cont current medications and maintain follow up with specialists.      Beverly Oakes MD  [facility]    There are no Patient Instructions on file for this visit.  All of your core healthy metrics are met.

## 2023-06-26 ENCOUNTER — TELEPHONE (OUTPATIENT)
Dept: PREADMISSION TESTING | Facility: HOSPITAL | Age: 68
End: 2023-06-26

## 2023-06-26 ENCOUNTER — PATIENT MESSAGE (OUTPATIENT)
Dept: SPORTS MEDICINE | Facility: CLINIC | Age: 68
End: 2023-06-26
Payer: MEDICARE

## 2023-06-26 ENCOUNTER — PATIENT MESSAGE (OUTPATIENT)
Dept: SURGERY | Facility: HOSPITAL | Age: 68
End: 2023-06-26
Payer: MEDICARE

## 2023-06-26 ENCOUNTER — TELEPHONE (OUTPATIENT)
Dept: OPHTHALMOLOGY | Facility: CLINIC | Age: 68
End: 2023-06-26
Payer: MEDICARE

## 2023-06-26 ENCOUNTER — PATIENT MESSAGE (OUTPATIENT)
Dept: ADMINISTRATIVE | Facility: OTHER | Age: 68
End: 2023-06-26
Payer: MEDICARE

## 2023-06-26 DIAGNOSIS — Z01.818 PRE-OP TESTING: Primary | ICD-10-CM

## 2023-06-26 DIAGNOSIS — M79.609 PAIN IN EXTREMITY, UNSPECIFIED EXTREMITY: ICD-10-CM

## 2023-06-26 NOTE — ANESTHESIA PAT ROS NOTE
06/26/2023  Diane Garcia is a 67 y.o., female.      Pre-op Assessment          Review of Systems         Anesthesia Assessment: Preoperative EQUATION    Planned Procedure: Procedure(s) (LRB):  ARTHROPLASTY, KNEE, TOTAL (Left)  Requested Anesthesia Type:General  Surgeon: NATALIO Greenwood MD  Service: Orthopedics  Known or anticipated Date of Surgery:7/19/2023    Surgeon notes: reviewed    Electronic QUestionnaire Assessment completed via nurse interview with patient.        Triage considerations:     The patient has no apparent active cardiac condition (No unstable coronary Syndrome such as severe unstable angina or recent [<1 month] myocardial infarction, decompensated CHF, severe valvular   disease or significant arrhythmia)    Previous anesthesia records:MAC   Patient reports no personal or family history of anesthesia complications.  2/2/23           EGD (ESOPHAGOGASTRODUODENOSCOPY) (Abdomen)   COLONOSCOPY (Abdomen)    Airway:  Mallampati: I / I  Mouth Opening: Normal  TM Distance: Normal  Tongue: Normal  Neck ROM: Normal ROM    Last PCP note: within 1 month , within [facility]    Dr. Oakes  Subspecialty notes: Ortho  Sleep Medicine  (PEDRO BURCIAGA)  Bariatrics      (Dr. Arboleda)  Gastro           (Dr. Grayson)    Other important co-morbidities: GERD, HLD, HTN, Obesity, and MELLISA, pre-DM      Tests already available:  Available tests,  outside [facility] , within [facility] .  3/9/23       A1c  CBC  CMP  TSH  Lipid panel  12/5/22     D-[facility]   Troponin T            (CE) EKG     CT Chest w/ CTA Cardiac  6/8/22       XR Thoracic spine            Instructions given. (See in Nurse's note)    Optimization:  Anesthesia Preop Clinic Assessment  Indicated Will schedule POC    Medical Opinion Indicated       Sub-specialist consult indicated:   [facility] Pre op Center NP       Plan:    Testing:  CBC, CMP, EKG, and PT/INR    Pre-anesthesia  visit       Visit focus: possible regional anesthesia and/or nerve block      Consultation: PC NP for medical and anesthesia optimization.     Patient  has previously scheduled Medical Appointment:    7/5 7/6    Navigation: Tests Scheduled.              Consults scheduled.             Results will be tracked by Preop Clinic.                  7/6/23           Patient is optimized     I spent a total of 37 minutes on the day of the visit.This includes face to face time and non-face to face time preparing to see the patient (eg, review of tests), obtaining and/or reviewing separately obtained history, documenting clinical information in the electronic or other health record, independently interpreting results and communicating results to the patient/family/caregiver, or care coordinator.      Kam Rodriguez, RITO  Perioperative Medicine  [facility]

## 2023-06-26 NOTE — TELEPHONE ENCOUNTER
----- Message from Gillian Yeager OD sent at 6/26/2023 10:19 AM CDT -----  Regarding: FW: Pt called to cancel/reschedule her 7/12/23 appt and would like a call back this morning    ----- Message -----  From: Martha Cueva  Sent: 6/26/2023   9:53 AM CDT  To: Gillian Yeager Staff  Subject: Pt called to cancel/reschedule her 7/12/23 a#    Appointment Access Request:    Pt called to cancel/reschedule her 7/12/23 appt and would like a call back this morning    Pt can be reached at [phone]

## 2023-07-04 NOTE — ASSESSMENT & PLAN NOTE
Current /80  today.    Taking: Norvasc, Benicar, HCTZ  Patient reports home BP readings of: 140/80    Lifestyle changes to reduce systolic BP:   exercise 30 minutes per day,  5 days per week or 150 minutes weekly; sodium reduction and avoidance of high salt foods such as processed meats, frozen meals and  fast foods.   Keeping a healthy weight/BMI can help with better BP control    BP acceptable for surgery. I recommend monitoring BP during perioperative period as uncontrolled pain can elevate blood pressure.

## 2023-07-04 NOTE — ASSESSMENT & PLAN NOTE
BMI 39.44    Patient would benefit from weight loss and has not set goals to achieve success.   Lifestyle changes should be made by eating healthy, exercising at least 150 minutes weekly, and avoiding sedentary behavior.

## 2023-07-04 NOTE — ASSESSMENT & PLAN NOTE
Currently being treated with NEXIUM  Encouraged to elevate HOB and avoid laying down for 2-3 hours after meals.   Weight loss encouraged as well as dietary changes such as reduction or avoidance of fatty foods, caffeine, spicy foods, and chocolate.    Smoking cessation was recommended as well as reduction of alcohol consumption.

## 2023-07-05 ENCOUNTER — HOSPITAL ENCOUNTER (OUTPATIENT)
Dept: CARDIOLOGY | Facility: CLINIC | Age: 68
Discharge: HOME | End: 2023-07-05
Payer: MEDICARE

## 2023-07-05 ENCOUNTER — OFFICE VISIT (OUTPATIENT)
Dept: INTERNAL MEDICINE | Facility: CLINIC | Age: 68
End: 2023-07-05
Payer: MEDICARE

## 2023-07-05 VITALS
DIASTOLIC BLOOD PRESSURE: 80 MMHG | WEIGHT: 237 LBS | OXYGEN SATURATION: 97 % | SYSTOLIC BLOOD PRESSURE: 150 MMHG | TEMPERATURE: 98 F | HEIGHT: 65 IN | HEART RATE: 68 BPM | BODY MASS INDEX: 39.49 KG/M2

## 2023-07-05 DIAGNOSIS — E78.2 MIXED HYPERLIPIDEMIA: ICD-10-CM

## 2023-07-05 DIAGNOSIS — G89.29 CHRONIC PAIN OF LEFT KNEE: ICD-10-CM

## 2023-07-05 DIAGNOSIS — Z01.818 PRE-OP TESTING: ICD-10-CM

## 2023-07-05 DIAGNOSIS — K21.9 GASTROESOPHAGEAL REFLUX DISEASE, UNSPECIFIED WHETHER ESOPHAGITIS PRESENT: ICD-10-CM

## 2023-07-05 DIAGNOSIS — E66.01 SEVERE OBESITY (BMI 35.0-39.9) WITH COMORBIDITY: ICD-10-CM

## 2023-07-05 DIAGNOSIS — M25.562 CHRONIC PAIN OF LEFT KNEE: ICD-10-CM

## 2023-07-05 DIAGNOSIS — G47.33 OSA (OBSTRUCTIVE SLEEP APNEA): ICD-10-CM

## 2023-07-05 DIAGNOSIS — I10 ESSENTIAL HYPERTENSION: ICD-10-CM

## 2023-07-05 DIAGNOSIS — R73.03 PREDIABETES: ICD-10-CM

## 2023-07-05 PROCEDURE — 99214 OFFICE O/P EST MOD 30 MIN: CPT | Mod: S$PBB,,, | Performed by: NURSE PRACTITIONER

## 2023-07-05 PROCEDURE — 99214 OFFICE O/P EST MOD 30 MIN: CPT | Mod: PBBFAC | Performed by: NURSE PRACTITIONER

## 2023-07-05 PROCEDURE — 93010 ELECTROCARDIOGRAM REPORT: CPT | Mod: S$PBB,,, | Performed by: INTERNAL MEDICINE

## 2023-07-05 PROCEDURE — 99999 PR PBB SHADOW E&M-EST. PATIENT-LVL IV: ICD-10-PCS | Mod: PBBFAC,,, | Performed by: NURSE PRACTITIONER

## 2023-07-05 PROCEDURE — 99999 PR PBB SHADOW E&M-EST. PATIENT-LVL IV: CPT | Mod: PBBFAC,,, | Performed by: NURSE PRACTITIONER

## 2023-07-05 PROCEDURE — 99214 PR OFFICE/OUTPT VISIT, EST, LEVL IV, 30-39 MIN: ICD-10-PCS | Mod: S$PBB,,, | Performed by: NURSE PRACTITIONER

## 2023-07-05 PROCEDURE — 93010 EKG 12-LEAD: ICD-10-PCS | Mod: S$PBB,,, | Performed by: INTERNAL MEDICINE

## 2023-07-05 PROCEDURE — 93005 ELECTROCARDIOGRAM TRACING: CPT | Mod: PBBFAC | Performed by: INTERNAL MEDICINE

## 2023-07-05 NOTE — HPI
This is a 67 y.o. female  who presents today for a preoperative evaluation in preparation for a Orthopedic  procedure.  Scheduled for  7/26/23  Surgery is indicated for  left knee replacement.   Patient is new to me.  Details of current problem: The duration of problem is   since 2019 when she had meniscus surgery  Reports symptoms of  pain, limping, stiffness   Aggravating factors include: prolonged walking    Relieving factors are      Treated with  Ibuprofen and Meloxicam   Reports pain: 5/10  The history has been obtained by speaking with the patient and reviewing the electronic medical record and/or outside health information. Significant health conditions for the perioperative period are discussed below in assessment and plan.     Patient reports current health status to be Good.  Denies any new symptoms before surgery.

## 2023-07-05 NOTE — OUTPATIENT SUBJECTIVE & OBJECTIVE
Outpatient Subjective & Objective      Chief Complaint: Preoperative evaulation, perioperative medical management, and complication reduction plan.     Functional Capacity:  Able to climb a flight of stairs without CP SOB or Syncope.  Able to meet 4 METs     Anesthesia issues: None    Difficulty mouth opening: none    Steroid use in the last 12 months:  steroid to right knee    Dental Issues: none    Family anesthesia difficulty: None     Family Hx of Thrombosis none    Past Medical History:   Diagnosis Date    Arthritis     GERD (gastroesophageal reflux disease)     Hyperlipidemia     Hypertension      Past Surgical History:   Procedure Laterality Date    COLONOSCOPY N/A 02/02/2023    Procedure: COLONOSCOPY;  Surgeon: Bob Grayson MD;  Location: [facility];  Service: Endoscopy;  Laterality: N/A;    ESOPHAGOGASTRODUODENOSCOPY N/A 02/02/2023    Procedure: EGD (ESOPHAGOGASTRODUODENOSCOPY);  Surgeon: Bob Grayson MD;  Location: [facility];  Service: Endoscopy;  Laterality: N/A;    HYSTERECTOMY      OOPHORECTOMY      mary jo placed Right     femur right mary jo    mary jo removed Right        Review of Systems   Constitutional:  Negative for chills, fatigue and fever.   HENT:  Negative for trouble swallowing and voice change.    Eyes:  Negative for photophobia and visual disturbance.        No acute visual changes   Respiratory:  Negative for apnea, cough, chest tightness, shortness of breath and wheezing.         Has MELLISA- wears CPAP at night   Cardiovascular:  Negative for chest pain, palpitations and leg swelling.   Gastrointestinal:  Negative for abdominal distention, abdominal pain, blood in stool, constipation, diarrhea, nausea and vomiting.        NO FLD, hepatitis, cirrhosis  No BRB or black tarry stool     Genitourinary:  Negative for difficulty urinating, dysuria, flank pain, frequency, hematuria and urgency.   Musculoskeletal:  Positive for arthralgias and gait problem. Negative for back pain, joint  "swelling, myalgias, neck pain and neck stiffness.   Neurological:  Negative for dizziness, tremors, seizures, syncope, weakness, light-headedness, numbness and headaches.   Psychiatric/Behavioral:  Negative for suicidal ideas.       VITALS  Visit Vitals  BP (!) 150/80 (BP Location: Right arm, Patient Position: Sitting)   Pulse 68   Temp 98.2 °F (36.8 °C) (Oral)   Ht 5' 5" (1.651 m)   Wt 107.5 kg (237 lb)   SpO2 97%   BMI 39.44 kg/m²      Physical Exam     Significant Labs:  Lab Results   Component Value Date    WBC 8.69 07/05/2023    HGB 12.8 07/05/2023    HCT 38.9 07/05/2023     07/05/2023    CHOL 154 03/09/2023    TRIG 156 (H) 03/09/2023    HDL 42 03/09/2023    ALT 14 07/05/2023    AST 10 07/05/2023     07/05/2023    K 3.9 07/05/2023     07/05/2023    CREATININE 0.6 07/05/2023    BUN 13 07/05/2023    CO2 25 07/05/2023    TSH 1.569 03/09/2023    INR 0.9 07/05/2023    HGBA1C 5.7 (H) 07/05/2023       Diagnostic Studies: No relevant studies.    EKG:   Results for orders placed or performed during the hospital encounter of 07/05/23   EKG 12-lead    Collection Time: 07/05/23  3:26 PM    Narrative    Test Reason : Z01.818,    Vent. Rate : 069 BPM     Atrial Rate : 069 BPM     P-R Int : 188 ms          QRS Dur : 108 ms      QT Int : 406 ms       P-R-T Axes : 070 008 040 degrees     QTc Int : 435 ms    Normal sinus rhythm  Normal ECG  No previous ECGs available  Confirmed by VIJI DURBIN MD ([id number]) on 7/5/2023 4:26:10 PM    Referred By: ZUHAIR VERNON           Confirmed By:VIJI DURBIN MD       2D ECHO:  TTE:  No results found for this or any previous visit.    MONICA:  No results found for this or any previous visit.     Imaging     Active Cardiac Conditions: None      Revised Cardiac Risk Index   High -Risk Surgery  Intraperitoneal; Intrathoracic; suprainguinal vascular Yes- + 1 No- 0   History of Ischemic Heart Disease   (Hx of MI/positive exercise test/current chest pain due to ischemia/use of " nitrate therapy/EKG with pathological Q waves) Yes- + 1 No- 0   History of CHF  (Pulmonary edema/bilateral rales or S3 gallop/PND/CXR showing pulmonary vascular redistribution) Yes- + 1 No- 0   History of CVA   (Prior stroke or TIA) Yes- + 1 No- 0   Pre-operative treatment with insulin Yes- + 1 No- 0   Pre-operative creatinine > 2mg/dl Yes- + 1 No- 0   Total:      Risk Status:  Estimated risk of cardiac complications after non-cardiac surgery using the Revised Cardiac Risk Index for Preoperative risk is 3.9 %      ARISCAT (Canet) risk index: Low: 1.6% risk of post-op pulmonary complications.    American Society of Anesthesiologists Physical Status classification (ASA): 3           No further cardiac workup needed prior to surgery.    Outpatient Subjective & Objective

## 2023-07-05 NOTE — PROGRESS NOTES
[facility]  Progress Note    Patient Name: Diane Garcia  MRN: [id number]  Date of Evaluation- 07/06/2023  PCP- Beverly Oakes MD    Future cases for Diane Garcia [[id number]]       Case ID Status Date Time Basilio Procedure Provider Location    [id number] [facility] 7/26/2023  1:11  ARTHROPLASTY, KNEE, TOTAL W Andrzej Greenwood MD [[id number]] [facility] OR            HPI:  This is a 67 y.o. female  who presents today for a preoperative evaluation in preparation for a Orthopedic  procedure.  Scheduled for  7/26/23  Surgery is indicated for  left knee replacement.   Patient is new to me.  Details of current problem: The duration of problem is   since 2019 when she had meniscus surgery  Reports symptoms of  pain, limping, stiffness   Aggravating factors include: prolonged walking    Relieving factors are      Treated with  Ibuprofen and Meloxicam   Reports pain: 5/10  The history has been obtained by speaking with the patient and reviewing the electronic medical record and/or outside health information. Significant health conditions for the perioperative period are discussed below in assessment and plan.     Patient reports current health status to be Good.  Denies any new symptoms before surgery.       Subjective/ Objective:     Chief Complaint: Preoperative evaulation, perioperative medical management, and complication reduction plan.     Functional Capacity:  Able to climb a flight of stairs without CP SOB or Syncope.  Able to meet 4 METs     Anesthesia issues: None    Difficulty mouth opening: none    Steroid use in the last 12 months:  steroid to right knee    Dental Issues: none    Family anesthesia difficulty: None     Family Hx of Thrombosis none    Past Medical History:   Diagnosis Date    Arthritis     GERD (gastroesophageal reflux disease)     Hyperlipidemia     Hypertension      Past Surgical History:   Procedure Laterality Date    COLONOSCOPY N/A 02/02/2023    Procedure: COLONOSCOPY;  Surgeon:  "Bob Grayson MD;  Location: [facility];  Service: Endoscopy;  Laterality: N/A;    ESOPHAGOGASTRODUODENOSCOPY N/A 02/02/2023    Procedure: EGD (ESOPHAGOGASTRODUODENOSCOPY);  Surgeon: Bob Grayson MD;  Location: [facility];  Service: Endoscopy;  Laterality: N/A;    HYSTERECTOMY      OOPHORECTOMY      mary jo placed Right     femur right mary jo    mary jo removed Right        Review of Systems   Constitutional:  Negative for chills, fatigue and fever.   HENT:  Negative for trouble swallowing and voice change.    Eyes:  Negative for photophobia and visual disturbance.        No acute visual changes   Respiratory:  Negative for apnea, cough, chest tightness, shortness of breath and wheezing.         Has MELLISA- wears CPAP at night   Cardiovascular:  Negative for chest pain, palpitations and leg swelling.   Gastrointestinal:  Negative for abdominal distention, abdominal pain, blood in stool, constipation, diarrhea, nausea and vomiting.        NO FLD, hepatitis, cirrhosis  No BRB or black tarry stool     Genitourinary:  Negative for difficulty urinating, dysuria, flank pain, frequency, hematuria and urgency.   Musculoskeletal:  Positive for arthralgias and gait problem. Negative for back pain, joint swelling, myalgias, neck pain and neck stiffness.   Neurological:  Negative for dizziness, tremors, seizures, syncope, weakness, light-headedness, numbness and headaches.   Psychiatric/Behavioral:  Negative for suicidal ideas.       VITALS  Visit Vitals  BP (!) 150/80 (BP Location: Right arm, Patient Position: Sitting)   Pulse 68   Temp 98.2 °F (36.8 °C) (Oral)   Ht 5' 5" (1.651 m)   Wt 107.5 kg (237 lb)   SpO2 97%   BMI 39.44 kg/m²      Physical Exam     Significant Labs:  Lab Results   Component Value Date    WBC 8.69 07/05/2023    HGB 12.8 07/05/2023    HCT 38.9 07/05/2023     07/05/2023    CHOL 154 03/09/2023    TRIG 156 (H) 03/09/2023    HDL 42 03/09/2023    ALT 14 07/05/2023    AST 10 07/05/2023     " 07/05/2023    K 3.9 07/05/2023     07/05/2023    CREATININE 0.6 07/05/2023    BUN 13 07/05/2023    CO2 25 07/05/2023    TSH 1.569 03/09/2023    INR 0.9 07/05/2023    HGBA1C 5.7 (H) 07/05/2023       Diagnostic Studies: No relevant studies.    EKG:   Results for orders placed or performed during the hospital encounter of 07/05/23   EKG 12-lead    Collection Time: 07/05/23  3:26 PM    Narrative    Test Reason : Z01.818,    Vent. Rate : 069 BPM     Atrial Rate : 069 BPM     P-R Int : 188 ms          QRS Dur : 108 ms      QT Int : 406 ms       P-R-T Axes : 070 008 040 degrees     QTc Int : 435 ms    Normal sinus rhythm  Normal ECG  No previous ECGs available  Confirmed by VIJI DURBIN MD ([id number]) on 7/5/2023 4:26:10 PM    Referred By: ZUHAIR VERNON           Confirmed By:VIJI DURBIN MD       2D ECHO:  TTE:  No results found for this or any previous visit.    MONICA:  No results found for this or any previous visit.     Imaging     Active Cardiac Conditions: None      Revised Cardiac Risk Index   High -Risk Surgery  Intraperitoneal; Intrathoracic; suprainguinal vascular Yes- + 1 No- 0   History of Ischemic Heart Disease   (Hx of MI/positive exercise test/current chest pain due to ischemia/use of nitrate therapy/EKG with pathological Q waves) Yes- + 1 No- 0   History of CHF  (Pulmonary edema/bilateral rales or S3 gallop/PND/CXR showing pulmonary vascular redistribution) Yes- + 1 No- 0   History of CVA   (Prior stroke or TIA) Yes- + 1 No- 0   Pre-operative treatment with insulin Yes- + 1 No- 0   Pre-operative creatinine > 2mg/dl Yes- + 1 No- 0   Total:      Risk Status:  Estimated risk of cardiac complications after non-cardiac surgery using the Revised Cardiac Risk Index for Preoperative risk is 3.9 %      ARISCAT (Canet) risk index: Low: 1.6% risk of post-op pulmonary complications.    American Society of Anesthesiologists Physical Status classification (ASA): 3           No further cardiac workup needed prior to  surgery.        Preoperative cardiac risk assessment-  The patient does not have any active cardiac conditions . Revised cardiac risk index predictors- ---.Functional capacity is more than 4 Mets. She will be undergoing a Orthopedic procedure that carries a Moderate Risk risk     The estimated risk of the rate of adverse cardiac outcomes  3.9    No further cardiac work up is indicated prior to proceeding with the surgery     Orders Placed This Encounter    Hemoglobin A1C       American Society of Anesthesiologists Physical status classification ( ASA ) class: 3     Postoperative pulmonary complication risk assessment: 1.6     ARISCAT ( Canet) risk index- risk class -  Low, if duration of surgery is under 3 hours, intermediate, if duration of surgery is over 3 hours     Assessment/Plan:     Essential hypertension  Current /80  today.    Taking: Norvasc, Benicar, HCTZ  Patient reports home BP readings of: 140/80    Lifestyle changes to reduce systolic BP:   exercise 30 minutes per day,  5 days per week or 150 minutes weekly; sodium reduction and avoidance of high salt foods such as processed meats, frozen meals and  fast foods.   Keeping a healthy weight/BMI can help with better BP control    BP acceptable for surgery. I recommend monitoring BP during perioperative period as uncontrolled pain can elevate blood pressure.           Mixed hyperlipidemia  Omega 3 Fatty acids, Mevacor    Prediabetes  Last A1c 6    Severe obesity (BMI 35.0-39.9) with comorbidity  BMI 39.44    Patient would benefit from weight loss and has not set goals to achieve success.   Lifestyle changes should be made by eating healthy, exercising at least 150 minutes weekly, and avoiding sedentary behavior.       Gastroesophageal reflux disease  Currently being treated with NEXIUM  Encouraged to elevate HOB and avoid laying down for 2-3 hours after meals.   Weight loss encouraged as well as dietary changes such as reduction or avoidance of fatty  foods, caffeine, spicy foods, and chocolate.    Smoking cessation was recommended as well as reduction of alcohol consumption.    MELLISA (obstructive sleep apnea)  This client has a possible diagnosis of obstructive sleep apnea (MELLISA)    Instructions were given to avoid the following: sleeping supine, weight gain ,alcoholic beverages , sedative medications, and CNS depressant use as these can all worsen MELLISA.    Untreated sleep apnea has been shown to increase daytime sleepiness, hypertension, heart disease and stroke which were discussed with the patient at this time    Please use caution with medications that induce respiratory depression in the perioperative period      Chronic pain of left knee  See HPI      Preventive perioperative care    Thromboembolic prophylaxis:  Her risk factors for thrombosis include morbid obesity, surgical procedure, age, and reduced mobility.I suggest  thromboembolic prophylaxis ( mechanical/pharmacological, weighing the risk benefits of pharmacological agent use considering martha procedural bleeding )  during the perioperative period.I suggested being active in the post operative period.      Postoperative pulmonary complication prophylaxis-Risk factors for post operative pulmonary complications include ASA class >2- I suggest incentive spirometry use, early ambulation, and pain control so as to avoid diaphragmatic splinting  Brush teeth twice per day, oral rinses, sleep with the head of the bed up 30 degrees     Renal complication prophylaxis-Risk factors for renal complications include age and hypertension . I suggest keeping her well hydrated and avoidance/ minimizing the use of  NSAID's,LINDQUIST 2 Inhibitors ,IV contrast if possible in the perioperative period.I suggested drinking 2 litre's of water a day      Surgical site Infection Prophylaxis-I  suggest appropriate antibiotic for Prophylaxis against Surgical site infections Shower with Hibiclens in the night before surgery and the  morning of surgery     In view of Spine procedure the patient  is at risk of postoperative urinary retention.  I suggest avoidance / minimizing the of  Benzodiazepines,Anticholinergic medication,antihistamines ( Benadryl) , if possible in the perioperative period. I suggest using the minimum possible use of opioids for the minimum period of time in the perioperative period. Benadryl avoidance suggested      This visit was focused on Preoperative evaluation, Perioperative Medical management, complication reduction plans. I suggest that the patient follows up with primary care or relevant sub specialists for ongoing health care.    I appreciate the opportunity to be involved in this patients care. Please feel free to contact me if there were any questions about this consultation.    Patient is optimized      I spent a total of 37 minutes on the day of the visit.This includes face to face time and non-face to face time preparing to see the patient (eg, review of tests), obtaining and/or reviewing separately obtained history, documenting clinical information in the electronic or other health record, independently interpreting results and communicating results to the patient/family/caregiver, or care coordinator.     Kam Rodriguez NP  Perioperative Medicine  [facility]   Pager [phone]

## 2023-07-05 NOTE — ASSESSMENT & PLAN NOTE
This client has a possible diagnosis of obstructive sleep apnea (MELLISA)    Instructions were given to avoid the following: sleeping supine, weight gain ,alcoholic beverages , sedative medications, and CNS depressant use as these can all worsen MELLISA.    Untreated sleep apnea has been shown to increase daytime sleepiness, hypertension, heart disease and stroke which were discussed with the patient at this time    Please use caution with medications that induce respiratory depression in the perioperative period

## 2023-07-06 ENCOUNTER — OFFICE VISIT (OUTPATIENT)
Dept: SPORTS MEDICINE | Facility: CLINIC | Age: 68
End: 2023-07-06
Payer: MEDICARE

## 2023-07-06 ENCOUNTER — HOSPITAL ENCOUNTER (OUTPATIENT)
Dept: RADIOLOGY | Facility: HOSPITAL | Age: 68
Discharge: HOME | End: 2023-07-06
Attending: PHYSICIAN ASSISTANT
Payer: MEDICARE

## 2023-07-06 VITALS
HEIGHT: 65 IN | BODY MASS INDEX: 38.93 KG/M2 | WEIGHT: 233.69 LBS | SYSTOLIC BLOOD PRESSURE: 146 MMHG | DIASTOLIC BLOOD PRESSURE: 81 MMHG | HEART RATE: 72 BPM

## 2023-07-06 DIAGNOSIS — M79.605 LEFT LEG PAIN: ICD-10-CM

## 2023-07-06 DIAGNOSIS — M17.12 PRIMARY OSTEOARTHRITIS OF LEFT KNEE: Primary | ICD-10-CM

## 2023-07-06 PROCEDURE — 99999 PR PBB SHADOW E&M-EST. PATIENT-LVL IV: CPT | Mod: PBBFAC,,, | Performed by: PHYSICIAN ASSISTANT

## 2023-07-06 PROCEDURE — 99999 PR PBB SHADOW E&M-EST. PATIENT-LVL IV: ICD-10-PCS | Mod: PBBFAC,,, | Performed by: PHYSICIAN ASSISTANT

## 2023-07-06 PROCEDURE — 99214 OFFICE O/P EST MOD 30 MIN: CPT | Mod: S$PBB,ICN,, | Performed by: PHYSICIAN ASSISTANT

## 2023-07-06 PROCEDURE — 77073 BONE LENGTH STUDIES: CPT | Mod: 26,,, | Performed by: RADIOLOGY

## 2023-07-06 PROCEDURE — 99214 PR OFFICE/OUTPT VISIT, EST, LEVL IV, 30-39 MIN: ICD-10-PCS | Mod: S$PBB,ICN,, | Performed by: PHYSICIAN ASSISTANT

## 2023-07-06 PROCEDURE — 77073 XR HIP TO ANKLE: ICD-10-PCS | Mod: 26,,, | Performed by: RADIOLOGY

## 2023-07-06 PROCEDURE — 77073 BONE LENGTH STUDIES: CPT | Mod: TC

## 2023-07-06 PROCEDURE — 99214 OFFICE O/P EST MOD 30 MIN: CPT | Mod: PBBFAC | Performed by: PHYSICIAN ASSISTANT

## 2023-07-06 RX ORDER — CEFAZOLIN SODIUM 2 G/50ML
2 SOLUTION INTRAVENOUS
Status: CANCELLED | OUTPATIENT
Start: 2023-07-06 | End: 2023-07-07

## 2023-07-06 RX ORDER — METHOCARBAMOL 500 MG/1
500 TABLET, FILM COATED ORAL 4 TIMES DAILY
Qty: 56 TABLET | Refills: 0 | Status: SHIPPED | OUTPATIENT
Start: 2023-07-06 | End: 2023-08-10 | Stop reason: SDUPTHER

## 2023-07-06 RX ORDER — OXYCODONE HYDROCHLORIDE 5 MG/1
5 TABLET ORAL EVERY 4 HOURS PRN
Qty: 28 TABLET | Refills: 0 | Status: SHIPPED | OUTPATIENT
Start: 2023-07-06 | End: 2023-08-01 | Stop reason: SDUPTHER

## 2023-07-06 RX ORDER — ACETAMINOPHEN 500 MG
1000 TABLET ORAL
Status: CANCELLED | OUTPATIENT
Start: 2023-07-06

## 2023-07-06 RX ORDER — PREGABALIN 75 MG/1
75 CAPSULE ORAL NIGHTLY
Status: CANCELLED | OUTPATIENT
Start: 2023-07-06

## 2023-07-06 RX ORDER — ASPIRIN 81 MG/1
81 TABLET ORAL 2 TIMES DAILY
Status: CANCELLED | OUTPATIENT
Start: 2023-07-06

## 2023-07-06 RX ORDER — MUPIROCIN 20 MG/G
1 OINTMENT TOPICAL 2 TIMES DAILY
Status: CANCELLED | OUTPATIENT
Start: 2023-07-06 | End: 2023-07-11

## 2023-07-06 RX ORDER — METHOCARBAMOL 750 MG/1
750 TABLET, FILM COATED ORAL 3 TIMES DAILY
Status: CANCELLED | OUTPATIENT
Start: 2023-07-06

## 2023-07-06 RX ORDER — TALC
6 POWDER (GRAM) TOPICAL NIGHTLY PRN
Status: CANCELLED | OUTPATIENT
Start: 2023-07-06

## 2023-07-06 RX ORDER — SODIUM CHLORIDE 0.9 % (FLUSH) 0.9 %
10 SYRINGE (ML) INJECTION
Status: CANCELLED | OUTPATIENT
Start: 2023-07-06

## 2023-07-06 RX ORDER — CELECOXIB 200 MG/1
200 CAPSULE ORAL 2 TIMES DAILY
Qty: 56 CAPSULE | Refills: 0 | Status: SHIPPED | OUTPATIENT
Start: 2023-07-06 | End: 2023-08-10 | Stop reason: SDUPTHER

## 2023-07-06 RX ORDER — PREGABALIN 75 MG/1
75 CAPSULE ORAL 2 TIMES DAILY
Qty: 28 CAPSULE | Refills: 0 | Status: SHIPPED | OUTPATIENT
Start: 2023-07-06 | End: 2023-08-10 | Stop reason: SDUPTHER

## 2023-07-06 RX ORDER — CEFAZOLIN SODIUM 2 G/50ML
2 SOLUTION INTRAVENOUS
Status: CANCELLED | OUTPATIENT
Start: 2023-07-06

## 2023-07-06 RX ORDER — ONDANSETRON 2 MG/ML
4 INJECTION INTRAMUSCULAR; INTRAVENOUS EVERY 8 HOURS PRN
Status: CANCELLED | OUTPATIENT
Start: 2023-07-06

## 2023-07-06 RX ORDER — BISACODYL 10 MG
10 SUPPOSITORY, RECTAL RECTAL EVERY 12 HOURS PRN
Status: CANCELLED | OUTPATIENT
Start: 2023-07-06

## 2023-07-06 RX ORDER — SODIUM CHLORIDE 9 MG/ML
INJECTION, SOLUTION INTRAVENOUS CONTINUOUS
Status: CANCELLED | OUTPATIENT
Start: 2023-07-06 | End: 2023-07-07

## 2023-07-06 RX ORDER — FAMOTIDINE 20 MG/1
20 TABLET, FILM COATED ORAL 2 TIMES DAILY
Status: CANCELLED | OUTPATIENT
Start: 2023-07-06

## 2023-07-06 RX ORDER — PREGABALIN 75 MG/1
75 CAPSULE ORAL
Status: CANCELLED | OUTPATIENT
Start: 2023-07-06

## 2023-07-06 RX ORDER — SODIUM CHLORIDE 9 MG/ML
INJECTION, SOLUTION INTRAVENOUS
Status: CANCELLED | OUTPATIENT
Start: 2023-07-06

## 2023-07-06 RX ORDER — NALOXONE HCL 0.4 MG/ML
0.02 VIAL (ML) INJECTION
Status: CANCELLED | OUTPATIENT
Start: 2023-07-06

## 2023-07-06 RX ORDER — CELECOXIB 200 MG/1
200 CAPSULE ORAL DAILY
Status: CANCELLED | OUTPATIENT
Start: 2023-07-06

## 2023-07-06 RX ORDER — MIDAZOLAM HYDROCHLORIDE 1 MG/ML
4 INJECTION INTRAMUSCULAR; INTRAVENOUS
Status: CANCELLED | OUTPATIENT
Start: 2023-07-06 | End: 2023-07-07

## 2023-07-06 RX ORDER — PROCHLORPERAZINE EDISYLATE 5 MG/ML
5 INJECTION INTRAMUSCULAR; INTRAVENOUS EVERY 6 HOURS PRN
Status: CANCELLED | OUTPATIENT
Start: 2023-07-06

## 2023-07-06 RX ORDER — ASPIRIN 81 MG/1
81 TABLET ORAL DAILY
Qty: 42 TABLET | Refills: 0 | Status: SHIPPED | OUTPATIENT
Start: 2023-07-06 | End: 2023-11-09

## 2023-07-06 RX ORDER — FENTANYL CITRATE 50 UG/ML
25 INJECTION, SOLUTION INTRAMUSCULAR; INTRAVENOUS EVERY 5 MIN PRN
Status: CANCELLED | OUTPATIENT
Start: 2023-07-06

## 2023-07-06 RX ORDER — ACETAMINOPHEN 500 MG
1000 TABLET ORAL EVERY 6 HOURS
Status: CANCELLED | OUTPATIENT
Start: 2023-07-06

## 2023-07-06 RX ORDER — AMOXICILLIN 250 MG
1 CAPSULE ORAL 2 TIMES DAILY
Status: CANCELLED | OUTPATIENT
Start: 2023-07-06

## 2023-07-06 RX ORDER — MORPHINE SULFATE 2 MG/ML
2 INJECTION, SOLUTION INTRAMUSCULAR; INTRAVENOUS
Status: CANCELLED | OUTPATIENT
Start: 2023-07-06

## 2023-07-06 RX ORDER — FENTANYL CITRATE 50 UG/ML
100 INJECTION, SOLUTION INTRAMUSCULAR; INTRAVENOUS
Status: CANCELLED | OUTPATIENT
Start: 2023-07-06 | End: 2023-07-07

## 2023-07-06 RX ORDER — OXYCODONE HYDROCHLORIDE 5 MG/1
10 TABLET ORAL
Status: CANCELLED | OUTPATIENT
Start: 2023-07-06

## 2023-07-06 RX ORDER — MUPIROCIN 20 MG/G
1 OINTMENT TOPICAL
Status: CANCELLED | OUTPATIENT
Start: 2023-07-06

## 2023-07-06 RX ORDER — POLYETHYLENE GLYCOL 3350 17 G/17G
17 POWDER, FOR SOLUTION ORAL DAILY
Status: CANCELLED | OUTPATIENT
Start: 2023-07-06

## 2023-07-06 RX ORDER — OXYCODONE HYDROCHLORIDE 5 MG/1
5 TABLET ORAL
Status: CANCELLED | OUTPATIENT
Start: 2023-07-06

## 2023-07-06 RX ORDER — LIDOCAINE HYDROCHLORIDE 10 MG/ML
1 INJECTION, SOLUTION EPIDURAL; INFILTRATION; INTRACAUDAL; PERINEURAL
Status: CANCELLED | OUTPATIENT
Start: 2023-07-06

## 2023-07-06 RX ORDER — CEFADROXIL 500 MG/1
1 CAPSULE ORAL EVERY 12 HOURS
Qty: 28 CAPSULE | Refills: 0 | Status: SHIPPED | OUTPATIENT
Start: 2023-07-06 | End: 2023-08-03

## 2023-07-06 RX ORDER — CELECOXIB 200 MG/1
400 CAPSULE ORAL ONCE
Status: CANCELLED | OUTPATIENT
Start: 2023-07-06 | End: 2023-07-06

## 2023-07-06 NOTE — H&P
Diane Garcia  is here for a completion of her perioperative paperwork. she  Is scheduled to undergo left total knee arthroplasty with the [employer] triathlon system.  Universal base plate.  Non X 3 poly.  Regular cement on 7/26/23.  She is a healthy individual and does need clearance for this procedure.     Patient cleared by Kam Rodriguez NP in preop center.    Patient is staying OVERNIGHT.    Risks, indications and benefits of the surgical procedure were discussed with the patient. All questions with regard to surgery, rehab, expected return to functional activities, activities of daily living and recreational endeavors were answered to her satisfaction.    Discussed COVID-19 with the patient, they are aware of our current policies and procedures, were given the option of delaying surgery, and they elect to proceed.    Patient was informed and understands the risks of surgery are greater for patients with a current condition or hx of heart disease, obesity, clotting disorders, recurrent infections, steroid use, current or past smoking, and factors such as sedentary lifestyle and noncompliance with medications, therapy or f/u. The degree of the increased risk is hard to estimate w/ any degree of precision.    Once no other questions were asked, a brief history and physical exam was then performed.    PAST MEDICAL HISTORY:   Past Medical History:   Diagnosis Date    Arthritis     GERD (gastroesophageal reflux disease)     Hyperlipidemia     Hypertension      PAST SURGICAL HISTORY:   Past Surgical History:   Procedure Laterality Date    COLONOSCOPY N/A 02/02/2023    Procedure: COLONOSCOPY;  Surgeon: Bob Grayson MD;  Location: [facility];  Service: Endoscopy;  Laterality: N/A;    ESOPHAGOGASTRODUODENOSCOPY N/A 02/02/2023    Procedure: EGD (ESOPHAGOGASTRODUODENOSCOPY);  Surgeon: Bob Grayson MD;  Location: [facility];  Service: Endoscopy;  Laterality: N/A;    HYSTERECTOMY      OOPHORECTOMY       amry jo placed Right     femur right mary jo    mary jo removed Right      FAMILY HISTORY:   Family History   Problem Relation Age of Onset    Cancer Mother         Lung    COPD Father     No Known Problems Sister     Melanoma Neg Hx      SOCIAL HISTORY:   Social History     Socioeconomic History    Marital status:     Number of children: 1   Tobacco Use    Smoking status: Former     Packs/day: 2.00     Years: 25.00     Pack years: 50.00     Types: Cigarettes     Quit date: 2000     Years since quittin.6    Smokeless tobacco: Never   Substance and Sexual Activity    Alcohol use: Yes     Comment: A few drinks a week    Drug use: Never     Comment: last use >30 yrs ago     Sexual activity: Yes     Partners: Male   Social History Narrative    Stairs- 6 to enter       MEDICATIONS:   Current Outpatient Medications:     amLODIPine (NORVASC) 5 MG tablet, Take 1 tablet (5 mg total) by mouth once daily. (Patient not taking: Reported on 2023), Disp: 90 tablet, Rfl: 3    biotin 1 mg Cap, Take by mouth., Disp: , Rfl:     cartilage/collagen/bor/hyalur (JOINT HEALTH ORAL), Take by mouth., Disp: , Rfl:     esomeprazole (NEXIUM) 20 MG capsule, Take 20 mg by mouth before breakfast., Disp: , Rfl:     hydroCHLOROthiazide (HYDRODIURIL) 25 MG tablet, TAKE 1 TABLET(25 MG) BY MOUTH EVERY DAY, Disp: 90 tablet, Rfl: 3    ibuprofen (ADVIL,MOTRIN) 800 MG tablet, TAKE 1 TABLET(800 MG) BY MOUTH EVERY 6 HOURS AS NEEDED FOR PAIN, Disp: 60 tablet, Rfl: 1    lovastatin (MEVACOR) 40 MG tablet, TAKE 1 TABLET(40 MG) BY MOUTH EVERY EVENING, Disp: 90 tablet, Rfl: 3    meloxicam (MOBIC) 15 MG tablet, Take 15 mg by mouth., Disp: , Rfl:     metFORMIN (GLUCOPHAGE-XR) 500 MG ER 24hr tablet, Take 1 tablet (500 mg total) by mouth daily with breakfast., Disp: 90 tablet, Rfl: 0    olmesartan (BENICAR) 40 MG tablet, TAKE 1 TABLET(40 MG) BY MOUTH EVERY DAY, Disp: 90 tablet, Rfl: 3    omega-3 fatty acids/fish oil (FISH OIL-OMEGA-3 FATTY ACIDS) 300-1,000  mg capsule, Take by mouth once daily., Disp: , Rfl:     TURMERIC ORAL, Take by mouth., Disp: , Rfl:   ALLERGIES:   Review of patient's allergies indicates:   Allergen Reactions    Penicillins      nausea       Review of Systems   Constitution: Negative. Negative for chills, fever and night sweats.   HENT: Negative for congestion and headaches.    Eyes: Negative for blurred vision, left vision loss and right vision loss.   Cardiovascular: Negative for chest pain and syncope.   Respiratory: Negative for cough and shortness of breath.    Endocrine: Negative for polydipsia, polyphagia and polyuria.   Hematologic/Lymphatic: Negative for bleeding problem. Does not bruise/bleed easily.   Skin: Negative for dry skin, itching and rash.   Musculoskeletal: Negative for falls and muscle weakness.   Gastrointestinal: Negative for abdominal pain and bowel incontinence.   Genitourinary: Negative for bladder incontinence and nocturia.   Neurological: Negative for disturbances in coordination, loss of balance and seizures.   Psychiatric/Behavioral: Negative for depression. The patient does not have insomnia.    Allergic/Immunologic: Negative for hives and persistent infections.     PHYSICAL EXAM:  GEN: A&Ox3, WD WN NAD  HEENT: WNL  CHEST: CTAB, no W/R/R  HEART: RRR, no M/R/G   ABD: Soft, NT ND, BS x4 QUADS  MS: Refer to previous note for detailed MS exam  NEURO: CN II-XII intact       The surgical consent was then reviewed with the patient, who agreed with all the contents of the consent form and it was signed.     PHYSICAL THERAPY:  She was also instructed regarding physical therapy and will begin on POD#1-3. She is doing physical therapy at [facility].    POST OP CARE: Instructions were reviewed including care of the wound and dressing after surgery and when she can shower.     PAIN MANAGEMENT: Diane Garcia was instructed regarding the Polar ice unit that will be in place after surgery and her  postoperative pain medications.     MEDICATION:  Roxicodone 5 mg 1-2 q 4 hours PRN for pain  Zofran 4 mg q 8 hours PRN for nausea and vomiting.  Aspirin 81mg BID x 6 weeks for DVT prophylaxis starting on the evening after surgery.  Cefadroxil x7 days post op for infection prophylaxis  Robaxin 500mg qid as needed for muscle spasms x 2 weeks  Lyrica 75mg BID x 2 weeks post op  Celebrex 200mg BID x 4 weeks post op    Post op meds to be delivered bedside prior to discharge. Deliver to family if patient is in surgery at 5pm.     Patient was instructed to purchase and take Colace to counter possible GI side effects of taking opiates.     DVT prophylaxis was discussed with the patient today including risk factors for developing DVTs and history of DVTs. The patient was asked if any specific recommendations were given from the doctor/s that did pre-operative surgical clearance.      If the patient was previously taking 81mg baby aspirin, they were told to not take additional baby aspirin, using the above stated aspirin and to restart the 81mg aspirin daily after completion of the aspirin dose.      Patient was also told to buy over the counter Prilosec medication and take it once daily for GI protection as long as they are taking NSAIDs or Aspirin.     The patient was told that narcotic pain medications may make them drowsy and instructions were given to not sign legal documents, drive or operate heavy machinery, cars, or equipment while under the influence of narcotic medications.     As there were no other questions to be asked, she was given my business card along with Dr. Greenwood's business card if she has any questions or concerns prior to surgery or in the postop period.

## 2023-07-06 NOTE — PROGRESS NOTES
Diane Garcia is a 67 y.o. year old here today for pre surgery optimization visit  in preparation for a Left total knee arthroplasty to be performed by Dr. Greenwood on 7/26/23.  she was last seen and treated in the clinic on 6/15/2023. she will be medically optimized by the pre op center. There has been no significant change in medical status since last visit. No fever, chills, malaise, or unexplained weight change.      Allergies, Medications, past medical and surgical history reviewed.    Focused examination performed.    Patient declined to see surgeon today. All questions answered. Patient encouraged to call with questions. Contact information given.     Pre, martha, and post operative procedures and expectations discussed. Goals of successful surgery reviewed and include manageable pain levels, surgical site free of infection, medication management, and ambulation with PT/OT assistance. Healthy weight management discussed with patient and caregiver who were receptive to eduction of healthy diet and activity. No other necessary lifestyle changes identified. Educated patient about signs and symptoms of infection, medication management, anticoagulation therapy, risk of tobacco and alcohol use, and self-care to promote healing. Surgical guide given for future reference. Hibiclens given to patient with instructions. All questions were answered.     Diane Garcia verbalized an understanding to the education and goals. Patient has displayed readiness to engage in care and is ready to proceed with surgery.  Patient reports her son Home is able and ready to provide assistance at home after discharge.    Surgical and blood consents signed.    Diane Garcia will contact us if there are any questions, concerns, or changes in medical status prior to surgery.       Joint class: 6/20/23    Patient has discussed discharge planning with surgeon. Patient will be discharged to home following surgery.   patient will be  scheduled with [facility].    50 minutes of time was spent on patient education, review of records, templating, H&P, , appointment scheduling and optimizing patient for surgery.

## 2023-07-06 NOTE — H&P (VIEW-ONLY)
Diane Garcia  is here for a completion of her perioperative paperwork. she  Is scheduled to undergo left total knee arthroplasty with the [place] triathlon system.  Universal base plate.  Non X 3 poly.  Regular cement on 7/26/23.  She is a healthy individual and does need clearance for this procedure.     Patient cleared by Kam Rodriguez NP in preop center.    Patient is staying OVERNIGHT.    Risks, indications and benefits of the surgical procedure were discussed with the patient. All questions with regard to surgery, rehab, expected return to functional activities, activities of daily living and recreational endeavors were answered to her satisfaction.    Discussed COVID-19 with the patient, they are aware of our current policies and procedures, were given the option of delaying surgery, and they elect to proceed.    Patient was informed and understands the risks of surgery are greater for patients with a current condition or hx of heart disease, obesity, clotting disorders, recurrent infections, steroid use, current or past smoking, and factors such as sedentary lifestyle and noncompliance with medications, therapy or f/u. The degree of the increased risk is hard to estimate w/ any degree of precision.    Once no other questions were asked, a brief history and physical exam was then performed.    PAST MEDICAL HISTORY:   Past Medical History:   Diagnosis Date    Arthritis     GERD (gastroesophageal reflux disease)     Hyperlipidemia     Hypertension      PAST SURGICAL HISTORY:   Past Surgical History:   Procedure Laterality Date    COLONOSCOPY N/A 02/02/2023    Procedure: COLONOSCOPY;  Surgeon: Bob Grayson MD;  Location: [facility];  Service: Endoscopy;  Laterality: N/A;    ESOPHAGOGASTRODUODENOSCOPY N/A 02/02/2023    Procedure: EGD (ESOPHAGOGASTRODUODENOSCOPY);  Surgeon: Bob Grayson MD;  Location: [facility];  Service: Endoscopy;  Laterality: N/A;    HYSTERECTOMY      OOPHORECTOMY       mary jo placed Right     femur right mary jo    mary jo removed Right      FAMILY HISTORY:   Family History   Problem Relation Age of Onset    Cancer Mother         Lung    COPD Father     No Known Problems Sister     Melanoma Neg Hx      SOCIAL HISTORY:   Social History     Socioeconomic History    Marital status:     Number of children: 1   Tobacco Use    Smoking status: Former     Packs/day: 2.00     Years: 25.00     Pack years: 50.00     Types: Cigarettes     Quit date: 2000     Years since quittin.6    Smokeless tobacco: Never   Substance and Sexual Activity    Alcohol use: Yes     Comment: A few drinks a week    Drug use: Never     Comment: last use >30 yrs ago     Sexual activity: Yes     Partners: Male   Social History Narrative    Stairs- 6 to enter       MEDICATIONS:   Current Outpatient Medications:     amLODIPine (NORVASC) 5 MG tablet, Take 1 tablet (5 mg total) by mouth once daily. (Patient not taking: Reported on 2023), Disp: 90 tablet, Rfl: 3    biotin 1 mg Cap, Take by mouth., Disp: , Rfl:     cartilage/collagen/bor/hyalur (JOINT HEALTH ORAL), Take by mouth., Disp: , Rfl:     esomeprazole (NEXIUM) 20 MG capsule, Take 20 mg by mouth before breakfast., Disp: , Rfl:     hydroCHLOROthiazide (HYDRODIURIL) 25 MG tablet, TAKE 1 TABLET(25 MG) BY MOUTH EVERY DAY, Disp: 90 tablet, Rfl: 3    ibuprofen (ADVIL,MOTRIN) 800 MG tablet, TAKE 1 TABLET(800 MG) BY MOUTH EVERY 6 HOURS AS NEEDED FOR PAIN, Disp: 60 tablet, Rfl: 1    lovastatin (MEVACOR) 40 MG tablet, TAKE 1 TABLET(40 MG) BY MOUTH EVERY EVENING, Disp: 90 tablet, Rfl: 3    meloxicam (MOBIC) 15 MG tablet, Take 15 mg by mouth., Disp: , Rfl:     metFORMIN (GLUCOPHAGE-XR) 500 MG ER 24hr tablet, Take 1 tablet (500 mg total) by mouth daily with breakfast., Disp: 90 tablet, Rfl: 0    olmesartan (BENICAR) 40 MG tablet, TAKE 1 TABLET(40 MG) BY MOUTH EVERY DAY, Disp: 90 tablet, Rfl: 3    omega-3 fatty acids/fish oil (FISH OIL-OMEGA-3 FATTY ACIDS) 300-1,000  mg capsule, Take by mouth once daily., Disp: , Rfl:     TURMERIC ORAL, Take by mouth., Disp: , Rfl:   ALLERGIES:   Review of patient's allergies indicates:   Allergen Reactions    Penicillins      nausea       Review of Systems   Constitution: Negative. Negative for chills, fever and night sweats.   HENT: Negative for congestion and headaches.    Eyes: Negative for blurred vision, left vision loss and right vision loss.   Cardiovascular: Negative for chest pain and syncope.   Respiratory: Negative for cough and shortness of breath.    Endocrine: Negative for polydipsia, polyphagia and polyuria.   Hematologic/Lymphatic: Negative for bleeding problem. Does not bruise/bleed easily.   Skin: Negative for dry skin, itching and rash.   Musculoskeletal: Negative for falls and muscle weakness.   Gastrointestinal: Negative for abdominal pain and bowel incontinence.   Genitourinary: Negative for bladder incontinence and nocturia.   Neurological: Negative for disturbances in coordination, loss of balance and seizures.   Psychiatric/Behavioral: Negative for depression. The patient does not have insomnia.    Allergic/Immunologic: Negative for hives and persistent infections.     PHYSICAL EXAM:  GEN: A&Ox3, WD WN NAD  HEENT: WNL  CHEST: CTAB, no W/R/R  HEART: RRR, no M/R/G   ABD: Soft, NT ND, BS x4 QUADS  MS: Refer to previous note for detailed MS exam  NEURO: CN II-XII intact       The surgical consent was then reviewed with the patient, who agreed with all the contents of the consent form and it was signed.     PHYSICAL THERAPY:  She was also instructed regarding physical therapy and will begin on POD#1-3. She is doing physical therapy at [facility].    POST OP CARE: Instructions were reviewed including care of the wound and dressing after surgery and when she can shower.     PAIN MANAGEMENT: Diane Garcia was instructed regarding the Polar ice unit that will be in place after surgery and her  postoperative pain medications.     MEDICATION:  Roxicodone 5 mg 1-2 q 4 hours PRN for pain  Zofran 4 mg q 8 hours PRN for nausea and vomiting.  Aspirin 81mg BID x 6 weeks for DVT prophylaxis starting on the evening after surgery.  Cefadroxil x7 days post op for infection prophylaxis  Robaxin 500mg qid as needed for muscle spasms x 2 weeks  Lyrica 75mg BID x 2 weeks post op  Celebrex 200mg BID x 4 weeks post op    Post op meds to be delivered bedside prior to discharge. Deliver to family if patient is in surgery at 5pm.     Patient was instructed to purchase and take Colace to counter possible GI side effects of taking opiates.     DVT prophylaxis was discussed with the patient today including risk factors for developing DVTs and history of DVTs. The patient was asked if any specific recommendations were given from the doctor/s that did pre-operative surgical clearance.      If the patient was previously taking 81mg baby aspirin, they were told to not take additional baby aspirin, using the above stated aspirin and to restart the 81mg aspirin daily after completion of the aspirin dose.      Patient was also told to buy over the counter Prilosec medication and take it once daily for GI protection as long as they are taking NSAIDs or Aspirin.     The patient was told that narcotic pain medications may make them drowsy and instructions were given to not sign legal documents, drive or operate heavy machinery, cars, or equipment while under the influence of narcotic medications.     As there were no other questions to be asked, she was given my business card along with Dr. Greenwood's business card if she has any questions or concerns prior to surgery or in the postop period.

## 2023-07-17 ENCOUNTER — TELEPHONE (OUTPATIENT)
Dept: PRIMARY CARE CLINIC | Facility: CLINIC | Age: 68
End: 2023-07-17
Payer: MEDICARE

## 2023-07-17 ENCOUNTER — HOSPITAL ENCOUNTER (OUTPATIENT)
Dept: RADIOLOGY | Facility: HOSPITAL | Age: 68
Discharge: HOME | End: 2023-07-17
Attending: NURSE PRACTITIONER
Payer: MEDICARE

## 2023-07-17 ENCOUNTER — OFFICE VISIT (OUTPATIENT)
Dept: ORTHOPEDICS | Facility: CLINIC | Age: 68
End: 2023-07-17
Payer: MEDICARE

## 2023-07-17 VITALS — WEIGHT: 233.69 LBS | HEIGHT: 65 IN | BODY MASS INDEX: 38.93 KG/M2

## 2023-07-17 DIAGNOSIS — R52 PAIN: Primary | ICD-10-CM

## 2023-07-17 DIAGNOSIS — M79.671 RIGHT FOOT PAIN: Primary | ICD-10-CM

## 2023-07-17 DIAGNOSIS — R52 PAIN: ICD-10-CM

## 2023-07-17 PROCEDURE — 73630 X-RAY EXAM OF FOOT: CPT | Mod: 26,RT,, | Performed by: RADIOLOGY

## 2023-07-17 PROCEDURE — 99213 PR OFFICE/OUTPT VISIT, EST, LEVL III, 20-29 MIN: ICD-10-PCS | Mod: S$PBB,,, | Performed by: NURSE PRACTITIONER

## 2023-07-17 PROCEDURE — 73630 X-RAY EXAM OF FOOT: CPT | Mod: TC,RT

## 2023-07-17 PROCEDURE — 73630 XR FOOT COMPLETE 3 VIEW RIGHT: ICD-10-PCS | Mod: 26,RT,, | Performed by: RADIOLOGY

## 2023-07-17 PROCEDURE — 99213 OFFICE O/P EST LOW 20 MIN: CPT | Mod: PBBFAC | Performed by: NURSE PRACTITIONER

## 2023-07-17 PROCEDURE — 99999 PR PBB SHADOW E&M-EST. PATIENT-LVL III: ICD-10-PCS | Mod: PBBFAC,,, | Performed by: NURSE PRACTITIONER

## 2023-07-17 PROCEDURE — 99999 PR PBB SHADOW E&M-EST. PATIENT-LVL III: CPT | Mod: PBBFAC,,, | Performed by: NURSE PRACTITIONER

## 2023-07-17 PROCEDURE — 99213 OFFICE O/P EST LOW 20 MIN: CPT | Mod: S$PBB,,, | Performed by: NURSE PRACTITIONER

## 2023-07-17 RX ORDER — NEBIVOLOL 5 MG/1
5 TABLET ORAL DAILY
Qty: 90 TABLET | Refills: 1 | Status: SHIPPED | OUTPATIENT
Start: 2023-07-17 | End: 2023-11-09

## 2023-07-17 NOTE — TELEPHONE ENCOUNTER
Spoke With Pt stop taken Amlodipine only after  2 days   Pt said swelling of legs  Pt want to take something different

## 2023-07-17 NOTE — TELEPHONE ENCOUNTER
----- Message from Nora Carr sent at 7/17/2023  8:41 AM CDT -----  Contact: [phone]  Ms Garcia is calling in regards to Rx amLODIPine (NORVASC) 5 MG tablet    It is not working for her, lower extremity swelling when taking, needs a counteractive med to help with swelling or a different medication altogether       Please call and advise @ # [phone]

## 2023-07-24 ENCOUNTER — TELEPHONE (OUTPATIENT)
Dept: PHARMACY | Facility: CLINIC | Age: 68
End: 2023-07-24
Payer: MEDICARE

## 2023-07-25 ENCOUNTER — ANESTHESIA EVENT (OUTPATIENT)
Dept: SURGERY | Facility: HOSPITAL | Age: 68
End: 2023-07-25
Payer: MEDICARE

## 2023-07-25 ENCOUNTER — TELEPHONE (OUTPATIENT)
Dept: SPORTS MEDICINE | Facility: CLINIC | Age: 68
End: 2023-07-25
Payer: MEDICARE

## 2023-07-25 NOTE — TELEPHONE ENCOUNTER
Spoke to patient. Confirmed that medication will be delivered bedside and patient is staying the night.     Malina Grady ATC, OTC  Sports Medicine Assistant - Dr. Andrzej Greenwood   [facility]      ----- Message from Patt Sutherland sent at 7/25/2023 12:45 PM CDT -----  Regarding: pt advice  Contact: [phone]  Pt is calling to speak with the nurse and does the pt suppose to fill the medication before or after procedure.

## 2023-07-26 ENCOUNTER — ANESTHESIA (OUTPATIENT)
Dept: SURGERY | Facility: HOSPITAL | Age: 68
End: 2023-07-26
Payer: MEDICARE

## 2023-07-26 ENCOUNTER — HOSPITAL ENCOUNTER (OUTPATIENT)
Facility: HOSPITAL | Age: 68
Discharge: HOME | End: 2023-07-27
Attending: ORTHOPAEDIC SURGERY | Admitting: ORTHOPAEDIC SURGERY
Payer: MEDICARE

## 2023-07-26 DIAGNOSIS — M17.12 PRIMARY OSTEOARTHRITIS OF LEFT KNEE: ICD-10-CM

## 2023-07-26 LAB
POCT GLUCOSE: 100 MG/DL (ref 70–110)
POCT GLUCOSE: 121 MG/DL (ref 70–110)
POCT GLUCOSE: 171 MG/DL (ref 70–110)
POCT GLUCOSE: 185 MG/DL (ref 70–110)

## 2023-07-26 PROCEDURE — 64448 NJX AA&/STRD FEM NRV NFS IMG: CPT | Performed by: STUDENT IN AN ORGANIZED HEALTH CARE EDUCATION/TRAINING PROGRAM

## 2023-07-26 PROCEDURE — 25000003 PHARM REV CODE 250: Performed by: PHYSICIAN ASSISTANT

## 2023-07-26 PROCEDURE — 63600175 PHARM REV CODE 636 W HCPCS: Performed by: NURSE ANESTHETIST, CERTIFIED REGISTERED

## 2023-07-26 PROCEDURE — 64448 LEFT ADDUCTOR CATHETER: ICD-10-PCS | Mod: 59,LT,, | Performed by: ANESTHESIOLOGY

## 2023-07-26 PROCEDURE — 94799 UNLISTED PULMONARY SVC/PX: CPT

## 2023-07-26 PROCEDURE — 63600175 PHARM REV CODE 636 W HCPCS: Performed by: ANESTHESIOLOGY

## 2023-07-26 PROCEDURE — 27201423 OPTIME MED/SURG SUP & DEVICES STERILE SUPPLY: Performed by: ORTHOPAEDIC SURGERY

## 2023-07-26 PROCEDURE — 25000003 PHARM REV CODE 250: Performed by: NURSE ANESTHETIST, CERTIFIED REGISTERED

## 2023-07-26 PROCEDURE — 27447 TOTAL KNEE ARTHROPLASTY: CPT | Mod: LT,GC,, | Performed by: ORTHOPAEDIC SURGERY

## 2023-07-26 PROCEDURE — 82962 GLUCOSE BLOOD TEST: CPT | Mod: 91 | Performed by: ORTHOPAEDIC SURGERY

## 2023-07-26 PROCEDURE — C1713 ANCHOR/SCREW BN/BN,TIS/BN: HCPCS | Performed by: ORTHOPAEDIC SURGERY

## 2023-07-26 PROCEDURE — 25000003 PHARM REV CODE 250: Performed by: SURGERY

## 2023-07-26 PROCEDURE — C1776 JOINT DEVICE (IMPLANTABLE): HCPCS | Performed by: ORTHOPAEDIC SURGERY

## 2023-07-26 PROCEDURE — 27447 PR TOTAL KNEE ARTHROPLASTY: ICD-10-PCS | Mod: LT,GC,, | Performed by: ORTHOPAEDIC SURGERY

## 2023-07-26 PROCEDURE — D9220A PRA ANESTHESIA: Mod: CRNA,,, | Performed by: NURSE ANESTHETIST, CERTIFIED REGISTERED

## 2023-07-26 PROCEDURE — C1751 CATH, INF, PER/CENT/MIDLINE: HCPCS | Performed by: SURGERY

## 2023-07-26 PROCEDURE — 36000710: Performed by: ORTHOPAEDIC SURGERY

## 2023-07-26 PROCEDURE — 63600175 PHARM REV CODE 636 W HCPCS: Performed by: PHYSICIAN ASSISTANT

## 2023-07-26 PROCEDURE — D9220A PRA ANESTHESIA: ICD-10-PCS | Mod: CRNA,,, | Performed by: NURSE ANESTHETIST, CERTIFIED REGISTERED

## 2023-07-26 PROCEDURE — 63600175 PHARM REV CODE 636 W HCPCS: Performed by: ORTHOPAEDIC SURGERY

## 2023-07-26 PROCEDURE — 94660 CPAP INITIATION&MGMT: CPT

## 2023-07-26 PROCEDURE — 71000039 HC RECOVERY, EACH ADD'L HOUR: Performed by: ORTHOPAEDIC SURGERY

## 2023-07-26 PROCEDURE — 27200688 HC TRAY, SPINAL-HYPER/ ISOBARIC: Performed by: SURGERY

## 2023-07-26 PROCEDURE — 71000033 HC RECOVERY, INTIAL HOUR: Performed by: ORTHOPAEDIC SURGERY

## 2023-07-26 PROCEDURE — 94761 N-INVAS EAR/PLS OXIMETRY MLT: CPT

## 2023-07-26 PROCEDURE — 36000711: Performed by: ORTHOPAEDIC SURGERY

## 2023-07-26 PROCEDURE — D9220A PRA ANESTHESIA: ICD-10-PCS | Mod: ANES,,, | Performed by: SURGERY

## 2023-07-26 PROCEDURE — 37000009 HC ANESTHESIA EA ADD 15 MINS: Performed by: ORTHOPAEDIC SURGERY

## 2023-07-26 PROCEDURE — 63600175 PHARM REV CODE 636 W HCPCS: Performed by: SURGERY

## 2023-07-26 PROCEDURE — 27000190 HC CPAP FULL FACE MASK W/VALVE

## 2023-07-26 PROCEDURE — D9220A PRA ANESTHESIA: Mod: ANES,,, | Performed by: SURGERY

## 2023-07-26 PROCEDURE — 82962 GLUCOSE BLOOD TEST: CPT | Performed by: ORTHOPAEDIC SURGERY

## 2023-07-26 PROCEDURE — 37000008 HC ANESTHESIA 1ST 15 MINUTES: Performed by: ORTHOPAEDIC SURGERY

## 2023-07-26 PROCEDURE — 64448 NJX AA&/STRD FEM NRV NFS IMG: CPT | Mod: 59,LT,, | Performed by: ANESTHESIOLOGY

## 2023-07-26 PROCEDURE — 99900035 HC TECH TIME PER 15 MIN (STAT)

## 2023-07-26 DEVICE — PATELLA TRIATHLON 29X8 SYMTRC: Type: IMPLANTABLE DEVICE | Site: KNEE | Status: FUNCTIONAL

## 2023-07-26 DEVICE — IMPLANTABLE DEVICE: Type: IMPLANTABLE DEVICE | Site: KNEE | Status: FUNCTIONAL

## 2023-07-26 DEVICE — BASEPLATE SZ 3 TRI TS IMPLANT: Type: IMPLANTABLE DEVICE | Site: KNEE | Status: FUNCTIONAL

## 2023-07-26 DEVICE — STEM KNEE TRITHLON CEM 12X50: Type: IMPLANTABLE DEVICE | Site: KNEE | Status: FUNCTIONAL

## 2023-07-26 DEVICE — CEMENT BONE SIMPLEX HV RADPQ: Type: IMPLANTABLE DEVICE | Site: KNEE | Status: FUNCTIONAL

## 2023-07-26 DEVICE — COMP FEM POST STAB CEM SZ 2 LF: Type: IMPLANTABLE DEVICE | Site: KNEE | Status: FUNCTIONAL

## 2023-07-26 RX ORDER — NALOXONE HCL 0.4 MG/ML
0.02 VIAL (ML) INJECTION
Status: DISCONTINUED | OUTPATIENT
Start: 2023-07-26 | End: 2023-07-27 | Stop reason: HOSPADM

## 2023-07-26 RX ORDER — DEXAMETHASONE SODIUM PHOSPHATE 4 MG/ML
INJECTION, SOLUTION INTRA-ARTICULAR; INTRALESIONAL; INTRAMUSCULAR; INTRAVENOUS; SOFT TISSUE
Status: DISCONTINUED | OUTPATIENT
Start: 2023-07-26 | End: 2023-07-26

## 2023-07-26 RX ORDER — AMOXICILLIN 250 MG
1 CAPSULE ORAL 2 TIMES DAILY
Status: DISCONTINUED | OUTPATIENT
Start: 2023-07-26 | End: 2023-07-27 | Stop reason: HOSPADM

## 2023-07-26 RX ORDER — ROPIVACAINE HYDROCHLORIDE 2 MG/ML
INJECTION, SOLUTION EPIDURAL; INFILTRATION; PERINEURAL CONTINUOUS
Status: DISPENSED | OUTPATIENT
Start: 2023-07-26

## 2023-07-26 RX ORDER — FAMOTIDINE 20 MG/1
20 TABLET, FILM COATED ORAL 2 TIMES DAILY
Status: DISCONTINUED | OUTPATIENT
Start: 2023-07-26 | End: 2023-07-27 | Stop reason: HOSPADM

## 2023-07-26 RX ORDER — LIDOCAINE HYDROCHLORIDE 10 MG/ML
1 INJECTION, SOLUTION EPIDURAL; INFILTRATION; INTRACAUDAL; PERINEURAL
Status: DISCONTINUED | OUTPATIENT
Start: 2023-07-26 | End: 2023-07-26

## 2023-07-26 RX ORDER — SODIUM CHLORIDE 9 MG/ML
INJECTION, SOLUTION INTRAVENOUS CONTINUOUS
Status: DISCONTINUED | OUTPATIENT
Start: 2023-07-26 | End: 2023-07-27 | Stop reason: HOSPADM

## 2023-07-26 RX ORDER — FENTANYL CITRATE 50 UG/ML
100 INJECTION, SOLUTION INTRAMUSCULAR; INTRAVENOUS
Status: DISCONTINUED | OUTPATIENT
Start: 2023-07-26 | End: 2023-07-26 | Stop reason: HOSPADM

## 2023-07-26 RX ORDER — ACETAMINOPHEN 500 MG
1000 TABLET ORAL
Status: DISCONTINUED | OUTPATIENT
Start: 2023-07-26 | End: 2023-07-26

## 2023-07-26 RX ORDER — FENTANYL CITRATE 50 UG/ML
25-200 INJECTION, SOLUTION INTRAMUSCULAR; INTRAVENOUS
Status: DISCONTINUED | OUTPATIENT
Start: 2023-07-26 | End: 2023-07-27

## 2023-07-26 RX ORDER — SODIUM CHLORIDE 9 MG/ML
INJECTION, SOLUTION INTRAVENOUS
Status: COMPLETED | OUTPATIENT
Start: 2023-07-26 | End: 2023-07-26

## 2023-07-26 RX ORDER — LIDOCAINE HYDROCHLORIDE 10 MG/ML
1 INJECTION, SOLUTION EPIDURAL; INFILTRATION; INTRACAUDAL; PERINEURAL ONCE
Status: DISCONTINUED | OUTPATIENT
Start: 2023-07-26 | End: 2023-07-27

## 2023-07-26 RX ORDER — ONDANSETRON 2 MG/ML
4 INJECTION INTRAMUSCULAR; INTRAVENOUS EVERY 8 HOURS PRN
Status: DISCONTINUED | OUTPATIENT
Start: 2023-07-26 | End: 2023-07-27 | Stop reason: HOSPADM

## 2023-07-26 RX ORDER — HYDROMORPHONE HYDROCHLORIDE 1 MG/ML
0.2 INJECTION, SOLUTION INTRAMUSCULAR; INTRAVENOUS; SUBCUTANEOUS EVERY 5 MIN PRN
Status: DISCONTINUED | OUTPATIENT
Start: 2023-07-26 | End: 2023-07-26 | Stop reason: HOSPADM

## 2023-07-26 RX ORDER — POLYETHYLENE GLYCOL 3350 17 G/17G
17 POWDER, FOR SOLUTION ORAL DAILY
Status: DISCONTINUED | OUTPATIENT
Start: 2023-07-27 | End: 2023-07-27 | Stop reason: HOSPADM

## 2023-07-26 RX ORDER — MORPHINE SULFATE 2 MG/ML
2 INJECTION, SOLUTION INTRAMUSCULAR; INTRAVENOUS
Status: DISCONTINUED | OUTPATIENT
Start: 2023-07-26 | End: 2023-07-27 | Stop reason: HOSPADM

## 2023-07-26 RX ORDER — PROPOFOL 10 MG/ML
VIAL (ML) INTRAVENOUS CONTINUOUS PRN
Status: DISCONTINUED | OUTPATIENT
Start: 2023-07-26 | End: 2023-07-26

## 2023-07-26 RX ORDER — PROCHLORPERAZINE EDISYLATE 5 MG/ML
5 INJECTION INTRAMUSCULAR; INTRAVENOUS EVERY 6 HOURS PRN
Status: DISCONTINUED | OUTPATIENT
Start: 2023-07-26 | End: 2023-07-27 | Stop reason: HOSPADM

## 2023-07-26 RX ORDER — ONDANSETRON 2 MG/ML
INJECTION INTRAMUSCULAR; INTRAVENOUS
Status: DISCONTINUED | OUTPATIENT
Start: 2023-07-26 | End: 2023-07-26

## 2023-07-26 RX ORDER — MIDAZOLAM HYDROCHLORIDE 1 MG/ML
.5-4 INJECTION INTRAMUSCULAR; INTRAVENOUS
Status: DISCONTINUED | OUTPATIENT
Start: 2023-07-26 | End: 2023-07-27

## 2023-07-26 RX ORDER — METHOCARBAMOL 750 MG/1
750 TABLET, FILM COATED ORAL 3 TIMES DAILY
Status: DISCONTINUED | OUTPATIENT
Start: 2023-07-26 | End: 2023-07-27 | Stop reason: HOSPADM

## 2023-07-26 RX ORDER — TALC
6 POWDER (GRAM) TOPICAL NIGHTLY PRN
Status: DISCONTINUED | OUTPATIENT
Start: 2023-07-26 | End: 2023-07-26 | Stop reason: HOSPADM

## 2023-07-26 RX ORDER — ACETAMINOPHEN 500 MG
1000 TABLET ORAL
Status: COMPLETED | OUTPATIENT
Start: 2023-07-26 | End: 2023-07-26

## 2023-07-26 RX ORDER — CELECOXIB 200 MG/1
400 CAPSULE ORAL ONCE
Status: DISCONTINUED | OUTPATIENT
Start: 2023-07-26 | End: 2023-07-26

## 2023-07-26 RX ORDER — CELECOXIB 200 MG/1
400 CAPSULE ORAL ONCE
Status: COMPLETED | OUTPATIENT
Start: 2023-07-26 | End: 2023-07-26

## 2023-07-26 RX ORDER — SODIUM CHLORIDE 0.9 % (FLUSH) 0.9 %
10 SYRINGE (ML) INJECTION
Status: DISCONTINUED | OUTPATIENT
Start: 2023-07-26 | End: 2023-07-26 | Stop reason: HOSPADM

## 2023-07-26 RX ORDER — MIDAZOLAM HYDROCHLORIDE 1 MG/ML
4 INJECTION INTRAMUSCULAR; INTRAVENOUS
Status: DISCONTINUED | OUTPATIENT
Start: 2023-07-26 | End: 2023-07-26 | Stop reason: HOSPADM

## 2023-07-26 RX ORDER — MUPIROCIN 20 MG/G
1 OINTMENT TOPICAL 2 TIMES DAILY
Status: DISCONTINUED | OUTPATIENT
Start: 2023-07-26 | End: 2023-07-27 | Stop reason: HOSPADM

## 2023-07-26 RX ORDER — VANCOMYCIN HYDROCHLORIDE 1 G/20ML
INJECTION, POWDER, LYOPHILIZED, FOR SOLUTION INTRAVENOUS
Status: DISCONTINUED | OUTPATIENT
Start: 2023-07-26 | End: 2023-07-26 | Stop reason: HOSPADM

## 2023-07-26 RX ORDER — OXYCODONE HYDROCHLORIDE 10 MG/1
10 TABLET ORAL
Status: DISCONTINUED | OUTPATIENT
Start: 2023-07-26 | End: 2023-07-27 | Stop reason: HOSPADM

## 2023-07-26 RX ORDER — FENTANYL CITRATE 50 UG/ML
25 INJECTION, SOLUTION INTRAMUSCULAR; INTRAVENOUS EVERY 5 MIN PRN
Status: COMPLETED | OUTPATIENT
Start: 2023-07-26 | End: 2023-07-26

## 2023-07-26 RX ORDER — BUPIVACAINE HYDROCHLORIDE 5 MG/ML
INJECTION, SOLUTION EPIDURAL; INTRACAUDAL
Status: COMPLETED | OUTPATIENT
Start: 2023-07-26 | End: 2023-07-26

## 2023-07-26 RX ORDER — DEXMEDETOMIDINE HYDROCHLORIDE 100 UG/ML
INJECTION, SOLUTION INTRAVENOUS
Status: DISCONTINUED | OUTPATIENT
Start: 2023-07-26 | End: 2023-07-26

## 2023-07-26 RX ORDER — OXYCODONE HYDROCHLORIDE 5 MG/1
5 TABLET ORAL
Status: DISCONTINUED | OUTPATIENT
Start: 2023-07-26 | End: 2023-07-27 | Stop reason: HOSPADM

## 2023-07-26 RX ORDER — KETAMINE HCL IN 0.9 % NACL 50 MG/5 ML
SYRINGE (ML) INTRAVENOUS
Status: DISCONTINUED | OUTPATIENT
Start: 2023-07-26 | End: 2023-07-26

## 2023-07-26 RX ORDER — METHOCARBAMOL 500 MG/1
1000 TABLET, FILM COATED ORAL ONCE
Status: COMPLETED | OUTPATIENT
Start: 2023-07-26 | End: 2023-07-26

## 2023-07-26 RX ORDER — CARBOXYMETHYLCELLULOSE SODIUM 10 MG/ML
GEL OPHTHALMIC
Status: DISCONTINUED | OUTPATIENT
Start: 2023-07-26 | End: 2023-07-26

## 2023-07-26 RX ORDER — HALOPERIDOL 5 MG/ML
0.5 INJECTION INTRAMUSCULAR EVERY 10 MIN PRN
Status: DISCONTINUED | OUTPATIENT
Start: 2023-07-26 | End: 2023-07-26 | Stop reason: HOSPADM

## 2023-07-26 RX ORDER — ACETAMINOPHEN 500 MG
1000 TABLET ORAL EVERY 6 HOURS
Status: DISCONTINUED | OUTPATIENT
Start: 2023-07-26 | End: 2023-07-27 | Stop reason: HOSPADM

## 2023-07-26 RX ORDER — ASPIRIN 81 MG/1
81 TABLET ORAL 2 TIMES DAILY
Status: DISCONTINUED | OUTPATIENT
Start: 2023-07-26 | End: 2023-07-27 | Stop reason: HOSPADM

## 2023-07-26 RX ORDER — PROPOFOL 10 MG/ML
VIAL (ML) INTRAVENOUS
Status: DISCONTINUED | OUTPATIENT
Start: 2023-07-26 | End: 2023-07-26

## 2023-07-26 RX ORDER — EPHEDRINE SULFATE 50 MG/ML
INJECTION, SOLUTION INTRAVENOUS
Status: DISCONTINUED | OUTPATIENT
Start: 2023-07-26 | End: 2023-07-26

## 2023-07-26 RX ORDER — LIDOCAINE HYDROCHLORIDE 20 MG/ML
INJECTION INTRAVENOUS
Status: DISCONTINUED | OUTPATIENT
Start: 2023-07-26 | End: 2023-07-26

## 2023-07-26 RX ORDER — MUPIROCIN 20 MG/G
1 OINTMENT TOPICAL
Status: COMPLETED | OUTPATIENT
Start: 2023-07-26 | End: 2023-07-26

## 2023-07-26 RX ORDER — BISACODYL 10 MG
10 SUPPOSITORY, RECTAL RECTAL EVERY 12 HOURS PRN
Status: DISCONTINUED | OUTPATIENT
Start: 2023-07-26 | End: 2023-07-27 | Stop reason: HOSPADM

## 2023-07-26 RX ORDER — PREGABALIN 75 MG/1
75 CAPSULE ORAL
Status: COMPLETED | OUTPATIENT
Start: 2023-07-26 | End: 2023-07-26

## 2023-07-26 RX ORDER — PREGABALIN 75 MG/1
75 CAPSULE ORAL NIGHTLY
Status: DISCONTINUED | OUTPATIENT
Start: 2023-07-26 | End: 2023-07-27 | Stop reason: HOSPADM

## 2023-07-26 RX ORDER — OXYCODONE HYDROCHLORIDE 5 MG/1
5 TABLET ORAL
Status: DISCONTINUED | OUTPATIENT
Start: 2023-07-26 | End: 2023-07-26 | Stop reason: HOSPADM

## 2023-07-26 RX ORDER — FAMOTIDINE 10 MG/ML
INJECTION INTRAVENOUS
Status: DISCONTINUED | OUTPATIENT
Start: 2023-07-26 | End: 2023-07-26

## 2023-07-26 RX ADMIN — TRANEXAMIC ACID 1000 MG: 100 INJECTION INTRAVENOUS at 02:07

## 2023-07-26 RX ADMIN — PREGABALIN 75 MG: 75 CAPSULE ORAL at 12:07

## 2023-07-26 RX ADMIN — MUPIROCIN 1 G: 20 OINTMENT TOPICAL at 09:07

## 2023-07-26 RX ADMIN — Medication: at 04:07

## 2023-07-26 RX ADMIN — ACETAMINOPHEN 1000 MG: 500 TABLET ORAL at 06:07

## 2023-07-26 RX ADMIN — PREGABALIN 75 MG: 75 CAPSULE ORAL at 09:07

## 2023-07-26 RX ADMIN — HYDROMORPHONE HYDROCHLORIDE 0.2 MG: 1 INJECTION, SOLUTION INTRAMUSCULAR; INTRAVENOUS; SUBCUTANEOUS at 05:07

## 2023-07-26 RX ADMIN — CARBOXYMETHYLCELLULOSE SODIUM 4 DROP: 10 GEL OPHTHALMIC at 02:07

## 2023-07-26 RX ADMIN — LIDOCAINE HYDROCHLORIDE 100 MG: 20 INJECTION INTRAVENOUS at 02:07

## 2023-07-26 RX ADMIN — PROPOFOL 100 MCG/KG/MIN: 10 INJECTION, EMULSION INTRAVENOUS at 02:07

## 2023-07-26 RX ADMIN — FAMOTIDINE 20 MG: 10 INJECTION, SOLUTION INTRAVENOUS at 02:07

## 2023-07-26 RX ADMIN — SODIUM CHLORIDE, SODIUM GLUCONATE, SODIUM ACETATE, POTASSIUM CHLORIDE, MAGNESIUM CHLORIDE, SODIUM PHOSPHATE, DIBASIC, AND POTASSIUM PHOSPHATE: .53; .5; .37; .037; .03; .012; .00082 INJECTION, SOLUTION INTRAVENOUS at 03:07

## 2023-07-26 RX ADMIN — SODIUM CHLORIDE: 9 INJECTION, SOLUTION INTRAVENOUS at 04:07

## 2023-07-26 RX ADMIN — CELECOXIB 400 MG: 200 CAPSULE ORAL at 12:07

## 2023-07-26 RX ADMIN — SODIUM CHLORIDE, SODIUM GLUCONATE, SODIUM ACETATE, POTASSIUM CHLORIDE, MAGNESIUM CHLORIDE, SODIUM PHOSPHATE, DIBASIC, AND POTASSIUM PHOSPHATE: .53; .5; .37; .037; .03; .012; .00082 INJECTION, SOLUTION INTRAVENOUS at 02:07

## 2023-07-26 RX ADMIN — FENTANYL CITRATE 25 MCG: 50 INJECTION INTRAMUSCULAR; INTRAVENOUS at 04:07

## 2023-07-26 RX ADMIN — EPHEDRINE SULFATE 5 MG: 50 INJECTION INTRAVENOUS at 02:07

## 2023-07-26 RX ADMIN — OXYCODONE HYDROCHLORIDE 5 MG: 5 TABLET ORAL at 04:07

## 2023-07-26 RX ADMIN — CEFAZOLIN 2 G: 2 INJECTION, POWDER, FOR SOLUTION INTRAMUSCULAR; INTRAVENOUS at 09:07

## 2023-07-26 RX ADMIN — DEXMEDETOMIDINE 8 MCG: 100 INJECTION, SOLUTION, CONCENTRATE INTRAVENOUS at 01:07

## 2023-07-26 RX ADMIN — ONDANSETRON 4 MG: 2 INJECTION INTRAMUSCULAR; INTRAVENOUS at 07:07

## 2023-07-26 RX ADMIN — ACETAMINOPHEN 1000 MG: 500 TABLET ORAL at 12:07

## 2023-07-26 RX ADMIN — FENTANYL CITRATE 100 MCG: 50 INJECTION INTRAMUSCULAR; INTRAVENOUS at 01:07

## 2023-07-26 RX ADMIN — DEXAMETHASONE SODIUM PHOSPHATE 8 MG: 4 INJECTION, SOLUTION INTRAMUSCULAR; INTRAVENOUS at 02:07

## 2023-07-26 RX ADMIN — ONDANSETRON 4 MG: 2 INJECTION INTRAMUSCULAR; INTRAVENOUS at 03:07

## 2023-07-26 RX ADMIN — CEFAZOLIN 2 G: 2 INJECTION, POWDER, FOR SOLUTION INTRAMUSCULAR; INTRAVENOUS at 02:07

## 2023-07-26 RX ADMIN — MUPIROCIN 1 G: 20 OINTMENT TOPICAL at 12:07

## 2023-07-26 RX ADMIN — BUPIVACAINE HYDROCHLORIDE 10 ML: 5 INJECTION, SOLUTION EPIDURAL; INTRACAUDAL; PERINEURAL at 01:07

## 2023-07-26 RX ADMIN — SODIUM CHLORIDE: 9 INJECTION, SOLUTION INTRAVENOUS at 12:07

## 2023-07-26 RX ADMIN — METHOCARBAMOL 750 MG: 750 TABLET ORAL at 09:07

## 2023-07-26 RX ADMIN — FAMOTIDINE 20 MG: 20 TABLET, FILM COATED ORAL at 09:07

## 2023-07-26 RX ADMIN — SENNOSIDES AND DOCUSATE SODIUM 1 TABLET: 50; 8.6 TABLET ORAL at 09:07

## 2023-07-26 RX ADMIN — METHOCARBAMOL 1000 MG: 500 TABLET ORAL at 04:07

## 2023-07-26 RX ADMIN — TRANEXAMIC ACID 1000 MG: 100 INJECTION INTRAVENOUS at 03:07

## 2023-07-26 RX ADMIN — ASPIRIN 81 MG: 81 TABLET, COATED ORAL at 09:07

## 2023-07-26 RX ADMIN — MEPIVACAINE HYDROCHLORIDE 3.2 ML: 20 INJECTION, SOLUTION EPIDURAL; INFILTRATION at 02:07

## 2023-07-26 RX ADMIN — OXYCODONE HYDROCHLORIDE 10 MG: 10 TABLET ORAL at 09:07

## 2023-07-26 RX ADMIN — VANCOMYCIN HYDROCHLORIDE 1500 MG: 1.5 INJECTION, POWDER, LYOPHILIZED, FOR SOLUTION INTRAVENOUS at 12:07

## 2023-07-26 RX ADMIN — PROPOFOL 30 MG: 10 INJECTION, EMULSION INTRAVENOUS at 02:07

## 2023-07-26 RX ADMIN — MIDAZOLAM 2 MG: 1 INJECTION INTRAMUSCULAR; INTRAVENOUS at 01:07

## 2023-07-26 RX ADMIN — Medication 20 MG: at 02:07

## 2023-07-26 NOTE — PT/OT/SLP PROGRESS
Physical Therapy      Patient Name:  Diane Garcia   MRN:  [id number]    Patient not seen today secondary to late surgery. Will follow-up again in AM.

## 2023-07-26 NOTE — ASSESSMENT & PLAN NOTE
Diane Garcia is a 67 y.o. female is s/p L TKA on 7/26/23    Surgical dressing C/D/I, ice in place  Pain control: multimodal, Anesthesia Surgical Home following  PT/OT: WBAT LLE  DVT PPx: ASA 81 BID, FCDs at all times when not ambulating  Abx: postop Ancef, discharge with Cefadroxil x7 days  Drain: none  North: none    Dispo: plan to dc to home today once cleared by PT/OT

## 2023-07-26 NOTE — PROGRESS NOTES
Right ankle and foot swollen and painful to walk on. Dr. Greenwood at bedside to discuss plan of care with patient.

## 2023-07-26 NOTE — ANESTHESIA PREPROCEDURE EVALUATION
07/26/2023  Diane Garcia is a 67 y.o., female.      Pre-op Assessment    I have reviewed the Patient Summary Reports.     I have reviewed the Nursing Notes. I have reviewed the NPO Status.   I have reviewed the Medications.     Review of Systems  Anesthesia Hx:  No problems with previous Anesthesia  History of prior surgery of interest to airway management or planning: Previous anesthesia: General Denies Family Hx of Anesthesia complications.   Denies Personal Hx of Anesthesia complications.   Social:  Former Smoker    Hematology/Oncology:  Hematology Normal   Oncology Normal     EENT/Dental:EENT/Dental Normal   Cardiovascular:   Exercise tolerance: good Hypertension hyperlipidemia    Pulmonary:   Sleep Apnea, CPAP    Renal/:  Renal/ Normal     Hepatic/GI:   GERD    Musculoskeletal:   Arthritis     Neurological:  Neurology Normal    Endocrine:  Endocrine Normal  Obesity / BMI > 30  Dermatological:  Skin Normal    Psych:  Psychiatric Normal           Physical Exam  General: Well nourished, Cooperative, Alert and Oriented    Airway:  Mallampati: III / II  Mouth Opening: Normal  TM Distance: Normal  Tongue: Normal  Neck ROM: Normal ROM    Dental:  Intact    Chest/Lungs:  Clear to auscultation, Normal Respiratory Rate    Heart:  Rate: Normal  Rhythm: Regular Rhythm  Sounds: Normal    Abdomen:  Normal, Soft, Nontender        Anesthesia Plan  Type of Anesthesia, risks & benefits discussed:    Anesthesia Type: Spinal  Intra-op Monitoring Plan: Standard ASA Monitors  Post Op Pain Control Plan: multimodal analgesia and peripheral nerve block  Informed Consent: Informed consent signed with the Patient and all parties understand the risks and agree with anesthesia plan.  All questions answered.   ASA Score: 3    Ready For Surgery From Anesthesia Perspective.     .

## 2023-07-26 NOTE — SUBJECTIVE & OBJECTIVE
"Principal Problem:Primary osteoarthritis of left knee    Principal Orthopedic Problem: same as above, s/p L TKA 7/26    Interval History: Patient seen and examined at bedside. NAEON. Patient doing well. No complaints. Pain well controlled. Anticipate mobilizing with PT.      Review of patient's allergies indicates:   Allergen Reactions    Penicillins      nausea       Current Facility-Administered Medications   Medication    0.9%  NaCl infusion    acetaminophen tablet 1,000 mg    ceFAZolin 2 g in sodium chloride 0.9 % 50 mL IVPB (MB+)    fentaNYL 50 mcg/mL injection 100 mcg    fentaNYL 50 mcg/mL injection 25 mcg    fentaNYL 50 mcg/mL injection  mcg    haloperidol lactate injection 0.5 mg    HYDROmorphone injection 0.2 mg    LIDOcaine (PF) 10 mg/ml (1%) injection 10 mg    melatonin tablet 6 mg    midazolam (VERSED) 1 mg/mL injection 0.5-4 mg    midazolam (VERSED) 1 mg/mL injection 4 mg    morphine injection 2 mg    naloxone 0.4 mg/mL injection 0.02 mg    ondansetron injection 4 mg    oxyCODONE immediate release tablet 10 mg    oxyCODONE immediate release tablet 5 mg    oxyCODONE immediate release tablet 5 mg    pregabalin capsule 75 mg    prochlorperazine injection Soln 5 mg    ropivacaine 0.2% [facility] PainPRO Pump infusion 500 ML    sodium chloride 0.9% flush 10 mL    sodium chloride 0.9% flush 10 mL    tranexamic acid (CYKLOKAPRON) 1,000 mg in sodium chloride 0.9 % 100 mL IVPB (MB+)     Objective:     Vital Signs (Most Recent):  Temp: 98 °F (36.7 °C) (07/26/23 1218)  Pulse: 66 (07/26/23 1628)  Resp: 17 (07/26/23 1638)  BP: (!) 114/56 (07/26/23 1350)  SpO2: (!) 92 % (07/26/23 1628) Vital Signs (24h Range):  Temp:  [98 °F (36.7 °C)] 98 °F (36.7 °C)  Pulse:  [57-66] 66  Resp:  [16-20] 17  SpO2:  [92 %-99 %] 92 %  BP: (107-140)/(55-67) 114/56     Weight: 105.7 kg (233 lb)  Height: 5' 5" (165.1 cm)  Body mass index is 38.77 kg/m².      Intake/Output Summary (Last 24 hours) at 7/26/2023 9108  Last data filed at " 7/26/2023 1538  Gross per 24 hour   Intake 2000 ml   Output --   Net 2000 ml        Ortho/SPM Exam  AAOx4  NAD  Reg rate  No increased WOB    LLE:  Dressing c/d/i  Ice in place  SILT T/SP/DP/Person/Sa  Motor intact T/SP/DP  WWP extremities  FCDs in place and functioning       Significant Labs: All pertinent labs within the past 24 hours have been reviewed.    Significant Imaging: I have reviewed all pertinent imaging results/findings.

## 2023-07-26 NOTE — TRANSFER OF CARE
"Anesthesia Transfer of Care Note    Patient: Diane Garcia    Procedure(s) Performed: Procedure(s) (LRB):  ARTHROPLASTY, KNEE, TOTAL (Left)    Patient location: PACU    Anesthesia Type: MAC and spinal    Transport from OR: Transported from OR on 6-10 L/min O2 by face mask with adequate spontaneous ventilation    Post pain: adequate analgesia    Post assessment: no apparent anesthetic complications    Post vital signs: stable    Level of consciousness: alert, awake and oriented    Nausea/Vomiting: no nausea/vomiting    Complications: none    Transfer of care protocol was followed      Last vitals:   Visit Vitals  BP (!) 114/56 (BP Location: Right arm, Patient Position: Lying)   Pulse (!) 58   Temp 36.7 °C (98 °F) (Oral)   Resp 17   Ht 5' 5" (1.651 m)   Wt 105.7 kg (233 lb)   SpO2 98%   Breastfeeding No   BMI 38.77 kg/m²     "

## 2023-07-26 NOTE — ANESTHESIA PROCEDURE NOTES
Left Adductor Catheter    Patient location during procedure: pre-op   Block not for primary anesthetic.  Reason for block: at surgeon's request and post-op pain management   Post-op Pain Location: Left Leg Pain   Start time: 7/26/2023 1:29 PM  Timeout: 7/26/2023 1:28 PM   End time: 7/26/2023 1:45 PM    Staffing  Authorizing Provider: Joseph Borrero MD  Performing Provider: Thee Suarez DO    Preanesthetic Checklist  Completed: patient identified, IV checked, site marked, risks and benefits discussed, surgical consent, monitors and equipment checked, pre-op evaluation and timeout performed  Peripheral Block  Patient position: supine  Prep: ChloraPrep and site prepped and draped  Patient monitoring: heart rate, cardiac monitor, continuous pulse ox, continuous capnometry and frequent blood pressure checks  Block type: adductor canal  Laterality: left  Injection technique: continuous  Needle  Needle type: Tuohy   Needle gauge: 17 G  Needle length: 3.5 in  Needle localization: anatomical landmarks and ultrasound guidance  Catheter type: spring wound  Catheter size: 19 G  Test dose: lidocaine 1.5% with Epi 1-to-200,000 and negative   -ultrasound image captured on disc.  Assessment  Injection assessment: negative aspiration, negative parasthesia and local visualized surrounding nerve  Paresthesia pain: none  Heart rate change: no  Slow fractionated injection: yes  Pain Tolerance: comfortable throughout block and no complaints  Medications:    Medications: bupivacaine (pf) (MARCAINE) injection 0.5% - Perineural   10 mL - 7/26/2023 1:45:00 PM    Additional Notes  VSS.  [facility] RN monitoring vitals throughout procedure.  Patient tolerated procedure well.

## 2023-07-26 NOTE — HOSPITAL COURSE
On 7/26/23, the patient arrived to the [facility] proper pre-operative management.  Upon completion of pre-operative preparation, the patient was taken back to the operative theatre. Left total knee arthroplasty was performed without complication and the patient was transported to the post anesthesia care unit in stable condition.  After appropriate recovery from the anaesthetic agents used during the surgery, the patient was then transported to the hospital inpatient floor.  The interim of the hospital stay from arrival on the floor up to discharge has been uncomplicated. The patient has tolerated regular diet.  The patient's pain has been controlled using a multimodal approach. Currently, the patient's pain is well controlled on an oral regimen.  The patient has been voiding without difficulty.  The patient began participation in physical therapy after surgery and has progressed throughout the entire hospital stay.  Currently, the patient's progress is sufficient to allow the them to be discharged to home safely.  The patient agrees with this assessment and desires a discharge today.

## 2023-07-26 NOTE — ANESTHESIA POSTPROCEDURE EVALUATION
Anesthesia Post Evaluation    Patient: Diane Garcia    Procedure(s) Performed: Procedure(s) (LRB):  ARTHROPLASTY, KNEE, TOTAL (Left)    Final Anesthesia Type: spinal      Patient location during evaluation: PACU  Patient participation: Yes- Able to Participate  Level of consciousness: awake and alert and oriented  Post-procedure vital signs: reviewed and stable  Pain management: adequate  Airway patency: patent  MELLISA mitigation strategies: Multimodal analgesia  PONV status at discharge: No PONV  Anesthetic complications: no      Cardiovascular status: blood pressure returned to baseline and hemodynamically stable  Respiratory status: unassisted, spontaneous ventilation and room air  Hydration status: euvolemic  Follow-up not needed.          Vitals Value Taken Time   /88 07/26/23 1745   Temp 36.5 °C (97.7 °F) 07/26/23 1745   Pulse 63 07/26/23 1749   Resp 34 07/26/23 1746   SpO2 96 % 07/26/23 1749   Vitals shown include unvalidated device data.      Event Time   Out of Recovery 17:47:47         Pain/Billy Score: Pain Rating Prior to Med Admin: 6 (7/26/2023  5:40 PM)  Pain Rating Post Med Admin: 6 (7/26/2023  5:40 PM)  Billy Score: 9 (7/26/2023  4:24 PM)

## 2023-07-26 NOTE — PLAN OF CARE
VSS. Pt able to tolerate oral liquids. Pt reports tolerable pain level for transfer. Ice pack and dressing intact. Thigh TEDs and FCDs intact. IVF infusing and Nimbus infusion pump infusing. Pt to be transferred via bed to recovery suites. Pt stable for transfer. No distress noted.

## 2023-07-26 NOTE — HPI
Diane Garcia is a 67 y.o. year old female with left knee osteoarthritis, presenting for left knee replacement. She has failed conservative management. Decision was made to proceed with surgery at this time. She was last seen and treated in the clinic on 6/15/2023. she was medically optimized by the pre op center. There has been no significant change in medical status since last visit. No fever, chills, malaise, or unexplained weight change.

## 2023-07-26 NOTE — CARE UPDATE
I met with the patient in the preoperative holding area.  She reports a 2 week history of atraumatic onset right foot and ankle pain.  Fairly nonspecific in locality.  This is the first I am hearing of this.  She was seen by 1 of the orthopedic mid-level providers at [facility] previously along with an outside podiatrist.  Radiographs performed of the right foot and available for review in the system.  I see some midfoot degenerative changes.  Small lucency in the mid to lateral aspect of the navicular which may be indicative of a chronic stress reaction/old fracture.  This appears nonacute.  She has fairly nonspecific tenderness over the medial and lateral midfoot regions and less so over the ankle.  No ecchymosis.  No signs of trauma.  We discussed the radiographic findings.  Could consider CT and MRI of the foot for further evaluation specifically of the navicular.  She feels that she can weight bear on the right side at this time to allow rehab and recovery of the left knee.  She does not want to cancel her left total knee replacement.  We will plan to proceed he would I think the postoperative anti-inflammatory medication will help.  We will continue to follow her for her right foot and work that up as needed.

## 2023-07-26 NOTE — ANESTHESIA PROCEDURE NOTES
Spinal    Diagnosis: OA  Patient location during procedure: OR  Start time: 7/26/2023 1:58 PM  Timeout: 7/26/2023 1:58 PM  End time: 7/26/2023 2:01 PM    Staffing  Authorizing Provider: Asher Rene MD  Performing Provider: Cornell Vela MD    Spinal Block  Patient position: sitting  Prep: ChloraPrep  Patient monitoring: heart rate, continuous pulse ox and frequent blood pressure checks  Approach: midline  Location: L3-4  Injection technique: single shot  CSF Fluid: clear free-flowing CSF  Needle  Needle type: Marvin   Needle gauge: 25 G  Needle length: 3.5 in  Additional Documentation: incremental injection, negative aspiration for heme and no paresthesia on injection  Needle localization: anatomical landmarks  Assessment  Ease of block: easy  Patient's tolerance of the procedure: comfortable throughout block  Medications:    Medications: mepivacaine (CARBOCAINE) injection 20 mg/mL (2%) - Intrathecal   3.2 mL - 7/26/2023 2:01:00 PM

## 2023-07-26 NOTE — INTERVAL H&P NOTE
The patient has been examined and the H&P has been reviewed:    I concur with the findings and no changes have occurred since H&P was written.    Surgery risks, benefits and alternative options discussed and understood by patient/family.          Active Hospital Problems    Diagnosis  POA    *Primary osteoarthritis of left knee [M17.12]  Yes      Resolved Hospital Problems   No resolved problems to display.

## 2023-07-26 NOTE — PT/OT/SLP PROGRESS
Occupational Therapy      Patient Name:  Diane Garcia   MRN:  [id number]    Patient not seen today secondary to late sx. Will follow-up tomorrow.    7/26/2023

## 2023-07-27 ENCOUNTER — PATIENT MESSAGE (OUTPATIENT)
Dept: PRIMARY CARE CLINIC | Facility: CLINIC | Age: 68
End: 2023-07-27
Payer: MEDICARE

## 2023-07-27 ENCOUNTER — OFFICE VISIT (OUTPATIENT)
Dept: SPORTS MEDICINE | Facility: CLINIC | Age: 68
End: 2023-07-27
Payer: MEDICARE

## 2023-07-27 VITALS
SYSTOLIC BLOOD PRESSURE: 133 MMHG | TEMPERATURE: 98 F | DIASTOLIC BLOOD PRESSURE: 62 MMHG | RESPIRATION RATE: 16 BRPM | BODY MASS INDEX: 38.82 KG/M2 | WEIGHT: 233 LBS | OXYGEN SATURATION: 95 % | HEIGHT: 65 IN | HEART RATE: 65 BPM

## 2023-07-27 DIAGNOSIS — Z96.652 S/P TOTAL KNEE ARTHROPLASTY, LEFT: Primary | ICD-10-CM

## 2023-07-27 LAB
POCT GLUCOSE: 172 MG/DL (ref 70–110)
POCT GLUCOSE: 98 MG/DL (ref 70–110)

## 2023-07-27 PROCEDURE — 99212 PR OFFICE/OUTPT VISIT, EST, LEVL II, 10-19 MIN: ICD-10-PCS | Mod: FS,,, | Performed by: ANESTHESIOLOGY

## 2023-07-27 PROCEDURE — 94761 N-INVAS EAR/PLS OXIMETRY MLT: CPT

## 2023-07-27 PROCEDURE — 63600175 PHARM REV CODE 636 W HCPCS: Performed by: PHYSICIAN ASSISTANT

## 2023-07-27 PROCEDURE — 25000003 PHARM REV CODE 250: Performed by: PHYSICIAN ASSISTANT

## 2023-07-27 PROCEDURE — 99900035 HC TECH TIME PER 15 MIN (STAT)

## 2023-07-27 PROCEDURE — 97161 PT EVAL LOW COMPLEX 20 MIN: CPT

## 2023-07-27 PROCEDURE — 99024 PR POST-OP FOLLOW-UP VISIT: ICD-10-PCS | Mod: 95,POP,, | Performed by: PHYSICIAN ASSISTANT

## 2023-07-27 PROCEDURE — 99024 POSTOP FOLLOW-UP VISIT: CPT | Mod: 95,POP,, | Performed by: PHYSICIAN ASSISTANT

## 2023-07-27 PROCEDURE — 63600175 PHARM REV CODE 636 W HCPCS

## 2023-07-27 PROCEDURE — 97165 OT EVAL LOW COMPLEX 30 MIN: CPT

## 2023-07-27 PROCEDURE — 97530 THERAPEUTIC ACTIVITIES: CPT

## 2023-07-27 PROCEDURE — 25000003 PHARM REV CODE 250

## 2023-07-27 PROCEDURE — 97110 THERAPEUTIC EXERCISES: CPT

## 2023-07-27 PROCEDURE — 99212 OFFICE O/P EST SF 10 MIN: CPT | Mod: FS,,, | Performed by: ANESTHESIOLOGY

## 2023-07-27 PROCEDURE — 94660 CPAP INITIATION&MGMT: CPT

## 2023-07-27 PROCEDURE — 97535 SELF CARE MNGMENT TRAINING: CPT

## 2023-07-27 RX ORDER — INSULIN ASPART 100 [IU]/ML
1-10 INJECTION, SOLUTION INTRAVENOUS; SUBCUTANEOUS
Status: DISCONTINUED | OUTPATIENT
Start: 2023-07-27 | End: 2023-07-27 | Stop reason: HOSPADM

## 2023-07-27 RX ORDER — HYDROCHLOROTHIAZIDE 25 MG/1
25 TABLET ORAL DAILY
Status: DISCONTINUED | OUTPATIENT
Start: 2023-07-27 | End: 2023-07-27 | Stop reason: HOSPADM

## 2023-07-27 RX ORDER — ATORVASTATIN CALCIUM 10 MG/1
10 TABLET, FILM COATED ORAL DAILY
Status: DISCONTINUED | OUTPATIENT
Start: 2023-07-27 | End: 2023-07-27 | Stop reason: HOSPADM

## 2023-07-27 RX ORDER — CELECOXIB 200 MG/1
200 CAPSULE ORAL DAILY
Status: DISCONTINUED | OUTPATIENT
Start: 2023-07-27 | End: 2023-07-27 | Stop reason: HOSPADM

## 2023-07-27 RX ORDER — GLUCAGON 1 MG
1 KIT INJECTION
Status: DISCONTINUED | OUTPATIENT
Start: 2023-07-27 | End: 2023-07-27 | Stop reason: HOSPADM

## 2023-07-27 RX ORDER — METOPROLOL SUCCINATE 50 MG/1
50 TABLET, EXTENDED RELEASE ORAL DAILY
Status: DISCONTINUED | OUTPATIENT
Start: 2023-07-27 | End: 2023-07-27 | Stop reason: HOSPADM

## 2023-07-27 RX ORDER — IBUPROFEN 200 MG
16 TABLET ORAL
Status: DISCONTINUED | OUTPATIENT
Start: 2023-07-27 | End: 2023-07-27 | Stop reason: HOSPADM

## 2023-07-27 RX ORDER — IBUPROFEN 200 MG
24 TABLET ORAL
Status: DISCONTINUED | OUTPATIENT
Start: 2023-07-27 | End: 2023-07-27 | Stop reason: HOSPADM

## 2023-07-27 RX ORDER — LOSARTAN POTASSIUM 25 MG/1
100 TABLET ORAL DAILY
Status: DISCONTINUED | OUTPATIENT
Start: 2023-07-27 | End: 2023-07-27 | Stop reason: HOSPADM

## 2023-07-27 RX ORDER — METFORMIN HYDROCHLORIDE 500 MG/1
500 TABLET, EXTENDED RELEASE ORAL
Status: DISCONTINUED | OUTPATIENT
Start: 2023-07-27 | End: 2023-07-27 | Stop reason: HOSPADM

## 2023-07-27 RX ADMIN — OXYCODONE HYDROCHLORIDE 5 MG: 5 TABLET ORAL at 09:07

## 2023-07-27 RX ADMIN — INSULIN ASPART 2 UNITS: 100 INJECTION, SOLUTION INTRAVENOUS; SUBCUTANEOUS at 05:07

## 2023-07-27 RX ADMIN — SENNOSIDES AND DOCUSATE SODIUM 1 TABLET: 50; 8.6 TABLET ORAL at 08:07

## 2023-07-27 RX ADMIN — ASPIRIN 81 MG: 81 TABLET, COATED ORAL at 08:07

## 2023-07-27 RX ADMIN — SODIUM CHLORIDE: 9 INJECTION, SOLUTION INTRAVENOUS at 12:07

## 2023-07-27 RX ADMIN — ACETAMINOPHEN 1000 MG: 500 TABLET ORAL at 12:07

## 2023-07-27 RX ADMIN — MUPIROCIN 1 G: 20 OINTMENT TOPICAL at 08:07

## 2023-07-27 RX ADMIN — POLYETHYLENE GLYCOL 3350 17 G: 17 POWDER, FOR SOLUTION ORAL at 08:07

## 2023-07-27 RX ADMIN — CEFAZOLIN 2 G: 2 INJECTION, POWDER, FOR SOLUTION INTRAMUSCULAR; INTRAVENOUS at 05:07

## 2023-07-27 RX ADMIN — ATORVASTATIN CALCIUM 10 MG: 10 TABLET, FILM COATED ORAL at 08:07

## 2023-07-27 RX ADMIN — OXYCODONE HYDROCHLORIDE 5 MG: 5 TABLET ORAL at 03:07

## 2023-07-27 RX ADMIN — METHOCARBAMOL 750 MG: 750 TABLET ORAL at 08:07

## 2023-07-27 RX ADMIN — CELECOXIB 200 MG: 200 CAPSULE ORAL at 08:07

## 2023-07-27 RX ADMIN — ACETAMINOPHEN 1000 MG: 500 TABLET ORAL at 05:07

## 2023-07-27 RX ADMIN — METFORMIN HYDROCHLORIDE 500 MG: 500 TABLET, EXTENDED RELEASE ORAL at 07:07

## 2023-07-27 NOTE — PLAN OF CARE
Problem: Occupational Therapy  Goal: Occupational Therapy Goal  Description: Goals to be met by: 7/27/23     Patient will increase functional independence with ADLs by performing:    UE Dressing with Modified [place].  LE Dressing with Modified [place] and Assistive Devices as needed.  Grooming while standing at sink with Modified [place].  Toileting from bedside commode with Modified [place] for hygiene and clothing management.   Bathing from  shower chair/bench with Modified [place].  Toilet transfer to bedside commode with Modified [place].    Outcome: Ongoing, Progressing

## 2023-07-27 NOTE — PROGRESS NOTES
Acute Pain Service and Perioperative Surgical Home Progress Note    HPI  Diane Garcia is a 67 y.o., female, status post arthroplasty of the left knee with no acute events overnight.  Patient's pain is well controlled.  Catheter site is clean and dry, she has good range of motion, good sensation.  Tolerating p.o., planning on discharge      Surgery:  Procedure(s) (LRB):  ARTHROPLASTY, KNEE, TOTAL (Left)    Post Op Day #: 1    Catheter type: Perineural Adductor Canal    Infusion type: Ropivacaine 0.2%, with a 7cc automatic bolus every 3 hours, combined with a 5 cc patient controlled bolus available [id number]    Problem List:    Active Hospital Problems    Diagnosis  POA    *Primary osteoarthritis of left knee [M17.12]  Yes      Resolved Hospital Problems   No resolved problems to display.       Subjective:       General appearance of alert, oriented, no complaints   Pain with rest: 3    Numbers   Pain with movement: 3    Numbers   Side Effects    1. Pruritis No    2. Nausea No    3. Motor Blockade No, 0=Ability to raise lower extremities off bed    4. Sedation No, 1=awake and alert    Schedule Medications:    acetaminophen  1,000 mg Oral Q6H    aspirin  81 mg Oral BID    atorvastatin  10 mg Oral Daily    ceFAZolin (ANCEF) IVPB  2 g Intravenous Q8H    celecoxib  200 mg Oral Daily    dextrose 10%  25 g Intravenous Once    And    insulin regular  10 Units Intravenous Once    famotidine  20 mg Oral BID    hydroCHLOROthiazide  25 mg Oral Daily    losartan  100 mg Oral Daily    metFORMIN  500 mg Oral Daily with breakfast    methocarbamoL  750 mg Oral TID    metoprolol succinate  50 mg Oral Daily    mupirocin  1 g Nasal BID    polyethylene glycol  17 g Oral Daily    pregabalin  75 mg Oral QHS    senna-docusate 8.6-50 mg  1 tablet Oral BID        Continuous Infusions:   sodium chloride 0.9% 150 mL/hr at 07/27/23 0014    ropivacaine (PF) 2 mg/ml (0.2%)          PRN Medications:  bisacodyL, dextrose 10%, dextrose 10%,  dextrose 10% **AND** dextrose 10% **AND** insulin regular, glucagon (human recombinant), glucose, glucose, insulin aspart U-100, morphine, naloxone, ondansetron, oxyCODONE, oxyCODONE, prochlorperazine       Antibiotics:  Antibiotics (From admission, onward)      Start     Stop Route Frequency Ordered    07/26/23 2200  ceFAZolin 2 g in sodium chloride 0.9 % 50 mL IVPB (MB+)  (MEDICATIONS - ANTIBIOTICS NO PENICILLIN ALLERGY TOTAL KNEE PATHWAY POST-OP)         07/27 1359 IV Every 8 hours (non-standard times) 07/26/23 1614    07/26/23 2100  mupirocin 2 % ointment 1 g         07/31 2059 Nasl 2 times daily 07/26/23 1829               Objective:     Catheter site clean, dry, intact          Vital Signs (Most Recent):  Temp: 97.4 °F (36.3 °C) (07/27/23 0406)  Pulse: 65 (07/27/23 0406)  Resp: 16 (07/27/23 0406)  BP: (!) 102/59 (07/27/23 0406)  SpO2: 95 % (07/27/23 0406) Vital Signs Range (Last 24H):  Temp:  [97.2 °F (36.2 °C)-98 °F (36.7 °C)]   Pulse:  [57-69]   Resp:  [15-31]   BP: (102-164)/(54-88)   SpO2:  [89 %-99 %]          I & O (Last 24H):  Intake/Output Summary (Last 24 hours) at 7/27/2023 0543  Last data filed at 7/27/2023 0330  Gross per 24 hour   Intake 2240 ml   Output 1150 ml   Net 1090 ml       Physical Exam:    GA: Alert, comfortable, no acute distress.   Pulmonary: Clear to auscultation. Normal RR.    Cardiac: regular rate and rhythm.      Laboratory: reviewed in chart  CBC: No results for input(s): WBC, RBC, HGB, HCT, PLT, MCV, MCH, MCHC in the last 72 hours.    BMP: No results for input(s): NA, K, CO2, CL, BUN, CREATININE, GLU, MG, PHOS, CALCIUM in the last 72 hours.    No results for input(s): PT, INR, PROTIME, APTT in the last 72 hours.          Assessment:  See above       Pain control adequate    Plan:     1) Pain: Adductor canal perineural catheter in place and infusing. Dressing in tact.  Multimodal pain regimen ordered which includes acetaminophen, celecoxib, pregabalin, and prn oxycodone.  Will  continue to monitor. Plan to discharge with Nimbus pain pump.   2)HTN - cont home meds   3) FEN/GI: Tolerating regular diet.     4) Dispo: Pt working well with PT/OT. Case management and SW following along for setting up home health and physical therapy. Plan to discharge home this am.          Evaluator Joseph Cunha PA-C

## 2023-07-27 NOTE — DISCHARGE SUMMARY
[facility])  Orthopedics  Discharge Summary      Patient Name: Diane Garcia  MRN: [id number]  Admission Date: 7/26/2023  Hospital Length of Stay: 0 days  Discharge Date and Time: 7/27/2023 11:48 AM  Attending Physician: No att. providers found   Discharging Provider: Darek Reyes MD  Primary Care Provider: Beverly Oakes MD    HPI:   Diane Garcia is a 67 y.o. year old female with left knee osteoarthritis, presenting for left knee replacement. She has failed conservative management. Decision was made to proceed with surgery at this time. She was last seen and treated in the clinic on 6/15/2023. she was medically optimized by the pre op center. There has been no significant change in medical status since last visit. No fever, chills, malaise, or unexplained weight change.        Procedure(s) (LRB):  ARTHROPLASTY, KNEE, TOTAL (Left)      Hospital Course:  On 7/26/23, the patient arrived to the [facility] pre-operative management.  Upon completion of pre-operative preparation, the patient was taken back to the operative theatre. Left total knee arthroplasty was performed without complication and the patient was transported to the post anesthesia care unit in stable condition.  After appropriate recovery from the anaesthetic agents used during the surgery, the patient was then transported to the hospital inpatient floor.  The interim of the hospital stay from arrival on the floor up to discharge has been uncomplicated. The patient has tolerated regular diet.  The patient's pain has been controlled using a multimodal approach. Currently, the patient's pain is well controlled on an oral regimen.  The patient has been voiding without difficulty.  The patient began participation in physical therapy after surgery and has progressed throughout the entire hospital stay.  Currently, the patient's progress is sufficient to allow the them to be discharged to home safely.  The patient agrees with  "this assessment and desires a discharge today.       Goals of Care Treatment Preferences:  Code Status: Full Code      Consults (From admission, onward)        Status Ordering Provider     Inpatient consult to Respiratory Care  Once        Provider:  (Not yet assigned)    Acknowledged ANGY SARABIA     Inpatient consult to Respiratory Care  Once        Provider:  (Not yet assigned)    Acknowledged ANGY SARABIA     Inpatient consult to Social Work  Once        Provider:  (Not yet assigned)    Acknowledged ANYG SARABIA     Inpatient consult to Pain Management  Once        Provider:  (Not yet assigned)    Acknowledged ANGY SARABIA     Inpatient consult to Respiratory Care  Once        Provider:  (Not yet assigned)    Acknowledged ANGY SARABIA          Significant Diagnostic Studies: No pertinent studies.    Pending Diagnostic Studies:     None        Final Active Diagnoses:    Diagnosis Date Noted POA    PRINCIPAL PROBLEM:  Primary osteoarthritis of left knee [M17.12] 07/26/2023 Yes      Problems Resolved During this Admission:      Discharged Condition: good    Disposition: Home or Self Care    Follow Up:    Patient Instructions:      WALKER FOR HOME USE     Order Specific Question Answer Comments   Type of Walker: Adult (5'4"-6'6")    With wheels? Yes    Height: 5' 5" (1.651 m)    Weight: 106 kg (233 lb 11 oz)    Length of need (1-99 months): 3    Please check all that apply: Walker will be used for gait training.      WALKER FOR HOME USE     Order Specific Question Answer Comments   Type of Walker: Adult (5'4"-6'6")    With wheels? Yes    Height: 5' 5" (1.651 m)    Weight: 105.7 kg (233 lb)    Length of need (1-99 months): 99    Please check all that apply: Patient's condition impairs ambulation.      COMMODE FOR HOME USE     Order Specific Question Answer Comments   Type: Standard    Height: 5' 5" (1.651 m)    Weight: 105.7 kg (233 lb)  "   Length of need (1-99 months): 99      Ambulatory referral/consult to Physical/Occupational Therapy   Standing Status: Future   Referral Priority: Routine Referral Type: Physical Medicine   Referral Reason: Specialty Services Required   Number of Visits Requested: 1     Activity as tolerated     Sponge bath only until clinic visit     Keep surgical extremity elevated     Lifting restrictions   Order Comments: No strenuous exercise or lifting of > 10 lbs     Weight bearing as tolerated     No driving, operating heavy equipment or signing legal documents while taking pain medication     Leave dressing on - Keep it clean, dry, and intact until clinic visit   Order Comments: Do not remove surgical dressing for 2 weeks post-op. This will be done only by MD at initial post-op visit. If dressing is completely saturated, replace with identical dressing - silver-impregnated hydrocolloid dressing.     Do not get dressings wet. Do not shower.     If dressing continues to be saturated or there are signs of infection, please call [facility] [phone] for further instructions and to make appt to be seen.     Call MD for:  temperature >100.4     Call MD for:  persistent nausea and vomiting     Call MD for:  severe uncontrolled pain     Call MD for:  difficulty breathing, headache or visual disturbances     Call MD for:  redness, tenderness, or signs of infection (pain, swelling, redness, odor or green/yellow discharge around incision site)     Call MD for:  hives     Call MD for:  persistent dizziness or light-headedness     Call MD for:  extreme fatigue     Medications:  Reconciled Home Medications:      Medication List      ASK your doctor about these medications    aspirin 81 MG EC tablet  Commonly known as: ECOTRIN  Take 1 tablet (81 mg total) by mouth once daily.     biotin 1 mg Cap  Take by mouth.     cefadroxil 500 MG Cap  Commonly known as: DURICEF  Take 2 capsules (1 g total) by mouth every 12 (twelve) hours. for 7  days     celecoxib 200 MG capsule  Commonly known as: CeleBREX  Take 1 capsule (200 mg total) by mouth 2 (two) times daily.     esomeprazole 20 MG capsule  Commonly known as: NEXIUM  Take 20 mg by mouth before breakfast.     fish oil-omega-3 fatty acids 300-1,000 mg capsule  Take by mouth once daily.     hydroCHLOROthiazide 25 MG tablet  Commonly known as: HYDRODIURIL  TAKE 1 TABLET(25 MG) BY MOUTH EVERY DAY     ibuprofen 800 MG tablet  Commonly known as: ADVIL,MOTRIN  TAKE 1 TABLET(800 MG) BY MOUTH EVERY 6 HOURS AS NEEDED FOR PAIN     JOINT HEALTH ORAL  Take by mouth.     lovastatin 40 MG tablet  Commonly known as: MEVACOR  TAKE 1 TABLET(40 MG) BY MOUTH EVERY EVENING     meloxicam 15 MG tablet  Commonly known as: MOBIC  Take 15 mg by mouth.     metFORMIN 500 MG ER 24hr tablet  Commonly known as: GLUCOPHAGE-XR  Take 1 tablet (500 mg total) by mouth daily with breakfast.     methocarbamoL 500 MG Tab  Commonly known as: ROBAXIN  Take 1 tablet (500 mg total) by mouth 4 (four) times daily. for 14 days     nebivoloL 5 MG Tab  Commonly known as: BYSTOLIC  Take 1 tablet (5 mg total) by mouth once daily.     olmesartan 40 MG tablet  Commonly known as: BENICAR  TAKE 1 TABLET(40 MG) BY MOUTH EVERY DAY     oxyCODONE 5 MG immediate release tablet  Commonly known as: ROXICODONE  Take 1 tablet (5 mg total) by mouth every 4 (four) hours as needed for Pain.     pregabalin 75 MG capsule  Commonly known as: LYRICA  Take 1 capsule (75 mg total) by mouth 2 (two) times daily. for 14 days     TURMERIC ORAL  Take by mouth.            Darek Reyes MD  Orthopedics  [facility]

## 2023-07-27 NOTE — PLAN OF CARE
Problem: Adult Inpatient Plan of Care  Goal: Plan of Care Review  7/27/2023 1338 by Doreen Rogers RN  Outcome: Met  7/27/2023 1331 by Doreen Rogers RN  Outcome: Adequate for Care Transition  Flowsheets (Taken 7/27/2023 1331)  Plan of Care Reviewed With:   patient   friend  Goal: Patient-Specific Goal (Individualized)  7/27/2023 1338 by Doreen Rogers RN  Outcome: Met  7/27/2023 1331 by Doreen Rogers RN  Outcome: Adequate for Care Transition  Flowsheets (Taken 7/27/2023 1331)  Anxieties, Fears or Concerns: None  Individualized Care Needs: Keep pt and friend updated on plan of care  Goal: Absence of Hospital-Acquired Illness or Injury  7/27/2023 1338 by Doreen Rogers RN  Outcome: Met  7/27/2023 1331 by Doreen Rogers RN  Outcome: Adequate for Care Transition  Intervention: Identify and Manage Fall Risk  Flowsheets (Taken 7/27/2023 1331)  Safety Promotion/Fall Prevention:   assistive device/personal item within reach   instructed to call staff for mobility   side rails raised x 2   Fall Risk reviewed with patient/family   lighting adjusted   medications reviewed  Intervention: Prevent Skin Injury  Flowsheets (Taken 7/27/2023 1331)  Body Position: supine  Intervention: Prevent and Manage VTE (Venous Thromboembolism) Risk  Flowsheets (Taken 7/27/2023 1331)  Activity Management: Ankle pumps - L1  VTE Prevention/Management:   dorsiflexion/plantar flexion performed   fluids promoted   remove, assess skin, and reapply foot pump  Intervention: Prevent Infection  Flowsheets (Taken 7/27/2023 1331)  Infection Prevention: hand hygiene promoted  Goal: Optimal Comfort and Wellbeing  7/27/2023 1338 by Doreen Rogers RN  Outcome: Met  7/27/2023 1331 by Doreen Rogers RN  Outcome: Adequate for Care Transition  Intervention: Monitor Pain and Promote Comfort  Flowsheets (Taken 7/27/2023 1331)  Pain Management Interventions:   care clustered   cold applied   quiet environment facilitated  Intervention: Provide  Person-Centered Care  Flowsheets (Taken 7/27/2023 1331)  Trust Relationship/Rapport:   care explained   questions encouraged   choices provided   emotional support provided   thoughts/feelings acknowledged   empathic listening provided   questions answered  Goal: Readiness for Transition of Care  7/27/2023 1338 by Doreen Rogers RN  Outcome: Met  7/27/2023 1331 by Doreen Rogers RN  Outcome: Adequate for Care Transition     Problem: Pain Acute  Goal: Acceptable Pain Control and Functional Ability  7/27/2023 1338 by Doreen Rogers RN  Outcome: Met  7/27/2023 1331 by Doreen Rogers RN  Outcome: Adequate for Care Transition  Intervention: Develop Pain Management Plan  Flowsheets (Taken 7/27/2023 1331)  Pain Management Interventions:   care clustered   cold applied   quiet environment facilitated  Intervention: Prevent or Manage Pain  Flowsheets (Taken 7/27/2023 1331)  Medication Review/Management: medications reviewed  Intervention: Optimize Psychosocial Wellbeing  Flowsheets (Taken 7/27/2023 1331)  Supportive Measures: active listening utilized     Problem: Adjustment to Surgery (Knee Arthroplasty)  Goal: Optimal Coping  7/27/2023 1338 by Doreen Rogers RN  Outcome: Met  7/27/2023 1331 by Doreen Rogers RN  Outcome: Adequate for Care Transition     Problem: Bleeding (Knee Arthroplasty)  Goal: Absence of Bleeding  7/27/2023 1338 by Doreen Rogers RN  Outcome: Met  7/27/2023 1331 by Doreen Rogers RN  Outcome: Adequate for Care Transition  Intervention: Monitor and Manage Bleeding  Flowsheets (Taken 7/27/2023 1331)  Bleeding Management: dressing monitored     Problem: Bowel Motility Impaired (Knee Arthroplasty)  Goal: Effective Bowel Elimination  7/27/2023 1338 by Doreen Rogers RN  Outcome: Met  7/27/2023 1331 by Doreen Rogers RN  Outcome: Adequate for Care Transition     Problem: Fluid and Electrolyte Imbalance (Knee Arthroplasty)  Goal: Fluid and Electrolyte Balance  7/27/2023 1338 by Doreen Rogers  RN  Outcome: Met  7/27/2023 1331 by Doreen Rogers RN  Outcome: Adequate for Care Transition     Problem: Functional Ability Impaired (Knee Arthroplasty)  Goal: Optimal Functional Ability  7/27/2023 1338 by Doreen Rogers RN  Outcome: Met  7/27/2023 1331 by Doreen Rogers RN  Outcome: Adequate for Care Transition     Problem: Infection (Knee Arthroplasty)  Goal: Absence of Infection Signs and Symptoms  7/27/2023 1338 by Doreen Rogers RN  Outcome: Met  7/27/2023 1331 by Doreen Rogers RN  Outcome: Adequate for Care Transition     Problem: Neurovascular Compromise (Knee Arthroplasty)  Goal: Intact Neurovascular Status  7/27/2023 1338 by Doreen Rogers RN  Outcome: Met  7/27/2023 1331 by Doreen Rogers RN  Outcome: Adequate for Care Transition     Problem: Ongoing Anesthesia Effects (Knee Arthroplasty)  Goal: Anesthesia/Sedation Recovery  7/27/2023 1338 by Doreen Rogers RN  Outcome: Met  7/27/2023 1331 by Doreen Rogers RN  Outcome: Adequate for Care Transition     Problem: Pain (Knee Arthroplasty)  Goal: Acceptable Pain Control  7/27/2023 1338 by Doreen Rogers RN  Outcome: Met  7/27/2023 1331 by Doreen Rogers RN  Outcome: Adequate for Care Transition     Problem: Postoperative Nausea and Vomiting (Knee Arthroplasty)  Goal: Nausea and Vomiting Relief  7/27/2023 1338 by Doreen Rogers RN  Outcome: Met  7/27/2023 1331 by Doreen Rogers RN  Outcome: Adequate for Care Transition     Problem: Postoperative Urinary Retention (Knee Arthroplasty)  Goal: Effective Urinary Elimination  7/27/2023 1338 by Doreen Rogers RN  Outcome: Met  7/27/2023 1331 by Doreen Rogers RN  Outcome: Adequate for Care Transition     Problem: Respiratory Compromise (Knee Arthroplasty)  Goal: Effective Oxygenation and Ventilation  7/27/2023 1338 by Doreen Rogers RN  Outcome: Met  7/27/2023 1331 by Doreen Rogers RN  Outcome: Adequate for Care Transition     Problem: Fall Injury Risk  Goal: Absence of Fall and Fall-Related  Injury  7/27/2023 1338 by Doreen Rogers RN  Outcome: Met  7/27/2023 1331 by Doreen Rogers RN  Outcome: Adequate for Care Transition  Intervention: Identify and Manage Contributors  Flowsheets (Taken 7/27/2023 1331)  Medication Review/Management: medications reviewed  Intervention: Promote Injury-Free Environment  Flowsheets (Taken 7/27/2023 1331)  Safety Promotion/Fall Prevention:   assistive device/personal item within reach   instructed to call staff for mobility   side rails raised x 2   Fall Risk reviewed with patient/family   lighting adjusted   medications reviewed     Problem: Obstructive Sleep Apnea Risk or Actual  Goal: Unobstructed Breathing During Sleep  7/27/2023 1338 by Doreen Rogers RN  Outcome: Met  7/27/2023 1331 by Doreen Rogers RN  Outcome: Adequate for Care Transition  Intervention: Monitor and Manage Obstructive Sleep Apnea  Flowsheets (Taken 7/27/2023 1331)  NPPV/CPAP Maintenance: home PAP equipment/settings used

## 2023-07-27 NOTE — NURSING
Patient admitted to unit as ordered. No s/s of acute distress. Condition stable. Alert, verbal and oriented x 4. NS infusing at 150 mL/hr. Dressing to left knee clean, dry and intact. Nimbus intact. Call light placed within easy reach.

## 2023-07-27 NOTE — PLAN OF CARE
Problem: Adult Inpatient Plan of Care  Goal: Absence of Hospital-Acquired Illness or Injury  Intervention: Identify and Manage Fall Risk  Flowsheets (Taken 7/26/2023 1934)  Safety Promotion/Fall Prevention:   assistive device/personal item within reach   Fall Risk reviewed with patient/family   medications reviewed   lighting adjusted   side rails raised x 2   instructed to call staff for mobility     Problem: Adult Inpatient Plan of Care  Goal: Absence of Hospital-Acquired Illness or Injury  Intervention: Prevent Skin Injury  Flowsheets (Taken 7/26/2023 1934)  Body Position: supine     Problem: Adult Inpatient Plan of Care  Goal: Absence of Hospital-Acquired Illness or Injury  Intervention: Prevent and Manage VTE (Venous Thromboembolism) Risk  Flowsheets (Taken 7/26/2023 1934)  Activity Management: Ankle pumps - L1  VTE Prevention/Management:   remove, assess skin, and reapply foot pump   intravenous hydration   dorsiflexion/plantar flexion performed     Problem: Adult Inpatient Plan of Care  Goal: Absence of Hospital-Acquired Illness or Injury  Intervention: Prevent Infection  Flowsheets (Taken 7/26/2023 1934)  Infection Prevention: hand hygiene promoted     Problem: Adult Inpatient Plan of Care  Goal: Optimal Comfort and Wellbeing  Intervention: Monitor Pain and Promote Comfort  Flowsheets (Taken 7/26/2023 1934)  Pain Management Interventions:   care clustered   cold applied   quiet environment facilitated     Problem: Adult Inpatient Plan of Care  Goal: Optimal Comfort and Wellbeing  Intervention: Provide Person-Centered Care  Flowsheets (Taken 7/26/2023 1934)  Trust Relationship/Rapport:   care explained   questions encouraged   choices provided   emotional support provided   thoughts/feelings acknowledged   empathic listening provided   questions answered     Problem: Pain Acute  Goal: Acceptable Pain Control and Functional Ability  Intervention: Develop Pain Management Plan  Flowsheets (Taken 7/26/2023  1934)  Pain Management Interventions:   care clustered   cold applied   quiet environment facilitated     Problem: Pain Acute  Goal: Acceptable Pain Control and Functional Ability  Intervention: Prevent or Manage Pain  Flowsheets (Taken 7/26/2023 1934)  Sensory Stimulation Regulation:   care clustered   lighting decreased  Medication Review/Management: medications reviewed     Problem: Pain Acute  Goal: Acceptable Pain Control and Functional Ability  Intervention: Optimize Psychosocial Wellbeing  Flowsheets (Taken 7/26/2023 1934)  Supportive Measures: active listening utilized     Problem: Adjustment to Surgery (Knee Arthroplasty)  Goal: Optimal Coping  Intervention: Support Psychosocial Response to Surgery and Mobility Changes  Flowsheets (Taken 7/26/2023 1934)  Supportive Measures: active listening utilized     Problem: Bleeding (Knee Arthroplasty)  Goal: Absence of Bleeding  Intervention: Monitor and Manage Bleeding  Flowsheets (Taken 7/26/2023 1934)  Bleeding Management: dressing monitored     Problem: Fall Injury Risk  Goal: Absence of Fall and Fall-Related Injury  Intervention: Identify and Manage Contributors  Flowsheets (Taken 7/26/2023 1934)  Medication Review/Management: medications reviewed     Problem: Fall Injury Risk  Goal: Absence of Fall and Fall-Related Injury  Intervention: Promote Injury-Free Environment  Flowsheets (Taken 7/26/2023 1934)  Safety Promotion/Fall Prevention:   assistive device/personal item within reach   Fall Risk reviewed with patient/family   medications reviewed   lighting adjusted   side rails raised x 2   instructed to call staff for mobility

## 2023-07-27 NOTE — PT/OT/SLP EVAL
Physical Therapy Evaluation    Patient Name:  Diane Garcia   MRN:  [id number]    Recommendations:     Discharge Recommendations: outpatient PT   Discharge Equipment Recommendations: bedside commode, walker, rolling   Barriers to discharge: None    Assessment:     Diane Garcia is a 67 y.o. female admitted with a medical diagnosis of Primary osteoarthritis of left knee.  She presents with the following impairments/functional limitations: impaired self care skills, impaired functional mobility, decreased lower extremity function, orthopedic precautions. Patient was alert and eager to participate in therapy. She ambulated, performed all LLE exercises and she ascended/descended the stairs with minimal increase to her pain. She demonstrated great safety awareness throughout the session and was able to maintain conversation with no SOB or dizziness noted. Patient will continue to benefit from skilled PT to address decreased lower extremity function and mobility.     Rehab Prognosis: Good; patient would benefit from acute skilled PT services to address these deficits and reach maximum level of function.    Recent Surgery: Procedure(s) (LRB):  ARTHROPLASTY, KNEE, TOTAL (Left) 1 Day Post-Op    Plan:     During this hospitalization, patient to be seen daily to address the identified rehab impairments via therapeutic activities, gait training, therapeutic exercises and progress toward the following goals:    Plan of Care Expires:  07/28/23    Subjective     Chief Complaint: None   Patient/Family Comments/goals: Patient would like to return to long distance biking with her friend without any pain.   Pain/Comfort:  Pain Rating 1:  (Pain not scored)  Location - Side 1: Left  Location - Orientation 1: generalized  Location 1: knee    Patients cultural, spiritual, [employer] conflicts given the current situation: no    Living Environment:  Patient lives in a single story home with 2 sets of stairs to enter (8 steps) Set 1  railing on R, set 2 on L.   Prior to admission, patients level of function was independent.  Equipment used at home: shower chair.  Upon discharge, patient will have assistance from friend and son.    Objective:     Communicated with RN prior to session.  Patient found up in chair with cryotherapy, FCD, telemetry, perineural catheter,   upon PT entry to room.    General Precautions: Standard, fall  Orthopedic Precautions:LLE weight bearing as tolerated   Braces: N/A  Respiratory Status: Room air    Exams:  Sensation:    -       Intact  RLE ROM: WFL  RLE Strength: WFL  LLE ROM: WFL  LLE Strength: Appears to be WFL, but not formally assessed due to recent procedure     Functional Mobility:  Mat Mobility:     Supine to Sit: stand by assistance  Sit to Supine: stand by assistance  Transfers:     Sit to Stand:  stand by assistance with rolling walker  Toilet Transfer: patient walked to bathroom and then performed toilet transfer using stand by assistance with  rolling walker  Gait: Patient ambulated 210 ft x2 trials using rolling walker with standby assistance. She denied dizziness and showed no LOB or SOB. She performed exercises and stairs bewteen walking trials.   Stairs:  Pt ascended/descended 4 stair(s) with No Assistive Device with right handrail with Stand-by Assistance.       AM-PAC 6 CLICK MOBILITY  Total Score:18       Treatment & Education:  Therapeutic Activity:   Patient and friend educated in:  -PT role and POC  -safety with transfers including hand placement  -gait sequencing and RW management  -OOB activity to maximize recovery including ambulating at home to prevent DVT   -stair training    Therapeutic Exercise:  Patient educated in and performed L LE exercises x10 for ankle pumps, quad set, glute set, SAQ over bolster, heel slides, hip abd/add, SLR, and LAQ. Handout provided.       Patient left up in chair with all lines intact, call button in reach, RN notified, and friend present.    GOALS:    Multidisciplinary Problems       Physical Therapy Goals          Problem: Physical Therapy    Goal Priority Disciplines Outcome Goal Variances Interventions   Physical Therapy Goal     PT, PT/OT Ongoing, Progressing     Description: Goals to be met by: 07/28/2023                            History:     Past Medical History:   Diagnosis Date    Arthritis     GERD (gastroesophageal reflux disease)     Hyperlipidemia     Hypertension        Past Surgical History:   Procedure Laterality Date    COLONOSCOPY N/A 02/02/2023    Procedure: COLONOSCOPY;  Surgeon: Bob Grayson MD;  Location: [facility];  Service: Endoscopy;  Laterality: N/A;    ESOPHAGOGASTRODUODENOSCOPY N/A 02/02/2023    Procedure: EGD (ESOPHAGOGASTRODUODENOSCOPY);  Surgeon: Bob Grayson MD;  Location: [facility];  Service: Endoscopy;  Laterality: N/A;    HYSTERECTOMY      OOPHORECTOMY      mary jo placed Right     femur right mary jo    mary jo removed Right     TOTAL KNEE ARTHROPLASTY Left 7/26/2023    Procedure: ARTHROPLASTY, KNEE, TOTAL;  Surgeon: NATALIO Greenwood MD;  Location: [facility];  Service: Orthopedics;  Laterality: Left;  Regional w/Catheter, Adductor, Spinal, 0.2% Ropivacaine       Time Tracking:     PT Received On: 07/27/23  PT Start Time: 0948     PT Stop Time: 1025  PT Total Time (min): 37 min     Billable Minutes: Evaluation 6, Therapeutic Activity 14, and Therapeutic Exercise 17      07/27/2023

## 2023-07-27 NOTE — PROGRESS NOTES
[facility])  Orthopedics  Progress Note    Patient Name: Diane Garcia  MRN: [id number]  Admission Date: 7/26/2023  Hospital Length of Stay: 0 days  Attending Provider: NATALIO Greenwood MD  Primary Care Provider: Beverly Oakes MD  Follow-up For: Procedure(s) (LRB):  ARTHROPLASTY, KNEE, TOTAL (Left)    Post-Operative Day: 1 Day Post-Op  Subjective:     Principal Problem:Primary osteoarthritis of left knee    Principal Orthopedic Problem: same as above, s/p L TKA 7/26    Interval History: Patient seen and examined at bedside. NAEON. Patient doing well. No complaints. Pain well controlled. Anticipate mobilizing with PT.      Review of patient's allergies indicates:   Allergen Reactions    Penicillins      nausea       Current Facility-Administered Medications   Medication    0.9%  NaCl infusion    acetaminophen tablet 1,000 mg    ceFAZolin 2 g in sodium chloride 0.9 % 50 mL IVPB (MB+)    fentaNYL 50 mcg/mL injection 100 mcg    fentaNYL 50 mcg/mL injection 25 mcg    fentaNYL 50 mcg/mL injection  mcg    haloperidol lactate injection 0.5 mg    HYDROmorphone injection 0.2 mg    LIDOcaine (PF) 10 mg/ml (1%) injection 10 mg    melatonin tablet 6 mg    midazolam (VERSED) 1 mg/mL injection 0.5-4 mg    midazolam (VERSED) 1 mg/mL injection 4 mg    morphine injection 2 mg    naloxone 0.4 mg/mL injection 0.02 mg    ondansetron injection 4 mg    oxyCODONE immediate release tablet 10 mg    oxyCODONE immediate release tablet 5 mg    oxyCODONE immediate release tablet 5 mg    pregabalin capsule 75 mg    prochlorperazine injection Soln 5 mg    ropivacaine 0.2% Nimbus PainPRO Pump infusion 500 ML    sodium chloride 0.9% flush 10 mL    sodium chloride 0.9% flush 10 mL    tranexamic acid (CYKLOKAPRON) 1,000 mg in sodium chloride 0.9 % 100 mL IVPB (MB+)     Objective:     Vital Signs (Most Recent):  Temp: 98 °F (36.7 °C) (07/26/23 1218)  Pulse: 66 (07/26/23 1628)  Resp: 17 (07/26/23  "1638)  BP: (!) 114/56 (07/26/23 1350)  SpO2: (!) 92 % (07/26/23 1628) Vital Signs (24h Range):  Temp:  [98 °F (36.7 °C)] 98 °F (36.7 °C)  Pulse:  [57-66] 66  Resp:  [16-20] 17  SpO2:  [92 %-99 %] 92 %  BP: (107-140)/(55-67) 114/56     Weight: 105.7 kg (233 lb)  Height: 5' 5" (165.1 cm)  Body mass index is 38.77 kg/m².      Intake/Output Summary (Last 24 hours) at 7/26/2023 1651  Last data filed at 7/26/2023 1538  Gross per 24 hour   Intake 2000 ml   Output --   Net 2000 ml        Ortho/SPM Exam  AAOx4  NAD  Reg rate  No increased WOB    LLE:  Dressing c/d/i  Ice in place  SILT T/SP/DP/Person/Sa  Motor intact T/SP/DP  WWP extremities  FCDs in place and functioning       Significant Labs: All pertinent labs within the past 24 hours have been reviewed.    Significant Imaging: I have reviewed all pertinent imaging results/findings.    Assessment/Plan:     * Primary osteoarthritis of left knee  Diane Garcia is a 67 y.o. female is s/p L TKA on 7/26/23    Surgical dressing C/D/I, ice in place  Pain control: multimodal, Anesthesia Surgical Home following  PT/OT: WBAT LLE  DVT PPx: ASA 81 BID, FCDs at all times when not ambulating  Abx: postop Ancef, discharge with Cefadroxil x7 days  Drain: none  North: none    Dispo: plan to dc to home today once cleared by PT/OT            Darek Reyes MD  Orthopedics  [facility] [place] ([facility])  "

## 2023-07-27 NOTE — PLAN OF CARE
Problem: Physical Therapy  Goal: Physical Therapy Goal  Description: Goals to be met by: 07/28/2023       Outcome: Ongoing, Progressing     Patient tolerated PT session well. Patient ambulated 210 ft x2 with RW and stand by assistance. No LOB or SOB noted. Patient ascended/descended 4 steps with stand by assistance and rolling walker. Patient performed L LE therex x10 reps. Patient ready to discharge home from PT standpoint.

## 2023-07-27 NOTE — PLAN OF CARE
Problem: Adult Inpatient Plan of Care  Goal: Absence of Hospital-Acquired Illness or Injury  Intervention: Identify and Manage Fall Risk  Flowsheets (Taken 7/27/2023 1331)  Safety Promotion/Fall Prevention:   assistive device/personal item within reach   instructed to call staff for mobility   side rails raised x 2   Fall Risk reviewed with patient/family   lighting adjusted   medications reviewed     Problem: Adult Inpatient Plan of Care  Goal: Absence of Hospital-Acquired Illness or Injury  Intervention: Prevent Skin Injury  Flowsheets (Taken 7/27/2023 1331)  Body Position: supine     Problem: Adult Inpatient Plan of Care  Goal: Absence of Hospital-Acquired Illness or Injury  Intervention: Prevent and Manage VTE (Venous Thromboembolism) Risk  Flowsheets (Taken 7/27/2023 1331)  Activity Management: Ankle pumps - L1  VTE Prevention/Management:   dorsiflexion/plantar flexion performed   fluids promoted   remove, assess skin, and reapply foot pump     Problem: Adult Inpatient Plan of Care  Goal: Absence of Hospital-Acquired Illness or Injury  Intervention: Prevent Infection  Flowsheets (Taken 7/27/2023 1331)  Infection Prevention: hand hygiene promoted     Problem: Adult Inpatient Plan of Care  Goal: Optimal Comfort and Wellbeing  Intervention: Monitor Pain and Promote Comfort  Flowsheets (Taken 7/27/2023 1331)  Pain Management Interventions:   care clustered   cold applied   quiet environment facilitated     Problem: Adult Inpatient Plan of Care  Goal: Optimal Comfort and Wellbeing  Intervention: Provide Person-Centered Care  Flowsheets (Taken 7/27/2023 1331)  Trust Relationship/Rapport:   care explained   questions encouraged   choices provided   emotional support provided   thoughts/feelings acknowledged   empathic listening provided   questions answered     Problem: Pain Acute  Goal: Acceptable Pain Control and Functional Ability  Intervention: Develop Pain Management Plan  Flowsheets (Taken 7/27/2023  1331)  Pain Management Interventions:   care clustered   cold applied   quiet environment facilitated     Problem: Pain Acute  Goal: Acceptable Pain Control and Functional Ability  Intervention: Prevent or Manage Pain  Flowsheets (Taken 7/27/2023 1331)  Medication Review/Management: medications reviewed     Problem: Pain Acute  Goal: Acceptable Pain Control and Functional Ability  Intervention: Optimize Psychosocial Wellbeing  Flowsheets (Taken 7/27/2023 1331)  Supportive Measures: active listening utilized     Problem: Bleeding (Knee Arthroplasty)  Goal: Absence of Bleeding  Intervention: Monitor and Manage Bleeding  Flowsheets (Taken 7/27/2023 1331)  Bleeding Management: dressing monitored     Problem: Fall Injury Risk  Goal: Absence of Fall and Fall-Related Injury  Intervention: Identify and Manage Contributors  Flowsheets (Taken 7/27/2023 1331)  Medication Review/Management: medications reviewed     Problem: Fall Injury Risk  Goal: Absence of Fall and Fall-Related Injury  Intervention: Promote Injury-Free Environment  Flowsheets (Taken 7/27/2023 1331)  Safety Promotion/Fall Prevention:   assistive device/personal item within reach   instructed to call staff for mobility   side rails raised x 2   Fall Risk reviewed with patient/family   lighting adjusted   medications reviewed     Problem: Obstructive Sleep Apnea Risk or Actual  Goal: Unobstructed Breathing During Sleep  Intervention: Monitor and Manage Obstructive Sleep Apnea  Flowsheets (Taken 7/27/2023 1331)  NPPV/CPAP Maintenance: home PAP equipment/settings used

## 2023-07-27 NOTE — PT/OT/SLP EVAL
Occupational Therapy Evaluation  and Discharge Note    Name: Diane Garcia  MRN: [id number]  Admitting Diagnosis: Primary osteoarthritis of left knee 1 Day Post-Op  Recent Surgery: Procedure(s) (LRB):  ARTHROPLASTY, KNEE, TOTAL (Left) 1 Day Post-Op    Recommendations:     Discharge Recommendations: home  Level of Assistance Recommended: 24 hours supervision  Discharge Equipment Recommendations: bedside commode, walker, rolling  Barriers to discharge: None    Assessment:     Diane Garcia is a 67 y.o. female with a medical diagnosis of Primary osteoarthritis of left knee. She presents with performance deficits affecting function including impaired self care skills, impaired functional mobility, orthopedic precautions. Pt was able to perform supine/sit T/F c S and Sit <> Stand Transfer with supervision with rolling walker Bed <> Chair Transfer using Stand Pivot technique with supervision with rolling walker Toilet Transfer Stand Pivot technique with supervision with rolling walker and bedside commode.  Able to perform UB/LB dressing c modified independence  Educated pt on bathing, car transfers, and cryotherapy.       Rehab Prognosis: Good; patient would benefit from acute OT services to address these deficits and reach maximum level of function.    Plan:     Patient to be seen daily to address the above listed problems via self-care/home management, therapeutic activities, therapeutic exercises  Plan of Care Expires: 07/27/23  Plan of Care Reviewed with: patient, friend    Subjective     Chief Complaint: L TKA  Patient Comments/Goals: I think I'm doing good.  Pain/Comfort:  Pain Rating 1: 4/10    Patients cultural, spiritual, Religion conflicts given the current situation: no    Social History:  Living Environment: Patient lives alone in a single story home with number of outside stair(s): 7 c a rail on the R side of the steps and walk-in shower  Prior Level of Function: Prior to admission, patient was  independent.  Roles and Routines: Patient was driving and retired prior to admission.  Equipment Used at Home: shower chair  DME owned (not currently used): shower chair  Assistance Upon Discharge: friend(s)    Objective:     Communicated with RN prior to session. Patient found supine with cryotherapy, FCD upon OT entry to room.    General Precautions: Standard, fall   Orthopedic Precautions: LLE weight bearing as tolerated   Braces: N/A    Respiratory Status: Room air    Occupational Performance    Gait belt applied - Yes    Bed Mobility:   Supine to sit from left side of bed with supervision    Functional Mobility/Transfers:  Sit <> Stand Transfer with supervision with rolling walker  Bed <> Chair Transfer using Stand Pivot technique with supervision with rolling walker  Toilet Transfer Stand Pivot technique with supervision with rolling walker and bedside commode  Functional Mobility: Pt was able to walk to bathroom c S and RW.    Activities of Daily Living:  Upper Body Dressing: supervision to don shirt  Lower Body Dressing: supervision to don shorts and was unable to don shoes d/t swelling in B LE.  Toileting: supervision for toilet hygiene while sitting on BSC over toilet.  Grooming: Supervision to wash hands while standing at sink c RW.    Physical Exam:  Upper Extremity Range of Motion:     -       Right Upper Extremity: WFL  -       Left Upper Extremity: WFL  Upper Extremity Strength:    -       Right Upper Extremity: WFL  -       Left Upper Extremity: WFL    AMPAC 6 Click ADL:  AMPAC Total Score: 24    Treatment & Education:  Educated on the importance of mobility to maximize recovery  Educated on the importance of OOB mobility within safe range in order to decrease adverse effects of prolonged bedrest  Educated on safety with functional mobility; hand placement to ensure safe transfers to various surfaces in prep for ADLs  Educated on performing functional mobility and ADLs in adherence to orthopedic  precautions  Educated on weight bearing status  Will continue to educate as needed      Patient clear to ambulate to/from bathroom with RN/PCT, assist x1 .    Patient left up in chair with all lines intact, call button in reach, and RN notified.    GOALS:   Multidisciplinary Problems       Occupational Therapy Goals          Problem: Occupational Therapy    Goal Priority Disciplines Outcome Interventions   Occupational Therapy Goal     OT, PT/OT Ongoing, Progressing    Description: Goals to be met by: 7/27/23     Patient will increase functional independence with ADLs by performing:    UE Dressing with Modified [place].  LE Dressing with Modified [place] and Assistive Devices as needed.  Grooming while standing at sink with Modified [place].  Toileting from bedside commode with Modified [place] for hygiene and clothing management.   Bathing from  shower chair/bench with Modified [place].  Toilet transfer to bedside commode with Modified [place].                         History:     Past Medical History:   Diagnosis Date    Arthritis     GERD (gastroesophageal reflux disease)     Hyperlipidemia     Hypertension          Past Surgical History:   Procedure Laterality Date    COLONOSCOPY N/A 02/02/2023    Procedure: COLONOSCOPY;  Surgeon: Bob Grayson MD;  Location: [facility];  Service: Endoscopy;  Laterality: N/A;    ESOPHAGOGASTRODUODENOSCOPY N/A 02/02/2023    Procedure: EGD (ESOPHAGOGASTRODUODENOSCOPY);  Surgeon: Bob Grayson MD;  Location: [facility];  Service: Endoscopy;  Laterality: N/A;    HYSTERECTOMY      OOPHORECTOMY      mary jo placed Right     femur right mary jo    mary jo removed Right     TOTAL KNEE ARTHROPLASTY Left 7/26/2023    Procedure: ARTHROPLASTY, KNEE, TOTAL;  Surgeon: NATALIO Greenwood MD;  Location: [facility];  Service: Orthopedics;  Laterality: Left;  Regional w/Catheter, Adductor, Spinal, 0.2% Ropivacaine       Time Tracking:     OT Date of Treatment:  07/27/23  OT Start Time: 0830  OT Stop Time: 0927  OT Total Time (min): 57 min    Billable Minutes: Evaluation 12, Self Care/Home Management 30, and Therapeutic Activity 15    7/27/2023

## 2023-07-27 NOTE — NURSING
Patient discharged from unit as ordered. No s/s of acute distress. Condition stable. Dressing to left knee remains clean, dry and intact. Meds delivered to bedside. Patient and friend both verbalized understanding related to discharge/[facility] teaching. Patient was rolled down x staff assist and assisted into the vehicle.

## 2023-07-27 NOTE — PROGRESS NOTES
I called the patient today regarding her surgery with Dr. Greenwood. The patient had a left TKA on 7/26/2023.    Pain Scale: 6 / 10    Any issues with Fever: No.    Any issues with medications (specifically DVT prophylaxis): No.    Any issues with bowel movements:  Passing young: No                                                            Urination: No                                                            Constipation: No, Has not had Bowel movements since surgery- taking Miralax.     Completing at home exercises: Yes:     Any concerns regarding their dressing/bandage:  No.    Patient confirmed first OP-PT appointment:   7/28/23 at [place].    Any other concerns: Patient concerned about her blood pressure medications. I will reach out to Dr. Oakes and the patient will as well. Also discussed that she is ok to take Miralax for post op constipation.       The patient was informed that if they have any urgent issues with their bandage, medications or any other health concerns regarding their surgery to call the 24/7 Orthopedic Post-op Hot Line at [phone]. The patient was reminded that if they have any chest pain or shortness of breath to call 911 or go to the ER.    The patient verbalized understanding and does not have any other questions

## 2023-07-27 NOTE — ADDENDUM NOTE
Addendum  created 07/27/23 1048 by Fartun Venegas MD    Charge Capture section accepted, Cosign clinical note with attestation

## 2023-07-27 NOTE — PLAN OF CARE
Problem: Adult Inpatient Plan of Care  Goal: Absence of Hospital-Acquired Illness or Injury  Outcome: Ongoing, Progressing  Intervention: Identify and Manage Fall Risk  Flowsheets (Taken 7/27/2023 0430)  Safety Promotion/Fall Prevention:   assistive device/personal item within reach   side rails raised x 2   instructed to call staff for mobility   medications reviewed   Fall Risk reviewed with patient/family   nonskid shoes/socks when out of bed  Intervention: Prevent and Manage VTE (Venous Thromboembolism) Risk  Flowsheets (Taken 7/27/2023 0430)  Activity Management: Ankle pumps - L1  VTE Prevention/Management:   fluids promoted   dorsiflexion/plantar flexion performed   intravenous hydration   remove, assess skin, and reapply foot pump  Range of Motion: active ROM (range of motion) encouraged  Intervention: Prevent Infection  Flowsheets (Taken 7/27/2023 0430)  Infection Prevention:   rest/sleep promoted   hand hygiene promoted   single patient room provided

## 2023-07-27 NOTE — OP NOTE
[facility]   OPERATIVE REPORT   ORTHOPAEDIC SURGERY   PROVIDER: DR. CELSA CASTILLO    PATIENT INFORMATION   Diane Garcia 67 y.o. female 1955   MRN: [id number]   LOCATION: [facility]     DATE OF PROCEDURE: 7/26/2023     PREOPERATIVE DIAGNOSES:   Primary osteoarthritis of left knee     POSTOPERATIVE DIAGNOSES:   Primary osteoarthritis of left knee      PROCEDURES PERFORMED:   Left total knee arthroplasty (CPT 65625)     SURGEON: CELSA Castillo MD     ASSISTANTS:  Darek Reyes MD - Resident  SMA Kenji     ANESTHESIA: Spinal with adductor catheter     ESTIMATED BLOOD LOSS: 200 mL    IMPLANTS:   Implant Name Type Inv. Item Serial No.  Lot No. LRB No. Used Action   PIN FIX TEMP 1/8X2.5IN LF STER - [id number]  PIN FIX TEMP 1/8X2.5IN LF STER  [employer]. [id number] Left 1 Implanted and Explanted   CEMENT BONE SIMPLEX HV RADPQ - [id number]  CEMENT BONE SIMPLEX HV RADPQ  [employer]. [id number] Left 1 Implanted   CEMENT BONE SIMPLEX HV RADPQ - [id number]  CEMENT BONE SIMPLEX HV RADPQ  [employer]. [id number] Left 1 Implanted   COMP FEM POST STAB ZAHRAA SZ 2 LF - [id number]  COMP FEM POST STAB ZAHRAA SZ 2 LF  [employer]. [id number] Left 1 Implanted   BASEPLATE SZ 3 TRI TS IMPLANT - [id number]  BASEPLATE SZ 3 TRI TS IMPLANT  [employer]. [id number] Left 1 Implanted   STEM KNEE TRITHLON ZAHRAA 12X50 - [id number]  STEM KNEE TRITHLON ZAHRAA 12X50  [employer]. [id number] Left 1 Implanted   PATELLA TRIATHLON 29X8 SYMTRC - [id number]  PATELLA TRIATHLON 29X8 SYMTRC  [employer]. [id number] Left 1 Implanted   INSERT TRIATHLON PS TIB 13MM 3 - [id number]  INSERT TRIATHLON PS TIB 13MM 3  [employer]. [id number] Left 1 Implanted      SPECIMENS: None.    COMPLICATIONS: None.     INTRAOPERATIVE COUNTS: Correct.     PROPHYLACTIC IV ANTIBIOTICS: Given per [facility] Protocol.    INDICATIONS FOR PROCEDURE:  Diane Garcia is a 67 y.o. year-old with a longstanding  history of left knee pain.  She has failed extensive conservative care to this point.  Preoperative imaging revealed degenerative joint disease and treatment options were discussed.  She elected to proceed with knee replacement.    Risks and complications were discussed including, but not limited to risks of anesthetic complications, infections, wound healing complications, aseptic loosening, instability, DVT, pulmonary embolism and death among others and she elected to proceed.    COMPONENTS USED:  The [employer] triathlon system with size femur 2, tibia UBP 3, 13  mm polyethylene, 29 mm patella.    DESCRIPTION OF PROCEDURE:  The patient was taken to the Operating Room where anesthesia was administered by the Anesthesia Department. She was then placed in the supine position and all superficial neurovascular structures were well padded.  The left lower extremity was then sterilely prepped and draped in the normal fashion.    Under tourniquet control, a 20 cm longitudinal incision was made over the anterior aspect of the knee.  Subcutaneous tissue was sharply dissected down to the deep fascia, which was incised along the line of the incision.  A standard medial parapatellar arthrotomy was made.  The proximal medial tibial plateau was then subperiosteally exposed protecting the medial collateral ligament.  A portion of the infrapatellar fat pad was excised.  The patella was everted and the knee was flexed to 90 degrees.    The extramedullary tibial alignment guide was then placed and the proximal tibial osteotomy was made approximately 4 mm distal to the most shallow surface of the tibial plateau.  This was done perpendicular to the axis of the tibia with approximately 3 degrees posterior slope.    A drill was then used to gain access into the intramedullary canal of the distal femur. The distal femoral cutting guide was placed and the 8 mm distal femoral cut was made in 5 degrees of valgus.  Femur was sized to a size 2.   The size 2 cutting guide was applied, anteriorized 1.5 mm to ensure no notching and to open up the flexion gap a bit, and the anterior femoral condyle, posterior condyle, and chamfer cuts were made. There was no notching anteriorly.  At this time, the cutting guide was removed and the cruciate ligaments, osteophytes, menisci and any debris was removed from the joint space. Flexion and extension gaps were checked and were found to be equal at 13 mm. The notch cutting guide for the posterior stabilized housing was placed and the notch cuts were then made.    We then turned our attention back towards the proximal tibia.  This was sized to a size 3, placed in neutral rotation, and the keel was punched in the standard fashion after drilling for the UBP and a 50 mm cemented tibial stem given the patient's obesity in this case and bone quality.  Trial sizes of the 2 femur, 3 tibia, and 13 mm polyethylene liner were then placed.    The patellar cutting guide was then used to remove the dorsal 10 mm of the patellar surface to a final thickness of 14 mm.  This was sized to a size 29. Drill holes were placed and patellar trial was placed.    At this time, the knee was placed through range of motion.  Excellent patellofemoral tracking and stability were noted throughout.  Full extension was easily obtained and intraoperative range of motion was from approximately 0 to 130 degrees.    Trial components were removed and the bony surfaces were thoroughly irrigated in a pulse lavage fashion and dried.  Two batches of cement were mixed and the tibial, femoral and patellar components were cemented into position, impacted and held in place as the cement polymerized.  Any excess cement was removed using [place] elevators.  The 13 mm polyethylene was then locked into position.    The wound was once again thoroughly irrigated.  The tourniquet was deflated and any significant bleeding was stopped using electrocautery.  Tranexamic acid was  given intravenously pre incision and at the time of component cementation for hemostasis. The wound was irrigated with dilute betadine wash followed by normal saline via pulse lavage prior to closure.     The quadriceps tendon and parapatellar retinaculum were then closed with interrupted figure-of-eight sutures of #1 Vicryl.  Subcutaneous tissue was closed with interrupted inverted stitches #3-0 Vicryl.  Skin was approximated using staples followed by Dermabond.  Sterile dressing was applied and she was returned to the Postanesthesia Care Unit in stable condition.    As the attending surgeon I was physically present for the key/critical portions of the procedure.

## 2023-07-28 ENCOUNTER — CLINICAL SUPPORT (OUTPATIENT)
Dept: REHABILITATION | Facility: HOSPITAL | Age: 68
End: 2023-07-28
Payer: MEDICARE

## 2023-07-28 DIAGNOSIS — R53.1 WEAKNESS: ICD-10-CM

## 2023-07-28 DIAGNOSIS — G89.29 CHRONIC PAIN OF BOTH KNEES: Primary | ICD-10-CM

## 2023-07-28 DIAGNOSIS — M25.561 CHRONIC PAIN OF BOTH KNEES: Primary | ICD-10-CM

## 2023-07-28 DIAGNOSIS — M17.12 PRIMARY OSTEOARTHRITIS OF LEFT KNEE: ICD-10-CM

## 2023-07-28 DIAGNOSIS — R26.9 ABNORMAL GAIT: ICD-10-CM

## 2023-07-28 DIAGNOSIS — M25.562 CHRONIC PAIN OF BOTH KNEES: Primary | ICD-10-CM

## 2023-07-28 PROCEDURE — 97110 THERAPEUTIC EXERCISES: CPT | Performed by: PHYSICAL THERAPIST

## 2023-07-28 PROCEDURE — 97161 PT EVAL LOW COMPLEX 20 MIN: CPT | Performed by: PHYSICAL THERAPIST

## 2023-07-28 NOTE — TELEPHONE ENCOUNTER
Pt stopped taking bystolic because of low bp readings while in hospital for knee surgery.  Per the Hospital resident. Hospital BP was 101/50. Do you want her to follow up in office? Please advise.

## 2023-07-28 NOTE — PROGRESS NOTES
[facility]   Physical Therapy Initial Evaluation      Name: Diane Garcia  Clinic Number: [id number]    Therapy Diagnosis: No diagnosis found.     Physician: Brando Edmond*    Physician Orders: PT Eval and Treat   Medical Diagnosis from Referral: M17.12 (ICD-10-CM) - Primary osteoarthritis of left knee  Evaluation Date: 7/28/2023  Authorization Period Expiration: 7/26/24  Plan of Care Expiration: 12/31/23  Progress Note Due: 8/30/23  Visit # / Visits authorized: 1/ 1   FOTO: 1/1    Precautions: Standard     Time In: 1415  Time Out: 1515  Total Appointment Time (timed & untimed codes): 60 minutes      PROCEDURES PERFORMED:   Left total knee arthroplasty (CPT 32558)  Subjective     Date of onset: 7/26/23    History of current condition - Diane reports: surgery performed on the above date. Pt stayed one night at surgical center and then sent home. States she is doing well overall. Denies s/s of infection, DVT. Pt lives alone but has a friend that will help her. Would like to transfer to [facility] after evaluation. States she has pain in her right ankle and knee that that limits her abilities. Has RW, Bed side commode, shower chair.     Falls: none    Imaging: see chart:     Prior Therapy: none  Social History:  lives alone  Occupation: retired  Prior Level of Function: independent, walking, house work  Current Level of Function: limited secondary to surgery    Pain:  Current 3/10, worst 6/10, best 3/10   Location: left knee    Description: Aching, Dull, and Sharp  Aggravating Factors: Sitting, Standing, Bending, Extension, and Getting out of bed/chair  Easing Factors: pain medication, ice, and rest    Patients goals: walk normally     Medical History:   Past Medical History:   Diagnosis Date    Arthritis     GERD (gastroesophageal reflux disease)     Hyperlipidemia     Hypertension        Surgical History:   Diane Garcia  has a past surgical history that  includes Hysterectomy; Oophorectomy; Esophagogastroduodenoscopy (N/A, 02/02/2023); Colonoscopy (N/A, 02/02/2023); mary jo placed (Right); mary jo removed (Right); and Total knee arthroplasty (Left, 7/26/2023).    Medications:   Diane has a current medication list which includes the following prescription(s): aspirin, biotin, cartilage/collagen/bor/hyalur, cefadroxil, celecoxib, esomeprazole, hydrochlorothiazide, ibuprofen, lovastatin, meloxicam, metformin, methocarbamol, nebivolol, olmesartan, fish oil-omega-3 fatty acids, oxycodone, pregabalin, and turmeric, and the following Facility-Administered Medications: ropivacaine (pf) 2 mg/ml (0.2%).    Allergies:   Review of patient's allergies indicates:   Allergen Reactions    Penicillins      nausea        Objective      Observation: presents in gym. No distress noted. Post op bandage in place. No s/s of infection or DVT, generalized brusing, swelling    Gait: MI with RW, antalgic gait    Functional tests(*=pain):   Sit to stand: MI with use of BUE, weight shift to well leg    Special test:  Homans - LLE    Range of Motion(*=pain):   Knee AROM PROM   Right 3-0-125 3-0-125   Left 0-5-30 0-45     Lower Extremity Strength  R quad set: excellent  L quad set:  fair    Joint Mobility: generalized stiffness secondary to post op day 3     Palpation: tenderness along incision site, posterior knee    Proximal/distal screen: full hip , ankle ROM    Sensation: intact BLE    Edema:   Girth measures    Girth Right Left   Joint Line  cm  cm    Will take on net visit      Limitation/Restriction for FOTO  Survey    Therapist reviewed FOTO scores for Diane Garcia on 7/28/2023.   FOTO documents entered into EPIC - see Media section.    Limitation Score: see media         Treatment     Total Treatment time (time-based codes) separate from Evaluation: 20 minutes      Diane received the treatments listed below:      therapeutic exercises to develop strength, endurance, and ROM for 20  minutes including:  Exercises listed in pt instructions      Patient Education and Home Exercises     Education provided:   - issued hep, reviewed s/s infection, DVT    Written Home Exercises Provided: yes. Exercises were reviewed and Diane was able to demonstrate them prior to the end of the session.  Diane demonstrated good  understanding of the education provided. See EMR under Patient Instructions for exercises provided during therapy sessions.    Assessment     Diane is a 67 y.o. female referred to outpatient Physical Therapy with a medical diagnosis of M17.12 (ICD-10-CM) - Primary osteoarthritis of left knee. Patient presents with decreased ROM, weakness, pain, stiffness, swelling, abnormal gait, decreased functional mobility secondary to the above dx. Pt would benefit from skilled PT in order to maximize funtion    Patient prognosis is Excellent.   Patient will benefit from skilled outpatient Physical Therapy to address the deficits stated above and in the chart below, provide patient /family education, and to maximize patientt's level of independence.     Plan of care discussed with patient: Yes  Patient's spiritual, cultural and educational needs considered and patient is agreeable to the plan of care and goals as stated below:     Anticipated Barriers for therapy: none    Medical Necessity is demonstrated by the following  History  Co-morbidities and personal factors that may impact the plan of care Co-morbidities:   BMI    Personal Factors:   no deficits     low   Examination  Body Structures and Functions, activity limitations and participation restrictions that may impact the plan of care Body Regions:   lower extremities    Body Systems:    ROM  strength  balance  gait  transfers  transitions  motor control    Participation Restrictions:   none    Activity limitations:   Learning and applying knowledge  no deficits    General Tasks and Commands  no deficits    Communication  no  deficits    Mobility  lifting and carrying objects  walking  moving around using equipment (WC)    Self care  washing oneself (bathing, drying, washing hands)  caring for body parts (brushing teeth, shaving, grooming)  toileting  dressing  looking after one's health    Domestic Life  shopping  cooking  doing house work (cleaning house, washing dishes, laundry)    Interactions/Relationships  no deficits    Life Areas  no deficits    Community and Social Life  community life  recreation and leisure         low   Clinical Presentation stable and uncomplicated low   Decision Making/ Complexity Score: low     Goals:  Short Term Goals: 6 weeks   Pt independent in initial hep  Pain 0-3/10  ROM full active extension equal to well knee. Flexion 110 or better  20 SLR without lag  Pt ambulates independently without significant deviation  Swelling <25%  Pt reports 35% or better function.     Long Term Goals: 16 weeks   Pt independent in d/c hep  Pain 0-1/10  Pt negotiates stairs , multiple flights without significant deviation  ROM full active ext to 120 or better flexion  Swelling <10%  Pt reports 85% improvement in function  Plan     Plan of care Certification: 7/28/2023 to 12/31/23.    Outpatient Physical Therapy 1-3 times weekly for 16-20 weeks to include the following interventions: Electrical Stimulation IFC, NMES, Manual Therapy, Moist Heat/ Ice, Neuromuscular Re-ed, Patient Education, Therapeutic Activities, and Therapeutic Exercise.     Ruy Guaman, PT

## 2023-07-28 NOTE — PLAN OF CARE
[facility]   Physical Therapy Initial Evaluation      Name: Diane Garcia  Clinic Number: [id number]    Therapy Diagnosis: No diagnosis found.     Physician: Brando Edmond*    Physician Orders: PT Eval and Treat   Medical Diagnosis from Referral: M17.12 (ICD-10-CM) - Primary osteoarthritis of left knee  Evaluation Date: 7/28/2023  Authorization Period Expiration: 7/26/24  Plan of Care Expiration: 12/31/23  Progress Note Due: 8/30/23  Visit # / Visits authorized: 1/ 1   FOTO: 1/1    Precautions: Standard     Time In: 1415  Time Out: 1515  Total Appointment Time (timed & untimed codes): 60 minutes      PROCEDURES PERFORMED:   Left total knee arthroplasty (CPT 66290)  Subjective     Date of onset: 7/26/23    History of current condition - Diane reports: surgery performed on the above date. Pt stayed one night at surgical center and then sent home. States she is doing well overall. Denies s/s of infection, DVT. Pt lives alone but has a friend that will help her. Would like to transfer to [facility] after evaluation. States she has pain in her right ankle and knee that that limits her abilities. Has RW, Bed side commode, shower chair.     Falls: none    Imaging: see chart:     Prior Therapy: none  Social History:  lives alone  Occupation: retired  Prior Level of Function: independent, walking, house work  Current Level of Function: limited secondary to surgery    Pain:  Current 3/10, worst 6/10, best 3/10   Location: left knee    Description: Aching, Dull, and Sharp  Aggravating Factors: Sitting, Standing, Bending, Extension, and Getting out of bed/chair  Easing Factors: pain medication, ice, and rest    Patients goals: walk normally     Medical History:   Past Medical History:   Diagnosis Date    Arthritis     GERD (gastroesophageal reflux disease)     Hyperlipidemia     Hypertension        Surgical History:   Diane Garcia  has a past surgical history that  includes Hysterectomy; Oophorectomy; Esophagogastroduodenoscopy (N/A, 02/02/2023); Colonoscopy (N/A, 02/02/2023); mary jo placed (Right); mary jo removed (Right); and Total knee arthroplasty (Left, 7/26/2023).    Medications:   Diane has a current medication list which includes the following prescription(s): aspirin, biotin, cartilage/collagen/bor/hyalur, cefadroxil, celecoxib, esomeprazole, hydrochlorothiazide, ibuprofen, lovastatin, meloxicam, metformin, methocarbamol, nebivolol, olmesartan, fish oil-omega-3 fatty acids, oxycodone, pregabalin, and turmeric, and the following Facility-Administered Medications: ropivacaine (pf) 2 mg/ml (0.2%).    Allergies:   Review of patient's allergies indicates:   Allergen Reactions    Penicillins      nausea        Objective      Observation: presents in gym. No distress noted. Post op bandage in place. No s/s of infection or DVT, generalized brusing, swelling    Gait: MI with RW, antalgic gait    Functional tests(*=pain):   Sit to stand: MI with use of BUE, weight shift to well leg    Special test:  Homans - LLE    Range of Motion(*=pain):   Knee AROM PROM   Right 3-0-125 3-0-125   Left 0-5-30 0-45     Lower Extremity Strength  R quad set: excellent  L quad set:  fair    Joint Mobility: generalized stiffness secondary to post op day 3     Palpation: tenderness along incision site, posterior knee    Proximal/distal screen: full hip , ankle ROM    Sensation: intact BLE    Edema:   Girth measures    Girth Right Left   Joint Line  cm  cm    Will take on net visit      Limitation/Restriction for FOTO  Survey    Therapist reviewed FOTO scores for Diane Garcia on 7/28/2023.   FOTO documents entered into EPIC - see Media section.    Limitation Score: see media         Treatment     Total Treatment time (time-based codes) separate from Evaluation: 20 minutes      Diane received the treatments listed below:      therapeutic exercises to develop strength, endurance, and ROM for 20  minutes including:  Exercises listed in pt instructions      Patient Education and Home Exercises     Education provided:   - issued hep, reviewed s/s infection, DVT    Written Home Exercises Provided: yes. Exercises were reviewed and Diane was able to demonstrate them prior to the end of the session.  Diane demonstrated good  understanding of the education provided. See EMR under Patient Instructions for exercises provided during therapy sessions.    Assessment     Diane is a 67 y.o. female referred to outpatient Physical Therapy with a medical diagnosis of M17.12 (ICD-10-CM) - Primary osteoarthritis of left knee. Patient presents with decreased ROM, weakness, pain, stiffness, swelling, abnormal gait, decreased functional mobility secondary to the above dx. Pt would benefit from skilled PT in order to maximize funtion    Patient prognosis is Excellent.   Patient will benefit from skilled outpatient Physical Therapy to address the deficits stated above and in the chart below, provide patient /family education, and to maximize patientt's level of independence.     Plan of care discussed with patient: Yes  Patient's spiritual, cultural and educational needs considered and patient is agreeable to the plan of care and goals as stated below:     Anticipated Barriers for therapy: none    Medical Necessity is demonstrated by the following  History  Co-morbidities and personal factors that may impact the plan of care Co-morbidities:   BMI    Personal Factors:   no deficits     low   Examination  Body Structures and Functions, activity limitations and participation restrictions that may impact the plan of care Body Regions:   lower extremities    Body Systems:    ROM  strength  balance  gait  transfers  transitions  motor control    Participation Restrictions:   none    Activity limitations:   Learning and applying knowledge  no deficits    General Tasks and Commands  no deficits    Communication  no  deficits    Mobility  lifting and carrying objects  walking  moving around using equipment (WC)    Self care  washing oneself (bathing, drying, washing hands)  caring for body parts (brushing teeth, shaving, grooming)  toileting  dressing  looking after one's health    Domestic Life  shopping  cooking  doing house work (cleaning house, washing dishes, laundry)    Interactions/Relationships  no deficits    Life Areas  no deficits    Community and Social Life  community life  recreation and leisure         low   Clinical Presentation stable and uncomplicated low   Decision Making/ Complexity Score: low     Goals:  Short Term Goals: 6 weeks   Pt independent in initial hep  Pain 0-3/10  ROM full active extension equal to well knee. Flexion 110 or better  20 SLR without lag  Pt ambulates independently without significant deviation  Swelling <25%  Pt reports 35% or better function.     Long Term Goals: 16 weeks   Pt independent in d/c hep  Pain 0-1/10  Pt negotiates stairs , multiple flights without significant deviation  ROM full active ext to 120 or better flexion  Swelling <10%  Pt reports 85% improvement in function  Plan     Plan of care Certification: 7/28/2023 to 12/31/23.    Outpatient Physical Therapy 1-3 times weekly for 16-20 weeks to include the following interventions: Electrical Stimulation IFC, NMES, Manual Therapy, Moist Heat/ Ice, Neuromuscular Re-ed, Patient Education, Therapeutic Activities, and Therapeutic Exercise.     Ruy Guaman, PT

## 2023-07-30 ENCOUNTER — PATIENT MESSAGE (OUTPATIENT)
Dept: SPORTS MEDICINE | Facility: CLINIC | Age: 68
End: 2023-07-30
Payer: MEDICARE

## 2023-07-31 ENCOUNTER — CLINICAL SUPPORT (OUTPATIENT)
Dept: REHABILITATION | Facility: HOSPITAL | Age: 68
End: 2023-07-31
Payer: MEDICARE

## 2023-07-31 ENCOUNTER — PATIENT MESSAGE (OUTPATIENT)
Dept: PRIMARY CARE CLINIC | Facility: CLINIC | Age: 68
End: 2023-07-31
Payer: MEDICARE

## 2023-07-31 DIAGNOSIS — G89.29 CHRONIC PAIN OF BOTH KNEES: Primary | ICD-10-CM

## 2023-07-31 DIAGNOSIS — R26.9 ABNORMAL GAIT: ICD-10-CM

## 2023-07-31 DIAGNOSIS — R53.1 WEAKNESS: ICD-10-CM

## 2023-07-31 DIAGNOSIS — M25.561 CHRONIC PAIN OF BOTH KNEES: Primary | ICD-10-CM

## 2023-07-31 DIAGNOSIS — M25.562 CHRONIC PAIN OF BOTH KNEES: Primary | ICD-10-CM

## 2023-07-31 PROCEDURE — 97110 THERAPEUTIC EXERCISES: CPT | Mod: PO

## 2023-07-31 PROCEDURE — 97014 ELECTRIC STIMULATION THERAPY: CPT | Mod: PO

## 2023-07-31 PROCEDURE — 97112 NEUROMUSCULAR REEDUCATION: CPT | Mod: PO

## 2023-07-31 RX ORDER — MELOXICAM 15 MG/1
15 TABLET ORAL
Qty: 60 TABLET | Refills: 1 | Status: SHIPPED | OUTPATIENT
Start: 2023-07-31 | End: 2023-10-02

## 2023-07-31 NOTE — PLAN OF CARE
07/27/23 1100   BRUNSON Message   Medicare Outpatient and Observation Notification regarding financial responsibility Given to patient/caregiver   Date BRUNSON was signed 07/27/23   Time BRUNSON was signed 1100     Brunson form provided by [place] staff and signed on the above date and time.    Sandra Henry RNCM  Case Management  [facility]  [phone]

## 2023-07-31 NOTE — PLAN OF CARE
[place] [facility] ([facility])  Discharge Final Note    Primary Care Provider: Beverly Oakes MD    Expected Discharge Date: 7/27/2023    Final Discharge Note (most recent)       Final Note - 07/31/23 1533          Final Note    Assessment Type Final Discharge Note     Anticipated Discharge Disposition Home or Self Care     What phone number can be called within the next 1-3 days to see how you are doing after discharge? --   [phone] (    Hospital Resources/Appts/Education Provided Appointments scheduled and added to AVS                     Important Message from Medicare           Future Appointments   Date Time Provider Department Center   8/3/2023  1:30 PM Ray Salcedo MD [facility]   8/10/2023  3:00 PM Brando Edmond PA-C [facility]   8/14/2023  2:30 PM Gillian Yeager OD [facility] [place]   9/7/2023  9:45 AM NATALIO Greenwood MD [facility]     Patient discharged home to care of family on 7/27/23.    Sandra Henry RNCM  Case Management  [facility]  [phone]

## 2023-07-31 NOTE — PROGRESS NOTES
[facility]   Physical Therapy Treatment Note      Name: Diane Garcia  Clinic Number: [id number]    Therapy Diagnosis:   Encounter Diagnoses   Name Primary?    Chronic pain of both knees Yes    Abnormal gait     Weakness      Physician: Brando Edmond*    Visit Date: 7/31/2023    Physician Orders: PT Eval and Treat   Medical Diagnosis from Referral: M17.12 (ICD-10-CM) - Primary osteoarthritis of left knee  Evaluation Date: 7/31/2023  Authorization Period Expiration: 7/26/24  Plan of Care Expiration: 12/31/23  Progress Note Due: 8/30/23  Visit # / Visits authorized: 1/ 1       Time In: 10:00am  Time Out: 11:08am  Total Appointment Time (timed & untimed codes): 68 minutes      PROCEDURES PERFORMED:   Left total knee arthroplasty (CPT 69960)        Subjective     Pt reports: That she removed the nerve block yesterday and she can definitely tell the difference.  She was compliant with home exercise program - demonstrated to PT  Response to previous treatment: Good  Functional change: Moving around better each day    Pain: 4/10  Location: left knee      Objective      Objective Measures updated at progress report unless specified.     Treatment     Diane received the treatments listed below:      therapeutic exercises to develop strength, endurance, ROM, flexibility, and core stabilization for 30 minutes including:  [x] Supine Heel Slides x 30  [x] Knee Prop x 3 min  [x] Ankle Pumps Supine x 30  [x] Seated Marching 2 x 10  [x] Seated Heel Raises 3 x 10  [x] Seated Abd/Add with RTB x 30     manual therapy techniques: Joint mobilizations, Manual Lymphatic Drainage, and Soft tissue Mobilization were applied to the: L+ Knee for 5 minutes including:  Light pressure PROM knee extension/flexion    neuromuscular re-education activities to improve: Balance, Coordination, Kinesthetic, Sense, and Proprioception for 23 minutes. The following activities were included:  [x] SAQ 3 x 10 with t/c  at VMO/quad  [x] Guteal Squeezes Isolated from Quad Sets 3 min w/ 5 sec hold  [x] Quad Set x 5 min with T/C for isolating quad from gluteal compensatory action  [x] LAQ 3 x 10     therapeutic activities to improve functional performance for 2  minutes, including:  RW adjustment    supervised modalities after being cleared for contradictions: IFC Electrical Stimulation:  Diane received IFC Electrical Stimulation for pain control applied to the L+ Knee Joint for pain reduction  with cold pack for 15 minutes     Patient Education and Home Exercises       Education provided:   - Not get LE wet / dressing wet - get assist to bathe/shower  - Kendell hose x 2 weeks after Sx    Written Home Exercises Provided: Patient instructed to cont prior HEP. Exercises were reviewed and Diane was able to demonstrate them prior to the end of the session.  Diane demonstrated good  understanding of the education provided. See EMR under Patient Instructions for exercises provided during therapy sessions    Assessment     Pt tolerated Tx very well. Pt with good quad recruitment with t/c for this phase of recovery. She was unable to perform SLR, however this was secondary to pain. We will attempt next Tx and if unsuccessful, add NMES. Pt RW needed height adjustment. Pt ambulating very well with good knee flexion during swing phase and good knee extension during stance phase.     Diane Is progressing well towards her goals.   Pt prognosis is Excellent.     Pt will continue to benefit from skilled outpatient physical therapy to address the deficits listed in the problem list box on initial evaluation, provide pt/family education and to maximize pt's level of independence in the home and community environment.     Pt's spiritual, cultural and educational needs considered and pt agreeable to plan of care and goals.     Anticipated barriers to physical therapy: none    Goals: 6-16weeks  Short Term Goals: 6 weeks   Pt independent in initial  hep - Met 7/31/2023  Pain 0-3/10  ROM full active extension equal to well knee. Flexion 110 or better  20 SLR without lag  Pt ambulates independently without significant deviation  Swelling <25%  Pt reports 35% or better function.     Long Term Goals: 16 weeks   Pt independent in d/c hep  Pain 0-1/10  Pt negotiates stairs , multiple flights without significant deviation  ROM full active ext to 120 or better flexion  Swelling <10%  Pt reports 85% improvement in function  Plan     Girth Right Left   Joint Line  cm  cm    Will take on net visit    Plan of care Certification: 7/31/2023 to 12/31/23.    Outpatient Physical Therapy 1-3 times weekly for 16-20 weeks to include the following interventions: Electrical Stimulation IFC, NMES, Manual Therapy, Moist Heat/ Ice, Neuromuscular Re-ed, Patient Education, Therapeutic Activities, and Therapeutic Exercise.     Marivel Tavares, PT

## 2023-08-01 DIAGNOSIS — M17.12 PRIMARY OSTEOARTHRITIS OF LEFT KNEE: ICD-10-CM

## 2023-08-01 RX ORDER — OXYCODONE HYDROCHLORIDE 5 MG/1
5 TABLET ORAL EVERY 4 HOURS PRN
Qty: 28 TABLET | Refills: 0 | Status: SHIPPED | OUTPATIENT
Start: 2023-08-01 | End: 2023-08-10 | Stop reason: SDUPTHER

## 2023-08-02 ENCOUNTER — CLINICAL SUPPORT (OUTPATIENT)
Dept: REHABILITATION | Facility: HOSPITAL | Age: 68
End: 2023-08-02
Payer: MEDICARE

## 2023-08-02 DIAGNOSIS — M17.12 PRIMARY OSTEOARTHRITIS OF LEFT KNEE: Primary | ICD-10-CM

## 2023-08-02 PROCEDURE — 97112 NEUROMUSCULAR REEDUCATION: CPT | Mod: PO

## 2023-08-02 PROCEDURE — 97110 THERAPEUTIC EXERCISES: CPT | Mod: PO

## 2023-08-02 PROCEDURE — 97014 ELECTRIC STIMULATION THERAPY: CPT | Mod: PO

## 2023-08-02 PROCEDURE — 97140 MANUAL THERAPY 1/> REGIONS: CPT | Mod: PO

## 2023-08-02 NOTE — PROGRESS NOTES
"[facility]   Physical Therapy Treatment Note      Name: Diane Garcia  Clinic Number: [id number]    Therapy Diagnosis:   No diagnosis found.    Physician: Brando Edmond*    Visit Date: 8/2/2023    Physician Orders: PT Eval and Treat   Medical Diagnosis from Referral: M17.12 (ICD-10-CM) - Primary osteoarthritis of left knee  Evaluation Date: 7/31/2023  Authorization Period Expiration: 7/26/24  Plan of Care Expiration: 12/31/23  Progress Note Due: 8/30/23  Visit # / Visits authorized: 3/ 12       Time In: 1:57 pm  Time Out: 3:20 pm  Total Appointment Time (timed & untimed codes): 83 minutes      PROCEDURES PERFORMED:   Left total knee arthroplasty (CPT 34002)        Subjective     Pt reports: That she felt good after last Tx. She also states that her foot is feeling a little better, although she feels she could walk better if it were not injured  She was compliant with home exercise program - demonstrated to PT  Response to previous treatment: Good  Functional change: Moving around better each day    Pain: 4/10  Location: left knee      Objective      Objective Measures updated at progress report unless specified.     Girth Right 8/2/2023   Joint Line  18"   End of Bandage 17"       Treatment     Diane received the treatments listed below:      therapeutic exercises to develop strength, endurance, ROM, flexibility, and core stabilization for 23 minutes including:  [] Supine Heel Slides x 30  [] Knee Prop x 3 min  [x] Ankle Pumps Supine x 30  [x] Seated Marching 2 x 10  [x] Seated Heel Raises 3 x 10  [x] Seated Abd/Add with RTB x 30     manual therapy techniques: Joint mobilizations, Manual Lymphatic Drainage, and Soft tissue Mobilization were applied to the: L+ Knee for 15 minutes including:  Light pressure PROM knee extension/flexion  Effleurage from lower leg to groin     neuromuscular re-education activities to improve: Balance, Coordination, Kinesthetic, Sense, and " Proprioception for 25 minutes. The following activities were included: with use of NMES to Quad  [x] SAQ 3 x 10 with t/c at VMO/quad  [x] Guteal Squeezes Isolated from Quad Sets 3 min w/ 5 sec hold  [x] Quad Set x 5 min with T/C for isolating quad from gluteal compensatory action  [x] LAQ 3 x 10  [x] SLR x 5 min with 5 sec holds NMES    unsupervised modalities after being cleared for contradictions: IFC Electrical Stimulation:  Diane received IFC Electrical Stimulation for pain control applied to the L+ Knee Joint for pain reduction  with cold pack for 15 minutes     Patient Education and Home Exercises       Education provided:   -importance of SLR and quad activation     Written Home Exercises Provided: Patient instructed to cont prior HEP. Exercises were reviewed and Diane was able to demonstrate them prior to the end of the session.  Diane demonstrated good  understanding of the education provided. See EMR under Patient Instructions for exercises provided during therapy sessions    Assessment   Pt presented with improvements noted in all areas. She was ambulating at an increase velocity. Her ROM seated was 105 deg. She was able to perform SLR with <10 deg quad lag. Swelling is diffuse throughout the knee, however it should be noted that swelling is centralizing as there is minimal swelling noted near ankle area of L+ LE. Pt     Diane Is progressing well towards her goals.   Pt prognosis is Excellent.     Pt will continue to benefit from skilled outpatient physical therapy to address the deficits listed in the problem list box on initial evaluation, provide pt/family education and to maximize pt's level of independence in the home and community environment.     Pt's spiritual, cultural and educational needs considered and pt agreeable to plan of care and goals.     Anticipated barriers to physical therapy: none    Goals: 6-16weeks  Short Term Goals: 6 weeks   Pt independent in initial hep - Met  7/31/2023  Pain 0-3/10  ROM full active extension equal to well knee. Flexion 110 or better  20 SLR without lag  Pt ambulates independently without significant deviation  Swelling <25%  Pt reports 35% or better function.     Long Term Goals: 16 weeks   Pt independent in d/c hep  Pain 0-1/10  Pt negotiates stairs , multiple flights without significant deviation  ROM full active ext to 120 or better flexion  Swelling <10%  Pt reports 85% improvement in function  Plan   Continue with POC    Plan of care Certification: 7/31/2023 to 12/31/23.    Outpatient Physical Therapy 1-3 times weekly for 16-20 weeks to include the following interventions: Electrical Stimulation IFC, NMES, Manual Therapy, Moist Heat/ Ice, Neuromuscular Re-ed, Patient Education, Therapeutic Activities, and Therapeutic Exercise.     Marivel Tavares, PT

## 2023-08-03 ENCOUNTER — OFFICE VISIT (OUTPATIENT)
Dept: ORTHOPEDICS | Facility: CLINIC | Age: 68
End: 2023-08-03
Payer: MEDICARE

## 2023-08-03 DIAGNOSIS — M79.671 ACUTE PAIN OF RIGHT FOOT: ICD-10-CM

## 2023-08-03 DIAGNOSIS — M25.571 PAIN IN JOINT INVOLVING RIGHT ANKLE AND FOOT: Primary | ICD-10-CM

## 2023-08-03 DIAGNOSIS — M25.571 ACUTE RIGHT ANKLE PAIN: ICD-10-CM

## 2023-08-03 PROCEDURE — 99212 OFFICE O/P EST SF 10 MIN: CPT | Mod: PBBFAC | Performed by: ORTHOPAEDIC SURGERY

## 2023-08-03 PROCEDURE — 99999 PR PBB SHADOW E&M-EST. PATIENT-LVL II: ICD-10-PCS | Mod: PBBFAC,,, | Performed by: ORTHOPAEDIC SURGERY

## 2023-08-03 PROCEDURE — 99999 PR PBB SHADOW E&M-EST. PATIENT-LVL II: CPT | Mod: PBBFAC,,, | Performed by: ORTHOPAEDIC SURGERY

## 2023-08-03 PROCEDURE — 99214 OFFICE O/P EST MOD 30 MIN: CPT | Mod: S$PBB,,,

## 2023-08-03 PROCEDURE — 99214 PR OFFICE/OUTPT VISIT, EST, LEVL IV, 30-39 MIN: ICD-10-PCS | Mod: S$PBB,,,

## 2023-08-03 NOTE — PROGRESS NOTES
Subjective:   Chief complaint:   Chief Complaint   Patient presents with    Right Foot - Pain     Referring provider: Dr. NATALIO Greenwood     Checked in 1 hour and 40 minutes late for appointment, thought her appointment was at a different time    HISTORY:  Diane Garcia is a 67 y.o. female presents with complaint of right foot and ankle pain and swelling onset 3 weeks ago.  Pain and swelling has been worsening with activities.  Swelling is better when she wakes up in the morning and when she elevates the foot.  She had a TKA on the left side last week.  The pain in her right foot is affecting her ability to rehab her knee.  She denies history of trauma to the foot.     PAST MEDICAL/SURGICAL HISTORY:  Past Medical History:   Diagnosis Date    Arthritis     GERD (gastroesophageal reflux disease)     Hyperlipidemia     Hypertension      Past Surgical History:   Procedure Laterality Date    COLONOSCOPY N/A 02/02/2023    Procedure: COLONOSCOPY;  Surgeon: Bob Grayson MD;  Location: [facility];  Service: Endoscopy;  Laterality: N/A;    ESOPHAGOGASTRODUODENOSCOPY N/A 02/02/2023    Procedure: EGD (ESOPHAGOGASTRODUODENOSCOPY);  Surgeon: Bob Grayson MD;  Location: [facility];  Service: Endoscopy;  Laterality: N/A;    HYSTERECTOMY      OOPHORECTOMY      mary jo placed Right     femur right mary jo    mary jo removed Right     TOTAL KNEE ARTHROPLASTY Left 7/26/2023    Procedure: ARTHROPLASTY, KNEE, TOTAL;  Surgeon: NATALIO Greenwood MD;  Location: [facility];  Service: Orthopedics;  Laterality: Left;  Regional w/Catheter, Adductor, Spinal, 0.2% Ropivacaine       Current Medications:   Current Outpatient Medications:     aspirin (ECOTRIN) 81 MG EC tablet, Take 1 tablet (81 mg total) by mouth once daily., Disp: 42 tablet, Rfl: 0    biotin 1 mg Cap, Take by mouth., Disp: , Rfl:     cartilage/collagen/bor/hyalur (JO[employer] HEALTH ORAL), Take by mouth., Disp: , Rfl:     cefadroxil (DURICEF) 500 MG Cap, Take 2 capsules (1 g  total) by mouth every 12 (twelve) hours. for 7 days, Disp: 28 capsule, Rfl: 0    celecoxib (CELEBREX) 200 MG capsule, Take 1 capsule (200 mg total) by mouth 2 (two) times daily., Disp: 56 capsule, Rfl: 0    esomeprazole (NEXIUM) 20 MG capsule, Take 20 mg by mouth before breakfast., Disp: , Rfl:     hydroCHLOROthiazide (HYDRODIURIL) 25 MG tablet, TAKE 1 TABLET(25 MG) BY MOUTH EVERY DAY, Disp: 90 tablet, Rfl: 3    ibuprofen (ADVIL,MOTRIN) 800 MG tablet, TAKE 1 TABLET(800 MG) BY MOUTH EVERY 6 HOURS AS NEEDED FOR PAIN, Disp: 60 tablet, Rfl: 1    lovastatin (MEVACOR) 40 MG tablet, TAKE 1 TABLET(40 MG) BY MOUTH EVERY EVENING, Disp: 90 tablet, Rfl: 3    meloxicam (MOBIC) 15 MG tablet, TAKE 1 TABLET(15 MG) BY MOUTH EVERY DAY, Disp: 60 tablet, Rfl: 1    metFORMIN (GLUCOPHAGE-XR) 500 MG ER 24hr tablet, Take 1 tablet (500 mg total) by mouth daily with breakfast., Disp: 90 tablet, Rfl: 0    methocarbamoL (ROBAXIN) 500 MG Tab, Take 1 tablet (500 mg total) by mouth 4 (four) times daily. for 14 days, Disp: 56 tablet, Rfl: 0    nebivoloL (BYSTOLIC) 5 MG Tab, Take 1 tablet (5 mg total) by mouth once daily., Disp: 90 tablet, Rfl: 1    olmesartan (BENICAR) 40 MG tablet, TAKE 1 TABLET(40 MG) BY MOUTH EVERY DAY, Disp: 90 tablet, Rfl: 3    omega-3 fatty acids/fish oil (FISH OIL-OMEGA-3 FATTY ACIDS) 300-1,000 mg capsule, Take by mouth once daily., Disp: , Rfl:     oxyCODONE (ROXICODONE) 5 MG immediate release tablet, Take 1 tablet (5 mg total) by mouth every 4 (four) hours as needed for Pain., Disp: 28 tablet, Rfl: 0    pregabalin (LYRICA) 75 MG capsule, Take 1 capsule (75 mg total) by mouth 2 (two) times daily. for 14 days, Disp: 28 capsule, Rfl: 0    TURMERIC ORAL, Take by mouth., Disp: , Rfl:   No current facility-administered medications for this visit.    Facility-Administered Medications Ordered in Other Visits:     ropivacaine 0.2% Nimbus PainPRO Pump infusion 500 ML, , Perineural, Continuous, Cornell Vela MD, New Bag at  23 [phone]    Social History:   Social History     Socioeconomic History    Marital status:     Number of children: 1   Tobacco Use    Smoking status: Former     Current packs/day: 0.00     Average packs/day: 2.0 packs/day for 25.0 years (50.0 ttl pk-yrs)     Types: Cigarettes     Start date: 1975     Quit date: 2000     Years since quittin.7    Smokeless tobacco: Never   Substance and Sexual Activity    Alcohol use: Yes     Comment: A few drinks a week    Drug use: Never     Comment: last use >30 yrs ago     Sexual activity: Yes     Partners: Male   Social History Narrative    Stairs- 6 to enter         Objective:       REVIEW OF SYSTEMS:  Constitution: Negative. Negative for chills, fever and night sweats.   Cardiovascular: Negative for chest pain and syncope.   Respiratory: Negative for cough and shortness of breath.   Gastrointestinal: See HPI. Negative for nausea/vomiting. Negative for abdominal pain.  Genitourinary: See HPI. Negative for discoloration or dysuria.  Skin: Negative for dry skin, itching and rash.   Hematologic/Lymphatic: Negative for bleeding problem. Does not bruise/bleed easily.   Musculoskeletal: Negative for falls and muscle weakness.   Neurological: See HPI. No seizures.   Endocrine: Negative for polydipsia, polyphagia and polyuria.   Allergic/Immunologic: Negative for hives and persistent infections.      MSK:     RLE  Skin closed, no fracture blisters or tenting of skin, no callus  Deformity:  None (valgus, varus, equinus)  Ecchymoses:  None  Swelling:  Moderate from the ankle to the forefoot  TTP:  About the dorsal lateral foot  Compartments soft and compressible  ROM:  Full dorsiflexion & plantarflexion; full inversion & eversion.  Patient has pain with passive plantar flexion  Ligaments: neg anterior drawer test; neg varus stress test  SILT Sa/Person/SP/DP  Motor intact EHL/FHL/Gastroc/TA  ,3 secs cap refill  Warm well perfused extremities  DP pulse 2+ palpable  .      Imaging:  I independently reviewed and interpreted the imaging and my findings are as follows:     3V x-ray of the foot to reviewed reveals some degenerative changes in the midfoot but overall normal alignment with some hammering and clawing of her lesser toes.    Assessment:       1. Pain in joint involving right ankle and foot    2. Acute right ankle pain    3. Acute pain of right foot          Plan:       She endorsed  insidious onset ankle pain without history of trauma which has been going on for 3 weeks.  Plan is to obtain an MRI to further evaluate her symptoms    I have personally taken the history and examined this patient and agree with the residents note as stated above.  Etiology of this patient's right foot and ankle pain is unclear to me by examination and plain x-ray.  I would like to obtain an MRI of the foot and the ankle to rule out any structural abnormalities that might interfere with her knee rehab.      Orders Placed This Encounter   Procedures    MRI Ankle Without Contrast Right     Standing Status:   Future     Standing Expiration Date:   8/3/2024     Order Specific Question:   Does the patient have a pacemaker or a defibrillator (Note: Some facilities may not be able to schedule an MRI for patients with pacemakers and defibrillators. You should contact your local radiology department to determine if this is the case.)?     Answer:   No     Order Specific Question:   Does the patient have an aneurysm or surgical clip, pump, nerve/brain stimulator, middle/inner ear prosthesis, or other metal implant or foreign object (bullet, shrapnel)? If they have a card related to their implant, ask them to bring it. Issues related to the implant may cause the MRI to be delayed.     Answer:   Yes     Comments:   Left total knee implant     Order Specific Question:   Is the patient claustrophobic?     Answer:   No     Order Specific Question:   Will the patient require sedation?     Answer:   No      Order Specific Question:   Does the patient have any of the following conditions? Diabetes, History of Renal Disease or Hypertension requiring medical therapy?     Answer:   Yes     Order Specific Question:   May the Radiologist modify the order per protocol to meet the clinical needs of the patient?     Answer:   Yes     Order Specific Question:   Is this part of a Research Study?     Answer:   No     Order Specific Question:   Does the patient have on a skin patch for medication with aluminized backing?     Answer:   No    MRI Foot (Midfoot) Right Without Contrast     Standing Status:   Future     Standing Expiration Date:   8/3/2024     Order Specific Question:   Does the patient have a pacemaker or a defibrillator (Note: Some facilities may not be able to schedule an MRI for patients with pacemakers and defibrillators. You should contact your local radiology department to determine if this is the case.)?     Answer:   No     Order Specific Question:   Does the patient have an aneurysm or surgical clip, pump, nerve/brain stimulator, middle/inner ear prosthesis, or other metal implant or foreign object (bullet, shrapnel)? If they have a card related to their implant, ask them to bring it. Issues related to the implant may cause the MRI to be delayed.     Answer:   Yes     Comments:   Total knee implant left     Order Specific Question:   Is the patient claustrophobic?     Answer:   No     Order Specific Question:   Will the patient require sedation?     Answer:   No     Order Specific Question:   Does the patient have any of the following conditions? Diabetes, History of Renal Disease or Hypertension requiring medical therapy?     Answer:   Yes     Order Specific Question:   May the Radiologist modify the order per protocol to meet the clinical needs of the patient?     Answer:   Yes     Order Specific Question:   Is this part of a Research Study?     Answer:   No     Order Specific Question:   Does the patient  have on a skin patch for medication with aluminized backing?     Answer:   No       Past Medical History:   Diagnosis Date    Arthritis     GERD (gastroesophageal reflux disease)     Hyperlipidemia     Hypertension        Past Surgical History:   Procedure Laterality Date    COLONOSCOPY N/A 2023    Procedure: COLONOSCOPY;  Surgeon: Bob Grayson MD;  Location: [facility];  Service: Endoscopy;  Laterality: N/A;    ESOPHAGOGASTRODUODENOSCOPY N/A 2023    Procedure: EGD (ESOPHAGOGASTRODUODENOSCOPY);  Surgeon: Bob Grayson MD;  Location: [facility];  Service: Endoscopy;  Laterality: N/A;    HYSTERECTOMY      OOPHORECTOMY      mary jo placed Right     femur right mary jo    mary jo removed Right     TOTAL KNEE ARTHROPLASTY Left 2023    Procedure: ARTHROPLASTY, KNEE, TOTAL;  Surgeon: NATALIO Greenwood MD;  Location: [facility];  Service: Orthopedics;  Laterality: Left;  Regional w/Catheter, Adductor, Spinal, 0.2% Ropivacaine       Family History   Problem Relation Age of Onset    Cancer Mother         Lung    COPD Father     No Known Problems Sister     Melanoma Neg Hx        Social History     Socioeconomic History    Marital status:     Number of children: 1   Tobacco Use    Smoking status: Former     Current packs/day: 0.00     Average packs/day: 2.0 packs/day for 25.0 years (50.0 ttl pk-yrs)     Types: Cigarettes     Start date: 1975     Quit date: 2000     Years since quittin.7    Smokeless tobacco: Never   Substance and Sexual Activity    Alcohol use: Yes     Comment: A few drinks a week    Drug use: Never     Comment: last use >30 yrs ago     Sexual activity: Yes     Partners: Male   Social History Narrative    Stairs- 6 to enter

## 2023-08-04 ENCOUNTER — CLINICAL SUPPORT (OUTPATIENT)
Dept: REHABILITATION | Facility: HOSPITAL | Age: 68
End: 2023-08-04
Payer: MEDICARE

## 2023-08-04 DIAGNOSIS — G89.29 CHRONIC PAIN OF BOTH KNEES: Primary | ICD-10-CM

## 2023-08-04 DIAGNOSIS — M25.562 CHRONIC PAIN OF BOTH KNEES: Primary | ICD-10-CM

## 2023-08-04 DIAGNOSIS — R53.1 WEAKNESS: ICD-10-CM

## 2023-08-04 DIAGNOSIS — M25.561 CHRONIC PAIN OF BOTH KNEES: Primary | ICD-10-CM

## 2023-08-04 DIAGNOSIS — R26.9 ABNORMAL GAIT: ICD-10-CM

## 2023-08-04 PROCEDURE — 97110 THERAPEUTIC EXERCISES: CPT | Mod: PO

## 2023-08-04 PROCEDURE — 97014 ELECTRIC STIMULATION THERAPY: CPT | Mod: PO

## 2023-08-04 PROCEDURE — 97112 NEUROMUSCULAR REEDUCATION: CPT | Mod: PO

## 2023-08-04 NOTE — PROGRESS NOTES
[facility]   Physical Therapy Treatment Note      Name: Diane Garcia  Clinic Number: [id number]    Therapy Diagnosis:   Encounter Diagnoses   Name Primary?    Chronic pain of both knees Yes    Abnormal gait     Weakness        Physician: Brando Edmond    Visit Date: 8/4/2023    Physician Orders: PT Eval and Treat   Medical Diagnosis from Referral: M17.12 (ICD-10-CM) - Primary osteoarthritis of left knee  Evaluation Date: 7/31/2023  Authorization Period Expiration: 7/26/24  Plan of Care Expiration: 12/31/23  Progress Note Due: 8/30/23  Visit # / Visits authorized: 1/ 1       Time In: 2:08 pm  Time Out: 3:35 pm  Total Appointment Time (timed & untimed codes): 70 minutes      PROCEDURES PERFORMED:   Left total knee arthroplasty (CPT 69972)        Subjective     Pt reports: That she is feeling good and continues to walk better. She states that she is getting an MRI on her R+ foot next week. She states that she took a shower recently. It should also be noted that when discussing foot and her visit, she mentioned that before ankle issues, she stepped on something in the grass. Thinking it was ants, she put cortisone on the bumps. Please see assessment for description. She has not mentioned to MD , only podiatrist that she stepped in ants  She was compliant with home exercise program - demonstrated to PT  Response to previous treatment: Good  Functional change: Moving around better each day    Pain: 4/10  Location: left knee      Objective      Objective Measures updated at progress report unless specified.     Treatment     Diane received the treatments listed below:      therapeutic exercises to develop strength, endurance, ROM, flexibility, and core stabilization for 15 minutes including:  [x] Bike x 10 min not full revolution   [x] Supine Heel Slides x 30  [] Knee Prop x 3 min  [] Ankle Pumps Supine x 30  [] Seated Marching 2 x 10  [] Seated Heel Raises 3 x 10      manual  "therapy techniques: Joint mobilizations, Manual Lymphatic Drainage, and Soft tissue Mobilization were applied to the: L+ Knee for 0 minutes including:  Light pressure PROM knee extension/flexion      neuromuscular re-education activities to improve: Balance, Coordination, Kinesthetic, Sense, and Proprioception for 40 minutes. The following activities were included: with use of NMES to Quad  [x] SAQ 3 x 10 with t/c at VMO/quad  [x] Guteal Squeezes Isolated from Quad Sets 3 min w/ 5 sec hold  [x] Quad Set x 5 min with T/C for isolating quad from gluteal compensatory action  [x] LAQ 3 x 10  [x] SLR x 5 min with 5 sec holds NMES  [x] SL AA Abd with NMES x 15    therapeutic activities to improve functional performance for 0 minutes, including:  Training for lifting L+ LE on/off mat and in/out of bed   Considering height of bed, she was encouraged to "support" L+ LE with use of R+ LE, however only as a support guard while allowing the lifting to come from L+ LE     unsupervised modalities after being cleared for contradictions: IFC Electrical Stimulation:  Diane received IFC Electrical Stimulation for pain control applied to the L+ Knee Joint for pain reduction  with cold pack for 15 minutes     Patient Education and Home Exercises       Education provided:   -importance of SLR and quad activation     Written Home Exercises Provided: Patient instructed to cont prior HEP. Exercises were reviewed and Diane was able to demonstrate them prior to the end of the session.  Diane demonstrated good  understanding of the education provided. See EMR under Patient Instructions for exercises provided during therapy sessions    Assessment   Pt showed pic of blistering swollen foot, severe erythremia . It was very evident that the reaction on her foot was not a result of ant bites. Furthermore, she states that she did not see any ants on her foot. Pt told to mention this to MD as this may have been the result of stepping on a " "caterpillar. Pt will [id number] bubbled area in surgical dressing, she had about 3 and they were located directly in the middle of the dressing. MD notified. No adverse s/s reported or noted    Diane Is progressing well towards her goals.   Pt prognosis is Excellent.     Pt will continue to benefit from skilled outpatient physical therapy to address the deficits listed in the problem list box on initial evaluation, provide pt/family education and to maximize pt's level of independence in the home and community environment.     Pt's spiritual, cultural and educational needs considered and pt agreeable to plan of care and goals.     Anticipated barriers to physical therapy: none    Goals: 6-16weeks  Short Term Goals: 6 weeks   Pt independent in initial hep - Met 7/31/2023  Pain 0-3/10  ROM full active extension equal to well knee. Flexion 110 or better  20 SLR without lag  Pt ambulates independently without significant deviation  Swelling <25%  Pt reports 35% or better function.     Long Term Goals: 16 weeks   Pt independent in d/c hep  Pain 0-1/10  Pt negotiates stairs , multiple flights without significant deviation  ROM full active ext to 120 or better flexion  Swelling <10%  Pt reports 85% improvement in function  Plan     Girth Right Left   Joint Line  18"  cm   @ end of bandage 17"     Plan of care Certification: 7/31/2023 to 12/31/23.    Outpatient Physical Therapy 1-3 times weekly for 16-20 weeks to include the following interventions: Electrical Stimulation IFC, NMES, Manual Therapy, Moist Heat/ Ice, Neuromuscular Re-ed, Patient Education, Therapeutic Activities, and Therapeutic Exercise.     Marivel Tavares, PT       "

## 2023-08-07 ENCOUNTER — HOSPITAL ENCOUNTER (OUTPATIENT)
Dept: RADIOLOGY | Facility: HOSPITAL | Age: 68
Discharge: HOME | End: 2023-08-07
Attending: ORTHOPAEDIC SURGERY
Payer: MEDICARE

## 2023-08-07 ENCOUNTER — TELEPHONE (OUTPATIENT)
Dept: SPORTS MEDICINE | Facility: CLINIC | Age: 68
End: 2023-08-07
Payer: MEDICARE

## 2023-08-07 ENCOUNTER — TELEPHONE (OUTPATIENT)
Dept: ORTHOPEDICS | Facility: CLINIC | Age: 68
End: 2023-08-07
Payer: MEDICARE

## 2023-08-07 ENCOUNTER — CLINICAL SUPPORT (OUTPATIENT)
Dept: REHABILITATION | Facility: HOSPITAL | Age: 68
End: 2023-08-07
Payer: MEDICARE

## 2023-08-07 DIAGNOSIS — G89.29 CHRONIC PAIN OF BOTH KNEES: Primary | ICD-10-CM

## 2023-08-07 DIAGNOSIS — M25.571 PAIN IN JOINT INVOLVING RIGHT ANKLE AND FOOT: ICD-10-CM

## 2023-08-07 DIAGNOSIS — R53.1 WEAKNESS: ICD-10-CM

## 2023-08-07 DIAGNOSIS — M25.561 CHRONIC PAIN OF BOTH KNEES: Primary | ICD-10-CM

## 2023-08-07 DIAGNOSIS — R26.9 ABNORMAL GAIT: ICD-10-CM

## 2023-08-07 DIAGNOSIS — M79.671 ACUTE PAIN OF RIGHT FOOT: ICD-10-CM

## 2023-08-07 DIAGNOSIS — M25.562 CHRONIC PAIN OF BOTH KNEES: Primary | ICD-10-CM

## 2023-08-07 PROCEDURE — 97110 THERAPEUTIC EXERCISES: CPT | Mod: PO

## 2023-08-07 PROCEDURE — 97112 NEUROMUSCULAR REEDUCATION: CPT | Mod: PO

## 2023-08-07 PROCEDURE — 73718 MRI LOWER EXTREMITY W/O DYE: CPT | Mod: TC,RT

## 2023-08-07 PROCEDURE — 73721 MRI ANKLE WITHOUT CONTRAST RIGHT: ICD-10-PCS | Mod: 26,RT,, | Performed by: RADIOLOGY

## 2023-08-07 PROCEDURE — 73721 MRI JNT OF LWR EXTRE W/O DYE: CPT | Mod: 26,RT,, | Performed by: RADIOLOGY

## 2023-08-07 PROCEDURE — 73718 MRI LOWER EXTREMITY W/O DYE: CPT | Mod: 26,RT,, | Performed by: RADIOLOGY

## 2023-08-07 PROCEDURE — 73721 MRI JNT OF LWR EXTRE W/O DYE: CPT | Mod: TC,RT

## 2023-08-07 PROCEDURE — 97014 ELECTRIC STIMULATION THERAPY: CPT | Mod: PO

## 2023-08-07 PROCEDURE — 73718 MRI FOOT (MIDFOOT) RIGHT WITHOUT CONTRAST: ICD-10-PCS | Mod: 26,RT,, | Performed by: RADIOLOGY

## 2023-08-07 NOTE — TELEPHONE ENCOUNTER
Return phone call to patient. She is requesting that her imaging for foot and ankle be moved to a sooner date from 8/11/23. Patient is under the care of Dr. Salcedo for right foot and ankle. Left TKA with Dr. Greenwood on 7/26/23.    I have discussed this with patient multiple times that we cannot rescheduled imaging from other offices or providers. I have encouraged her to reach out directly to Dr. Salcedo's staff to see if they can get her in for a sooner appointment time. Patient states that they are unable. She is adamant that Dr. Greenwood told her that he could get her in for a sooner MRI if necessary during her last appointment. The next available is August 16th. I informed patient that her imaging appointment is the next available but added her to the wait list. She expressed her frustration but verbalized understanding.     Also discussed alternative weight bearing options to help patient until imaging appointment on Friday. Of note, patient was not placed in a walking boot at her appointment with Dr. Salcedo. Patient stated that she had a boot at home. However, it made her unsteady and put more pressure on her surgical knee. Patient was instructed to use her walker with the addition of the boot to decrease fall risk and help with pain and ambulation. Patient stated that she did not want to rely on walker to get around and felt that it was hindering her TKA recovery. Additionally suggested that patient find a shoe of a similar height of the boot to take off some of the pressure described. Crutches were also an alternative discussed but patient declined.

## 2023-08-07 NOTE — PROGRESS NOTES
"[facility]   Physical Therapy Treatment Note      Name: Diane Garcia  Clinic Number: [id number]    Therapy Diagnosis:   Encounter Diagnoses   Name Primary?    Chronic pain of both knees Yes    Abnormal gait     Weakness        Physician: Brando Edmond*    Visit Date: 8/7/2023    Physician Orders: PT Eval and Treat   Medical Diagnosis from Referral: M17.12 (ICD-10-CM) - Primary osteoarthritis of left knee  Evaluation Date: 7/31/2023  Authorization Period Expiration: 7/26/24  Plan of Care Expiration: 12/31/23  Progress Note Due: 8/30/23  Visit # / Visits authorized: 1/ 1       Time In: 1:03 pm  Time Out: 2:35 pm  Total Appointment Time (timed & untimed codes): 70 minutes      PROCEDURES PERFORMED:   Left total knee arthroplasty (CPT 65593)        Subjective     Pt reports: That she did her exercises over the weekend. She states that she can walk around in the kitchen holding onto countertop for balance w/out AD. She reports that they gave her a boot for her R+ foot and it is helping with the pain  Response to previous treatment: Good  Functional change: Moving around better each day    Pain: 4/10  Location: left knee      Objective      Objective Measures updated at progress report unless specified.   Girth L+ at end of bandage improved from 17" to 16.75"                    Middle of joint line improved 18" to 17"                     110 deg L+ knee flexion / 0 deg extension taken 8/7/2023    Treatment     Diane received the treatments listed below:      therapeutic exercises to develop strength, endurance, ROM, flexibility, and core stabilization for 15 minutes including:  [x] Bike x 10 min not full revolution   [x] Supine Heel Slides x 30  [x] Standing HS curls at RW  [x] Seated Marching 2 x 10  [x] Seated Heel Raises 3 x 10      manual therapy techniques: Joint mobilizations, Manual Lymphatic Drainage, and Soft tissue Mobilization were applied to the: L+ Knee for 0 minutes " "including:  Light pressure PROM knee extension/flexion      neuromuscular re-education activities to improve: Balance, Coordination, Kinesthetic, Sense, and Proprioception for 40 minutes. The following activities were included:   [x] SAQ 3 x 10 with t/c at VMO/quad  [x] Guteal Squeezes w/ Add Pillow Squeeze x 30  [x] Quad Set x 5 min with T/C for isolating quad from gluteal compensatory action  [x] LAQ 3 x 10  [x] SLR x 5 min with 5 sec holds  [x] SL Abd with 3 x 10    therapeutic activities to improve functional performance for 0 minutes, including:  Training for lifting L+ LE on/off mat and in/out of bed   Considering height of bed, she was encouraged to "support" L+ LE with use of R+ LE, however only as a support guard while allowing the lifting to come from L+ LE     unsupervised modalities after being cleared for contradictions: IFC Electrical Stimulation:  Diane received IFC Electrical Stimulation for pain control applied to the L+ Knee Joint for pain reduction  with cold pack for 15 minutes     Patient Education and Home Exercises       Education provided:   -importance of increasing WB onto L+ LE when performing sit <> stand. Advised not to attempt to stand without use of hands due to inability to perform with PT present     Written Home Exercises Provided: Patient instructed to cont prior HEP. Exercises were reviewed and Diane was able to demonstrate them prior to the end of the session.  Diane demonstrated good  understanding of the education provided. See EMR under Patient Instructions for exercises provided during therapy sessions    Assessment   Pt continues to make good improvements with decreased swelling noted. Attempted for pt to perform sit <> stand with 1 UE assist on RW and pt was unable due to increased pain in left knee. She continues to put most of weight through R+ LE, keeping L+ LE into extension when she is standing. SLR performed with minimal quad lag. Reached out to MA regarding " "    Diane Is progressing well towards her goals.   Pt prognosis is Excellent.     Pt will continue to benefit from skilled outpatient physical therapy to address the deficits listed in the problem list box on initial evaluation, provide pt/family education and to maximize pt's level of independence in the home and community environment.     Pt's spiritual, cultural and educational needs considered and pt agreeable to plan of care and goals.     Anticipated barriers to physical therapy: none    Goals: 6-16weeks  Short Term Goals: 6 weeks   Pt independent in initial hep - Met 7/31/2023  Pain 0-3/10  ROM full active extension equal to well knee. Flexion 110 or better  20 SLR without lag  Pt ambulates independently without significant deviation  Swelling <25%  Pt reports 35% or better function.     Long Term Goals: 16 weeks   Pt independent in d/c hep  Pain 0-1/10  Pt negotiates stairs , multiple flights without significant deviation  ROM full active ext to 120 or better flexion  Swelling <10%  Pt reports 85% improvement in function  Plan     Girth Right Left   Joint Line  18"  cm   @ end of bandage 17"     Plan of care Certification: 7/31/2023 to 12/31/23.    Outpatient Physical Therapy 1-3 times weekly for 16-20 weeks to include the following interventions: Electrical Stimulation IFC, NMES, Manual Therapy, Moist Heat/ Ice, Neuromuscular Re-ed, Patient Education, Therapeutic Activities, and Therapeutic Exercise.     Marivel Tavares, PT       "

## 2023-08-07 NOTE — TELEPHONE ENCOUNTER
Called and rescheduled pt's MRI appts to 08/07 @ 7:30 pm and 8 pm. Pt was satisfied with new appt date/time.

## 2023-08-09 ENCOUNTER — CLINICAL SUPPORT (OUTPATIENT)
Dept: REHABILITATION | Facility: HOSPITAL | Age: 68
End: 2023-08-09
Payer: MEDICARE

## 2023-08-09 DIAGNOSIS — M25.561 CHRONIC PAIN OF BOTH KNEES: Primary | ICD-10-CM

## 2023-08-09 DIAGNOSIS — M25.562 CHRONIC PAIN OF BOTH KNEES: Primary | ICD-10-CM

## 2023-08-09 DIAGNOSIS — G89.29 CHRONIC PAIN OF BOTH KNEES: Primary | ICD-10-CM

## 2023-08-09 DIAGNOSIS — R53.1 WEAKNESS: ICD-10-CM

## 2023-08-09 DIAGNOSIS — R26.9 ABNORMAL GAIT: ICD-10-CM

## 2023-08-09 PROCEDURE — 97110 THERAPEUTIC EXERCISES: CPT | Mod: PO

## 2023-08-09 PROCEDURE — 97112 NEUROMUSCULAR REEDUCATION: CPT | Mod: PO

## 2023-08-09 NOTE — PROGRESS NOTES
"[facility]   Physical Therapy Treatment Note      Name: Diane Garcia  Clinic Number: [id number]    Therapy Diagnosis:   Encounter Diagnoses   Name Primary?    Chronic pain of both knees Yes    Abnormal gait     Weakness        Physician: Brando Edmond*    Visit Date: 8/9/2023    Physician Orders: PT Eval and Treat   Medical Diagnosis from Referral: M17.12 (ICD-10-CM) - Primary osteoarthritis of left knee  Evaluation Date: 7/31/2023  Authorization Period Expiration: 7/26/24  Plan of Care Expiration: 12/31/23  Progress Note Due: 8/30/23  Visit # / Visits authorized: 1/ 1       Time In: 3:30 pm  Time Out: 4:50 pm  Total Appointment Time (timed & untimed codes): 70 minutes      PROCEDURES PERFORMED:   Left total knee arthroplasty (CPT 83065)        Subjective     Pt reports: That her knee is feeling good and she has been doing her exercises  Response to previous treatment: Good  Functional change: Moving around better each day    Pain: 2/10  Location: left knee      Objective      Objective Measures updated at progress report unless specified.   8/9/2023:   Girth L+ at end of bandage improved from 17" to 16.75" to 16.25" at today's visit                    Middle of joint line improved 18" to 17" to 16.75" at today's visit                         120 deg L+ knee flexion / 0 deg extension taken 8/9/2023    Girth 1st Measure Right   Joint Line  18"   @ end of bandage 17"         Treatment     Diane received the treatments listed below:      therapeutic exercises to develop strength, endurance, ROM, flexibility, and core stabilization for 15 minutes including:  [x] Bike x 10 min full revolution  [x] Supine Heel Slides x 30  [x] Standing HS curls at RW  [x] Seated Marching 3 x 10  [x] Seated Heel Raises 3 x 10      manual therapy techniques: Joint mobilizations, Manual Lymphatic Drainage, and Soft tissue Mobilization were applied to the: L+ Knee for 0 minutes including:  Light " "pressure PROM knee extension/flexion      neuromuscular re-education activities to improve: Balance, Coordination, Kinesthetic, Sense, and Proprioception for 40 minutes. The following activities were included:   [x] SAQ 3 x 10 with t/c at VMO/quad  [x] Bridging 2 x 10  [x] Quad Set x 5 min with T/C for isolating quad from gluteal compensatory action  [x] LAQ 3 x 10  [x] SLR 3 x 10  [x] SL Abd with 3 x 10 with mod t/c and verbal cues for proper alignment     therapeutic activities to improve functional performance for 0 minutes, including:  Training for lifting L+ LE on/off mat and in/out of bed   Considering height of bed, she was encouraged to "support" L+ LE with use of R+ LE, however only as a support guard while allowing the lifting to come from L+ LE     unsupervised modalities after being cleared for contradictions: IFC Electrical Stimulation:  Diane received CP for swelling/pain control applied to the L+ Knee Joint for pain reduction  with cold pack for 15 minutes     Patient Education and Home Exercises       Education provided:   -importance of increasing WB onto L+ LE when performing sit <> stand. Advised not to attempt to stand without use of hands due to inability to perform with PT present     Written Home Exercises Provided: Patient instructed to cont prior HEP. Exercises were reviewed and Diane was able to demonstrate them prior to the end of the session.  Diane demonstrated good  understanding of the education provided. See EMR under Patient Instructions for exercises provided during therapy sessions    Assessment   Pt enters with improved ROM noted when walking and when performing rec bike for ROM. She progressed to full revolution today. She continues to require verbal and t/c's at the VMO on L+ in order to reduce quad lag with both SLR and Sl Abd. Pt will go to MD tomorrow for 2 week f/u visit     Diane Is progressing well towards her goals.   Pt prognosis is Excellent.     Pt will " continue to benefit from skilled outpatient physical therapy to address the deficits listed in the problem list box on initial evaluation, provide pt/family education and to maximize pt's level of independence in the home and community environment.     Pt's spiritual, cultural and educational needs considered and pt agreeable to plan of care and goals.     Anticipated barriers to physical therapy: none    Goals: 6-16weeks  Short Term Goals: 6 weeks   Pt independent in initial hep - Met 7/31/2023  Pain 0-3/10  ROM full active extension equal to well knee. Flexion 110 or better Met 8/9/2023  20 SLR without lag  Pt ambulates independently without significant deviation  Swelling <25%  Pt reports 35% or better function.     Long Term Goals: 16 weeks   Pt independent in d/c hep  Pain 0-1/10  Pt negotiates stairs , multiple flights without significant deviation  ROM full active ext to 120 or better flexion  Swelling <10%  Pt reports 85% improvement in function  Plan   Continue with current Phase 2 Exercises     Plan of care Certification: 7/31/2023 to 12/31/23.    Outpatient Physical Therapy 1-3 times weekly for 16-20 weeks to include the following interventions: Electrical Stimulation IFC, NMES, Manual Therapy, Moist Heat/ Ice, Neuromuscular Re-ed, Patient Education, Therapeutic Activities, and Therapeutic Exercise.     Marivel Tavares, PT

## 2023-08-10 ENCOUNTER — OFFICE VISIT (OUTPATIENT)
Dept: ORTHOPEDICS | Facility: CLINIC | Age: 68
End: 2023-08-10
Payer: MEDICARE

## 2023-08-10 ENCOUNTER — OFFICE VISIT (OUTPATIENT)
Dept: SPORTS MEDICINE | Facility: CLINIC | Age: 68
End: 2023-08-10
Payer: MEDICARE

## 2023-08-10 ENCOUNTER — HOSPITAL ENCOUNTER (OUTPATIENT)
Dept: RADIOLOGY | Facility: HOSPITAL | Age: 68
Discharge: HOME | End: 2023-08-10
Attending: PHYSICIAN ASSISTANT
Payer: MEDICARE

## 2023-08-10 VITALS
DIASTOLIC BLOOD PRESSURE: 71 MMHG | BODY MASS INDEX: 38.82 KG/M2 | HEART RATE: 72 BPM | WEIGHT: 233 LBS | HEIGHT: 65 IN | SYSTOLIC BLOOD PRESSURE: 133 MMHG

## 2023-08-10 DIAGNOSIS — M25.562 LEFT KNEE PAIN, UNSPECIFIED CHRONICITY: ICD-10-CM

## 2023-08-10 DIAGNOSIS — M19.079 ARTHRITIS OF FOOT: Primary | ICD-10-CM

## 2023-08-10 DIAGNOSIS — Z96.652 S/P TOTAL KNEE ARTHROPLASTY, LEFT: Primary | ICD-10-CM

## 2023-08-10 DIAGNOSIS — M25.562 ACUTE PAIN OF LEFT KNEE: ICD-10-CM

## 2023-08-10 DIAGNOSIS — M17.12 PRIMARY OSTEOARTHRITIS OF LEFT KNEE: ICD-10-CM

## 2023-08-10 PROCEDURE — 73560 X-RAY EXAM OF KNEE 1 OR 2: CPT | Mod: TC,LT

## 2023-08-10 PROCEDURE — 99441 PR PHYSICIAN TELEPHONE EVALUATION 5-10 MIN: ICD-10-PCS | Mod: 95,,, | Performed by: ORTHOPAEDIC SURGERY

## 2023-08-10 PROCEDURE — 99024 PR POST-OP FOLLOW-UP VISIT: ICD-10-PCS | Mod: POP,,, | Performed by: PHYSICIAN ASSISTANT

## 2023-08-10 PROCEDURE — 99999 PR PBB SHADOW E&M-EST. PATIENT-LVL III: CPT | Mod: PBBFAC,,, | Performed by: PHYSICIAN ASSISTANT

## 2023-08-10 PROCEDURE — 73560 X-RAY EXAM OF KNEE 1 OR 2: CPT | Mod: 26,LT,, | Performed by: RADIOLOGY

## 2023-08-10 PROCEDURE — 99213 OFFICE O/P EST LOW 20 MIN: CPT | Mod: PBBFAC | Performed by: PHYSICIAN ASSISTANT

## 2023-08-10 PROCEDURE — 73560 XR KNEE 1 OR 2 VIEW LEFT: ICD-10-PCS | Mod: 26,LT,, | Performed by: RADIOLOGY

## 2023-08-10 PROCEDURE — 99441 PR PHYSICIAN TELEPHONE EVALUATION 5-10 MIN: CPT | Mod: 95,,, | Performed by: ORTHOPAEDIC SURGERY

## 2023-08-10 PROCEDURE — 99024 POSTOP FOLLOW-UP VISIT: CPT | Mod: POP,,, | Performed by: PHYSICIAN ASSISTANT

## 2023-08-10 PROCEDURE — 99999 PR PBB SHADOW E&M-EST. PATIENT-LVL III: ICD-10-PCS | Mod: PBBFAC,,, | Performed by: PHYSICIAN ASSISTANT

## 2023-08-10 RX ORDER — PREGABALIN 75 MG/1
75 CAPSULE ORAL 2 TIMES DAILY
Qty: 28 CAPSULE | Refills: 0 | Status: SHIPPED | OUTPATIENT
Start: 2023-08-10 | End: 2023-11-09

## 2023-08-10 RX ORDER — OXYCODONE HYDROCHLORIDE 5 MG/1
5 TABLET ORAL EVERY 4 HOURS PRN
Qty: 23 TABLET | Refills: 0 | Status: SHIPPED | OUTPATIENT
Start: 2023-08-10 | End: 2023-08-17

## 2023-08-10 RX ORDER — CELECOXIB 200 MG/1
200 CAPSULE ORAL 2 TIMES DAILY
Qty: 56 CAPSULE | Refills: 0 | Status: SHIPPED | OUTPATIENT
Start: 2023-08-10 | End: 2023-09-07

## 2023-08-10 RX ORDER — METHOCARBAMOL 500 MG/1
500 TABLET, FILM COATED ORAL 4 TIMES DAILY
Qty: 56 TABLET | Refills: 0 | Status: SHIPPED | OUTPATIENT
Start: 2023-08-10 | End: 2023-08-24

## 2023-08-10 NOTE — PROGRESS NOTES
S:Diane Garcia presents for post-operative evaluation.        DATE OF PROCEDURE: 7/26/2023      PROCEDURES PERFORMED:   Left total knee arthroplasty (CPT 88467)    Diane Garcia reports to be doing well 2wk s/p the above mentioned procedure. Denies fevers, chills, night sweats, chest pain, difficulty breathing, calf pain or tenderness. Going to PT 2xWeek at the [facility] location. Seeing good progress daily. Pain levels are improving. She is taking the Oxy 5mg as needed for PT and at night. Otherwise taking Celebrex, Lyrica, Robaxin, and ASA. Completed post op Cefadroxil.    Of note patient with right foot pain, currently following with Dr. Salcedo.    O: The incision is healing well.  No signs of infection.  Staples were removed. No significant pain or unusual tenderness. aROM knee 0-115 with ease.    Imaging:  Plain radiographs of the left knee demonstrate post operative total knee arthroplasty prosthesis in place and intact without complications.    A/P: Images were reviewed with the patient. Incisions healing well. Ok to shower with incisions uncovered. No submerging at this point. Will refill Oxycodone, Lyrica, Robaxin, Celebrex. Patient doing very well overall. Plan to follow the rehab plan as previously outlined. RTC in 4 weeks.

## 2023-08-11 ENCOUNTER — TELEPHONE (OUTPATIENT)
Dept: INTERVENTIONAL RADIOLOGY/VASCULAR | Facility: CLINIC | Age: 68
End: 2023-08-11
Payer: MEDICARE

## 2023-08-11 ENCOUNTER — CLINICAL SUPPORT (OUTPATIENT)
Dept: REHABILITATION | Facility: HOSPITAL | Age: 68
End: 2023-08-11
Payer: MEDICARE

## 2023-08-11 DIAGNOSIS — R53.1 WEAKNESS: ICD-10-CM

## 2023-08-11 DIAGNOSIS — M25.562 CHRONIC PAIN OF BOTH KNEES: Primary | ICD-10-CM

## 2023-08-11 DIAGNOSIS — R26.9 ABNORMAL GAIT: ICD-10-CM

## 2023-08-11 DIAGNOSIS — G89.29 CHRONIC PAIN OF BOTH KNEES: Primary | ICD-10-CM

## 2023-08-11 DIAGNOSIS — M25.561 CHRONIC PAIN OF BOTH KNEES: Primary | ICD-10-CM

## 2023-08-11 PROCEDURE — 97110 THERAPEUTIC EXERCISES: CPT | Mod: PO

## 2023-08-11 PROCEDURE — 97530 THERAPEUTIC ACTIVITIES: CPT | Mod: PO

## 2023-08-11 NOTE — TELEPHONE ENCOUNTER
Spoke to pt on phone, she wants to talk to the dr before scheduling IR procedure. Verbally understood, clinic number given. thanks

## 2023-08-14 ENCOUNTER — CLINICAL SUPPORT (OUTPATIENT)
Dept: REHABILITATION | Facility: HOSPITAL | Age: 68
End: 2023-08-14
Payer: MEDICARE

## 2023-08-14 DIAGNOSIS — G89.29 CHRONIC PAIN OF BOTH KNEES: Primary | ICD-10-CM

## 2023-08-14 DIAGNOSIS — R26.9 ABNORMAL GAIT: ICD-10-CM

## 2023-08-14 DIAGNOSIS — M25.561 CHRONIC PAIN OF BOTH KNEES: Primary | ICD-10-CM

## 2023-08-14 DIAGNOSIS — M25.562 CHRONIC PAIN OF BOTH KNEES: Primary | ICD-10-CM

## 2023-08-14 DIAGNOSIS — R53.1 WEAKNESS: ICD-10-CM

## 2023-08-14 PROCEDURE — 97112 NEUROMUSCULAR REEDUCATION: CPT | Mod: PO

## 2023-08-14 PROCEDURE — 97530 THERAPEUTIC ACTIVITIES: CPT | Mod: PO

## 2023-08-14 PROCEDURE — 97110 THERAPEUTIC EXERCISES: CPT | Mod: PO

## 2023-08-14 NOTE — PROGRESS NOTES
"[facility]   Physical Therapy Treatment Note      Name: Diane Garcia  Clinic Number: [id number]    Therapy Diagnosis:   Encounter Diagnoses   Name Primary?    Chronic pain of both knees Yes    Abnormal gait     Weakness        Physician: Brando Edmond    Visit Date: 8/14/2023    Physician Orders: PT Eval and Treat   Medical Diagnosis from Referral: M17.12 (ICD-10-CM) - Primary osteoarthritis of left knee  Evaluation Date: 7/31/2023  Authorization Period Expiration: 7/26/24  Plan of Care Expiration: 12/31/23  Progress Note Due: 8/30/23  Visit # / Visits authorized: 1/ 1       Time In: 12:00 pm  Time Out: 1:30 pm  Total Appointment Time (timed & untimed codes): 90 minutes      PROCEDURES PERFORMED:   Left total knee arthroplasty (CPT 62516)        Subjective     Pt reports: That her knee is feeling good and she has an appt with another foot/ankle MD on Wed. She states that her swelling is increased despite wearing the boot. She also mentioned that she knows she would be walking without an AD if it were not for her foot  Response to previous treatment: Good  Functional change: Moving around better each day    Pain: 2/10  Location: left knee      Objective      Objective Measures updated at progress report unless specified.   8/9/2023:   Girth L+ at end of bandage improved from 17" to 16.75" to 16.25" at today's visit                    Middle of joint line improved 18" to 17" to 16.75" at today's visit                         120 deg L+ knee flexion / 0 deg extension taken 8/9/2023    Girth 1st Measure Right   Joint Line  18"   @ end of bandage 17"         Treatment     Diane received the treatments listed below:      therapeutic exercises to develop strength, endurance, ROM, flexibility, and core stabilization for 20 minutes including:  [x] Bike x 10 min full revolution  [x] Supine Heel Slides x 30  [x] Prone HS curls x 15  [x] Seated Marching 3 x 10 B+  [x] Seated Ankle PF " with GTB 3 x 10      manual therapy techniques: Joint mobilizations, Manual Lymphatic Drainage, and Soft tissue Mobilization were applied to the: L+ Knee for 0 minutes including:  Light pressure PROM knee extension/flexion      neuromuscular re-education activities to improve: Balance, Coordination, Kinesthetic, Sense, and Proprioception for 40 minutes. The following activities were included:   [x] SAQ 3 x 10 with t/c at VMO/quad 1# large and small bolster B+  [x] Bridging 3 x 10  [x] SL Adduction 3 x 10 B+  [x] LAQ 3 x 10 1# B+  [x] SLR 3 x 10 1# B+  [x] SL Abd with 3 x 10 with mod t/c and verbal cues for proper alignment B+    therapeutic activities to improve functional performance for 8 minutes, including:  Ambulation with RW but only 1 UE assist without pain or LOB and with boot on R+ LE  Ambulation with use of SPC x 200 ft, with boot on R+ LE - pt had to stop due to pain in foot    unsupervised modalities after being cleared for contradictions: IFC Electrical Stimulation:  Diane received CP for swelling/pain control applied to the L+ Knee Joint for pain reduction  with cold pack for 15 minutes     Patient Education and Home Exercises       Education provided:   -importance of increasing WB onto L+ LE when performing sit <> stand. Advised not to attempt to stand without use of hands due to inability to perform with PT present     Written Home Exercises Provided: Patient instructed to cont prior HEP. Exercises were reviewed and Diane was able to demonstrate them prior to the end of the session.  Diane demonstrated good  understanding of the education provided. See EMR under Patient Instructions for exercises provided during therapy sessions    Assessment   Pt displayed increased swelling in R+ foot. Pt with good quad contraction and stability of L+ knee when ambulating with use of SPC, however unable to transition due to R+ foot pain. Established therex on B+ LE in order to provide adequate strength in  both LE to improve overall functional activity efficiency     Diane Is progressing well towards her goals.   Pt prognosis is Excellent.     Pt will continue to benefit from skilled outpatient physical therapy to address the deficits listed in the problem list box on initial evaluation, provide pt/family education and to maximize pt's level of independence in the home and community environment.     Pt's spiritual, cultural and educational needs considered and pt agreeable to plan of care and goals.     Anticipated barriers to physical therapy: none    Goals: 6-16weeks  Short Term Goals: 6 weeks   Pt independent in initial hep - Met 7/31/2023  Pain 0-3/10  ROM full active extension equal to well knee. Flexion 110 or better Met 8/9/2023  20 SLR without lag Met 8/14/23  Pt ambulates independently without significant deviation  Swelling <25%  Pt reports 35% or better function.     Long Term Goals: 16 weeks   Pt independent in d/c hep  Pain 0-1/10  Pt negotiates stairs , multiple flights without significant deviation  ROM full active ext to 120 or better flexion  Swelling <10%  Pt reports 85% improvement in function  Plan   Continue with current Phase 2 Exercises     Plan of care Certification: 7/31/2023 to 12/31/23.    Outpatient Physical Therapy 1-3 times weekly for 16-20 weeks to include the following interventions: Electrical Stimulation IFC, NMES, Manual Therapy, Moist Heat/ Ice, Neuromuscular Re-ed, Patient Education, Therapeutic Activities, and Therapeutic Exercise.     Marivel Tavares, PT

## 2023-08-14 NOTE — PROGRESS NOTES
"[facility]   Physical Therapy Treatment Note      Name: Diane Garcia  Clinic Number: [id number]    Therapy Diagnosis:   Encounter Diagnoses   Name Primary?    Chronic pain of both knees Yes    Abnormal gait     Weakness        Physician: Brando Edmond*    Visit Date: 8/11/2023    Physician Orders: PT Eval and Treat   Medical Diagnosis from Referral: M17.12 (ICD-10-CM) - Primary osteoarthritis of left knee  Evaluation Date: 7/31/2023  Authorization Period Expiration: 7/26/24  Plan of Care Expiration: 12/31/23  Progress Note Due: 8/30/23  Visit # / Visits authorized: 1/ 1       Time In: 3:30 pm  Time Out: 4:50 pm  Total Appointment Time (timed & untimed codes): 70 minutes      PROCEDURES PERFORMED:   Left total knee arthroplasty (CPT 71010)        Subjective     Pt reports: That her knee is feeling good and she has been doing her exercises  Response to previous treatment: Good  Functional change: Moving around better each day    Pain: 2/10  Location: left knee      Objective      Objective Measures updated at progress report unless specified.   8/9/2023:   Girth L+ at end of bandage improved from 17" to 16.75" to 16.25" at today's visit                    Middle of joint line improved 18" to 17" to 16.75" at today's visit                         120 deg L+ knee flexion / 0 deg extension taken 8/9/2023    Girth 1st Measure Right   Joint Line  18"   @ end of bandage 17"         Treatment     Diane received the treatments listed below:      therapeutic exercises to develop strength, endurance, ROM, flexibility, and core stabilization for 15 minutes including:  [x] Bike x 10 min full revolution  [x] Supine Heel Slides x 30  [x] Standing HS curls at RW  [x] Seated Marching 3 x 10  [x] Seated Heel Raises 3 x 10  [x] Ankle PF GTB 3 x 10 L+       manual therapy techniques: Joint mobilizations, Manual Lymphatic Drainage, and Soft tissue Mobilization were applied to the: L+ Knee for 3 " "minutes including:  Light pressure PROM knee extension/flexion  Patellar Mobs all 4 directions       neuromuscular re-education activities to improve: Balance, Coordination, Kinesthetic, Sense, and Proprioception for 40 minutes. The following activities were included:   [x] SAQ 3 x 10 with t/c at VMO/quad  [x] Bridging 2 x 10  [x] Quad Set x 5 min with T/C for isolating quad from gluteal compensatory action  [x] LAQ 3 x 10  [x] SLR 3 x 10  [x] SL Abd with 3 x 10 with mod t/c and verbal cues for proper alignment   [x] SL Add with 3 x 10 with mod t/c and verbal cues for proper alignment     therapeutic activities to improve functional performance for 0 minutes, including:  Training for lifting L+ LE on/off mat and in/out of bed   Considering height of bed, she was encouraged to "support" L+ LE with use of R+ LE, however only as a support guard while allowing the lifting to come from L+ LE     unsupervised modalities after being cleared for contradictions: Diane received CP for swelling/pain control applied to the L+ Knee Joint for pain reduction  with cold pack for 15 minutes     Patient Education and Home Exercises       Education provided:   HEP    Written Home Exercises Provided: Patient instructed to cont prior HEP. Exercises were reviewed and Diane was able to demonstrate them prior to the end of the session.  Diane demonstrated good  understanding of the education provided. See EMR under Patient Instructions for exercises provided during therapy sessions    Assessment   Pt with excellent progress of L+ knee, however R+ foot/ankle continue to be barrier to ambulatory progression     Diane Is progressing well towards her goals.   Pt prognosis is Excellent.     Pt will continue to benefit from skilled outpatient physical therapy to address the deficits listed in the problem list box on initial evaluation, provide pt/family education and to maximize pt's level of independence in the home and community " environment.     Pt's spiritual, cultural and educational needs considered and pt agreeable to plan of care and goals.     Anticipated barriers to physical therapy: none    Goals: 6-16weeks  Short Term Goals: 6 weeks   Pt independent in initial hep - Met 7/31/2023  Pain 0-3/10  ROM full active extension equal to well knee. Flexion 110 or better Met 8/9/2023  20 SLR without lag  Pt ambulates independently without significant deviation  Swelling <25%  Pt reports 35% or better function.     Long Term Goals: 16 weeks   Pt independent in d/c hep  Pain 0-1/10  Pt negotiates stairs , multiple flights without significant deviation  ROM full active ext to 120 or better flexion  Swelling <10%  Pt reports 85% improvement in function  Plan   Continue with current Phase 2 Exercises     Plan of care Certification: 7/31/2023 to 12/31/23.    Outpatient Physical Therapy 1-3 times weekly for 16-20 weeks to include the following interventions: Electrical Stimulation IFC, NMES, Manual Therapy, Moist Heat/ Ice, Neuromuscular Re-ed, Patient Education, Therapeutic Activities, and Therapeutic Exercise.     Marivel Tavares, PT

## 2023-08-15 NOTE — PROCEDURES
HST completed and interpreted.  Report is available under media tab.  The patient is established with a sleep provider and follow-up will be arranged.     None

## 2023-08-16 ENCOUNTER — CLINICAL SUPPORT (OUTPATIENT)
Dept: REHABILITATION | Facility: HOSPITAL | Age: 68
End: 2023-08-16
Payer: MEDICARE

## 2023-08-16 DIAGNOSIS — M17.12 PRIMARY OSTEOARTHRITIS OF LEFT KNEE: Primary | ICD-10-CM

## 2023-08-16 PROCEDURE — 97110 THERAPEUTIC EXERCISES: CPT | Mod: PO

## 2023-08-16 PROCEDURE — 97112 NEUROMUSCULAR REEDUCATION: CPT | Mod: PO

## 2023-08-18 ENCOUNTER — CLINICAL SUPPORT (OUTPATIENT)
Dept: REHABILITATION | Facility: HOSPITAL | Age: 68
End: 2023-08-18
Payer: MEDICARE

## 2023-08-18 DIAGNOSIS — G89.29 CHRONIC PAIN OF BOTH KNEES: Primary | ICD-10-CM

## 2023-08-18 DIAGNOSIS — R26.9 ABNORMAL GAIT: ICD-10-CM

## 2023-08-18 DIAGNOSIS — M25.562 CHRONIC PAIN OF BOTH KNEES: Primary | ICD-10-CM

## 2023-08-18 DIAGNOSIS — R53.1 WEAKNESS: ICD-10-CM

## 2023-08-18 DIAGNOSIS — M25.561 CHRONIC PAIN OF BOTH KNEES: Primary | ICD-10-CM

## 2023-08-18 PROCEDURE — 97112 NEUROMUSCULAR REEDUCATION: CPT | Mod: PO

## 2023-08-18 PROCEDURE — 97110 THERAPEUTIC EXERCISES: CPT | Mod: PO

## 2023-08-18 PROCEDURE — 97530 THERAPEUTIC ACTIVITIES: CPT | Mod: PO

## 2023-08-18 NOTE — PROGRESS NOTES
"[facility]   Physical Therapy Treatment Note      Name: Diane Garcia  Clinic Number: [id number]    Therapy Diagnosis:   No diagnosis found.      Physician: Brando Edmond*    Visit Date: 8/16/2023    Physician Orders: PT Eval and Treat   Medical Diagnosis from Referral: M17.12 (ICD-10-CM) - Primary osteoarthritis of left knee  Evaluation Date: 7/31/2023  Authorization Period Expiration: 7/26/24  Plan of Care Expiration: 12/31/23  Progress Note Due: 8/30/23  Visit # / Visits authorized: 1/ 1       Time In: 12:00 pm  Time Out: 1:30 pm  Total Appointment Time (timed & untimed codes): 90 minutes      PROCEDURES PERFORMED:   Left total knee arthroplasty (CPT 89092)        Subjective     Pt reports: That she saw the foot MD and she has a Fracture in her foot that could take up to 3 months to heel. She was administered a new cam boot that comes further up towards the knee. She continues to report that she could walk without an AD if she did not have her foot situation. Pt states that she return to foot MD in 3 weeks for f/u imagining to see healing progress. Pt states that she swept the floor yesterday while using RW and it took a lot out of her    Response to previous treatment: Good  Functional change: Moving around better each day    Pain: 2/10  Location: left knee      Objective      Objective Measures updated at progress report unless specified.   8/9/2023:   Girth L+ at end of bandage improved from 17" to 16.75" to 16.25" at today's visit                    Middle of joint line improved 18" to 17" to 16.75" at today's visit                         120 deg L+ knee flexion / 0 deg extension taken 8/9/2023    Girth 1st Measure Right   Joint Line  18"   @ end of bandage 17"         Treatment     Diane received the treatments listed below:      therapeutic exercises to develop strength, endurance, ROM, flexibility, and core stabilization for 30 minutes including:  [x] Bike x 10 " min full revolution  [x] Supine Heel Slides x 30  [x] Prone HS curls x 15 B+  [x] Seated Marching 3 x 10 B+  [x] Seated Ankle PF with GTB 3 x 10      manual therapy techniques: Joint mobilizations, Manual Lymphatic Drainage, and Soft tissue Mobilization were applied to the: L+ Knee for 0 minutes including:  Light pressure PROM knee extension/flexion      neuromuscular re-education activities to improve: Balance, Coordination, Kinesthetic, Sense, and Proprioception for 40 minutes. The following activities were included:   [x] SAQ 3 x 10 with t/c at VMO/quad 1# large and small bolster B+  [x] Bridging 3 x 10  [x] SL Adduction 3 x 10 B+  [x] LAQ 3 x 10 1# B+  [x] SLR 3 x 10 1# B+  [x] SL Abd with 3 x 10 with mod t/c and verbal cues for proper alignment B+    therapeutic activities to improve functional performance for 0 minutes, including:  Ambulation with RW but only 1 UE assist without pain or LOB and with boot on R+ LE  Ambulation with use of SPC x 200 ft, with boot on R+ LE - pt had to stop due to pain in foot    unsupervised modalities after being cleared for contradictions: IFC Electrical Stimulation:  Diane received CP for swelling/pain control applied to the L+ Knee Joint for pain reduction  with cold pack for 0 minutes     Patient Education and Home Exercises       Education provided:   -importance of increasing WB onto L+ LE when performing sit <> stand. Advised not to attempt to stand without use of hands due to inability to perform with PT present     Written Home Exercises Provided: Patient instructed to cont prior HEP. Exercises were reviewed and Diane was able to demonstrate them prior to the end of the session.  Diane demonstrated good  understanding of the education provided. See EMR under Patient Instructions for exercises provided during therapy sessions    Assessment   Pt is with noted swelling in R+ ankle/foot area. It should also be noted that L+ LE lower leg swelling is dispersed throughout  which can be attributed to walking further distances during MD visit. She also increased her activity at home  Diane Is progressing well towards her goals. Pt continues to be in good spirit despite obstacles that are slowing the progress of her recovery. Performed therex B+  Pt prognosis is Excellent.     Pt will continue to benefit from skilled outpatient physical therapy to address the deficits listed in the problem list box on initial evaluation, provide pt/family education and to maximize pt's level of independence in the home and community environment.     Pt's spiritual, cultural and educational needs considered and pt agreeable to plan of care and goals.     Anticipated barriers to physical therapy: none    Goals: 6-16weeks  Short Term Goals: 6 weeks   Pt independent in initial hep - Met 7/31/2023  Pain 0-3/10 Met 8/16/23  ROM full active extension equal to well knee. Flexion 110 or better Met 8/9/2023  20 SLR without lag Met 8/14/23  Pt ambulates independently without significant deviation  Swelling <25%  Pt reports 35% or better function.     Long Term Goals: 16 weeks   Pt independent in d/c hep  Pain 0-1/10  Pt negotiates stairs , multiple flights without significant deviation  ROM full active ext to 120 or better flexion  Swelling <10%  Pt reports 85% improvement in function  Plan   Continue with current Phase 2 Exercises     Plan of care Certification: 7/31/2023 to 12/31/23.    Outpatient Physical Therapy 1-3 times weekly for 16-20 weeks to include the following interventions: Electrical Stimulation IFC, NMES, Manual Therapy, Moist Heat/ Ice, Neuromuscular Re-ed, Patient Education, Therapeutic Activities, and Therapeutic Exercise.     Marivel Tavares, PT

## 2023-08-22 NOTE — PROGRESS NOTES
"[facility]   Physical Therapy Treatment Note      Name: Diane Garcia  Clinic Number: [id number]    Therapy Diagnosis:   Encounter Diagnoses   Name Primary?    Chronic pain of both knees Yes    Abnormal gait     Weakness          Physician: Brando Edmond    Visit Date: 8/18/2023    Physician Orders: PT Eval and Treat   Medical Diagnosis from Referral: M17.12 (ICD-10-CM) - Primary osteoarthritis of left knee  Evaluation Date: 7/31/2023  Authorization Period Expiration: 7/26/24  Plan of Care Expiration: 12/31/23  Progress Note Due: 8/30/23  Visit # / Visits authorized: 1/ 1       Time In: 1:00 pm  Time Out: 2:25 pm  Total Appointment Time (timed & untimed codes): 70 minutes      PROCEDURES PERFORMED:   Left total knee arthroplasty (CPT 62816)        Subjective     Pt reports: that she went on an outing yesterday and she did a mixture of walking with walker and riding in transport chair. She reports minimal L+ knee pain    Response to previous treatment: Good  Functional change: Moving around better each day    Pain: 2/10  Location: left knee      Objective      Objective Measures updated at progress report unless specified.   8/9/2023:   Girth L+ at end of bandage improved from 17" to 16.75" to 16.25" at today's visit                    Middle of joint line improved 18" to 17" to 16.75" at today's visit                         120 deg L+ knee flexion / 0 deg extension taken 8/9/2023    Girth 1st Measure Right   Joint Line  18"   @ end of bandage 17"         Treatment     Diane received the treatments listed below:      therapeutic exercises to develop strength, endurance, ROM, flexibility, and core stabilization for 23 minutes including:  [x] Bike x 10 min full revolution  [] Supine Heel Slides x 30  [x] Prone HS curls 3 x 10 B+  [x] Prone Hip Extension with knee at 90 deg flexion 3 x 10 B+  [x] Seated Ankle PF with GTB 3 x 10      manual therapy techniques: Joint " mobilizations, Manual Lymphatic Drainage, and Soft tissue Mobilization were applied to the: L+ Knee for 0 minutes including:  Light pressure PROM knee extension/flexion      neuromuscular re-education activities to improve: Balance, Coordination, Kinesthetic, Sense, and Proprioception for 35 minutes. The following activities were included:   [x] SAQ 3 x 10 with t/c at VMO/quad 1# large and small bolster B+  [] Bridging 3 x 10  [x] SL Adduction 3 x 10 B+  [x] LAQ 3 x 10 1# B+  [x] SLR 3 x 10 1# B+  [x] SL Abd with 3 x 10 with mod t/c and verbal cues for proper alignment B+    therapeutic activities to improve functional performance for 12 minutes, including:  Ascend and Descend stairs using 1 UE assist 4 steps x 3 reps   Shuttle DBL Leg 2 x 10 3 black bands     unsupervised modalities after being cleared for contradictions: IFC Electrical Stimulation:  Diane received CP for swelling/pain control applied to the L+ Knee Joint for pain reduction  with cold pack for 0 minutes     Patient Education and Home Exercises       Education provided:   -importance of increasing WB onto L+ LE when performing sit <> stand. Advised not to attempt to stand without use of hands due to inability to perform with PT present     Written Home Exercises Provided: Patient instructed to cont prior HEP. Exercises were reviewed and Diane was able to demonstrate them prior to the end of the session.  Diane demonstrated good  understanding of the education provided. See EMR under Patient Instructions for exercises provided during therapy sessions    Assessment   Pt is making very good gains with L+ knee * LE. The R+ ankle/foot is the primary impairment limiting her functional mobility at this point.     Diane Is progressing well towards her goals. Pt continues to be in good spirit despite obstacles that are slowing the progress of her recovery.  Pt prognosis is Excellent.     Pt will continue to benefit from skilled outpatient physical  therapy to address the deficits listed in the problem list box on initial evaluation, provide pt/family education and to maximize pt's level of independence in the home and community environment.     Pt's spiritual, cultural and educational needs considered and pt agreeable to plan of care and goals.     Anticipated barriers to physical therapy: none    Goals: 6-16weeks  Short Term Goals: 6 weeks   Pt independent in initial hep - Met 7/31/2023  Pain 0-3/10 Met 8/16/23  ROM full active extension equal to well knee. Flexion 110 or better Met 8/9/2023  20 SLR without lag Met 8/14/23  Pt ambulates independently without significant deviation  Swelling <25%  Pt reports 35% or better function.     Long Term Goals: 16 weeks   Pt independent in d/c hep  Pain 0-1/10  Pt negotiates stairs , multiple flights without significant deviation  ROM full active ext to 120 or better flexion  Swelling <10%  Pt reports 85% improvement in function  Plan   Continue with current Phase 2 Exercises     Plan of care Certification: 7/31/2023 to 12/31/23.    Outpatient Physical Therapy 1-3 times weekly for 16-20 weeks to include the following interventions: Electrical Stimulation IFC, NMES, Manual Therapy, Moist Heat/ Ice, Neuromuscular Re-ed, Patient Education, Therapeutic Activities, and Therapeutic Exercise.     Marivel Tavares, PT

## 2023-08-23 DIAGNOSIS — M25.562 ACUTE PAIN OF LEFT KNEE: ICD-10-CM

## 2023-08-23 DIAGNOSIS — Z96.652 S/P TOTAL KNEE ARTHROPLASTY, LEFT: Primary | ICD-10-CM

## 2023-08-23 RX ORDER — HYDROCODONE BITARTRATE AND ACETAMINOPHEN 5; 325 MG/1; MG/1
1 TABLET ORAL EVERY 6 HOURS PRN
Qty: 20 TABLET | Refills: 0 | Status: SHIPPED | OUTPATIENT
Start: 2023-08-23 | End: 2023-11-09

## 2023-08-23 NOTE — PROGRESS NOTES
Called patient in regard to pain med refill request. Patient says her knee is doing great. Only bothers her at night. Her main complaint is the foot. She saw Dr. Salcedo who diagnosed her with talonavicular degenerative changes and offered her an injection with IR. She saw a second opinion who diagnosed her with a fractured and told her injections would make it worse. At this point her foot pain is so severe that she is unable to walk across the house without pain. She is worried it is going to interfere with her knee rehab. She will discuss with her foot doctor about possibly doing the injection. I will call in a small prescription for Norco for her pain.

## 2023-08-29 ENCOUNTER — CLINICAL SUPPORT (OUTPATIENT)
Dept: REHABILITATION | Facility: HOSPITAL | Age: 68
End: 2023-08-29
Payer: MEDICARE

## 2023-08-29 DIAGNOSIS — R53.1 WEAKNESS: ICD-10-CM

## 2023-08-29 DIAGNOSIS — M25.562 CHRONIC PAIN OF BOTH KNEES: Primary | ICD-10-CM

## 2023-08-29 DIAGNOSIS — M25.561 CHRONIC PAIN OF BOTH KNEES: Primary | ICD-10-CM

## 2023-08-29 DIAGNOSIS — G89.29 CHRONIC PAIN OF BOTH KNEES: Primary | ICD-10-CM

## 2023-08-29 DIAGNOSIS — R26.9 ABNORMAL GAIT: ICD-10-CM

## 2023-08-29 PROCEDURE — 97530 THERAPEUTIC ACTIVITIES: CPT | Mod: PO

## 2023-08-29 PROCEDURE — 97110 THERAPEUTIC EXERCISES: CPT | Mod: PO

## 2023-08-31 ENCOUNTER — PATIENT MESSAGE (OUTPATIENT)
Dept: PRIMARY CARE CLINIC | Facility: CLINIC | Age: 68
End: 2023-08-31
Payer: MEDICARE

## 2023-08-31 RX ORDER — METFORMIN HYDROCHLORIDE 500 MG/1
500 TABLET, EXTENDED RELEASE ORAL
Qty: 90 TABLET | Refills: 2 | Status: SHIPPED | OUTPATIENT
Start: 2023-08-31 | End: 2023-11-09

## 2023-08-31 NOTE — TELEPHONE ENCOUNTER
No care due was identified.  [facility] Embedded Care Due Messages. Reference number: [id number].   8/31/2023 10:39:47 AM CDT

## 2023-08-31 NOTE — PROGRESS NOTES
[facility] [place] [facility]   Physical Therapy Monthly Treatment Note      Name: Diane Garcia  Clinic Number: [id number]    Therapy Diagnosis:   Encounter Diagnoses   Name Primary?    Chronic pain of both knees Yes    Abnormal gait     Weakness          Physician: Brando Edmond*    Visit Date: 8/29/2023    Physician Orders: PT Eval and Treat   Medical Diagnosis from Referral: M17.12 (ICD-10-CM) - Primary osteoarthritis of left knee  Evaluation Date: 7/31/2023  Authorization Period Expiration: 7/26/24  Plan of Care Expiration: 12/31/23  Progress Note Due: 8/30/23  Visit # / Visits authorized: 1/ 1       Time In: 3:00 pm  Time Out: 4:00 pm  Total Appointment Time (timed & untimed codes): 60 minutes      PROCEDURES PERFORMED:   Left total knee arthroplasty (CPT 83483)        Subjective     Pt reports: that she is having an ok day. She states that she went back to the foot doctor and she did not receive good news. She states that she will be in a boot on her R+ LE x 1 month. The most concerning part is that she cannot walk without an AD because the pain in the R+ foot is unbearable.   Response to previous treatment: Good  Functional change: Moving around better each day    Pain: 2/10  Location: left knee      Objective      Range of Motion: Mild discomfort at End Tange L+ knee flexion, otherwise remarkable           Knee 8/29/03 AROM Flexion AROM Extension   Right 125 deg 0 deg   Left 125 deg 0 deg     Knee EVAL AROM PROM   Right 3-0-125 3-0-125   Left 0-5-30 0-45     Lower Extremity Strength: Pt able to perform SLR with 0 deg quad lag   Eval  Current 8/29/23   Heel Raise SL NT 3-/5   Hip Flexion TBA 4+/5   Hip Abduction  TBA 4+/5   Hip Adduction TBA 4/5   Knee Extension TBA 5/5   Knee Flexion  TBA 4/5        Treatment     Diane received the treatments listed below:      therapeutic exercises to develop strength, endurance, ROM, flexibility, and core stabilization for 45 minutes including:  []  Bike x 10 min full revolution  [x] Seated heel raises with 35# dumbell across thigh 3 x 10  [x]Supine HSS with rope 3 x 30 sec  [x]Prone Quad Stretch 3 x 30 sec with rope  [x] Prone HS curls 3 x 10 B+  [x] Prone Hip Extension with knee at 90 deg flexion 3 x 10 B+  [x] Leg Extension Machine 5# 3 x 10  [x] SAQ 3 x 10 with t/c at VMO/quad 2# large and small bolster B+  [x] SL Adduction 3 x 10 B+  [x] LAQ 3 x 10 2# B+  [x] SLR 3 x 10 1# B+  [x] SL Abd with 3 x 10     therapeutic activities to improve functional performance for 12 minutes, including:  Ascend and Descend stairs using 1 UE assist 4 steps x 3 reps   Shuttle DBL Leg 2 x 10 3 black bands     Patient Education and Home Exercises       Education provided:   -review of HEP    Written Home Exercises Provided: Patient instructed to cont prior HEP. Exercises were reviewed and Diane was able to demonstrate them prior to the end of the session.  Diane demonstrated good  understanding of the education provided. See EMR under Patient Instructions for exercises provided during therapy sessions    Assessment   Pt is making very good gains with L+ knee . The R+ ankle/foot is the primary impairment limiting her functional mobility at this point.Pt is unable to transition to ambulation without an AD due to pain in foot. Pt display atrophy in L+ quad. She has minimal swelling. Her L+ LE has great terminal knee extension with gait. Pt able to ascend and descend stairs without assist with use of HR. Pt with fair ability to perform single limb heel raise on L+. PT will continue to focus on strengthening and building muscle in L+ quad, and other strength deficits in L+ LE. We will monitor R+ foot pain in order to determine ability to progress with gait, as well as other dynamic exercises. Reduce Tx to 1x/wk due to limitation with Tx session secondary to R+ navicular Fx    Diane Is progressing well towards her goals. Pt continues to be in good spirit despite obstacles that  are slowing the progress of her recovery.  Pt prognosis is Excellent.     Goals: 6-16weeks  Short Term Goals: 6 weeks   Pt independent in initial hep - Met 7/31/2023  Pain 0-3/10 Met 8/16/23  ROM full active extension equal to well knee. Flexion 110 or better Met 8/9/2023  20 SLR without lag Met 8/14/23  Pt ambulates independently without significant deviation REMOVE 8/29/23  Swelling <25% Met 8/29/23  Pt reports 35% or better function.     Long Term Goals: 16 weeks   Pt independent in d/c hep   Pain 0-1/10 Met 8/29/23  Pt negotiates stairs , multiple flights without significant deviation Met 8/29/23  ROM full active ext to 120 or better flexion Met 8/29/23  Swelling <10% Met 8/29/23  Pt reports 85% improvement in function  Plan   Continue with current Phase 2 Exercises     Plan of care Certification: 7/31/2023 to 12/31/23.    Outpatient Physical Therapy 1-3 times weekly for 16-20 weeks to include the following interventions: Electrical Stimulation IFC, NMES, Manual Therapy, Moist Heat/ Ice, Neuromuscular Re-ed, Patient Education, Therapeutic Activities, and Therapeutic Exercise.     Marivel Tavares, PT

## 2023-09-02 DIAGNOSIS — E78.2 MIXED HYPERLIPIDEMIA: Primary | ICD-10-CM

## 2023-09-06 ENCOUNTER — CLINICAL SUPPORT (OUTPATIENT)
Dept: REHABILITATION | Facility: HOSPITAL | Age: 68
End: 2023-09-06
Payer: MEDICARE

## 2023-09-06 DIAGNOSIS — M25.562 CHRONIC PAIN OF BOTH KNEES: Primary | ICD-10-CM

## 2023-09-06 DIAGNOSIS — M25.561 CHRONIC PAIN OF BOTH KNEES: Primary | ICD-10-CM

## 2023-09-06 DIAGNOSIS — R26.9 ABNORMAL GAIT: ICD-10-CM

## 2023-09-06 DIAGNOSIS — G89.29 CHRONIC PAIN OF BOTH KNEES: Primary | ICD-10-CM

## 2023-09-06 DIAGNOSIS — R53.1 WEAKNESS: ICD-10-CM

## 2023-09-06 PROCEDURE — 97110 THERAPEUTIC EXERCISES: CPT | Mod: PO

## 2023-09-06 PROCEDURE — 97116 GAIT TRAINING THERAPY: CPT | Mod: PO

## 2023-09-06 RX ORDER — LOVASTATIN 40 MG/1
TABLET ORAL
Qty: 90 TABLET | Refills: 3 | Status: SHIPPED | OUTPATIENT
Start: 2023-09-06

## 2023-09-06 NOTE — PROGRESS NOTES
[facility]   Physical Therapy Treatment Note      Name: Diane Garcia  Clinic Number: [id number]    Therapy Diagnosis:   Encounter Diagnoses   Name Primary?    Chronic pain of both knees Yes    Abnormal gait     Weakness          Physician: Brando Edmond    Visit Date: 9/6/2023    Physician Orders: PT Eval and Treat   Medical Diagnosis from Referral: M17.12 (ICD-10-CM) - Primary osteoarthritis of left knee  Evaluation Date: 7/31/2023  Authorization Period Expiration: 7/26/24  Plan of Care Expiration: 12/31/23  Progress Note Due: 8/30/23  Visit # / Visits authorized: 10/ 12       Time In: 3:00 pm  Time Out: 4:00 pm  Total Appointment Time (timed & untimed codes): 60 minutes      PROCEDURES PERFORMED:   Left total knee arthroplasty (CPT 09746)        Subjective     Pt reports: her L+ knee feels pretty good, her R+ foot is still a major problem    Response to previous treatment: Good  Functional change: Moving around better each day    Pain: 0/10  Location: left knee      Objective    See PN 8/29/23     Treatment     Diane received the treatments listed below:      therapeutic exercises to develop strength, endurance, ROM, flexibility, and core stabilization for 40 minutes including:  [x] Bike x 15 min full revolution for overall muscular endurance and strength   [] Seated heel raises with 35# dumbell across thigh 3 x 10  []Supine HSS with rope 3 x 30 sec  []Prone Quad Stretch 3 x 30 sec with rope  [x] Prone HS curls 3 x 10 B+ 2# L+  [] Prone Hip Extension with knee at 90 deg flexion 3 x 10 B+  [x] Leg Extension Machine 5# 3 x 10  [] SAQ 3 x 10 with t/c at VMO/quad 2# large and small bolster B+  [x] SLR 4-way Adduction 3 x 10 B+    Gait Training x 13min with use of  ft x 2 CGA and verbal cues for sequencing, 1 Crutch under L+  ft x 2 with verbal cues for sequencing   -proper fit for crutch and cane noted - pt states she has crutch at home      therapeutic  activities to improve functional performance for  minutes, including:  Ascend and Descend stairs using 1 UE assist 4 steps x 3 reps   Shuttle DBL Leg 2 x 10 3 black bands     Patient Education and Home Exercises       Education provided:   -review of HEP    Written Home Exercises Provided: Patient instructed to cont prior HEP. Exercises were reviewed and Diane was able to demonstrate them prior to the end of the session.  Diane demonstrated good  understanding of the education provided. See EMR under Patient Instructions for exercises provided during therapy sessions    Assessment   Despite pain in R+ foot and pt thinking her pain is still severe in R+ foot, it should be noted that she had better results with cane and crutch than when she trialed this 2-3 weeks ago. Pt has been instructed to use crutch as primary mode of mobility, and use walker when her pain is increased in R+ LE. This indicates decreased pain which is allowing for improved mobility. Pt is dedicated to doing all she needs to maintain mobility and endurance     Diane Is progressing well towards her goals. Pt continues to be in good spirit despite obstacles that are slowing the progress of her recovery.  Pt prognosis is Excellent.     Goals: 6-16weeks  Short Term Goals: 6 weeks   Pt independent in initial hep - Met 7/31/2023  Pain 0-3/10 Met 8/16/23  ROM full active extension equal to well knee. Flexion 110 or better Met 8/9/2023  20 SLR without lag Met 8/14/23  Pt ambulates independently without significant deviation REMOVE 8/29/23  Swelling <25% Met 8/29/23  Pt reports 35% or better function.     Long Term Goals: 16 weeks   Pt independent in d/c hep   Pain 0-1/10 Met 8/29/23  Pt negotiates stairs , multiple flights without significant deviation Met 8/29/23  ROM full active ext to 120 or better flexion Met 8/29/23  Swelling <10% Met 8/29/23  Pt reports 85% improvement in function  Plan   Continue to monitor progress with complication of R+  foot, and progerss as tolerated in a modified form     Plan of care Certification: 7/31/2023 to 12/31/23.    Outpatient Physical Therapy 1-3 times weekly for 16-20 weeks to include the following interventions: Electrical Stimulation IFC, NMES, Manual Therapy, Moist Heat/ Ice, Neuromuscular Re-ed, Patient Education, Therapeutic Activities, and Therapeutic Exercise.     Marivel Tavares, PT

## 2023-09-07 ENCOUNTER — HOSPITAL ENCOUNTER (OUTPATIENT)
Dept: RADIOLOGY | Facility: HOSPITAL | Age: 68
Discharge: HOME | End: 2023-09-07
Attending: ORTHOPAEDIC SURGERY
Payer: MEDICARE

## 2023-09-07 ENCOUNTER — OFFICE VISIT (OUTPATIENT)
Dept: SPORTS MEDICINE | Facility: CLINIC | Age: 68
End: 2023-09-07
Payer: MEDICARE

## 2023-09-07 VITALS
DIASTOLIC BLOOD PRESSURE: 62 MMHG | WEIGHT: 222.69 LBS | HEART RATE: 69 BPM | BODY MASS INDEX: 37.1 KG/M2 | SYSTOLIC BLOOD PRESSURE: 124 MMHG | HEIGHT: 65 IN

## 2023-09-07 DIAGNOSIS — M25.562 LEFT KNEE PAIN, UNSPECIFIED CHRONICITY: ICD-10-CM

## 2023-09-07 DIAGNOSIS — M25.562 LEFT KNEE PAIN, UNSPECIFIED CHRONICITY: Primary | ICD-10-CM

## 2023-09-07 PROCEDURE — 73562 X-RAY EXAM OF KNEE 3: CPT | Mod: 26,RT,, | Performed by: RADIOLOGY

## 2023-09-07 PROCEDURE — 73564 X-RAY EXAM KNEE 4 OR MORE: CPT | Mod: 26,LT,, | Performed by: RADIOLOGY

## 2023-09-07 PROCEDURE — 73562 XR KNEE ORTHO LEFT WITH FLEXION: ICD-10-PCS | Mod: 26,RT,, | Performed by: RADIOLOGY

## 2023-09-07 PROCEDURE — 99024 PR POST-OP FOLLOW-UP VISIT: ICD-10-PCS | Mod: POP,,, | Performed by: ORTHOPAEDIC SURGERY

## 2023-09-07 PROCEDURE — 99024 POSTOP FOLLOW-UP VISIT: CPT | Mod: POP,,, | Performed by: ORTHOPAEDIC SURGERY

## 2023-09-07 PROCEDURE — 99213 OFFICE O/P EST LOW 20 MIN: CPT | Mod: PBBFAC | Performed by: ORTHOPAEDIC SURGERY

## 2023-09-07 PROCEDURE — 73564 X-RAY EXAM KNEE 4 OR MORE: CPT | Mod: TC,LT

## 2023-09-07 PROCEDURE — 73564 XR KNEE ORTHO LEFT WITH FLEXION: ICD-10-PCS | Mod: 26,LT,, | Performed by: RADIOLOGY

## 2023-09-07 PROCEDURE — 99999 PR PBB SHADOW E&M-EST. PATIENT-LVL III: ICD-10-PCS | Mod: PBBFAC,,, | Performed by: ORTHOPAEDIC SURGERY

## 2023-09-07 PROCEDURE — 99999 PR PBB SHADOW E&M-EST. PATIENT-LVL III: CPT | Mod: PBBFAC,,, | Performed by: ORTHOPAEDIC SURGERY

## 2023-09-07 NOTE — PROGRESS NOTES
CC: Left TKA Post-Op    DATE OF PROCEDURE: 7/26/2023   PROCEDURES PERFORMED:   Left total knee arthroplasty (CPT 66113)    Diane Garcia reports to be doing well 6wk s/p the above mentioned procedure. Going to PT at the [facility] location. Seeing good progress daily. Pain levels are improving.  States the knee is doing well.  No complaints.  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  She saw my colleague Dr. Ray Salcedo for this as well as a 2nd opinion from Dr. Field Duenas. She was placed into a boot and is using a walker for the right foot really.  We recognized this issue in regards to her foot just prior to surgery in the preop holding area.  X-rays were obtained.  At that time the patient wished to proceed with surgery for her left knee given all considerations and the circumstances.  Otherwise she feels that she is progressing well in physical therapy for her left knee.    O: Examination of the left knee show that the incision is well healed.  No signs of infection.  No significant pain or unusual tenderness. aROM knee 0-125 with ease.  Stable to varus and valgus stress.  No mid flexion instability.  No effusion.    Imaging:  Plain radiographs of the left knee demonstrate post operative total knee arthroplasty prosthesis in place and intact without complications.    A/P:   -Continue with PT focusing on strengthening and range of motion.  Overall recovery likely we will be somewhat delayed due to her ongoing right foot issue.  This certainly can irritate her left knee during the recovery.  Continue supportive care and follow up with a foot and ankle specialist.  -RTC in 6 weeks for repeat clinical and radiographic check.  -No wound soaks for another 4-6 weeks.

## 2023-09-14 ENCOUNTER — OFFICE VISIT (OUTPATIENT)
Dept: PRIMARY CARE CLINIC | Facility: CLINIC | Age: 68
End: 2023-09-14
Payer: MEDICARE

## 2023-09-14 VITALS
HEART RATE: 71 BPM | DIASTOLIC BLOOD PRESSURE: 74 MMHG | HEIGHT: 65 IN | BODY MASS INDEX: 37.05 KG/M2 | RESPIRATION RATE: 18 BRPM | SYSTOLIC BLOOD PRESSURE: 124 MMHG | OXYGEN SATURATION: 97 % | TEMPERATURE: 98 F

## 2023-09-14 DIAGNOSIS — G47.00 INSOMNIA, UNSPECIFIED TYPE: Primary | ICD-10-CM

## 2023-09-14 DIAGNOSIS — E78.2 MIXED HYPERLIPIDEMIA: ICD-10-CM

## 2023-09-14 DIAGNOSIS — R73.03 PREDIABETES: ICD-10-CM

## 2023-09-14 DIAGNOSIS — T70.20XS EFFECTS OF HIGH ALTITUDE, SEQUELA: ICD-10-CM

## 2023-09-14 DIAGNOSIS — M79.89 LEG SWELLING: ICD-10-CM

## 2023-09-14 DIAGNOSIS — Z12.31 OTHER SCREENING MAMMOGRAM: ICD-10-CM

## 2023-09-14 DIAGNOSIS — I10 ESSENTIAL HYPERTENSION: ICD-10-CM

## 2023-09-14 DIAGNOSIS — E66.01 SEVERE OBESITY (BMI 35.0-39.9) WITH COMORBIDITY: ICD-10-CM

## 2023-09-14 DIAGNOSIS — I70.0 AORTIC ATHEROSCLEROSIS: ICD-10-CM

## 2023-09-14 DIAGNOSIS — G89.29 CHRONIC PAIN OF BOTH KNEES: ICD-10-CM

## 2023-09-14 DIAGNOSIS — G47.33 OSA (OBSTRUCTIVE SLEEP APNEA): ICD-10-CM

## 2023-09-14 DIAGNOSIS — M25.561 CHRONIC PAIN OF BOTH KNEES: ICD-10-CM

## 2023-09-14 DIAGNOSIS — M25.562 CHRONIC PAIN OF BOTH KNEES: ICD-10-CM

## 2023-09-14 PROCEDURE — 99214 OFFICE O/P EST MOD 30 MIN: CPT | Mod: PBBFAC,PN | Performed by: INTERNAL MEDICINE

## 2023-09-14 PROCEDURE — 99397 PR PREVENTIVE VISIT,EST,65 & OVER: ICD-10-PCS | Mod: S$PBB,GZ,, | Performed by: INTERNAL MEDICINE

## 2023-09-14 PROCEDURE — 99397 PER PM REEVAL EST PAT 65+ YR: CPT | Mod: S$PBB,GZ,, | Performed by: INTERNAL MEDICINE

## 2023-09-14 PROCEDURE — 99999 PR PBB SHADOW E&M-EST. PATIENT-LVL IV: CPT | Mod: PBBFAC,,, | Performed by: INTERNAL MEDICINE

## 2023-09-14 PROCEDURE — 99999 PR PBB SHADOW E&M-EST. PATIENT-LVL IV: ICD-10-PCS | Mod: PBBFAC,,, | Performed by: INTERNAL MEDICINE

## 2023-09-14 RX ORDER — ACETAZOLAMIDE 125 MG/1
TABLET ORAL
Qty: 30 TABLET | Refills: 0 | Status: SHIPPED | OUTPATIENT
Start: 2023-09-14 | End: 2023-11-09

## 2023-09-14 RX ORDER — FUROSEMIDE 20 MG/1
20 TABLET ORAL DAILY PRN
Qty: 30 TABLET | Refills: 0 | Status: SHIPPED | OUTPATIENT
Start: 2023-09-14 | End: 2023-11-09

## 2023-09-14 RX ORDER — TRAZODONE HYDROCHLORIDE 50 MG/1
50 TABLET ORAL NIGHTLY
Qty: 90 TABLET | Refills: 1 | Status: SHIPPED | OUTPATIENT
Start: 2023-09-14 | End: 2024-09-13

## 2023-09-14 NOTE — PROGRESS NOTES
Subjective:      Patient ID: Diane Garcia is a 68 y.o. female.    Chief Complaint: Annual Exam    Diane Garcia is a 67 y.o. female with PMH significant for HTN, HLD, prediabetes, MELLISA, GERD, OA bilateral knees.   Presenting today for follow up. Date of last annual is 3/6/2023    HTN: BP at office 124/74. Denies CP/SOB/lightheadedness/dizziness. Continue current regimen    GERD:  Does have a history of gastric reflux most likely secondary to large hiatal hernia. Continues on nexium at this time.      HLD: Continue lovastatin and omega 3     Prediabetes: Metformin 500mg qday.    Underwent left TKA on 7/26/2023. Continue to follow up with sports medicine. Currently wearing nik boot for the foot fracture.     Up to date with colonoscopy, due 2026. Mammogram ordered    Request medication for altitude sickness, ordered diamox today.     Denies any chest pain, shortness of breath, nausea vomiting constipation diarrhea, blood in stool, heartburn    Review of Systems   Constitutional:  Negative for chills, fever and weight loss.   HENT:  Negative for congestion, ear pain and sore throat.    Eyes:  Negative for double vision.   Respiratory:  Negative for cough and shortness of breath.    Cardiovascular:  Negative for chest pain, palpitations and leg swelling.   Gastrointestinal:  Negative for abdominal pain, heartburn, nausea and vomiting.   Skin:  Negative for rash.   Neurological:  Negative for dizziness, tingling and headaches.   Psychiatric/Behavioral:  Negative for depression.          Current Outpatient Medications:     biotin 1 mg Cap, Take by mouth., Disp: , Rfl:     cartilage/collagen/bor/hyalur (JOINT HEALTH ORAL), Take by mouth., Disp: , Rfl:     esomeprazole (NEXIUM) 20 MG capsule, Take 20 mg by mouth before breakfast., Disp: , Rfl:     hydroCHLOROthiazide (HYDRODIURIL) 25 MG tablet, TAKE 1 TABLET(25 MG) BY MOUTH EVERY DAY, Disp: 90 tablet, Rfl: 3    HYDROcodone-acetaminophen (NORCO) 5-325 mg per tablet,  Take 1 tablet by mouth every 6 (six) hours as needed for Pain., Disp: 20 tablet, Rfl: 0    ibuprofen (ADVIL,MOTRIN) 800 MG tablet, TAKE 1 TABLET(800 MG) BY MOUTH EVERY 6 HOURS AS NEEDED FOR PAIN, Disp: 60 tablet, Rfl: 1    lovastatin (MEVACOR) 40 MG tablet, TAKE 1 TABLET(40 MG) BY MOUTH EVERY EVENING, Disp: 90 tablet, Rfl: 3    meloxicam (MOBIC) 15 MG tablet, TAKE 1 TABLET(15 MG) BY MOUTH EVERY DAY, Disp: 60 tablet, Rfl: 1    metFORMIN (GLUCOPHAGE-XR) 500 MG ER 24hr tablet, Take 1 tablet (500 mg total) by mouth daily with breakfast., Disp: 90 tablet, Rfl: 2    nebivoloL (BYSTOLIC) 5 MG Tab, Take 1 tablet (5 mg total) by mouth once daily., Disp: 90 tablet, Rfl: 1    olmesartan (BENICAR) 40 MG tablet, TAKE 1 TABLET(40 MG) BY MOUTH EVERY DAY, Disp: 90 tablet, Rfl: 3    omega-3 fatty acids/fish oil (FISH OIL-OMEGA-3 FATTY ACIDS) 300-1,000 mg capsule, Take by mouth once daily., Disp: , Rfl:     pregabalin (LYRICA) 75 MG capsule, Take 1 capsule (75 mg total) by mouth 2 (two) times daily. for 14 days, Disp: 28 capsule, Rfl: 0    TURMERIC ORAL, Take by mouth., Disp: , Rfl:     acetaZOLAMIDE (DIAMOX) 125 MG Tab, BID day prior to travel  & BID through descent, drink lots of water, no alcohol, no cafffeine, Disp: 30 tablet, Rfl: 0    aspirin (ECOTRIN) 81 MG EC tablet, Take 1 tablet (81 mg total) by mouth once daily. (Patient not taking: Reported on 9/7/2023), Disp: 42 tablet, Rfl: 0    furosemide (LASIX) 20 MG tablet, Take 1 tablet (20 mg total) by mouth daily as needed (leg swelling)., Disp: 30 tablet, Rfl: 0    traZODone (DESYREL) 50 MG tablet, Take 1 tablet (50 mg total) by mouth every evening., Disp: 90 tablet, Rfl: 1  No current facility-administered medications for this visit.    Facility-Administered Medications Ordered in Other Visits:     ropivacaine 0.2% [facility] PainPRO Pump infusion 500 ML, , Perineural, Continuous, Cornell Vela MD, New Bag at 07/26/23 2900    Lab Results   Component Value Date    HGBA1C 5.7  "(H) 07/05/2023    HGBA1C 6.0 (H) 03/09/2023    HGBA1C 5.6 02/02/2021     No results found for: "MICALBCREAT"  Lab Results   Component Value Date    LDLCALC 80.8 03/09/2023    LDLCALC 81.6 03/11/2022    CHOL 154 03/09/2023    HDL 42 03/09/2023    TRIG 156 (H) 03/09/2023       Lab Results   Component Value Date     07/05/2023    K 3.9 07/05/2023     07/05/2023    CO2 25 07/05/2023    GLU 95 07/05/2023    BUN 13 07/05/2023    CREATININE 0.6 07/05/2023    CALCIUM 9.3 07/05/2023    PROT 7.2 07/05/2023    ALBUMIN 4.0 07/05/2023    BILITOT 0.7 07/05/2023    ALKPHOS 64 07/05/2023    AST 10 07/05/2023    ALT 14 07/05/2023    ANIONGAP 11 07/05/2023    ESTGFRAFRICA >60.0 03/11/2022    EGFRNONAA >60.0 03/11/2022    WBC 8.69 07/05/2023    HGB 12.8 07/05/2023    HGB 12.3 03/09/2023    HCT 38.9 07/05/2023    MCV 89 07/05/2023     07/05/2023    TSH 1.569 03/09/2023    HEPCAB Negative 03/11/2022       No results found for: "LH", "FSH", "TOTALTESTOST", "PROGESTERONE", "ESTRADIOL", "OIFNGOXM42EB", "DFBIDWYX50", "FERRITIN", "IRON", "TRANSFERRIN", "TIBC", "FESATURATED", "ZINC"      Past Medical History:   Diagnosis Date    Arthritis     GERD (gastroesophageal reflux disease)     Hyperlipidemia     Hypertension      Past Surgical History:   Procedure Laterality Date    COLONOSCOPY N/A 02/02/2023    Procedure: COLONOSCOPY;  Surgeon: Bob Grayson MD;  Location: [facility];  Service: Endoscopy;  Laterality: N/A;    ESOPHAGOGASTRODUODENOSCOPY N/A 02/02/2023    Procedure: EGD (ESOPHAGOGASTRODUODENOSCOPY);  Surgeon: Bob Grayson MD;  Location: [facility] END[facility];  Service: Endoscopy;  Laterality: N/A;    HYSTERECTOMY      OOPHORECTOMY      mary jo placed Right     femur right mary jo    mary jo removed Right     TOTAL KNEE ARTHROPLASTY Left 7/26/2023    Procedure: ARTHROPLASTY, KNEE, TOTAL;  Surgeon: NATALIO Greenwood MD;  Location: [facility] OR;  Service: Orthopedics;  Laterality: Left;  Regional w/Catheter, Adductor, Spinal, " "0.2% Ropivacaine     Social History     Social History Narrative    Stairs- 6 to enter     Family History   Problem Relation Age of Onset    Cancer Mother         Lung    COPD Father     No Known Problems Sister     Melanoma Neg Hx      Vitals:    09/14/23 1043   BP: 124/74   Pulse: 71   Resp: 18   Temp: 98 °F (36.7 °C)   SpO2: 97%   Height: 5' 5" (1.651 m)   PainSc:   4     Objective:   Physical Exam  Vitals reviewed.   Constitutional:       Appearance: Normal appearance.   HENT:      Head: Normocephalic.   Eyes:      Pupils: Pupils are equal, round, and reactive to light.   Cardiovascular:      Rate and Rhythm: Normal rate.      Pulses: Normal pulses.      Heart sounds: Normal heart sounds.   Pulmonary:      Effort: Pulmonary effort is normal.      Breath sounds: Normal breath sounds.   Abdominal:      General: Bowel sounds are normal.      Palpations: Abdomen is soft.   Musculoskeletal:         General: Normal range of motion.   Skin:     General: Skin is warm.   Neurological:      General: No focal deficit present.      Mental Status: She is alert. Mental status is at baseline.   Psychiatric:         Mood and Affect: Mood normal.       Assessment:     1. Insomnia, unspecified type    2. Essential hypertension    3. Mixed hyperlipidemia    4. Prediabetes    5. MELLISA (obstructive sleep apnea)    6. Effects of high altitude, sequela    7. Leg swelling    8. Other screening mammogram    9. Aortic atherosclerosis    10. Severe obesity (BMI 35.0-39.9) with comorbidity    11. Chronic pain of both knees      Plan:     Orders Placed This Encounter    Mammo Digital Screening Bilat w/ Adam    traZODone (DESYREL) 50 MG tablet    acetaZOLAMIDE (DIAMOX) 125 MG Tab    furosemide (LASIX) 20 MG tablet       There are no Patient Instructions on file for this visit.  Tests to Keep You Healthy    Mammogram: ORDERED BUT NOT SCHEDULED  Colon Cancer Screening: Met on 2/2/2023  Last Blood Pressure <= 139/89 (9/14/2023): " NO

## 2023-09-22 DIAGNOSIS — T70.20XS EFFECTS OF HIGH ALTITUDE, SEQUELA: ICD-10-CM

## 2023-09-22 RX ORDER — ACETAZOLAMIDE 125 MG/1
TABLET ORAL
Qty: 30 TABLET | Refills: 0 | OUTPATIENT
Start: 2023-09-22

## 2023-09-29 ENCOUNTER — CLINICAL SUPPORT (OUTPATIENT)
Dept: REHABILITATION | Facility: HOSPITAL | Age: 68
End: 2023-09-29
Payer: MEDICARE

## 2023-09-29 DIAGNOSIS — M25.562 CHRONIC PAIN OF BOTH KNEES: Primary | ICD-10-CM

## 2023-09-29 DIAGNOSIS — G89.29 CHRONIC PAIN OF BOTH KNEES: Primary | ICD-10-CM

## 2023-09-29 DIAGNOSIS — M25.561 CHRONIC PAIN OF BOTH KNEES: Primary | ICD-10-CM

## 2023-09-29 DIAGNOSIS — R26.9 ABNORMAL GAIT: ICD-10-CM

## 2023-09-29 DIAGNOSIS — R53.1 WEAKNESS: ICD-10-CM

## 2023-09-29 PROCEDURE — 97110 THERAPEUTIC EXERCISES: CPT | Mod: PO

## 2023-09-29 NOTE — PROGRESS NOTES
[facility]   Physical Therapy Treatment Note      Name: Diane Garcia  Clinic Number: [id number]    Therapy Diagnosis:   Encounter Diagnoses   Name Primary?    Chronic pain of both knees Yes    Abnormal gait     Weakness          Physician: Brando Edmond    Visit Date: 9/29/2023    Physician Orders: PT Eval and Treat   Medical Diagnosis from Referral: M17.12 (ICD-10-CM) - Primary osteoarthritis of left knee  Evaluation Date: 7/31/2023  Authorization Period Expiration: 7/26/24  Plan of Care Expiration: 12/31/23  Progress Note Due: 8/30/23  Visit # / Visits authorized: 10/ 12       Time In: 2:00 pm  Time Out: 3:00 pm  Total Appointment Time (timed & untimed codes): 60 minutes        Subjective     Pt reports: She is doing better overall. She states that her knee is doing really well. She is no longer using an AD for ambulation. She states that increased walking and towards the end of the day, the R+ ankle will swell and cause her increased pain. She states that if it were not for the R+ ankle, she would be getting around normally. She reports that the MD seeing her for her foot states that she has neuropathy, however pt is questioning accuracy of such statement. She also reports that he states there has been healing noted, but she still needs to remain in boot for another 8 weeks.    Response to previous treatment: Good  Functional change: Moving around better each day    Pain: 0/10  Location: left knee      Objective    L+ Knee AROM Flexion 125 deg  L+ Knee AROM Extension 0 deg     Treatment     Diane received the treatments listed below:      therapeutic exercises to develop strength, endurance, ROM, flexibility, and core stabilization for 23 minutes including:  [x] Bike x 15 min full revolution for overall muscular endurance and strength   [] Seated heel raises with 35# dumbell across thigh 3 x 10  []Supine HSS with rope 3 x 30 sec  []Prone Quad Stretch 3 x 30 sec with  rope  [] Prone HS curls 3 x 10 B+ 2# L+  [] Prone Hip Extension with knee at 90 deg flexion 3 x 10 B+  [] Leg Extension Machine 5# 3 x 10  [] SAQ 3 x 10 with t/c at VMO/quad 2# large and small bolster B+  [] SLR 4-way Adduction 3 x 10 B+    Gait Training x 0 min with use of  ft x 2 CGA and verbal cues for sequencing, 1 Crutch under L+  ft x 2 with verbal cues for sequencing   -proper fit for crutch and cane noted - pt states she has crutch at home      therapeutic activities to improve functional performance for  minutes, including:  Ascend and Descend stairs using 1 UE assist 4 steps x 3 reps   Shuttle DBL Leg 2 x 10 3 black bands     Patient Education and Home Exercises       Education provided:   -review of HEP    Written Home Exercises Provided: Patient instructed to cont prior HEP. Exercises were reviewed and Diane was able to demonstrate them prior to the end of the session.  Diane demonstrated good  understanding of the education provided. See EMR under Patient Instructions for exercises provided during therapy sessions    Assessment   Following rec bike, pt was placed on mat to begin exercises. However, when PT taking measurements it was noted that patient had increased swelling in lower legs that had not been present before. When patient was questioned about any issues, she reported that she was taking a new BP med, amlodopine. PT decided to take pts BP. She was seated at this time and it was taken in L+ UE and read 175/98. Over the next 25 min 2 more reading were taken, 161/102 and lastly 155/100. Tx session for the day was stopped and pt was advised to contact MD regarding elevated pressure. She was notified that if top numbers reaches or exceeds 180mmHg that she should proceed to the ER. PT to contact MD regarding knee and placing therapy on hold until R+ foot has healed.         Diane Is progressing well towards her goals. Pt continues to be in good spirit despite obstacles that are  slowing the progress of her recovery.  Pt prognosis is Excellent.     Goals: 6-16weeks  Short Term Goals: 6 weeks   Pt independent in initial hep - Met 7/31/2023  Pain 0-3/10 Met 8/16/23  ROM full active extension equal to well knee. Flexion 110 or better Met 8/9/2023  20 SLR without lag Met 8/14/23  Pt ambulates independently without significant deviation REMOVE 8/29/23  Swelling <25% Met 8/29/23  Pt reports 35% or better function.     Long Term Goals: 16 weeks   Pt independent in d/c hep   Pain 0-1/10 Met 8/29/23  Pt negotiates stairs , multiple flights without significant deviation Met 8/29/23  ROM full active ext to 120 or better flexion Met 8/29/23  Swelling <10% Met 8/29/23  Pt reports 85% improvement in function  Plan   Continue to monitor progress with complication of R+ foot, and progerss as tolerated in a modified form     Plan of care Certification: 7/31/2023 to 12/31/23.    Outpatient Physical Therapy 1-3 times weekly for 16-20 weeks to include the following interventions: Electrical Stimulation IFC, NMES, Manual Therapy, Moist Heat/ Ice, Neuromuscular Re-ed, Patient Education, Therapeutic Activities, and Therapeutic Exercise.     Marivel Tavares, PT

## 2023-09-30 ENCOUNTER — PATIENT MESSAGE (OUTPATIENT)
Dept: PRIMARY CARE CLINIC | Facility: CLINIC | Age: 68
End: 2023-09-30
Payer: MEDICARE

## 2023-10-02 RX ORDER — MELOXICAM 15 MG/1
15 TABLET ORAL DAILY PRN
Qty: 60 TABLET | Refills: 1 | Status: SHIPPED | OUTPATIENT
Start: 2023-10-02 | End: 2023-10-09

## 2023-10-03 ENCOUNTER — HOSPITAL ENCOUNTER (OUTPATIENT)
Dept: RADIOLOGY | Facility: HOSPITAL | Age: 68
Discharge: HOME | End: 2023-10-03
Attending: INTERNAL MEDICINE
Payer: MEDICARE

## 2023-10-03 VITALS — HEIGHT: 65 IN | WEIGHT: 222 LBS | BODY MASS INDEX: 36.99 KG/M2

## 2023-10-03 DIAGNOSIS — Z12.31 OTHER SCREENING MAMMOGRAM: ICD-10-CM

## 2023-10-03 PROCEDURE — 77063 BREAST TOMOSYNTHESIS BI: CPT | Mod: 26,,, | Performed by: RADIOLOGY

## 2023-10-03 PROCEDURE — 77067 SCR MAMMO BI INCL CAD: CPT | Mod: TC

## 2023-10-03 PROCEDURE — 77067 SCR MAMMO BI INCL CAD: CPT | Mod: 26,,, | Performed by: RADIOLOGY

## 2023-10-03 PROCEDURE — 77063 MAMMO DIGITAL SCREENING BILAT WITH TOMO: ICD-10-PCS | Mod: 26,,, | Performed by: RADIOLOGY

## 2023-10-03 PROCEDURE — 77067 MAMMO DIGITAL SCREENING BILAT WITH TOMO: ICD-10-PCS | Mod: 26,,, | Performed by: RADIOLOGY

## 2023-10-08 NOTE — TELEPHONE ENCOUNTER
No care due was identified.  Health [facility] Embedded Care Due Messages. Reference number: [id number].   10/08/2023 1:54:01 PM CDT

## 2023-10-09 RX ORDER — IBUPROFEN 800 MG/1
TABLET ORAL
Qty: 60 TABLET | Refills: 1 | Status: SHIPPED | OUTPATIENT
Start: 2023-10-09

## 2023-10-12 NOTE — PROGRESS NOTES
CC: LEFT knee f/u    DATE OF PROCEDURE: 7/26/2023   PROCEDURES PERFORMED:   Left total knee arthroplasty (CPT 70332)    68 y.o. Female returns for scheduled follow-up of her left knee.  States the knee feels good.  Has some intermittent pain going up and down stairs and getting in and out of a car.  Localizes anteriorly.  Usually transient and not too significant.  Overall making good progress.  She seems pleased.  Remains in a walking boot for her right foot.  Seeing Dr. Field Duenas for this.  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  I do not have his records for review.  She does feel that the foot is doing better.  She also states that she is very happy we proceeded with her left total knee replacement despite her right foot issue.    REVIEW OF SYSTEMS:   Constitution: Negative. Negative for chills, fever and night sweats.    Hematologic/Lymphatic: Negative for bleeding problem. Does not bruise/bleed easily.   Skin: Negative for dry skin, itching and rash.   Musculoskeletal: Negative for falls. Positive for right foot pain and  muscle weakness.     PAST MEDICAL HISTORY:   Past Medical History:   Diagnosis Date    Arthritis     GERD (gastroesophageal reflux disease)     Hyperlipidemia     Hypertension      PAST SURGICAL HISTORY:   Past Surgical History:   Procedure Laterality Date    BREAST BIOPSY Left 2013    benign needle    COLONOSCOPY N/A 02/02/2023    Procedure: COLONOSCOPY;  Surgeon: Bob Grayson MD;  Location: [facility];  Service: Endoscopy;  Laterality: N/A;    ESOPHAGOGASTRODUODENOSCOPY N/A 02/02/2023    Procedure: EGD (ESOPHAGOGASTRODUODENOSCOPY);  Surgeon: Bob Grayson MD;  Location: [facility];  Service: Endoscopy;  Laterality: N/A;    HYSTERECTOMY      OOPHORECTOMY      mary jo placed Right     femur right mary jo    mary jo removed Right     TOTAL KNEE ARTHROPLASTY Left 07/26/2023    Procedure: ARTHROPLASTY, KNEE, TOTAL;  Surgeon: NATALIO  Andrzej Greenwood MD;  Location: [facility] OR;  Service: Orthopedics;  Laterality: Left;  Regional w/Catheter, Adductor, Spinal, 0.2% Ropivacaine     FAMILY HISTORY:   Family History   Problem Relation Age of Onset    Cancer Mother         Lung    COPD Father     No Known Problems Sister     Melanoma Neg Hx      SOCIAL HISTORY:   Social History     Socioeconomic History    Marital status:     Number of children: 1   Tobacco Use    Smoking status: Former     Current packs/day: 0.00     Average packs/day: 2.0 packs/day for 25.0 years (50.0 ttl pk-yrs)     Types: Cigarettes     Start date: 1975     Quit date: 2000     Years since quittin.9    Smokeless tobacco: Never   Substance and Sexual Activity    Alcohol use: Yes     Comment: A few drinks a week    Drug use: Never     Comment: last use >30 yrs ago     Sexual activity: Yes     Partners: Male   Social History Narrative    Stairs- 6 to enter       MEDICATIONS:     Current Outpatient Medications:     acetaZOLAMIDE (DIAMOX) 125 MG Tab, BID day prior to travel  & BID through descent, drink lots of water, no alcohol, no cafffeine, Disp: 30 tablet, Rfl: 0    aspirin (ECOTRIN) 81 MG EC tablet, Take 1 tablet (81 mg total) by mouth once daily. (Patient not taking: Reported on 2023), Disp: 42 tablet, Rfl: 0    biotin 1 mg Cap, Take by mouth., Disp: , Rfl:     cartilage/collagen/bor/hyalur (JOINT HEALTH ORAL), Take by mouth., Disp: , Rfl:     cephALEXin (KEFLEX) 500 MG capsule, Take 1 capsule (500 mg total) by mouth once. Take 30 minutes prior to dental procedure for 1 dose, Disp: 4 capsule, Rfl: 0    esomeprazole (NEXIUM) 20 MG capsule, Take 20 mg by mouth before breakfast., Disp: , Rfl:     furosemide (LASIX) 20 MG tablet, Take 1 tablet (20 mg total) by mouth daily as needed (leg swelling)., Disp: 30 tablet, Rfl: 0    hydroCHLOROthiazide (HYDRODIURIL) 25 MG tablet, TAKE 1 TABLET(25 MG) BY MOUTH EVERY DAY, Disp: 90 tablet, Rfl: 3     "HYDROcodone-acetaminophen (NORCO) 5-325 mg per tablet, Take 1 tablet by mouth every 6 (six) hours as needed for Pain., Disp: 20 tablet, Rfl: 0    ibuprofen (ADVIL,MOTRIN) 800 MG tablet, TAKE 1 TABLET(800 MG) BY MOUTH EVERY 6 HOURS AS NEEDED FOR PAIN, Disp: 60 tablet, Rfl: 1    lovastatin (MEVACOR) 40 MG tablet, TAKE 1 TABLET(40 MG) BY MOUTH EVERY EVENING, Disp: 90 tablet, Rfl: 3    metFORMIN (GLUCOPHAGE-XR) 500 MG ER 24hr tablet, Take 1 tablet (500 mg total) by mouth daily with breakfast., Disp: 90 tablet, Rfl: 2    nebivoloL (BYSTOLIC) 5 MG Tab, Take 1 tablet (5 mg total) by mouth once daily., Disp: 90 tablet, Rfl: 1    olmesartan (BENICAR) 40 MG tablet, TAKE 1 TABLET(40 MG) BY MOUTH EVERY DAY, Disp: 90 tablet, Rfl: 3    omega-3 fatty acids/fish oil (FISH OIL-OMEGA-3 FATTY ACIDS) 300-1,000 mg capsule, Take by mouth once daily., Disp: , Rfl:     pregabalin (LYRICA) 75 MG capsule, Take 1 capsule (75 mg total) by mouth 2 (two) times daily. for 14 days, Disp: 28 capsule, Rfl: 0    traZODone (DESYREL) 50 MG tablet, Take 1 tablet (50 mg total) by mouth every evening., Disp: 90 tablet, Rfl: 1    TURMERIC ORAL, Take by mouth., Disp: , Rfl:   No current facility-administered medications for this visit.    Facility-Administered Medications Ordered in Other Visits:     ropivacaine 0.2% [facility] PainPRO Pump infusion 500 ML, , Perineural, Continuous, Cornell Vela MD, New Bag at 07/26/23 9952    ALLERGIES:   Review of patient's allergies indicates:   Allergen Reactions    Penicillins      nausea      PHYSICAL EXAMINATION:  BP (!) 141/91   Pulse 82   Temp 98.3 °F (36.8 °C) (Oral)   Resp 18   Ht 5' 5" (1.651 m)   BMI 36.94 kg/m²   General: Well-developed well-nourished 68 y.o. femalein no acute distress   Cardiovascular: Regular rhythm by palpation of distal pulse, normal color and temperature, no concerning varicosities on symptomatic side   Lungs: No labored breathing or wheezing appreciated   Neuro: Alert and " oriented ×3   Psychiatric: well oriented to person, place and time, demonstrates normal mood and affect   Skin: No rashes, lesions or ulcers, normal temperature, turgor, and texture on involved extremity    Ortho/SPM Exam  Exam of the left knee shows full active extension, active flexion to 120° with central patellar tracking.  No mid flexion instability.  Stable to varus and valgus stress.  Good quad strength and activation.  Well-healed anterior incision.  She does have some scratches over the distal part of the incision.  She states she is been scratching this area recently.  No signs of infection.     IMAGING:  X-rays including standing, weight bearing AP and flexion bilateral knees, LEFT knee lateral and sunrise views ordered and images reviewed by me show:    Well placed total knee prosthesis.  No signs of loosening, fracture, or complication otherwise.    ASSESSMENT:      ICD-10-CM ICD-9-CM   1. Chronic pain of right knee  M25.561 719.46    G89.29 338.29   2. S/P total knee arthroplasty, left  Z96.652 V43.65     PLAN:     Clinically doing very well.  Discussed the need for dental prophylaxis.  She does have some upcoming dental work.  Prescription provided.  Continued treatment for her right foot per Dr. Duenas.  I expect her left knee to feel better over time.  Discussed not scratching around the incision.  She understands the risk for infection with surrounding skin breaks.  Return to clinic in 3 months for routine recheck of the left knee with repeat x-rays.      Procedures

## 2023-10-19 ENCOUNTER — HOSPITAL ENCOUNTER (OUTPATIENT)
Dept: RADIOLOGY | Facility: HOSPITAL | Age: 68
Discharge: HOME | End: 2023-10-19
Attending: ORTHOPAEDIC SURGERY
Payer: MEDICARE

## 2023-10-19 ENCOUNTER — OFFICE VISIT (OUTPATIENT)
Dept: SPORTS MEDICINE | Facility: CLINIC | Age: 68
End: 2023-10-19
Payer: MEDICARE

## 2023-10-19 VITALS
HEIGHT: 65 IN | SYSTOLIC BLOOD PRESSURE: 141 MMHG | RESPIRATION RATE: 18 BRPM | HEART RATE: 82 BPM | DIASTOLIC BLOOD PRESSURE: 91 MMHG | TEMPERATURE: 98 F | BODY MASS INDEX: 36.94 KG/M2

## 2023-10-19 DIAGNOSIS — M25.561 ACUTE PAIN OF RIGHT KNEE: ICD-10-CM

## 2023-10-19 DIAGNOSIS — G89.29 CHRONIC PAIN OF RIGHT KNEE: Primary | ICD-10-CM

## 2023-10-19 DIAGNOSIS — Z96.652 S/P TOTAL KNEE ARTHROPLASTY, LEFT: ICD-10-CM

## 2023-10-19 DIAGNOSIS — M25.561 CHRONIC PAIN OF RIGHT KNEE: Primary | ICD-10-CM

## 2023-10-19 PROCEDURE — 99999 PR PBB SHADOW E&M-EST. PATIENT-LVL III: CPT | Mod: PBBFAC,,, | Performed by: ORTHOPAEDIC SURGERY

## 2023-10-19 PROCEDURE — 99213 OFFICE O/P EST LOW 20 MIN: CPT | Mod: PBBFAC | Performed by: ORTHOPAEDIC SURGERY

## 2023-10-19 PROCEDURE — 73562 XR KNEE 3 VIEW RIGHT: ICD-10-PCS | Mod: 26,RT,, | Performed by: INTERNAL MEDICINE

## 2023-10-19 PROCEDURE — 99213 PR OFFICE/OUTPT VISIT, EST, LEVL III, 20-29 MIN: ICD-10-PCS | Mod: S$PBB,24,, | Performed by: ORTHOPAEDIC SURGERY

## 2023-10-19 PROCEDURE — 73562 X-RAY EXAM OF KNEE 3: CPT | Mod: TC,RT

## 2023-10-19 PROCEDURE — 99213 OFFICE O/P EST LOW 20 MIN: CPT | Mod: S$PBB,24,, | Performed by: ORTHOPAEDIC SURGERY

## 2023-10-19 PROCEDURE — 99999 PR PBB SHADOW E&M-EST. PATIENT-LVL III: ICD-10-PCS | Mod: PBBFAC,,, | Performed by: ORTHOPAEDIC SURGERY

## 2023-10-19 PROCEDURE — 73562 X-RAY EXAM OF KNEE 3: CPT | Mod: 26,RT,, | Performed by: INTERNAL MEDICINE

## 2023-10-19 RX ORDER — CEPHALEXIN 500 MG/1
500 CAPSULE ORAL ONCE
Qty: 4 CAPSULE | Refills: 0 | Status: SHIPPED | OUTPATIENT
Start: 2023-10-19 | End: 2023-10-19

## 2023-11-07 ENCOUNTER — TELEPHONE (OUTPATIENT)
Dept: DERMATOLOGY | Facility: CLINIC | Age: 68
End: 2023-11-07
Payer: MEDICARE

## 2023-11-09 ENCOUNTER — OFFICE VISIT (OUTPATIENT)
Dept: DERMATOLOGY | Facility: CLINIC | Age: 68
End: 2023-11-09
Payer: MEDICARE

## 2023-11-09 DIAGNOSIS — L57.0 ACTINIC KERATOSIS: Primary | ICD-10-CM

## 2023-11-09 PROCEDURE — 99999 PR PBB SHADOW E&M-EST. PATIENT-LVL II: CPT | Mod: PBBFAC,,, | Performed by: PATHOLOGY

## 2023-11-09 PROCEDURE — 99212 OFFICE O/P EST SF 10 MIN: CPT | Mod: PBBFAC | Performed by: PATHOLOGY

## 2023-11-09 PROCEDURE — 99213 PR OFFICE/OUTPT VISIT, EST, LEVL III, 20-29 MIN: ICD-10-PCS | Mod: 25,S$PBB,, | Performed by: PATHOLOGY

## 2023-11-09 PROCEDURE — 17000 PR DESTRUCTION(LASER SURGERY,CRYOSURGERY,CHEMOSURGERY),PREMALIGNANT LESIONS,FIRST LESION: ICD-10-PCS | Mod: S$PBB,,, | Performed by: PATHOLOGY

## 2023-11-09 PROCEDURE — 17000 DESTRUCT PREMALG LESION: CPT | Mod: PBBFAC | Performed by: PATHOLOGY

## 2023-11-09 PROCEDURE — 99999 PR PBB SHADOW E&M-EST. PATIENT-LVL II: ICD-10-PCS | Mod: PBBFAC,,, | Performed by: PATHOLOGY

## 2023-11-09 PROCEDURE — 17000 DESTRUCT PREMALG LESION: CPT | Mod: S$PBB,,, | Performed by: PATHOLOGY

## 2023-11-09 PROCEDURE — 99213 OFFICE O/P EST LOW 20 MIN: CPT | Mod: 25,S$PBB,, | Performed by: PATHOLOGY

## 2023-11-09 RX ORDER — MELOXICAM 15 MG/1
15 TABLET ORAL
COMMUNITY
Start: 2023-10-31

## 2023-11-09 NOTE — PROGRESS NOTES
Subjective:      Patient ID:  Diane Garcia is a 68 y.o. female who presents for   Chief Complaint   Patient presents with    Actinic Keratosis     F/u - nose     68 y.o. female presents today for a Actinic Keratosis follow up. Previously treated with cryo, resolved for sometime but has since come back. No other lesions of concern, last had FBSE 6 months ago.    Last seen on 4/20/2023 by Dr. Mcdermott for TBSE.     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          Review of Systems    Objective:   Physical Exam   Constitutional: She appears well-developed and well-nourished. No distress.   Neurological: She is alert and oriented to person, place, and time. She is not disoriented.   Psychiatric: She has a normal mood and affect.   Skin:   Areas Examined (abnormalities noted in diagram):   Head / Face Inspection Performed            Diagram Legend     Erythematous scaling macule/papule c/w actinic keratosis       Vascular papule c/w angioma      Pigmented verrucoid papule/plaque c/w seborrheic keratosis      Yellow umbilicated papule c/w sebaceous hyperplasia      Irregularly shaped tan macule c/w lentigo     1-2 mm smooth white papules consistent with Milia      Movable subcutaneous cyst with punctum c/w epidermal inclusion cyst      Subcutaneous movable cyst c/w pilar cyst      Firm pink to brown papule c/w dermatofibroma      Pedunculated fleshy papule(s) c/w skin tag(s)      Evenly pigmented macule c/w junctional nevus     Mildly variegated pigmented, slightly irregular-bordered macule c/w mildly atypical nevus      Flesh colored to evenly pigmented papule c/w intradermal nevus       Pink pearly papule/plaque c/w basal cell carcinoma      Erythematous hyperkeratotic cursted plaque c/w SCC      Surgical scar with  no sign of skin cancer recurrence      Open and closed comedones      Inflammatory papules and pustules      Verrucoid papule consistent consistent with wart     Erythematous eczematous patches and plaques     Dystrophic onycholytic nail with subungual debris c/w onychomycosis     Umbilicated papule    Erythematous-base heme-crusted tan verrucoid plaque consistent with inflamed seborrheic keratosis     Erythematous Silvery Scaling Plaque c/w Psoriasis     See annotation      Assessment / Plan:        Actinic keratosis  Patient's lesion of concern c/w AK, previously treated with LN2. Plan to treat again with LN2 and if not resolved at next visit consider biopsy. Also recommend field therapy - pt to start 3-4 weeks after lesion treated with cryo heals. Discussed side effects of medication.  - LN2 as below  - Start calcipotriene/efudex compound cream through [facility] BID x 3-5 days to nose, forehead, temples, cheeks    Cryosurgery Procedure Note    Verbal consent from the patient is obtained including, but not limited to, risk of hypopigmentation/hyperpigmentation, scar, recurrence of lesion. The patient is aware of the precancerous quality and need for treatment of these lesions. Liquid nitrogen cryosurgery is applied to the 1 actinic keratoses, as detailed in the physical exam, to produce a freeze injury. The patient is aware that blisters may form and is instructed on wound care with gentle cleansing and use of vaseline ointment to keep moist until healed. The patient is supplied a handout on cryosurgery and is instructed to call if lesions do not completely resolve.    Follow up in about 6 months (around 5/9/2024).    Flor Pathak MD  Dermatology PGY-III

## 2023-11-22 DIAGNOSIS — Z78.0 MENOPAUSE: ICD-10-CM

## 2023-12-11 ENCOUNTER — PATIENT MESSAGE (OUTPATIENT)
Dept: INTERNAL MEDICINE | Facility: CLINIC | Age: 68
End: 2023-12-11
Payer: MEDICARE

## 2023-12-11 DIAGNOSIS — I10 ESSENTIAL HYPERTENSION: ICD-10-CM

## 2023-12-11 RX ORDER — OLMESARTAN MEDOXOMIL 40 MG/1
40 TABLET ORAL DAILY
Qty: 90 TABLET | Refills: 3 | Status: CANCELLED | OUTPATIENT
Start: 2023-12-11

## 2023-12-11 NOTE — TELEPHONE ENCOUNTER
Refill Encounter    PCP Visits: Recent Visits  Date Type Provider Dept   09/14/23 Office Visit Beverly Oakes MD [facility]   06/22/23 Office Visit Beverly Oakes MD [facility]   03/14/23 Office Visit Beverly Oakes MD [facility]   03/06/23 Office Visit Beverly Oakes MD [facility]   Showing recent visits within past 360 days and meeting all other requirements  Future Appointments  No visits were found meeting these conditions.  Showing future appointments within next 720 days and meeting all other requirements     Last 3 Blood Pressure:   BP Readings from Last 3 Encounters:   10/19/23 (!) 141/91   09/14/23 124/74   09/07/23 124/62     Preferred Pharmacy:   [id number] - [place] - [address] [facility] AT [facility] & [address]  [address] [facility] [address]  Phone: [phone] Fax: [phone]    [id number] - [place] - [address] AT [facility] & [facility]  [address]  [facility] [address]  Phone: [phone] Fax: [phone]    Requested RX:  Requested Prescriptions      No prescriptions requested or ordered in this encounter      RX Route: Normal

## 2023-12-14 ENCOUNTER — OFFICE VISIT (OUTPATIENT)
Dept: PRIMARY CARE CLINIC | Facility: CLINIC | Age: 68
End: 2023-12-14
Payer: MEDICARE

## 2023-12-14 VITALS
HEART RATE: 75 BPM | HEIGHT: 65 IN | DIASTOLIC BLOOD PRESSURE: 74 MMHG | BODY MASS INDEX: 36.94 KG/M2 | OXYGEN SATURATION: 99 % | SYSTOLIC BLOOD PRESSURE: 128 MMHG

## 2023-12-14 DIAGNOSIS — E78.2 MIXED HYPERLIPIDEMIA: ICD-10-CM

## 2023-12-14 DIAGNOSIS — M17.0 PRIMARY OSTEOARTHRITIS OF BOTH KNEES: ICD-10-CM

## 2023-12-14 DIAGNOSIS — K21.9 GASTROESOPHAGEAL REFLUX DISEASE, UNSPECIFIED WHETHER ESOPHAGITIS PRESENT: ICD-10-CM

## 2023-12-14 DIAGNOSIS — Z12.83 SKIN CANCER SCREENING: ICD-10-CM

## 2023-12-14 DIAGNOSIS — R73.03 PREDIABETES: ICD-10-CM

## 2023-12-14 DIAGNOSIS — Z76.89 ESTABLISHING CARE WITH NEW DOCTOR, ENCOUNTER FOR: Primary | ICD-10-CM

## 2023-12-14 DIAGNOSIS — G47.33 OSA (OBSTRUCTIVE SLEEP APNEA): ICD-10-CM

## 2023-12-14 DIAGNOSIS — E66.01 SEVERE OBESITY (BMI 35.0-39.9) WITH COMORBIDITY: ICD-10-CM

## 2023-12-14 DIAGNOSIS — I70.0 AORTIC ATHEROSCLEROSIS: ICD-10-CM

## 2023-12-14 DIAGNOSIS — I10 ESSENTIAL HYPERTENSION: ICD-10-CM

## 2023-12-14 DIAGNOSIS — R91.1 SOLITARY PULMONARY NODULE: ICD-10-CM

## 2023-12-14 DIAGNOSIS — Z01.00 ROUTINE EYE EXAM: ICD-10-CM

## 2023-12-14 PROCEDURE — 99999 PR PBB SHADOW E&M-EST. PATIENT-LVL V: CPT | Mod: PBBFAC,,, | Performed by: FAMILY MEDICINE

## 2023-12-14 PROCEDURE — 99215 PR OFFICE/OUTPT VISIT, EST, LEVL V, 40-54 MIN: ICD-10-PCS | Mod: S$PBB,,, | Performed by: FAMILY MEDICINE

## 2023-12-14 PROCEDURE — 99215 OFFICE O/P EST HI 40 MIN: CPT | Mod: PBBFAC,PN | Performed by: FAMILY MEDICINE

## 2023-12-14 PROCEDURE — 99215 OFFICE O/P EST HI 40 MIN: CPT | Mod: S$PBB,,, | Performed by: FAMILY MEDICINE

## 2023-12-14 PROCEDURE — 99999 PR PBB SHADOW E&M-EST. PATIENT-LVL V: ICD-10-PCS | Mod: PBBFAC,,, | Performed by: FAMILY MEDICINE

## 2023-12-14 RX ORDER — CEPHALEXIN 500 MG/1
CAPSULE ORAL
COMMUNITY
Start: 2023-10-19 | End: 2023-12-14

## 2023-12-14 RX ORDER — CELECOXIB 200 MG/1
200 CAPSULE ORAL DAILY
Qty: 30 CAPSULE | Refills: 0 | Status: SHIPPED | OUTPATIENT
Start: 2023-12-14

## 2023-12-14 RX ORDER — OLMESARTAN MEDOXOMIL 40 MG/1
40 TABLET ORAL DAILY
Qty: 90 TABLET | Refills: 3 | Status: SHIPPED | OUTPATIENT
Start: 2023-12-14

## 2023-12-15 PROBLEM — R91.1 SOLITARY PULMONARY NODULE: Status: ACTIVE | Noted: 2023-12-15

## 2023-12-15 PROBLEM — M25.561 CHRONIC PAIN OF BOTH KNEES: Status: RESOLVED | Noted: 2021-07-26 | Resolved: 2023-12-15

## 2023-12-15 PROBLEM — M25.562 CHRONIC PAIN OF BOTH KNEES: Status: RESOLVED | Noted: 2021-07-26 | Resolved: 2023-12-15

## 2023-12-15 PROBLEM — M25.562 CHRONIC PAIN OF LEFT KNEE: Status: RESOLVED | Noted: 2020-11-24 | Resolved: 2023-12-15

## 2023-12-15 PROBLEM — M17.12 PRIMARY OSTEOARTHRITIS OF LEFT KNEE: Status: RESOLVED | Noted: 2023-07-26 | Resolved: 2023-12-15

## 2023-12-15 PROBLEM — G89.29 CHRONIC PAIN OF LEFT KNEE: Status: RESOLVED | Noted: 2020-11-24 | Resolved: 2023-12-15

## 2023-12-15 PROBLEM — G89.29 CHRONIC PAIN OF BOTH KNEES: Status: RESOLVED | Noted: 2021-07-26 | Resolved: 2023-12-15

## 2023-12-15 NOTE — ASSESSMENT & PLAN NOTE
Continue PPI.  Discussed risks and benefits.  Consider H2 blocker.  Minimize NSAIDs.  See below regarding osteoarthritis.

## 2023-12-15 NOTE — ASSESSMENT & PLAN NOTE
Ask for an NSAID that is easier on my stomach.  educated that all NSAIDs or not only causing some potential gastritis but also potentially worsening kidney function.  Educated her that even celecoxib which I will prescribe today as below is not indicated long-term with her olmesartan nor even if she were not on blood pressure medications.

## 2023-12-15 NOTE — ASSESSMENT & PLAN NOTE
No mentioned on last CT angiogram of chest but mentioned in 2021 CT of chest.  Reviewed with patient.  Repeat CT.  Continue smoking cessation.  She quit more than 15 years ago

## 2023-12-15 NOTE — PROGRESS NOTES
"    /74 (BP Location: Right arm, Patient Position: Sitting, BP Method: Medium (Manual))   Pulse 75   Ht 5' 5" (1.651 m)   SpO2 99%   BMI 36.94 kg/m²       ===========    Chief Complaint: Follow-up          Follow-up  Pertinent negatives include no abdominal pain, arthralgias, chest pain, chills, congestion, coughing, diaphoresis, fatigue, fever, headaches, joint swelling, myalgias, nausea, neck pain, numbness, rash, sore throat, vomiting or weakness.       Diane Garcia is a 68 y.o. female     here for    Annual Wellness/Preventative Exam.  Health maintenance reviewed with patient in detail inc any recent labs and studies and needs for future screening labs.  Age-appropriate vaccines and other age-appropriate screening studies reviewed with patient in detail.  Sleep health reviewed with patient.  Skin health regarding possible skin cancer screening reviewed with patient.  General regularity of bowel movements and urinations reviewed with patient including any possibility of urine leakage.  Vision screening reviewed with patient.      Patient queried and denies any further complaints      Patient Active Problem List   Diagnosis    MELLISA (obstructive sleep apnea)    Severe obesity (BMI 35.0-39.9) with comorbidity    Gastroesophageal reflux disease    Former smoker    Essential hypertension    Mixed hyperlipidemia    Abnormal gait    Weakness    Primary osteoarthritis of both knees    Prediabetes    Aortic atherosclerosis    Solitary pulmonary nodule       SURGICAL AND MEDICAL HISTORY: updated and reviewed.  Past Surgical History:   Procedure Laterality Date    BREAST BIOPSY Left 2013    benign needle    COLONOSCOPY N/A 02/02/2023    Procedure: COLONOSCOPY;  Surgeon: Bob Grayson MD;  Location: [facility];  Service: Endoscopy;  Laterality: N/A;    ESOPHAGOGASTRODUODENOSCOPY N/A 02/02/2023    Procedure: EGD (ESOPHAGOGASTRODUODENOSCOPY);  Surgeon: Bob Grayson MD;  Location: [facility];  " Service: Endoscopy;  Laterality: N/A;    HYSTERECTOMY      OOPHORECTOMY      mary jo placed Right     femur right mary jo    mary jo removed Right     TOTAL KNEE ARTHROPLASTY Left 07/26/2023    Procedure: ARTHROPLASTY, KNEE, TOTAL;  Surgeon: NATALIO Greenwood MD;  Location: [facility] OR;  Service: Orthopedics;  Laterality: Left;  Regional w/Catheter, Adductor, Spinal, 0.2% Ropivacaine     ALLERGIES updated and reviewed.  Review of patient's allergies indicates:   Allergen Reactions    Penicillins      nausea       CURRENT OUTPATIENT MEDICATIONS updated and reviewed    Current Outpatient Medications:     biotin 1 mg Cap, Take by mouth., Disp: , Rfl:     cartilage/collagen/bor/hyalur (JOINT HEALTH ORAL), Take by mouth., Disp: , Rfl:     esomeprazole (NEXIUM) 20 MG capsule, Take 20 mg by mouth before breakfast., Disp: , Rfl:     hydroCHLOROthiazide (HYDRODIURIL) 25 MG tablet, TAKE 1 TABLET(25 MG) BY MOUTH EVERY DAY, Disp: 90 tablet, Rfl: 3    ibuprofen (ADVIL,MOTRIN) 800 MG tablet, TAKE 1 TABLET(800 MG) BY MOUTH EVERY 6 HOURS AS NEEDED FOR PAIN, Disp: 60 tablet, Rfl: 1    lovastatin (MEVACOR) 40 MG tablet, TAKE 1 TABLET(40 MG) BY MOUTH EVERY EVENING, Disp: 90 tablet, Rfl: 3    meloxicam (MOBIC) 15 MG tablet, Take 15 mg by mouth., Disp: , Rfl:     omega-3 fatty acids/fish oil (FISH OIL-OMEGA-3 FATTY ACIDS) 300-1,000 mg capsule, Take by mouth once daily., Disp: , Rfl:     traZODone (DESYREL) 50 MG tablet, Take 1 tablet (50 mg total) by mouth every evening., Disp: 90 tablet, Rfl: 1    TURMERIC ORAL, Take by mouth., Disp: , Rfl:     celecoxib (CELEBREX) 200 MG capsule, Take 1 capsule (200 mg total) by mouth once daily. With food and water for maximum of 15 consecutive days, Disp: 30 capsule, Rfl: 0    olmesartan (BENICAR) 40 MG tablet, Take 1 tablet (40 mg total) by mouth once daily., Disp: 90 tablet, Rfl: 3    RSVPreF3 antigen-AS01E, PF, (AREXVY, PF,) 120 mcg/0.5 mL SusR vaccine, Inject into the muscle., Disp: 1 each, Rfl: 0  No current  "facility-administered medications for this visit.    Facility-Administered Medications Ordered in Other Visits:     ropivacaine 0.2% [facility] PainPRO Pump infusion 500 ML, , Perineural, Continuous, Cornell Vela MD, New Bag at 07/26/23 2932    Review of Systems   Constitutional:  Negative for activity change, appetite change, chills, diaphoresis, fatigue, fever and unexpected weight change.   HENT:  Negative for congestion, ear discharge, ear pain, facial swelling, hearing loss, nosebleeds, postnasal drip, rhinorrhea, sinus pressure, sneezing, sore throat, tinnitus, trouble swallowing and voice change.    Eyes:  Negative for photophobia, pain, discharge, redness, itching and visual disturbance.   Respiratory:  Negative for cough, chest tightness, shortness of breath and wheezing.    Cardiovascular:  Negative for chest pain, palpitations and leg swelling.   Gastrointestinal:  Negative for abdominal distention, abdominal pain, anal bleeding, blood in stool, constipation, diarrhea, nausea, rectal pain and vomiting.   Endocrine: Negative for cold intolerance, heat intolerance, polydipsia, polyphagia and polyuria.   Genitourinary:  Negative for difficulty urinating, dysuria and flank pain.   Musculoskeletal:  Negative for arthralgias, back pain, joint swelling, myalgias and neck pain.   Skin:  Negative for rash.   Neurological:  Negative for dizziness, tremors, seizures, syncope, speech difficulty, weakness, light-headedness, numbness and headaches.   Psychiatric/Behavioral:  Negative for behavioral problems, confusion, decreased concentration, dysphoric mood, sleep disturbance and suicidal ideas. The patient is not nervous/anxious and is not hyperactive.        /74 (BP Location: Right arm, Patient Position: Sitting, BP Method: Medium (Manual))   Pulse 75   Ht 5' 5" (1.651 m)   SpO2 99%   BMI 36.94 kg/m²   Physical Exam  Vitals and nursing note reviewed.   Constitutional:       General: She is not in " acute distress.     Appearance: Normal appearance. She is well-developed. She is not ill-appearing or toxic-appearing.   HENT:      Head: Normocephalic and atraumatic.      Right Ear: Tympanic membrane, ear canal and external ear normal.      Left Ear: Tympanic membrane, ear canal and external ear normal.      Nose: Nose normal.      Mouth/Throat:      Lips: Pink.      Mouth: Mucous membranes are moist.      Pharynx: No oropharyngeal exudate or posterior oropharyngeal erythema.   Eyes:      General: No scleral icterus.        Right eye: No discharge.         Left eye: No discharge.      Extraocular Movements: Extraocular movements intact.      Conjunctiva/sclera: Conjunctivae normal.   Cardiovascular:      Rate and Rhythm: Normal rate and regular rhythm.      Pulses: Normal pulses.      Heart sounds: Normal heart sounds. No murmur heard.  Pulmonary:      Effort: Pulmonary effort is normal. No respiratory distress.      Breath sounds: Normal breath sounds. No wheezing or rales.   Abdominal:      General: Bowel sounds are normal. There is no distension.      Palpations: Abdomen is soft. There is no mass.      Tenderness: There is no abdominal tenderness. There is no right CVA tenderness, left CVA tenderness, guarding or rebound.      Hernia: No hernia is present.   Musculoskeletal:      Cervical back: Normal range of motion and neck supple. No rigidity or tenderness.   Lymphadenopathy:      Cervical: No cervical adenopathy.   Skin:     General: Skin is warm and dry.   Neurological:      General: No focal deficit present.      Mental Status: She is alert. Mental status is at baseline.   Psychiatric:         Mood and Affect: Mood normal.         Behavior: Behavior normal. Behavior is cooperative.         ASSESSMENT/PLAN    1. Establishing care with new doctor, encounter for    2. Essential hypertension  Assessment & Plan:  Good outpatient control.  Continue current management with olmesartan.  HCTZ.    Orders:  -      olmesartan (BENICAR) 40 MG tablet; Take 1 tablet (40 mg total) by mouth once daily.  Dispense: 90 tablet; Refill: 3    3. Routine eye exam  -     Ambulatory referral/consult to Optometry; Future; Expected date: 12/21/2023    4. Skin cancer screening  -     Ambulatory referral/consult to Dermatology; Future; Expected date: 12/21/2023    5. Solitary pulmonary nodule  Assessment & Plan:  No mentioned on last CT angiogram of chest but mentioned in 2021 CT of chest.  Reviewed with patient.  Repeat CT.  Continue smoking cessation.  She quit more than 15 years ago    Orders:  -     CT Chest Without Contrast; Future; Expected date: 12/14/2023    6. Mixed hyperlipidemia  Assessment & Plan:  Low-fat diet.  Weight loss by calorie restriction and exercise.  Continue statin.        7. Aortic atherosclerosis  Assessment & Plan:  Continue low-fat diet.  Continue statin.  Diagnosis based upon past CT.      8. Severe obesity (BMI 35.0-39.9) with comorbidity  Assessment & Plan:  Significant weight loss needed by calorie restriction and exercise.  Monitor.        9. Prediabetes  Assessment & Plan:  Weight loss as above.  Low-carbohydrate diet.  Monitor.      10. Gastroesophageal reflux disease, unspecified whether esophagitis present  Assessment & Plan:  Continue PPI.  Discussed risks and benefits.  Consider H2 blocker.  Minimize NSAIDs.  See below regarding osteoarthritis.      11. Primary osteoarthritis of both knees  Assessment & Plan:  Ask for an NSAID that is easier on my stomach.  educated that all NSAIDs or not only causing some potential gastritis but also potentially worsening kidney function.  Educated her that even celecoxib which I will prescribe today as below is not indicated long-term with her olmesartan nor even if she were not on blood pressure medications.      12. MELLISA (obstructive sleep apnea)  Assessment & Plan:  Monitor.      Other orders  -     celecoxib (CELEBREX) 200 MG capsule; Take 1 capsule (200 mg total)  by mouth once daily. With food and water for maximum of 15 consecutive days  Dispense: 30 capsule; Refill: 0            Most recent some lab results reviewed with patient.  Any new prescription medications gone over in detail including reason for taking the medication, most common possible side effects and possible costs, etcetera.    Chronic conditions updated. Other than changes or additions as above, cont current medications and maintain follow-up with specialists if indicated.     Cornell Giraldo MD  A dictation device was used to produce this document. Use of such devices sometimes results in grammatical errors or replacement of words that sound similarly.      Est5  Time spent in the evaluation and management of this patient exceeded 40min and greater than 50% of this time was in face-to-face time with the patient on day of the clinic visit.   This includes face-to-face time and non face-to-face time and includes the following:  --preparing to see the patient (eg, obtaining and/or reviewing old records such as, when applicable, primary care notes, specialist notes, hospital notes, review of laboratory tests, and/or radiographic and/or cardiology or other studies)  --performing a medically appropriate review of systems and examination and/or evaluation  --reviewing and independently interpreting results (not separately reported; eg, lab results) and communicating results to the patient and/or family/caregiver  --placing orders and/or reviewing other physician's orders which can both include medications, laboratory studies, radiographic studies, procedures, referrals etcetera and   --counseling and educating the patient and/or family member/caregiver regarding the treatment plan  --documentation of the visit in the electronic health record  --communicating with other health care providers regarding referrals, studies, follow-up, etc

## 2024-01-12 ENCOUNTER — HOSPITAL ENCOUNTER (OUTPATIENT)
Dept: RADIOLOGY | Facility: HOSPITAL | Age: 69
Discharge: HOME | End: 2024-01-12
Attending: FAMILY MEDICINE
Payer: MEDICARE

## 2024-01-12 DIAGNOSIS — R91.1 SOLITARY PULMONARY NODULE: ICD-10-CM

## 2024-01-12 PROCEDURE — 71250 CT THORAX DX C-: CPT | Mod: 26,,, | Performed by: RADIOLOGY

## 2024-01-12 PROCEDURE — 71250 CT THORAX DX C-: CPT | Mod: TC

## 2024-01-13 NOTE — TELEPHONE ENCOUNTER
No care due was identified.  [facility] Embedded Care Due Messages. Reference number: [id number].   1/13/2024 3:16:53 PM CST

## 2024-01-15 NOTE — PROGRESS NOTES
CC: Left knee post-op follow up; Right knee osteoarthritis    DATE OF PROCEDURE: 7/26/2023   PROCEDURES PERFORMED:   Left total knee arthroplasty (CPT 37357)    Diane Garcia, presents today for follow up appointment of her left knee. Patient is just under 6 months status post above procedure.  States the knee feels very good.  Has some intermittent mild discomfort with going up and downstairs but nothing too significant.  Very pleased with her outcome to this point.  She states that the right navicular issue she has had is getting better.  She is out of the boot.  She still has some pain with ambulation but heading in the right direction.  Please see prior notes for details.  She also has known right knee osteoarthritis.  She thinks she might want to proceed with a right total knee arthroplasty over the summer.  She would like to do a repeat right knee steroid injection today.    Prior Hx 10/19/2023:  68 y.o. Female returns for scheduled follow-up of her left knee.  States the knee feels good.  Has some intermittent pain going up and down stairs and getting in and out of a car.  Localizes anteriorly.  Usually transient and not too significant.  Overall making good progress.  She seems pleased.  Remains in a walking boot for her right foot.  Seeing Dr. Field Duenas for this.  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  I do not have his records for review.  She does feel that the foot is doing better.  She also states that she is very happy we proceeded with her left total knee replacement despite her right foot issue.    REVIEW OF SYSTEMS:   Constitution: Negative. Negative for chills, fever and night sweats.    Hematologic/Lymphatic: Negative for bleeding problem. Does not bruise/bleed easily.   Skin: Negative for dry skin, itching and rash.   Musculoskeletal: Negative for falls. Positive for right knee and foot pain and  muscle weakness.     PAST MEDICAL  HISTORY:   Past Medical History:   Diagnosis Date    Arthritis     GERD (gastroesophageal reflux disease)     Hyperlipidemia     Hypertension      PAST SURGICAL HISTORY:   Past Surgical History:   Procedure Laterality Date    BREAST BIOPSY Left 2013    benign needle    COLONOSCOPY N/A 2023    Procedure: COLONOSCOPY;  Surgeon: Bob Grayson MD;  Location: [facility] ENDO;  Service: Endoscopy;  Laterality: N/A;    ESOPHAGOGASTRODUODENOSCOPY N/A 2023    Procedure: EGD (ESOPHAGOGASTRODUODENOSCOPY);  Surgeon: Bob Grayson MD;  Location: [facility] END[facility];  Service: Endoscopy;  Laterality: N/A;    HYSTERECTOMY      OOPHORECTOMY      mary jo placed Right     femur right mary jo    mary jo removed Right     TOTAL KNEE ARTHROPLASTY Left 2023    Procedure: ARTHROPLASTY, KNEE, TOTAL;  Surgeon: NATALIO Greenwood MD;  Location: [facility] OR;  Service: Orthopedics;  Laterality: Left;  Regional w/Catheter, Adductor, Spinal, 0.2% Ropivacaine     FAMILY HISTORY:   Family History   Problem Relation Age of Onset    Cancer Mother         Lung    COPD Father     No Known Problems Sister     Melanoma Neg Hx      SOCIAL HISTORY:   Social History     Socioeconomic History    Marital status:     Number of children: 1   Tobacco Use    Smoking status: Former     Current packs/day: 0.00     Average packs/day: 2.0 packs/day for 25.0 years (50.0 ttl pk-yrs)     Types: Cigarettes     Start date: 1975     Quit date: 2000     Years since quittin.1    Smokeless tobacco: Never   Substance and Sexual Activity    Alcohol use: Yes     Comment: A few drinks a week    Drug use: Never     Comment: last use >30 yrs ago     Sexual activity: Yes     Partners: Male   Social History Narrative    Stairs- 6 to enter     MEDICATIONS:     Current Outpatient Medications:     biotin 1 mg Cap, Take by mouth., Disp: , Rfl:     cartilage/collagen/bor/hyalur (JOINT HEALTH ORAL), Take by mouth., Disp: , Rfl:     celecoxib (CELEBREX) 200  "MG capsule, Take 1 capsule (200 mg total) by mouth once daily. With food and water for maximum of 15 consecutive days, Disp: 30 capsule, Rfl: 0    esomeprazole (NEXIUM) 20 MG capsule, Take 20 mg by mouth before breakfast., Disp: , Rfl:     hydroCHLOROthiazide (HYDRODIURIL) 25 MG tablet, TAKE 1 TABLET(25 MG) BY MOUTH EVERY DAY, Disp: 90 tablet, Rfl: 3    ibuprofen (ADVIL,MOTRIN) 800 MG tablet, TAKE 1 TABLET(800 MG) BY MOUTH EVERY 6 HOURS AS NEEDED FOR PAIN, Disp: 60 tablet, Rfl: 1    lovastatin (MEVACOR) 40 MG tablet, TAKE 1 TABLET(40 MG) BY MOUTH EVERY EVENING, Disp: 90 tablet, Rfl: 3    meloxicam (MOBIC) 15 MG tablet, Take 15 mg by mouth., Disp: , Rfl:     olmesartan (BENICAR) 40 MG tablet, Take 1 tablet (40 mg total) by mouth once daily., Disp: 90 tablet, Rfl: 3    omega-3 fatty acids/fish oil (FISH OIL-OMEGA-3 FATTY ACIDS) 300-1,000 mg capsule, Take by mouth once daily., Disp: , Rfl:     RSVPreF3 antigen-AS01E, PF, (AREXVY, PF,) 120 mcg/0.5 mL SusR vaccine, Inject into the muscle., Disp: 1 each, Rfl: 0    traZODone (DESYREL) 50 MG tablet, Take 1 tablet (50 mg total) by mouth every evening., Disp: 90 tablet, Rfl: 1    TURMERIC ORAL, Take by mouth., Disp: , Rfl:   No current facility-administered medications for this visit.    Facility-Administered Medications Ordered in Other Visits:     ropivacaine 0.2% [facility] PainPRO Pump infusion 500 ML, , Perineural, Continuous, Cornell Vela MD, New Bag at 07/26/23 9371    ALLERGIES:   Review of patient's allergies indicates:   Allergen Reactions    Penicillins      nausea      PHYSICAL EXAMINATION:  /73   Pulse 74   Ht 5' 5" (1.651 m)   Wt 102.1 kg (225 lb)   BMI 37.44 kg/m²   General: Well-developed well-nourished 68 y.o. femalein no acute distress   Cardiovascular: Regular rhythm by palpation of distal pulse, normal color and temperature, no concerning varicosities on symptomatic side   Lungs: No labored breathing or wheezing appreciated   Neuro: Alert " and oriented ×3   Psychiatric: well oriented to person, place and time, demonstrates normal mood and affect   Skin: No rashes, lesions or ulcers, normal temperature, turgor, and texture on involved extremity    Ortho/SPM Exam  Exam of the left knee shows full active extension, active flexion to 125° with central patellar tracking.  No mid flexion instability.  Stable to varus and valgus stress.  Good quad strength and activation.  Well-healed anterior incision.   No signs of infection.  No swelling or effusion.  Good quad strength.    Exam of the right knee shows full active extension, active assisted flexion to 120° with some pain.  Positive patellar crepitus and grind.  Pain over the medial and lateral joint lines.  Pain medially with varus load.  Standing varus alignment.  Ligamentously stable.  No significant peripheral vascular disease.  2+ dorsalis pedis pulse.    IMAGING:  X-rays including standing, weight bearing AP and flexion bilateral knees, BILATERAL knee lateral and sunrise views ordered and images reviewed by me show:    Well placed left total knee prosthesis.  No signs of loosening, fracture, or complication otherwise. KL3 OA right knee - tricompartmental with intra-articular varus deformity    ASSESSMENT:      ICD-10-CM ICD-9-CM   1. Primary osteoarthritis of right knee  M17.11 715.16   2. History of left knee replacement  Z96.652 V43.65     PLAN:     In regards to her left knee, the patient has done quite well.  X-rays and clinical exam are appropriate.  In regards to her right knee, she would like to proceed with a right total knee arthroplasty in the summer.  She will get back to us on a specific desired date.  She is still healing up from her tarsal navicular injury.  Getting better.  Happy to proceed with a knee replacement once she is cleared by her foot and ankle specialist.  Corticosteroid injection given today.  She understands this will require at least 3 months weight before total knee  arthroplasty.    Large Joint Aspiration/Injection: R knee    Date/Time: 1/22/2024 1:15 PM    Performed by: NATALIO Greenwood MD  Authorized by: NATALIO Greenwood MD    Consent Done?:  Yes (Verbal)  Indications:  Pain  Site marked: the procedure site was marked    Timeout: prior to procedure the correct patient, procedure, and site was verified    Prep: patient was prepped and draped in usual sterile fashion      Local anesthesia used?: Yes    Local anesthetic:  Co-phenylcaine spray (0.2% Naropin)  Anesthetic total (ml):  4      Details:  Needle Size:  22 G  Ultrasonic Guidance for needle placement?: No    Approach:  Lateral  Location:  Knee  Site:  R knee  Medications:  40 mg triamcinolone acetonide 40 mg/mL  Patient tolerance:  Patient tolerated the procedure well with no immediate complications

## 2024-01-16 ENCOUNTER — PATIENT MESSAGE (OUTPATIENT)
Dept: PRIMARY CARE CLINIC | Facility: CLINIC | Age: 69
End: 2024-01-16
Payer: MEDICARE

## 2024-01-16 DIAGNOSIS — R91.1 PULMONARY NODULE: ICD-10-CM

## 2024-01-16 DIAGNOSIS — Z87.891 FORMER SMOKER: ICD-10-CM

## 2024-01-16 DIAGNOSIS — I25.10 ATHEROSCLEROSIS OF CORONARY ARTERY, UNSPECIFIED VESSEL OR LESION TYPE, UNSPECIFIED WHETHER ANGINA PRESENT, UNSPECIFIED WHETHER NATIVE OR TRANSPLANTED HEART: Primary | ICD-10-CM

## 2024-01-17 RX ORDER — CELECOXIB 200 MG/1
CAPSULE ORAL
Qty: 30 CAPSULE | Refills: 0 | OUTPATIENT
Start: 2024-01-17

## 2024-01-22 ENCOUNTER — HOSPITAL ENCOUNTER (OUTPATIENT)
Dept: RADIOLOGY | Facility: HOSPITAL | Age: 69
Discharge: HOME | End: 2024-01-22
Attending: ORTHOPAEDIC SURGERY
Payer: MEDICARE

## 2024-01-22 ENCOUNTER — OFFICE VISIT (OUTPATIENT)
Dept: SPORTS MEDICINE | Facility: CLINIC | Age: 69
End: 2024-01-22
Payer: MEDICARE

## 2024-01-22 VITALS
HEART RATE: 74 BPM | BODY MASS INDEX: 37.49 KG/M2 | HEIGHT: 65 IN | WEIGHT: 225 LBS | SYSTOLIC BLOOD PRESSURE: 128 MMHG | DIASTOLIC BLOOD PRESSURE: 73 MMHG

## 2024-01-22 DIAGNOSIS — M25.562 LEFT KNEE PAIN, UNSPECIFIED CHRONICITY: ICD-10-CM

## 2024-01-22 DIAGNOSIS — M17.11 PRIMARY OSTEOARTHRITIS OF RIGHT KNEE: Primary | ICD-10-CM

## 2024-01-22 DIAGNOSIS — Z96.652 HISTORY OF LEFT KNEE REPLACEMENT: ICD-10-CM

## 2024-01-22 DIAGNOSIS — M25.561 RIGHT KNEE PAIN, UNSPECIFIED CHRONICITY: ICD-10-CM

## 2024-01-22 PROCEDURE — 99999PBSHW PR PBB SHADOW TECHNICAL ONLY FILED TO HB: Mod: PBBFAC,,,

## 2024-01-22 PROCEDURE — 73564 X-RAY EXAM KNEE 4 OR MORE: CPT | Mod: 26,50,, | Performed by: RADIOLOGY

## 2024-01-22 PROCEDURE — 99214 OFFICE O/P EST MOD 30 MIN: CPT | Mod: 25,S$PBB,, | Performed by: ORTHOPAEDIC SURGERY

## 2024-01-22 PROCEDURE — 20610 DRAIN/INJ JOINT/BURSA W/O US: CPT | Mod: PBBFAC,RT | Performed by: ORTHOPAEDIC SURGERY

## 2024-01-22 PROCEDURE — 73564 X-RAY EXAM KNEE 4 OR MORE: CPT | Mod: TC,50

## 2024-01-22 PROCEDURE — 99999 PR PBB SHADOW E&M-EST. PATIENT-LVL III: CPT | Mod: PBBFAC,,, | Performed by: ORTHOPAEDIC SURGERY

## 2024-01-22 PROCEDURE — 99213 OFFICE O/P EST LOW 20 MIN: CPT | Mod: PBBFAC,25 | Performed by: ORTHOPAEDIC SURGERY

## 2024-01-22 RX ORDER — TRIAMCINOLONE ACETONIDE 40 MG/ML
40 INJECTION, SUSPENSION INTRA-ARTICULAR; INTRAMUSCULAR
Status: DISCONTINUED | OUTPATIENT
Start: 2024-01-22 | End: 2024-01-22 | Stop reason: HOSPADM

## 2024-01-22 RX ADMIN — TRIAMCINOLONE ACETONIDE 40 MG: 40 INJECTION, SUSPENSION INTRA-ARTICULAR; INTRAMUSCULAR at 01:01

## 2024-01-29 ENCOUNTER — DOCUMENTATION ONLY (OUTPATIENT)
Dept: REHABILITATION | Facility: HOSPITAL | Age: 69
End: 2024-01-29
Payer: MEDICARE

## 2024-01-29 NOTE — PROGRESS NOTES
Outpatient Therapy Discharge Summary     Name: Diane Garcia  Clinic Number: [id number]    Therapy Diagnosis:        Encounter Diagnoses   Name Primary?    Chronic pain of both knees Yes    Abnormal gait      Weakness              Physician: Brando Edmond*  Physician Orders: PT Eval and Treat   Medical Diagnosis from Referral: M17.12 (ICD-10-CM) - Primary osteoarthritis of left knee  Evaluation Date: 7/31/2023  Date of Last visit: 9/29/2023  Total Visits Received: 12      Assessment      Pt reports: She is doing better overall. She states that her knee is doing really well. She is no longer using an AD for ambulation. She states that increased walking and towards the end of the day, the R+ ankle will swell and cause her increased pain. She states that if it were not for the R+ ankle, she would be getting around normally. She reports that the MD seeing her for her foot states that she has neuropathy, however pt is questioning accuracy of such statement. She also reports that he states there has been healing noted, but she still needs to remain in boot for another 8 weeks.   Pain: 0/10  Location: left knee    L+ Knee AROM Flexion 125 deg  L+ Knee AROM Extension 0 deg    Short Term Goals: 6 weeks   Pt independent in initial hep - Met 7/31/2023  Pain 0-3/10 Met 8/16/23  ROM full active extension equal to well knee. Flexion 110 or better Met 8/9/2023  20 SLR without lag Met 8/14/23  Pt ambulates independently without significant deviation REMOVE 8/29/23  Swelling <25% Met 8/29/23  Pt reports 35% or better function.      Long Term Goals: 16 weeks   Pt independent in d/c hep   Pain 0-1/10 Met 8/29/23  Pt negotiates stairs , multiple flights without significant deviation Met 8/29/23  ROM full active ext to 120 or better flexion Met 8/29/23  Swelling <10% Met 8/29/23  Pt reports 85% improvement in function  Discharge reason: Patient has reached the maximum rehab potential for the present time due to R+ foot Fx  which is unrelated     Plan   This patient is discharged from Physical Therapy

## 2024-02-16 ENCOUNTER — OFFICE VISIT (OUTPATIENT)
Dept: CARDIOLOGY | Facility: CLINIC | Age: 69
End: 2024-02-16
Payer: MEDICARE

## 2024-02-16 VITALS
WEIGHT: 220.25 LBS | HEIGHT: 65 IN | SYSTOLIC BLOOD PRESSURE: 122 MMHG | HEART RATE: 72 BPM | BODY MASS INDEX: 36.69 KG/M2 | DIASTOLIC BLOOD PRESSURE: 80 MMHG

## 2024-02-16 DIAGNOSIS — E78.2 MIXED HYPERLIPIDEMIA: ICD-10-CM

## 2024-02-16 DIAGNOSIS — I10 ESSENTIAL HYPERTENSION: ICD-10-CM

## 2024-02-16 DIAGNOSIS — I70.0 AORTIC ATHEROSCLEROSIS: ICD-10-CM

## 2024-02-16 DIAGNOSIS — Z87.891 FORMER SMOKER: ICD-10-CM

## 2024-02-16 DIAGNOSIS — I25.10 ATHEROSCLEROSIS OF NATIVE CORONARY ARTERY OF NATIVE HEART WITHOUT ANGINA PECTORIS: Primary | ICD-10-CM

## 2024-02-16 DIAGNOSIS — E66.01 SEVERE OBESITY (BMI 35.0-39.9) WITH COMORBIDITY: ICD-10-CM

## 2024-02-16 PROCEDURE — 99999 PR PBB SHADOW E&M-EST. PATIENT-LVL III: CPT | Mod: PBBFAC,,, | Performed by: INTERNAL MEDICINE

## 2024-02-16 PROCEDURE — 99213 OFFICE O/P EST LOW 20 MIN: CPT | Mod: PBBFAC | Performed by: INTERNAL MEDICINE

## 2024-02-16 PROCEDURE — 93005 ELECTROCARDIOGRAM TRACING: CPT | Mod: PBBFAC | Performed by: INTERNAL MEDICINE

## 2024-02-16 PROCEDURE — 99204 OFFICE O/P NEW MOD 45 MIN: CPT | Mod: S$PBB,,, | Performed by: INTERNAL MEDICINE

## 2024-02-16 PROCEDURE — 93010 ELECTROCARDIOGRAM REPORT: CPT | Mod: S$PBB,,, | Performed by: INTERNAL MEDICINE

## 2024-02-16 NOTE — PROGRESS NOTES
Subjective:   02/16/2024     Patient ID:  Diane Garcia is a 68 y.o. female who presents for evaulation of Coronary Artery Disease        Patient here for review of a CT of the chest for lung cancer surveillance which did show aortic atherosclerosis and mild coronary calcification.  My review of the scan shows fairly unimpressive coronary calcification.  She does have aortic atherosclerosis.      She does not have exertional chest pains or tightness, she has no PND or orthopnea.  Her family history is negative for premature atherosclerotic.  She did smoke cigarettes, quit 20 years ago.  She rides her bike regularly without difficulty.    Hypertension is treated with Benicar HCT, blood pressure well controlled today.      Hyperlipidemia is treated with lovastatin 40 mg daily.  Her lipid profile in March of 2023 shows total cholesterol 154, HDL 42, LDL 81 and triglycerides 156.  She was poorly tolerant of other statins.        Past Medical History:   Diagnosis Date    Arthritis     GERD (gastroesophageal reflux disease)     Hyperlipidemia     Hypertension        Review of patient's allergies indicates:   Allergen Reactions    Penicillins      nausea         Current Outpatient Medications:     biotin 1 mg Cap, Take by mouth., Disp: , Rfl:     cartilage/collagen/bor/hyalur (JOINT HEALTH ORAL), Take by mouth., Disp: , Rfl:     celecoxib (CELEBREX) 200 MG capsule, Take 1 capsule (200 mg total) by mouth once daily. With food and water for maximum of 15 consecutive days, Disp: 30 capsule, Rfl: 0    esomeprazole (NEXIUM) 20 MG capsule, Take 20 mg by mouth before breakfast., Disp: , Rfl:     hydroCHLOROthiazide (HYDRODIURIL) 25 MG tablet, TAKE 1 TABLET(25 MG) BY MOUTH EVERY DAY, Disp: 90 tablet, Rfl: 3    ibuprofen (ADVIL,MOTRIN) 800 MG tablet, TAKE 1 TABLET(800 MG) BY MOUTH EVERY 6 HOURS AS NEEDED FOR PAIN, Disp: 60 tablet, Rfl: 1    lovastatin (MEVACOR) 40 MG tablet, TAKE 1 TABLET(40 MG) BY MOUTH EVERY EVENING, Disp: 90  tablet, Rfl: 3    meloxicam (MOBIC) 15 MG tablet, Take 15 mg by mouth., Disp: , Rfl:     olmesartan (BENICAR) 40 MG tablet, Take 1 tablet (40 mg total) by mouth once daily., Disp: 90 tablet, Rfl: 3    omega-3 fatty acids/fish oil (FISH OIL-OMEGA-3 FATTY ACIDS) 300-1,000 mg capsule, Take by mouth once daily., Disp: , Rfl:     RSVPreF3 antigen-AS01E, PF, (AREXVY, PF,) 120 mcg/0.5 mL SusR vaccine, Inject into the muscle., Disp: 1 each, Rfl: 0    traZODone (DESYREL) 50 MG tablet, Take 1 tablet (50 mg total) by mouth every evening., Disp: 90 tablet, Rfl: 1    TURMERIC ORAL, Take by mouth., Disp: , Rfl:   No current facility-administered medications for this visit.    Facility-Administered Medications Ordered in Other Visits:     ropivacaine 0.2% Nimbus PainPRO Pump infusion 500 ML, , Perineural, Continuous, Cornell Vela MD, New Bag at 07/26/23 2675     Objective:   Review of Systems   Cardiovascular:  Negative for chest pain, claudication, cyanosis, dyspnea on exertion, irregular heartbeat, leg swelling, near-syncope, orthopnea, palpitations, paroxysmal nocturnal dyspnea and syncope.         Vitals:    02/16/24 1440   BP: 122/80   Pulse: 72     Wt Readings from Last 3 Encounters:   02/16/24 99.9 kg (220 lb 3.8 oz)   01/22/24 102.1 kg (225 lb)   10/03/23 100.7 kg (222 lb)     Temp Readings from Last 3 Encounters:   10/19/23 98.3 °F (36.8 °C) (Oral)   09/14/23 98 °F (36.7 °C)   07/27/23 98.4 °F (36.9 °C)     BP Readings from Last 3 Encounters:   02/16/24 122/80   01/22/24 128/73   12/14/23 128/74     Pulse Readings from Last 3 Encounters:   02/16/24 72   01/22/24 74   12/14/23 75             Physical Exam  Vitals reviewed.   Constitutional:       General: She is not in acute distress.     Appearance: She is well-developed.   HENT:      Head: Normocephalic and atraumatic.      Nose: Nose normal.   Eyes:      Conjunctiva/sclera: Conjunctivae normal.      Pupils: Pupils are equal, round, and reactive to light.    Neck:      Vascular: No carotid bruit or JVD.   Cardiovascular:      Rate and Rhythm: Normal rate and regular rhythm.      Pulses: Normal pulses and intact distal pulses.      Heart sounds: Normal heart sounds. No murmur heard.     No friction rub. No gallop.   Pulmonary:      Effort: Pulmonary effort is normal. No respiratory distress.      Breath sounds: Normal breath sounds. No wheezing or rales.   Chest:      Chest wall: No tenderness.   Abdominal:      General: Bowel sounds are normal. There is no distension.      Palpations: Abdomen is soft.      Tenderness: There is no abdominal tenderness.   Musculoskeletal:         General: No tenderness or deformity. Normal range of motion.      Cervical back: Normal range of motion and neck supple.      Left lower leg: No edema.   Skin:     General: Skin is warm and dry.      Findings: No erythema or rash.   Neurological:      Mental Status: She is alert and oriented to person, place, and time.      Cranial Nerves: No cranial nerve deficit.      Motor: No abnormal muscle tone.      Coordination: Coordination normal.   Psychiatric:         Behavior: Behavior normal.         Thought Content: Thought content normal.         Judgment: Judgment normal.           Lab Results   Component Value Date    CHOL 154 03/09/2023    CHOL 157 03/11/2022    CHOL 171 02/02/2021     Lab Results   Component Value Date    HDL 42 03/09/2023    HDL 45 03/11/2022    HDL 40 02/02/2021     Lab Results   Component Value Date    LDLCALC 80.8 03/09/2023    LDLCALC 81.6 03/11/2022    LDLCALC 94.8 02/02/2021     Lab Results   Component Value Date    ALT 14 07/05/2023    AST 10 07/05/2023    AST 13 03/09/2023    AST 13 03/11/2022     Lab Results   Component Value Date    CREATININE 0.6 07/05/2023    BUN 13 07/05/2023     07/05/2023    K 3.9 07/05/2023    CO2 25 07/05/2023    CO2 26 03/09/2023    CO2 25 03/11/2022     Lab Results   Component Value Date    HGB 12.8 07/05/2023    HCT 38.9 07/05/2023     HCT 38.4 03/09/2023    HCT 40.3 03/11/2022                       The 10-year ASCVD risk score (Huy ODELL, et al., 2019) is: 7.4%    Values used to calculate the score:      Age: 68 years      Sex: Female      Is Non- : No      Diabetic: No      Tobacco smoker: No      Systolic Blood Pressure: 122 mmHg      Is BP treated: Yes      HDL Cholesterol: 42 mg/dL      Total Cholesterol: 154 mg/dL    Assessment and Plan:     Atherosclerosis of coronary artery, unspecified vessel or lesion type, unspecified whether angina present, unspecified whether native or transplanted heart  -     Ambulatory referral/consult to Cardiology    Former smoker  -     Ambulatory referral/consult to Cardiology    Severe obesity (BMI 35.0-39.9) with comorbidity    Aortic atherosclerosis        Patient hesitant to change lipid lowering therapy.  She has not tolerant of other statins.  She would not want to be on another medication such as ezetimibe.  Will obtain a lipoprotein small a and April be along with a standard lipid profile; depending upon results, would recommend changes to  lipid lowering therapy at that time  No follow-ups on file.          Future Appointments   Date Time Provider Department Center   2/16/2024  3:20 PM [facility] [address]   2/21/2024 10:00 AM Anderson Grey MD [facility] [place]   2/27/2024  2:00 PM Carla Mojica MD [facility] DER[facility]   4/4/2024  2:45 PM Carla Mojica MD [facility]

## 2024-02-19 LAB
OHS QRS DURATION: 106 MS
OHS QTC CALCULATION: 432 MS

## 2024-02-21 ENCOUNTER — OFFICE VISIT (OUTPATIENT)
Dept: PULMONOLOGY | Facility: CLINIC | Age: 69
End: 2024-02-21
Payer: MEDICARE

## 2024-02-21 VITALS
WEIGHT: 222.69 LBS | SYSTOLIC BLOOD PRESSURE: 130 MMHG | HEART RATE: 68 BPM | HEIGHT: 65 IN | DIASTOLIC BLOOD PRESSURE: 77 MMHG | BODY MASS INDEX: 37.1 KG/M2 | OXYGEN SATURATION: 97 %

## 2024-02-21 DIAGNOSIS — R91.1 SOLITARY PULMONARY NODULE: Primary | ICD-10-CM

## 2024-02-21 DIAGNOSIS — E66.01 SEVERE OBESITY (BMI 35.0-39.9) WITH COMORBIDITY: ICD-10-CM

## 2024-02-21 DIAGNOSIS — R91.1 PULMONARY NODULE: ICD-10-CM

## 2024-02-21 DIAGNOSIS — Z87.891 FORMER SMOKER: ICD-10-CM

## 2024-02-21 PROCEDURE — 99999 PR PBB SHADOW E&M-EST. PATIENT-LVL III: CPT | Mod: PBBFAC,,, | Performed by: INTERNAL MEDICINE

## 2024-02-21 PROCEDURE — 99213 OFFICE O/P EST LOW 20 MIN: CPT | Mod: PBBFAC | Performed by: INTERNAL MEDICINE

## 2024-02-21 PROCEDURE — 99204 OFFICE O/P NEW MOD 45 MIN: CPT | Mod: S$PBB,,, | Performed by: INTERNAL MEDICINE

## 2024-02-21 NOTE — PROGRESS NOTES
Subjective:       Patient ID: Diane Garcia is a 68 y.o. female.    Chief Complaint: No chief complaint on file.    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 >30 pack year history. No asbestos exposure. No occupational exposures. No SOB or BRO. No issues with chronic cough or hemoptysis. Had prior CT Chest in  3 months apart with stable 7mm LLL nodule part solid. No CT scans in interim.  Review of Systems   All other systems reviewed and are negative.      Past Medical History:   Diagnosis Date    Arthritis     GERD (gastroesophageal reflux disease)     Hyperlipidemia     Hypertension         Family History   Problem Relation Age of Onset    Cancer Mother         Lung    COPD Father     No Known Problems Sister     Melanoma Neg Hx       If not mentioned in HPI, Family history is reviewed and not contributory    Social History     Tobacco Use    Smoking status: Former     Current packs/day: 0.00     Average packs/day: 2.0 packs/day for 25.0 years (50.0 ttl pk-yrs)     Types: Cigarettes     Start date: 1975     Quit date: 2000     Years since quittin.2    Smokeless tobacco: Never   Substance Use Topics    Alcohol use: Yes     Comment: A few drinks a week    Drug use: Never     Comment: last use >30 yrs ago         Objective:        Vitals:    24 1005   BP: 130/77   Pulse: 68     Wt Readings from Last 3 Encounters:   24 101 kg (222 lb 10.6 oz)   24 99.9 kg (220 lb 3.8 oz)   24 102.1 kg (225 lb)     Temp Readings from Last 3 Encounters:   10/19/23 98.3 °F (36.8 °C) (Oral)   23 98 °F (36.7 °C)   23 98.4 °F (36.9 °C)     BP Readings from Last 3 Encounters:   24 130/77   24 122/80   24 128/73     Pulse Readings from Last 3 Encounters:   24 68   24 72   24 74       Physical Exam   Constitutional: She is oriented to person, place, and time. She appears well-developed and well-nourished.   HENT:   Head: Normocephalic.    Mouth/Throat: Oropharynx is clear and moist.   Cardiovascular: Normal rate, regular rhythm and normal heart sounds.   Pulmonary/Chest: Normal expansion, symmetric chest wall expansion, effort normal and breath sounds normal.   Abdominal: Soft. She exhibits no distension.   Musculoskeletal:         General: No edema. Normal range of motion.      Cervical back: Neck supple.   Lymphadenopathy:     She has no cervical adenopathy.   Neurological: She is alert and oriented to person, place, and time. Gait normal.   Skin: Skin is warm and dry. No rash noted.   Psychiatric: She has a normal mood and affect. Her behavior is normal. Judgment and thought content normal.   Vitals reviewed.    CBC  Lab Results   Component Value Date    WBC 8.69 07/05/2023    HGB 12.8 07/05/2023    HCT 38.9 07/05/2023    MCV 89 07/05/2023     07/05/2023         CMP  Sodium   Date Value Ref Range Status   07/05/2023 140 136 - 145 mmol/L Final     Potassium   Date Value Ref Range Status   07/05/2023 3.9 3.5 - 5.1 mmol/L Final     Chloride   Date Value Ref Range Status   07/05/2023 104 95 - 110 mmol/L Final     CO2   Date Value Ref Range Status   07/05/2023 25 23 - 29 mmol/L Final     Glucose   Date Value Ref Range Status   07/05/2023 95 70 - 110 mg/dL Final     BUN   Date Value Ref Range Status   07/05/2023 13 8 - 23 mg/dL Final     Creatinine   Date Value Ref Range Status   07/05/2023 0.6 0.5 - 1.4 mg/dL Final     Calcium   Date Value Ref Range Status   07/05/2023 9.3 8.7 - 10.5 mg/dL Final     Total Protein   Date Value Ref Range Status   07/05/2023 7.2 6.0 - 8.4 g/dL Final     Albumin   Date Value Ref Range Status   07/05/2023 4.0 3.5 - 5.2 g/dL Final     Total Bilirubin   Date Value Ref Range Status   07/05/2023 0.7 0.1 - 1.0 mg/dL Final     Comment:     For infants and newborns, interpretation of results should be based  on gestational age, weight and in agreement with clinical  observations.    Premature Infant recommended reference  "ranges:  Up to 24 hours.............<8.0 mg/dL  Up to 48 hours............<12.0 mg/dL  3-5 days..................<15.0 mg/dL  6-29 days.................<15.0 mg/dL       Alkaline Phosphatase   Date Value Ref Range Status   07/05/2023 64 55 - 135 U/L Final     AST   Date Value Ref Range Status   07/05/2023 10 10 - 40 U/L Final     ALT   Date Value Ref Range Status   07/05/2023 14 10 - 44 U/L Final     Anion Gap   Date Value Ref Range Status   07/05/2023 11 8 - 16 mmol/L Final     eGFR   Date Value Ref Range Status   07/05/2023 >60.0 >60 mL/min/1.73 m^2 Final       ABG  No results found for: "PH", "PO2", "PCO2"        Personal Diagnostic Review  I have personally reviewed the following data and added my own interpretation as below:  CT of chest performed on 2/2/24  without contrast revealed LLL 1cm semisolid nodule with increased size and density compared to 2021 but very slow and some size changes could be slice artifact.      2/21/2024    10:05 AM 2/16/2024     2:40 PM 1/22/2024     1:07 PM 12/14/2023     3:31 PM 10/19/2023    11:17 AM 10/3/2023     1:02 PM 9/14/2023    10:43 AM   Pulmonary Function Tests   SpO2 97 %   99 %   97 %   Height 5' 5" (1.651 m) 5' 5" (1.651 m) 5' 5" (1.651 m) 5' 5" (1.651 m) 5' 5" (1.651 m) 5' 5" (1.651 m) 5' 5" (1.651 m)   Weight 101 kg (222 lb 10.6 oz) 99.9 kg (220 lb 3.8 oz) 102.1 kg (225 lb)   100.7 kg (222 lb)    BMI (Calculated) 37.1 36.6 37.4   36.9          Assessment:       1. Solitary pulmonary nodule    2. Pulmonary nodule    3. Former smoker    4. Severe obesity (BMI 35.0-39.9) with comorbidity        Outpatient Encounter Medications as of 2/21/2024   Medication Sig Dispense Refill    biotin 1 mg Cap Take by mouth.      cartilage/collagen/bor/hyalur (JOINT HEALTH ORAL) Take by mouth.      esomeprazole (NEXIUM) 20 MG capsule Take 20 mg by mouth before breakfast.      hydroCHLOROthiazide (HYDRODIURIL) 25 MG tablet TAKE 1 TABLET(25 MG) BY MOUTH EVERY DAY 90 tablet 3    " ibuprofen (ADVIL,MOTRIN) 800 MG tablet TAKE 1 TABLET(800 MG) BY MOUTH EVERY 6 HOURS AS NEEDED FOR PAIN 60 tablet 1    lovastatin (MEVACOR) 40 MG tablet TAKE 1 TABLET(40 MG) BY MOUTH EVERY EVENING 90 tablet 3    meloxicam (MOBIC) 15 MG tablet Take 15 mg by mouth.      olmesartan (BENICAR) 40 MG tablet Take 1 tablet (40 mg total) by mouth once daily. 90 tablet 3    omega-3 fatty acids/fish oil (FISH OIL-OMEGA-3 FATTY ACIDS) 300-1,000 mg capsule Take by mouth once daily.      RSVPreF3 antigen-AS01E, PF, (AREXVY, PF,) 120 mcg/0.5 mL SusR vaccine Inject into the muscle. 1 each 0    TURMERIC ORAL Take by mouth.      celecoxib (CELEBREX) 200 MG capsule Take 1 capsule (200 mg total) by mouth once daily. With food and water for maximum of 15 consecutive days 30 capsule 0    traZODone (DESYREL) 50 MG tablet Take 1 tablet (50 mg total) by mouth every evening. 90 tablet 1     Facility-Administered Encounter Medications as of 2/21/2024   Medication Dose Route Frequency Provider Last Rate Last Admin    ropivacaine 0.2% Nimbus PainPRO Pump infusion 500 ML   Perineural Continuous Cornell Vela MD   New Bag at 07/26/23 8939     1. Pulmonary nodule  - Ambulatory referral/consult to Pulmonology  - CT Chest Without Contrast; Future    2. Former smoker  - Ambulatory referral/consult to Pulmonology    3. Solitary pulmonary nodule    4. Severe obesity (BMI 35.0-39.9) with comorbidity    Plan:     Problem List Items Addressed This Visit          Pulmonary    Solitary pulmonary nodule - Primary    Current Assessment & Plan     Semi-solid and 10mm. Increase in size and kristopher densitiy since 2021. Consideration for lepidic growth adenocarcinoma.   Bio[employer] testing now  CT Chest in 3 months unless Bio[employer] suggests increased risk and would proceed with biopsy via robotic bronchoscopy.            Endocrine    Severe obesity (BMI 35.0-39.9) with comorbidity    Current Assessment & Plan     Noted comorbidity            Other     Former smoker    Current Assessment & Plan     Quit over 15 years ago. Does not require lung cancer screening once diagnostics complete          Other Visit Diagnoses       Pulmonary nodule        Relevant Orders    CT Chest Without Contrast              No follow-ups on file.    Future Appointments   Date Time Provider Department Center   2/27/2024  2:00 PM Carla Mojica MD [facility]ER[facility]   4/4/2024  2:45 PM Carla Mojica MD [facility]           Anderson Grey MD

## 2024-02-21 NOTE — ASSESSMENT & PLAN NOTE
Semi-solid and 10mm. Increase in size and kristopher densitiy since 2021. Consideration for lepidic growth adenocarcinoma.   BioDesix testing now  CT Chest in 3 months unless Bio[employer] suggests increased risk and would proceed with biopsy via robotic bronchoscopy.

## 2024-02-27 ENCOUNTER — OFFICE VISIT (OUTPATIENT)
Dept: DERMATOLOGY | Facility: CLINIC | Age: 69
End: 2024-02-27
Payer: MEDICARE

## 2024-02-27 DIAGNOSIS — D48.5 NEOPLASM OF UNCERTAIN BEHAVIOR OF SKIN: Primary | ICD-10-CM

## 2024-02-27 DIAGNOSIS — Z85.828 HISTORY OF NONMELANOMA SKIN CANCER: ICD-10-CM

## 2024-02-27 DIAGNOSIS — D22.9 MULTIPLE BENIGN NEVI: ICD-10-CM

## 2024-02-27 DIAGNOSIS — L81.4 LENTIGINES: ICD-10-CM

## 2024-02-27 DIAGNOSIS — L57.0 ACTINIC KERATOSIS: ICD-10-CM

## 2024-02-27 DIAGNOSIS — L57.8 CHRONIC ACTINIC DERMATITIS: ICD-10-CM

## 2024-02-27 PROCEDURE — 88305 TISSUE EXAM BY PATHOLOGIST: CPT | Performed by: DERMATOLOGY

## 2024-02-27 PROCEDURE — 17003 DESTRUCT PREMALG LES 2-14: CPT | Mod: S$GLB,,, | Performed by: DERMATOLOGY

## 2024-02-27 PROCEDURE — 88305 TISSUE EXAM BY PATHOLOGIST: CPT | Mod: 26,,, | Performed by: DERMATOLOGY

## 2024-02-27 PROCEDURE — 99214 OFFICE O/P EST MOD 30 MIN: CPT | Mod: 25,S$GLB,, | Performed by: DERMATOLOGY

## 2024-02-27 PROCEDURE — 11102 TANGNTL BX SKIN SINGLE LES: CPT | Mod: S$GLB,,, | Performed by: DERMATOLOGY

## 2024-02-27 PROCEDURE — 17000 DESTRUCT PREMALG LESION: CPT | Mod: XS,S$GLB,, | Performed by: DERMATOLOGY

## 2024-02-27 NOTE — PROGRESS NOTES
"  Patient Information  Name: Diane Garcia  : 1955  MRN: [id number]     Referring Physician:  No ref. provider found   Primary Care Physician:  Cornell Giraldo MD   Date of Visit: 2024      Subjective:     History of Present lllness:    Diane Garcia is a 68 y.o. female who presents with a chief complaint of moles.  This is a high risk patient with a personal history of nonmelanoma skin cancer who is here today to check for the development of new lesions.  Patient is here today for a "mole" check.     Today, patient has no additional complaints. Denies any new, changing, or symptomatic lesions on the skin.    Patient was last seen: 2022.  Prior notes by myself reviewed.   Clinical documentation obtained by nursing staff reviewed.    Review of Systems    Objective:   Physical Exam   Constitutional: She appears well-developed and well-nourished. No distress.   Neurological: She is alert and oriented to person, place, and time. She is not disoriented.   Psychiatric: She has a normal mood and affect.   Skin:   Areas Examined (abnormalities noted in diagram):   Scalp / Hair Palpated and Inspected  Head / Face Inspection Performed  Neck Inspection Performed  Chest / Axilla Inspection Performed  Abdomen Inspection Performed  Genitals / Buttocks / Groin Inspection Performed  Back Inspection Performed  RUE Inspected  LUE Inspection Performed  RLE Inspected  LLE Inspection Performed  Nails and Digits Inspection Performed                 Diagram Legend     Erythematous scaling macule/papule c/w actinic keratosis       Vascular papule c/w angioma      Pigmented verrucoid papule/plaque c/w seborrheic keratosis      Yellow umbilicated papule c/w sebaceous hyperplasia      Irregularly shaped tan macule c/w lentigo     1-2 mm smooth white papules consistent with Milia      Movable subcutaneous cyst with punctum c/w epidermal inclusion cyst      Subcutaneous movable cyst c/w pilar cyst      Firm pink to " brown papule c/w dermatofibroma      Pedunculated fleshy papule(s) c/w skin tag(s)      Evenly pigmented macule c/w junctional nevus     Mildly variegated pigmented, slightly irregular-bordered macule c/w mildly atypical nevus      Flesh colored to evenly pigmented papule c/w intradermal nevus       Pink pearly papule/plaque c/w basal cell carcinoma      Erythematous hyperkeratotic cursted plaque c/w SCC      Surgical scar with no sign of skin cancer recurrence      Open and closed comedones      Inflammatory papules and pustules      Verrucoid papule consistent consistent with wart     Erythematous eczematous patches and plaques     Dystrophic onycholytic nail with subungual debris c/w onychomycosis     Umbilicated papule    Erythematous-base heme-crusted tan verrucoid plaque consistent with inflamed seborrheic keratosis     Erythematous Silvery Scaling Plaque c/w Psoriasis     See annotation          [] Data reviewed  [] Prior external notes reviewed  [] Independent review of test  [] Management discussed with another provider  [] Independent historian    Assessment / Plan:      Pathology Orders:       Normal Orders This Visit    Specimen to Pathology, Dermatology     Questions:    Procedure Type: Dermatology and skin neoplasms    Number of Specimens: 1    ------------------------: -------------------------    Spec 1 Procedure: Biopsy    Spec 1 Clinical Impression: r/o atypical nevus    Spec 1 Source: Right medial shin    Release to patient:           Neoplasm of uncertain behavior of skin  -     Specimen to Pathology, Dermatology    Shave biopsy procedure note:  Risk, benefits, and alternatives of biopsy are discussed with the patient, including risk of infection, scar, recurrence, and need for additional treatment of site. The patient agrees to the procedure by verbal consent. The area is marked and prepped with alcohol.  Approximately 1 mL of lidocaine 1% with epinephrine is used for local anesthesia. A sharp  blade is used to remove the lesion. The specimen is sent for pathology. Hemostasis is obtained with aluminum chloride and/or monopolar hyfrecation if needed. The area is then dressed and bandaged. The patient tolerated the procedure well without adverse event. Written instructions on wound care were given and were reviewed with the patient, who is to call for any signs of bleeding or infection. The patient will be notified of the pathology results.    Actinic keratosis  Cryosurgery procedure note:  Risk, benefits, and alternatives of cryosurgery are discussed with the patient, including but not limited to the risks of hypopigmentation, hyperpigmentation, scar, infection, recurrence of lesion(s), development of new lesion(s), and need for additional treatment of the lesion(s). Verbal consent obtained from patient. Liquid nitrogen cryosurgery applied to 4 lesion(s) to produce a freeze injury. Counseled patient that blisters may form, and instructed patient on wound care with gentle cleansing and use of Vaseline ointment to keep moist until healed. Handout was provided, and patient was instructed to return to clinic in 1-2 months if lesions do not completely resolve.    Chronic actinic dermatitis   - chronic problem, not at treatment goal  Recommended and discussed the use of Rx topical chemotherapeutic cream, called fluorouracil cream, to treat the sun-damaged and precancerous cells on the face. (pt has med at home)  A large clinical trial of topical 5% fluorouracil cream for chemoprevention of skin cancer demonstrated that a standard 2- to 4-week course of treatment of the face decreased the risk of squamous cell carcinoma by 75% over the following year.  Patient was counseled regarding the proper use and expected side effects of fluorouracil cream. Written instructions were provided. Patient will discontinue use if areas begin to ulcerate, bleed, blister, etc.    Multiple benign nevi  Multiple benign-appearing nevi  present on exam today. Reassurance provided. Counseled patient to periodically examine moles and return to clinic if any changes in size, shape, or color are noted or if it becomes symptomatic (bleeding, itching, pain, etc).  Recommend using a broad-spectrum, water-resistant sunscreen with SPF of 30 or higher--reapply every 2 hours. Seek shade, wear sun-protective clothing, and perform regular skin self-exams.\    Lentigines  These are benign sun spots which should be monitored for changes. Daily sun protection will reduce the number of new lesions.   Recommend using a broad-spectrum, water-resistant sunscreen with SPF of 30 or higher--reapply every 2 hours. Seek shade, wear sun-protective clothing, and perform regular skin self-exams.    History of nonmelanoma skin cancer   - stable and chronic  Area(s) of previous nonmelanoma skin cancer evaluated with no evidence of recurrence. Reassurance provided.  Recommend using a broad-spectrum, water-resistant sunscreen with SPF of 30 or higher--reapply every 2 hours. Seek shade, wear sun-protective clothing, and perform regular skin self-exams.      Follow up in about 3 months (around 5/27/2024).      Carla Mojica MD, FAAD  [facility]

## 2024-02-27 NOTE — PATIENT INSTRUCTIONS
INSTRUCTIONS FOR USING FLUOROURACIL 5% CREAM    Fluorouracil is a cream that will help eliminate early skin cancers by selectively destroying the precancerous cells.  You are to apply the cream to the following areas, not to individual spots:  Entire Face    -  Apply fluorouracil twice per day.          Apply only a pea-sized amount of fluorouracil to each of these areas.  When applying to the face and ears, keep the cream away from the creases around the eyes, ears, nose and mouth.  The cream will rub in more easily if you dampen the skin first.  After 1-2 weeks you will see small red spots appear.  This means the fluorouracil is working as it should.  To help avoid irritation, use a skin moisturizer such as CeraVe cream or ointment.  Apply a sunscreen everyday, especially when you are outside!  If you notice large areas of redness or irritation, do the following:  Stop fluorouracil for a few days until the irritation subsides.  After the irritation subsides, resume the fluorouracil, but only apply it once daily or every other day.        9. Treatment usually takes:   2 weeks          Biopsy Wound Care Instructions    Leave the bandage on for 24 hours without getting it wet.   Clean the area once a day with a gentle soap and water, then pat dry and apply Vaseline and a bandaid.  The site should be kept moist with Vaseline at all times to improve healing. Reapply a thick coating as needed. Do not let the site air out or form a scab, as this will delay healing and worsen scarring.  If any bleeding or oozing occurs once you return home, apply firm pressure to the area for 30 minutes straight without peeking. If bleeding continues, call the office immediately.  Please message us via [facility], call us at [phone], or return to the office at any sign of increasing redness, swelling, tenderness, pain, heat, yellow drainage/discharge, or continued bleeding.      Receiving Your Pathology Results    Your pathology  results will be released to you on [facility] at the same time that Dr. Mojica receives them.   Dr. Mojica will then message you with her interpretation of the results and/or with the plan going forward.  If you do not use [facility] or if your pathology results require more of an explanation, you will receive your results via a phone call.  If 2 weeks go by and you have not received your results, please message us via [facility] or call us at [phone] to inform us.

## 2024-03-01 LAB
FINAL PATHOLOGIC DIAGNOSIS: NORMAL
GROSS: NORMAL
Lab: NORMAL
MICROSCOPIC EXAM: NORMAL

## 2024-03-01 NOTE — PROGRESS NOTES
Final Pathologic Diagnosis 1. Skin, right medial shin, shave biopsy:   - MACULAR SEBORRHEIC KERATOSIS

## 2024-04-24 NOTE — PROGRESS NOTES
Established Patient - Audio Only Telehealth Visit     The patient location is:   [place]  The chief complaint leading to consultation is:  MRI results  Visit type: Virtual visit with audio only (telephone)  Total time spent with patient:  5 minute       The reason for the audio only service rather than synchronous audio and video virtual visit was related to technical difficulties or patient preference/necessity.     Each patient to whom I provide medical services by telemedicine is:  (1) informed of the relationship between the physician and patient and the respective role of any other health care provider with respect to management of the patient; and (2) notified that they may decline to receive medical services by telemedicine and may withdraw from such care at any time. Patient verbally consented to receive this service via voice-only telephone call.       HPI:  This is a 67-year-old female who presented to me on 08/03/2023 with recent onset of progressive pain and swelling of her right foot and ankle.  She had a left total knee replacement about a week prior to her initial visit with me  and reported that her right foot and ankle pain was interfering with her knee rehab.  She did not have any history of trauma to her right foot.    Plain x-rays revealed some degenerative changes of the midfoot.  Examination did not reveal any obvious structural deficits.  Because of her recent onset of pain and concern about her knee rehab I ordered an MRI scan to rule out any structural abnormalities that might need to be addressed to allow her to continue her knee rehab.    MRI results: MRI of the right midfoot and ankle performed on 08/08/2023 revealed revealed advanced arthritic changes of the talonavicular and naviculocuneiform joints with some flattening and bone marrow edema of the lateral navicular bone that could be consistent with avascular necrosis.  It was also noted to be some Achilles tendinosis, changes  consistent with ligamentous sprains as well as thickening of the plantar fascia.      Assessment and plan:    1. Arthritis of foot, right talonavicular joint  Ambulatory referral/consult  to [facility] Interventional RAD    IR Aspiration Injection Small Joint W FL           Recommendation:  MRI suggests extensive degenerative changes throughout the midtarsal joints around the navicular bone with some findings that may be consistent with osteonecrosis of the lateral navicular bone.  Patient reports that she never had any problems with her foot until about three weeks ago and I suppose that there may have been some occult fracture of the navicular bone.  The problem is that her pain is interfering with her knee rehab and she is looking for some immediate relief without unloading the foot.  I suggested we could refer her to Interventional Radiology for a corticosteroid injection to at least get her through the acute pain.    I will have her return to see me in six weeks with repeat standing x-ray of right foot                     This service was not originating from a related E/M service provided within the previous 7 days nor will  to an E/M service or procedure within the next 24 hours or my soonest available appointment.  Prevailing standard of care was able to be met in this audio-only visit.           Discharged

## 2024-05-07 ENCOUNTER — OFFICE VISIT (OUTPATIENT)
Dept: DERMATOLOGY | Facility: CLINIC | Age: 69
End: 2024-05-07
Payer: MEDICARE

## 2024-05-07 ENCOUNTER — TELEPHONE (OUTPATIENT)
Dept: DERMATOLOGY | Facility: CLINIC | Age: 69
End: 2024-05-07

## 2024-05-07 DIAGNOSIS — L01.1 IMPETIGINIZATION OF OTHER DERMATOSES: ICD-10-CM

## 2024-05-07 DIAGNOSIS — L24.4 IRRITANT CONTACT DERMATITIS DUE TO DRUG IN CONTACT WITH SKIN: ICD-10-CM

## 2024-05-07 DIAGNOSIS — L57.8 CHRONIC ACTINIC DERMATITIS: Primary | ICD-10-CM

## 2024-05-07 PROCEDURE — 99214 OFFICE O/P EST MOD 30 MIN: CPT | Mod: S$GLB,,, | Performed by: DERMATOLOGY

## 2024-05-07 RX ORDER — FLUTICASONE PROPIONATE 0.05 MG/G
OINTMENT TOPICAL
Qty: 60 G | Refills: 0 | Status: SHIPPED | OUTPATIENT
Start: 2024-05-07

## 2024-05-07 RX ORDER — CEPHALEXIN 500 MG/1
500 CAPSULE ORAL EVERY 8 HOURS
Qty: 21 CAPSULE | Refills: 0 | Status: SHIPPED | OUTPATIENT
Start: 2024-05-07 | End: 2024-05-14

## 2024-05-07 NOTE — PROGRESS NOTES
Patient Information  Name: Diane Garcia  : 1955  MRN: [id number]     Referring Physician:  No ref. provider found   Primary Care Physician:  Cornell Giraldo MD   Date of Visit: 2024      Subjective:     History of Present lllness:    Diane Garcia is a 68 y.o. female who presents with a chief complaint of rash.  Location: face, chest, left hand  Duration: 1 week  Signs/Symptoms: red, itching, tender, eyes are stinging.   Exacerbating factors: Applied compounded 5FU+ calcipotriene BID for 5 days  Relieving factors/Prior treatments: OTC moisturizer    Patient was last seen: 2024.  Prior notes by myself reviewed.   Clinical documentation obtained by nursing staff reviewed.    Review of Systems   Eyes:  Positive for eyelid inflammation.       Objective:   Physical Exam   Constitutional: She appears well-developed and well-nourished. No distress.   Eyes: Abnormal Lids.   (edema)No conjunctival no injection.   Neurological: She is alert and oriented to person, place, and time. She is not disoriented.   Psychiatric: She has a normal mood and affect.   Skin:   Areas Examined (abnormalities noted in diagram):   Head / Face Inspection Performed            Diagram Legend     Erythematous scaling macule/papule c/w actinic keratosis       Vascular papule c/w angioma      Pigmented verrucoid papule/plaque c/w seborrheic keratosis      Yellow umbilicated papule c/w sebaceous hyperplasia      Irregularly shaped tan macule c/w lentigo     1-2 mm smooth white papules consistent with Milia      Movable subcutaneous cyst with punctum c/w epidermal inclusion cyst      Subcutaneous movable cyst c/w pilar cyst      Firm pink to brown papule c/w dermatofibroma      Pedunculated fleshy papule(s) c/w skin tag(s)      Evenly pigmented macule c/w junctional nevus     Mildly variegated pigmented, slightly irregular-bordered macule c/w mildly atypical nevus      Flesh colored to evenly pigmented papule c/w  intradermal nevus       Pink pearly papule/plaque c/w basal cell carcinoma      Erythematous hyperkeratotic cursted plaque c/w SCC      Surgical scar with no sign of skin cancer recurrence      Open and closed comedones      Inflammatory papules and pustules      Verrucoid papule consistent consistent with wart     Erythematous eczematous patches and plaques     Dystrophic onycholytic nail with subungual debris c/w onychomycosis     Umbilicated papule    Erythematous-base heme-crusted tan verrucoid plaque consistent with inflamed seborrheic keratosis     Erythematous Silvery Scaling Plaque c/w Psoriasis     See annotation    No images are attached to the encounter or orders placed in the encounter.      [] Data reviewed  [] Prior external notes reviewed  [] Independent review of test  [] Management discussed with another provider  [] Independent historian    Assessment / Plan:        Chronic actinic dermatitis  Irritant contact dermatitis due to drug in contact with skin  - chronic problem with side effect of treatment  -     fluticasone propionate (CUTIVATE) 0.005 % ointment; Apply to affected areas of face BID prn irritation. Do not use for longer than 2 weeks in a row.  Dispense: 60 g; Refill: 0  Recommend using a broad-spectrum, water-resistant sunscreen with SPF of 30 or higher--reapply every 2 hours. Seek shade and wear sun-protective clothing/hat.    Impetiginization of other dermatoses  -     cephALEXin (KEFLEX) 500 MG capsule; Take 1 capsule (500 mg total) by mouth every 8 (eight) hours. for 7 days  Dispense: 21 capsule; Refill: 0      Follow up in about 3 months (around 8/7/2024) for follow up, or sooner if symptoms worsening or not improving.      Carla Mojica MD, FAAD  [facility]

## 2024-05-07 NOTE — TELEPHONE ENCOUNTER
----- Message from Bunny Littlejohn sent at 5/7/2024 11:47 AM CDT -----  Regarding: Same Day Appt  Contact: [phone]  Pt calling stating she was prescribed Sluorourabil calcipotrinrieme. States she is having a reaction to medication. Eyes are swollen and burning, face redness, rash in area she hasn't used the cream. Stopped using cream a week ago and still having issues. Please call [phone]

## 2024-05-08 ENCOUNTER — TELEPHONE (OUTPATIENT)
Dept: SPORTS MEDICINE | Facility: CLINIC | Age: 69
End: 2024-05-08
Payer: MEDICARE

## 2024-05-09 RX ORDER — CEPHALEXIN 500 MG/1
500 CAPSULE ORAL 4 TIMES DAILY
Qty: 4 CAPSULE | Refills: 0 | Status: SHIPPED | OUTPATIENT
Start: 2024-05-09 | End: 2024-05-10

## 2024-05-21 ENCOUNTER — HOSPITAL ENCOUNTER (OUTPATIENT)
Dept: RADIOLOGY | Facility: HOSPITAL | Age: 69
Discharge: HOME | End: 2024-05-21
Attending: INTERNAL MEDICINE
Payer: MEDICARE

## 2024-05-21 ENCOUNTER — TELEPHONE (OUTPATIENT)
Dept: PULMONOLOGY | Facility: CLINIC | Age: 69
End: 2024-05-21
Payer: MEDICARE

## 2024-05-21 DIAGNOSIS — R91.1 PULMONARY NODULE: ICD-10-CM

## 2024-05-21 PROCEDURE — 71250 CT THORAX DX C-: CPT | Mod: TC

## 2024-05-21 PROCEDURE — 71250 CT THORAX DX C-: CPT | Mod: 26,,, | Performed by: RADIOLOGY

## 2024-05-21 NOTE — TELEPHONE ENCOUNTER
----- Message from Angelica Young sent at 5/21/2024 11:33 AM CDT -----  Regarding: resche  Name of Who is Calling:  Pt called        What is the request in detail:  The pt would like to be rescheduled to next week . Please advise        Can the clinic reply by [facility]:  yes        What Number to Call Back if not in [facility]: [phone] (home)

## 2024-05-27 ENCOUNTER — OFFICE VISIT (OUTPATIENT)
Dept: URGENT CARE | Facility: CLINIC | Age: 69
End: 2024-05-27
Payer: MEDICARE

## 2024-05-27 DIAGNOSIS — H10.33 ACUTE BACTERIAL CONJUNCTIVITIS OF BOTH EYES: Primary | ICD-10-CM

## 2024-05-27 PROCEDURE — 99213 OFFICE O/P EST LOW 20 MIN: CPT | Mod: S$GLB,,, | Performed by: FAMILY MEDICINE

## 2024-05-27 RX ORDER — POLYMYXIN B SULFATE AND TRIMETHOPRIM 1; 10000 MG/ML; [USP'U]/ML
1 SOLUTION OPHTHALMIC EVERY 6 HOURS
Start: 2024-05-27 | End: 2024-06-03

## 2024-05-27 NOTE — PROGRESS NOTES
Subjective:      Patient ID: Diane Garcia is a 68 y.o. female.      Chief Complaint: Eye Problem    67 y/o female c/o bilateral eye redness and discharge that began yesterday.     Eye Problem   Both eyes are affected. This is a new problem. The current episode started yesterday. The problem occurs constantly. The problem has been gradually worsening. There was no injury mechanism. The patient is experiencing no pain. There is No known exposure to pink eye. She Does not wear contacts. Associated symptoms include an eye discharge and eye redness. Pertinent negatives include no blurred vision, double vision, fever, foreign body sensation, nausea, photophobia, recent URI or vomiting. She has tried nothing for the symptoms.       Constitution: Negative for fever.   Eyes:  Positive for eye discharge and eye redness. Negative for photophobia, double vision and blurred vision.   Gastrointestinal:  Negative for nausea and vomiting.      Objective:   See flowsheet  Vision Screening    Right eye Left eye Both eyes   Without correction 20/30 20/30 20/30   With correction         Physical Exam   Constitutional: She is oriented to person, place, and time.   Eyes: Right eye exhibits no discharge. Left eye exhibits discharge.      Comments: Bilateral conjunctival redness   Neurological: She is alert and oriented to person, place, and time.   Psychiatric: Thought content normal.       Assessment:     1. Acute bacterial conjunctivitis of both eyes        Plan:       Acute bacterial conjunctivitis of both eyes  -     polymyxin B sulf-trimethoprim (POLYTRIM) 10,000 unit- 1 mg/mL Drop; Place 1 drop into both eyes every 6 (six) hours. May substitute with ofloxacin 0.3% opht 1 drop every 6 hrs to both eyes X 7 days. for 7 days  If no improvement or worsening over 48 hrs will need to be reevaluated by a medical provider.

## 2024-05-29 ENCOUNTER — HOSPITAL ENCOUNTER (OUTPATIENT)
Dept: RADIOLOGY | Facility: HOSPITAL | Age: 69
Discharge: HOME | End: 2024-05-29
Payer: MEDICARE

## 2024-05-29 ENCOUNTER — OFFICE VISIT (OUTPATIENT)
Dept: ORTHOPEDICS | Facility: CLINIC | Age: 69
End: 2024-05-29
Payer: MEDICARE

## 2024-05-29 VITALS — BODY MASS INDEX: 37.1 KG/M2 | WEIGHT: 222.69 LBS | HEIGHT: 65 IN

## 2024-05-29 DIAGNOSIS — M77.8 RIGHT WRIST TENDONITIS: ICD-10-CM

## 2024-05-29 DIAGNOSIS — M79.641 RIGHT HAND PAIN: ICD-10-CM

## 2024-05-29 DIAGNOSIS — M79.641 RIGHT HAND PAIN: Primary | ICD-10-CM

## 2024-05-29 DIAGNOSIS — G56.01 RIGHT CARPAL TUNNEL SYNDROME: ICD-10-CM

## 2024-05-29 PROCEDURE — 99204 OFFICE O/P NEW MOD 45 MIN: CPT | Mod: 25,S$PBB,,

## 2024-05-29 PROCEDURE — 99999 PR PBB SHADOW E&M-EST. PATIENT-LVL III: CPT | Mod: PBBFAC,,,

## 2024-05-29 PROCEDURE — 20526 THER INJECTION CARP TUNNEL: CPT | Mod: S$PBB,RT,,

## 2024-05-29 PROCEDURE — 99213 OFFICE O/P EST LOW 20 MIN: CPT | Mod: PBBFAC,25

## 2024-05-29 PROCEDURE — 73130 X-RAY EXAM OF HAND: CPT | Mod: TC,RT

## 2024-05-29 PROCEDURE — 73130 X-RAY EXAM OF HAND: CPT | Mod: 26,RT,, | Performed by: RADIOLOGY

## 2024-05-29 PROCEDURE — 99999PBSHW PR PBB SHADOW TECHNICAL ONLY FILED TO HB: Mod: PBBFAC,,,

## 2024-05-29 PROCEDURE — 20526 THER INJECTION CARP TUNNEL: CPT | Mod: PBBFAC,RT

## 2024-05-29 RX ORDER — DEXAMETHASONE SODIUM PHOSPHATE 4 MG/ML
4 INJECTION, SOLUTION INTRA-ARTICULAR; INTRALESIONAL; INTRAMUSCULAR; INTRAVENOUS; SOFT TISSUE
Status: DISCONTINUED | OUTPATIENT
Start: 2024-05-29 | End: 2024-05-29 | Stop reason: HOSPADM

## 2024-05-29 RX ORDER — LIDOCAINE HYDROCHLORIDE 10 MG/ML
1 INJECTION INFILTRATION; PERINEURAL
Status: DISCONTINUED | OUTPATIENT
Start: 2024-05-29 | End: 2024-05-29 | Stop reason: HOSPADM

## 2024-05-29 RX ADMIN — DEXAMETHASONE SODIUM PHOSPHATE 4 MG: 4 INJECTION, SOLUTION INTRA-ARTICULAR; INTRALESIONAL; INTRAMUSCULAR; INTRAVENOUS; SOFT TISSUE at 03:05

## 2024-05-29 RX ADMIN — LIDOCAINE HYDROCHLORIDE 1 ML: 10 INJECTION, SOLUTION INFILTRATION; PERINEURAL at 03:05

## 2024-05-29 NOTE — TELEPHONE ENCOUNTER
Refill Routing Note   Medication(s) are not appropriate for processing by [facility] for the following reason(s):        No active prescription written by provider    [facility] action(s):  Defer     Requires labs : Yes             Appointments  past 12m or future 3m with PCP    Date Provider   Last Visit   12/14/2023 Cornell Giraldo MD   Next Visit   Visit date not found Cornell Giraldo MD   ED visits in past 90 days: 0        Note composed:10:35 AM 05/29/2024

## 2024-05-29 NOTE — PROGRESS NOTES
[facility]  History & Physical  Orthopedics    SUBJECTIVE:     Chief Complaint:  right-hand pain and numbness    Referring Provider: Self, Aaareferral     History of Present Illness:  Patient is a 68 y.o. right hand dominant female who presents today with complaints of right hand pain, numbness and tingling which began months ago.  She reports recently riding her bike a lot and noticing soreness of her right wrist.  Reports she experiences nocturnal numbness and tingling where she has to shake her hand at night.  Also reports intermittent numbness   f her entire hand during the day.  She reports she has wrist tried bracing at night,  1 brace has a past which seems to help and the other one with a bar that does not help.  She denies any neck pain.  She denies any hand trauma or hand surgery.  The patient is a/an retired.   Does a lot of/ gardening.  The patient denies any fevers, chills, N/V, D/C and presents for evaluation.    Review of patient's allergies indicates:   Allergen Reactions    Penicillins      nausea       Past Medical History:   Diagnosis Date    Arthritis     GERD (gastroesophageal reflux disease)     Hyperlipidemia     Hypertension      Past Surgical History:   Procedure Laterality Date    BREAST BIOPSY Left 2013    benign needle    COLONOSCOPY N/A 02/02/2023    Procedure: COLONOSCOPY;  Surgeon: Bob Grayson MD;  Location: [facility];  Service: Endoscopy;  Laterality: N/A;    ESOPHAGOGASTRODUODENOSCOPY N/A 02/02/2023    Procedure: EGD (ESOPHAGOGASTRODUODENOSCOPY);  Surgeon: Bob Grayson MD;  Location: [facility];  Service: Endoscopy;  Laterality: N/A;    HYSTERECTOMY      OOPHORECTOMY      mary jo placed Right     femur right mary jo    mary jo removed Right     TOTAL KNEE ARTHROPLASTY Left 07/26/2023    Procedure: ARTHROPLASTY, KNEE, TOTAL;  Surgeon: NATALIO Greenwood MD;  Location: [facility];  Service: Orthopedics;  Laterality: Left;  Regional w/Catheter, Adductor,  Spinal, 0.2% Ropivacaine     Family History   Problem Relation Name Age of Onset    Cancer Mother Smoker         Lung    COPD Father Smoker     No Known Problems Sister      Melanoma Neg Hx       Social History     Tobacco Use    Smoking status: Former     Current packs/day: 0.00     Average packs/day: 2.0 packs/day for 25.0 years (50.0 ttl pk-yrs)     Types: Cigarettes     Start date: 1975     Quit date: 2000     Years since quittin.5    Smokeless tobacco: Never   Substance Use Topics    Alcohol use: Yes     Comment: A few drinks a week    Drug use: Never     Comment: last use >30 yrs ago         Review of Systems:  Constitutional: Denies fever/chills  Neurological: Denies numbness/tingling (any exceptions noted in orthopaedic exam)   Psychiatric/Behavioral: Denies change in normal mood  Eyes: Denies change in vision  Cardiovascular: Denies chest pain  Respiratory: Denies shortness of breath  Hematologic/Lymphatic: Denies easy bleeding/bruising   Skin: Denies new rash or skin lesions   Gastrointestinal: Denies nausea/vomitting/diarrhea, change in bowel habits, abdominal pain   Allergic/Immunologic: Denies adverse reactions to current medications  Musculoskeletal: see HPI      OBJECTIVE:     Vital Signs (Most Recent)       Physical Exam:  Gen:  No acute distress  CV:  Peripherally well-perfused.  Pulses 2+ bilaterally.  Lungs:  Normal respiratory effort.  Abdomen:  Soft, non-tender, non-distended  Head/Neck:  Normocephalic.  Atraumatic. No TTP, AROM and PROM intact without pain  Neuro:  CN intact without deficit, SILT throughout B/L Upper & Lower Extremities    Right Hand/Wrist Examination:    Observation/Inspection:  Swelling  none    Deformity  none  Discoloration  none     Scars   none    Atrophy  none  +TTP FCR volar right wrist   HAND/WRIST EXAMINATION:  Finkelstein's Test   neg  WHAT Test    POS  Snuff box tenderness   Neg  Peralta's Test    Neg  Hook of Hamate Tenderness  Neg  CMC  grind    Neg  Circumduction test   Neg    Neurovascular Exam:  Digits WWP, brisk CR < 3s throughout  NVI motor/LTS to M/R/U nerves, radial pulse 2+  Tinel's Test - Carpal Tunnel  Pos right   Tinel's Test - Cubital Tunnel  Neg  Jaylene                                      POS    ROM hand full, painless    ROM wrist full, painless    ROM elbow full, painless    Abdomen not guarded  Respirations nonlabored  Perfusion intact    Diagnostic Results:   Xray right hand 05/29/2024   No acute fractures or dislocations noted in right-hand x-ray.  During noted bone spurs at the CMC 1st metacarpal  consistent with arthritis.  Imaging - I independently viewed the patient's imaging as well as the radiology report.    EMG - none     ASSESSMENT/PLAN:     Diane was seen today for pain, numbness and tingling.    Diagnoses and all orders for this visit:    Right hand pain  -     X-Ray Hand Complete Right; Future  -     EMG W/ ULTRASOUND AND NERVE CONDUCTION TEST 2 Extremities; Future    Right carpal tunnel syndrome  -     EMG W/ ULTRASOUND AND NERVE CONDUCTION TEST 2 Extremities; Future  -     Carpal Tunnel    Right wrist tendonitis         68 y.o. yo female with right carpal tunnel     Diane was seen today for pain, numbness and tingling.    Diagnoses and all orders for this visit:    Right hand pain  -     X-Ray Hand Complete Right; Future  -     EMG W/ ULTRASOUND AND NERVE CONDUCTION TEST 2 Extremities; Future    Right carpal tunnel syndrome  -     EMG W/ ULTRASOUND AND NERVE CONDUCTION TEST 2 Extremities; Future  -     Carpal Tunnel    Right wrist tendonitis       Plan:    X-rays reviewed and discussed with patient today in clinic.  Negative for any  dislocation or fractures,  but there is some noted bone spurring at the 1st CMC metacarpal of right thumb.  Based off HPI and exam today right hand pain and numbness consistent with right carpal tunnel after positive provocative test.  Discussed the pathophysiology of carpal tunnel  syndrome with patient in full detail.  Also discussed conservative treatments versus surgical intervention.  Is also noted that she also has FCR tendinitis of her wrist.  Discussed behavior modifications,  NSAIDs,  rest and icing.    Patient is interested in having a CSI  for her right carpal tunnel today.   I also  recommended an EMG to further evaluate the severity of her median nerve compression.  She is interested in having this done.  She will follow-up with Dr. Bipin carlin following her EMG to determine any further intervention.   All questions answered

## 2024-05-29 NOTE — PROCEDURES
Carpal Tunnel    Date/Time: 5/29/2024 3:00 PM    Performed by: Ana Paula Baker NP  Authorized by: Ana Paula Baker NP    Consent Done?:  Yes (Verbal)  Indications:  Pain  Site marked: the procedure site was marked    Timeout: prior to procedure the correct patient, procedure, and site was verified    Prep: patient was prepped and draped in usual sterile fashion      Local anesthetic:  Lidocaine 1% without epinephrine  Location:  Wrist  Ultrasonic Guidance for Needle Placement?: No    Needle size:  25 G  Approach:  Volar  Medications:  1 mL LIDOcaine HCL 10 mg/ml (1%) 10 mg/mL (1 %); 4 mg dexAMETHasone 4 mg/mL  Patient tolerance:  Patient tolerated the procedure well with no immediate complications

## 2024-05-30 RX ORDER — HYDROCHLOROTHIAZIDE 25 MG/1
TABLET ORAL
Qty: 90 TABLET | Refills: 1 | Status: SHIPPED | OUTPATIENT
Start: 2024-05-30

## 2024-06-12 ENCOUNTER — LAB VISIT (OUTPATIENT)
Dept: LAB | Facility: HOSPITAL | Age: 69
End: 2024-06-12
Attending: INTERNAL MEDICINE
Payer: MEDICARE

## 2024-06-12 DIAGNOSIS — I25.10 ATHEROSCLEROSIS OF NATIVE CORONARY ARTERY OF NATIVE HEART WITHOUT ANGINA PECTORIS: ICD-10-CM

## 2024-06-12 LAB
ALBUMIN SERPL BCP-MCNC: 3.9 G/DL (ref 3.5–5.2)
ALP SERPL-CCNC: 65 U/L (ref 55–135)
ALT SERPL W/O P-5'-P-CCNC: 20 U/L (ref 10–44)
ANION GAP SERPL CALC-SCNC: 11 MMOL/L (ref 8–16)
AST SERPL-CCNC: 16 U/L (ref 10–40)
BILIRUB SERPL-MCNC: 0.8 MG/DL (ref 0.1–1)
BUN SERPL-MCNC: 13 MG/DL (ref 8–23)
CALCIUM SERPL-MCNC: 9.6 MG/DL (ref 8.7–10.5)
CHLORIDE SERPL-SCNC: 103 MMOL/L (ref 95–110)
CHOLEST SERPL-MCNC: 155 MG/DL (ref 120–199)
CHOLEST/HDLC SERPL: 4.4 {RATIO} (ref 2–5)
CO2 SERPL-SCNC: 25 MMOL/L (ref 23–29)
CREAT SERPL-MCNC: 0.7 MG/DL (ref 0.5–1.4)
EST. GFR  (NO RACE VARIABLE): >60 ML/MIN/1.73 M^2
GLUCOSE SERPL-MCNC: 105 MG/DL (ref 70–110)
HDLC SERPL-MCNC: 35 MG/DL (ref 40–75)
HDLC SERPL: 22.6 % (ref 20–50)
LDLC SERPL CALC-MCNC: 80 MG/DL (ref 63–159)
NONHDLC SERPL-MCNC: 120 MG/DL
POTASSIUM SERPL-SCNC: 4.3 MMOL/L (ref 3.5–5.1)
PROT SERPL-MCNC: 7.3 G/DL (ref 6–8.4)
SODIUM SERPL-SCNC: 139 MMOL/L (ref 136–145)
TRIGL SERPL-MCNC: 200 MG/DL (ref 30–150)

## 2024-06-12 PROCEDURE — 83695 ASSAY OF LIPOPROTEIN(A): CPT | Performed by: INTERNAL MEDICINE

## 2024-06-12 PROCEDURE — 80053 COMPREHEN METABOLIC PANEL: CPT | Performed by: INTERNAL MEDICINE

## 2024-06-12 PROCEDURE — 80061 LIPID PANEL: CPT | Performed by: INTERNAL MEDICINE

## 2024-06-12 PROCEDURE — 36415 COLL VENOUS BLD VENIPUNCTURE: CPT | Mod: PN | Performed by: INTERNAL MEDICINE

## 2024-06-15 LAB — APO B SERPL-MCNC: 84 MG/DL

## 2024-06-17 ENCOUNTER — TELEPHONE (OUTPATIENT)
Dept: CARDIOLOGY | Facility: CLINIC | Age: 69
End: 2024-06-17
Payer: MEDICARE

## 2024-06-17 LAB — LPA SERPL-MCNC: 20 MG/DL (ref 0–30)

## 2024-06-18 ENCOUNTER — OFFICE VISIT (OUTPATIENT)
Dept: URGENT CARE | Facility: CLINIC | Age: 69
End: 2024-06-18
Payer: MEDICARE

## 2024-06-18 VITALS
RESPIRATION RATE: 16 BRPM | SYSTOLIC BLOOD PRESSURE: 130 MMHG | HEART RATE: 81 BPM | BODY MASS INDEX: 36.99 KG/M2 | WEIGHT: 222 LBS | OXYGEN SATURATION: 96 % | HEIGHT: 65 IN | TEMPERATURE: 98 F | DIASTOLIC BLOOD PRESSURE: 82 MMHG

## 2024-06-18 DIAGNOSIS — J32.9 RHINOSINUSITIS: Primary | ICD-10-CM

## 2024-06-18 DIAGNOSIS — H10.13 ALLERGIC CONJUNCTIVITIS OF BOTH EYES: ICD-10-CM

## 2024-06-18 LAB
CTP QC/QA: YES
MOLECULAR STREP A: NEGATIVE

## 2024-06-18 PROCEDURE — 87651 STREP A DNA AMP PROBE: CPT | Mod: QW,S$GLB,, | Performed by: NURSE PRACTITIONER

## 2024-06-18 PROCEDURE — 99213 OFFICE O/P EST LOW 20 MIN: CPT | Mod: S$GLB,,, | Performed by: NURSE PRACTITIONER

## 2024-06-18 RX ORDER — MONTELUKAST SODIUM 10 MG/1
10 TABLET ORAL NIGHTLY
Qty: 30 TABLET | Refills: 0 | Status: SHIPPED | OUTPATIENT
Start: 2024-06-18 | End: 2024-07-18

## 2024-06-18 RX ORDER — LORATADINE PSEUDOEPHEDRINE SULFATE 10; 240 MG/1; MG/1
1 TABLET, EXTENDED RELEASE ORAL DAILY
COMMUNITY
Start: 2024-06-18 | End: 2024-06-28

## 2024-06-18 RX ORDER — OFLOXACIN 3 MG/ML
1 SOLUTION/ DROPS OPHTHALMIC EVERY 6 HOURS
COMMUNITY
Start: 2024-05-27

## 2024-06-18 RX ORDER — OLOPATADINE HYDROCHLORIDE 1 MG/ML
1 SOLUTION/ DROPS OPHTHALMIC 2 TIMES DAILY
Qty: 5 ML | Refills: 0 | Status: SHIPPED | OUTPATIENT
Start: 2024-06-18 | End: 2025-06-18

## 2024-06-18 NOTE — PROGRESS NOTES
"Subjective:      Patient ID: Diane Garcia is a 68 y.o. female.    Vitals:  height is 5' 5" (1.651 m) and weight is 100.7 kg (222 lb). Her oral temperature is 98 °F (36.7 °C). Her blood pressure is 130/82 and her pulse is 81. Her respiration is 16 and oxygen saturation is 96%.     Chief Complaint: Eye Problem (Both eyes (left eye worse))    Patient was here 1 week ago and got antibiotic drops for eyes and now they are bothering her again.    Eye Problem   Both (left more than right) eyes are affected. The current episode started 1 to 4 weeks ago. The problem occurs constantly. The problem has been gradually worsening. There was no injury mechanism. Associated symptoms include eye redness and itching. Associated symptoms comments: Watery eyes. She has tried eye drops for the symptoms. The treatment provided mild relief.       Constitution: Negative.   HENT:  Positive for sore throat.         Ear fullness   Cardiovascular: Negative.    Eyes:  Positive for eye itching and eye redness.   Respiratory: Negative.        Objective:     Physical Exam   HENT:   Head: Normocephalic.   Ears:   Right Ear: Tympanic membrane, external ear and ear canal normal.   Left Ear: Tympanic membrane, external ear and ear canal normal.   Nose: Nose normal. No congestion.   Mouth/Throat: Mucous membranes are moist. No oropharyngeal exudate or posterior oropharyngeal erythema. Oropharynx is clear.   Eyes: Conjunctivae are normal.      Comments: +bilat sclera with erythema   Neck: Neck supple.   Pulmonary/Chest: Effort normal and breath sounds normal.   Abdominal: Normal appearance.   Neurological: She is alert.   Skin: Skin is warm and dry.       Assessment:     1. Rhinosinusitis    2. Allergic conjunctivitis of both eyes        Plan:       Rhinosinusitis  -     POCT Strep A, Molecular  -     montelukast (SINGULAIR) 10 mg tablet; Take 1 tablet (10 mg total) by mouth every evening.  Dispense: 30 tablet; Refill: 0  -     " loratadine-pseudoephedrine  mg (CLARITIN-D 24 HOUR)  mg per 24 hr tablet; Take 1 tablet by mouth once daily. for 10 days    Allergic conjunctivitis of both eyes  -     olopatadine (PATANOL) 0.1 % ophthalmic solution; Place 1 drop into both eyes 2 (two) times daily.  Dispense: 5 mL; Refill: 0      Results for orders placed or performed in visit on 06/18/24   POCT Strep A, Molecular   Result Value Ref Range    Molecular Strep A, POC Negative Negative     Acceptable Yes      Patient Instructions   Drink warm liquids: Drinking tea with lemon and honey, broth or bouillon may help dry, scratchy throats.  Apply ice: Sucking on ice chips or popsicles may help sore throat pain.  Use a humidifier or vaporizer: Adding moisture to your environment, especially your bedroom when youre sleeping, helps dry throats.  Rest: If your throat is sore from shouting, screaming, singing or even talking a lot, resting your voice may help.  Even more rest: Try to get as much rest as you can, including eight hours of sleep at night.  Avoid irritants: Second-hand smoke, smoking, spicy foods and very hot liquids may irritate your sore throat. If you smoke, please try giving up cigarettes or cigars for a few days.

## 2024-06-20 ENCOUNTER — OFFICE VISIT (OUTPATIENT)
Dept: PULMONOLOGY | Facility: CLINIC | Age: 69
End: 2024-06-20
Payer: MEDICARE

## 2024-06-20 VITALS
HEIGHT: 65 IN | BODY MASS INDEX: 37.47 KG/M2 | HEART RATE: 83 BPM | SYSTOLIC BLOOD PRESSURE: 116 MMHG | WEIGHT: 224.88 LBS | DIASTOLIC BLOOD PRESSURE: 77 MMHG | OXYGEN SATURATION: 96 %

## 2024-06-20 DIAGNOSIS — R91.1 SOLITARY PULMONARY NODULE: Primary | ICD-10-CM

## 2024-06-20 PROCEDURE — 99213 OFFICE O/P EST LOW 20 MIN: CPT | Mod: PBBFAC | Performed by: INTERNAL MEDICINE

## 2024-06-20 PROCEDURE — 99999 PR PBB SHADOW E&M-EST. PATIENT-LVL III: CPT | Mod: PBBFAC,,, | Performed by: INTERNAL MEDICINE

## 2024-06-20 NOTE — ASSESSMENT & PLAN NOTE
Semi-solid and 10mm. Increase in size and kristopher densitiy between 2021 and 224. Consideration for lepidic growth adenocarcinoma.   [employer] testing shows lower risk profile and repeat CT at 3 months shows stability  Yearly LDCT until 5 years out.

## 2024-06-20 NOTE — PROGRESS NOTES
Subjective:       Patient ID: Diane Garcia is a 68 y.o. female.  The patient's last visit with me was on 2/21/2024.     No new complaints or concerns.  No anxiety  Review of Systems    Objective:      Vitals:    06/20/24 1420   BP: 116/77   Pulse: 83     Wt Readings from Last 3 Encounters:   06/20/24 102 kg (224 lb 13.9 oz)   06/18/24 100.7 kg (222 lb)   05/29/24 101 kg (222 lb 10.6 oz)     Temp Readings from Last 3 Encounters:   06/18/24 98 °F (36.7 °C) (Oral)   10/19/23 98.3 °F (36.8 °C) (Oral)   09/14/23 98 °F (36.7 °C)     BP Readings from Last 3 Encounters:   06/20/24 116/77   06/18/24 130/82   02/21/24 130/77     Pulse Readings from Last 3 Encounters:   06/20/24 83   06/18/24 81   02/21/24 68       Physical Exam    CBC  Lab Results   Component Value Date    WBC 8.69 07/05/2023    HGB 12.8 07/05/2023    HCT 38.9 07/05/2023    MCV 89 07/05/2023     07/05/2023         CMP  Sodium   Date Value Ref Range Status   06/12/2024 139 136 - 145 mmol/L Final     Potassium   Date Value Ref Range Status   06/12/2024 4.3 3.5 - 5.1 mmol/L Final     Chloride   Date Value Ref Range Status   06/12/2024 103 95 - 110 mmol/L Final     CO2   Date Value Ref Range Status   06/12/2024 25 23 - 29 mmol/L Final     Glucose   Date Value Ref Range Status   06/12/2024 105 70 - 110 mg/dL Final     BUN   Date Value Ref Range Status   06/12/2024 13 8 - 23 mg/dL Final     Creatinine   Date Value Ref Range Status   06/12/2024 0.7 0.5 - 1.4 mg/dL Final     Calcium   Date Value Ref Range Status   06/12/2024 9.6 8.7 - 10.5 mg/dL Final     Total Protein   Date Value Ref Range Status   06/12/2024 7.3 6.0 - 8.4 g/dL Final     Albumin   Date Value Ref Range Status   06/12/2024 3.9 3.5 - 5.2 g/dL Final     Total Bilirubin   Date Value Ref Range Status   06/12/2024 0.8 0.1 - 1.0 mg/dL Final     Comment:     For infants and newborns, interpretation of results should be based  on gestational age, weight and in agreement with  "clinical  observations.    Premature Infant recommended reference ranges:  Up to 24 hours.............<8.0 mg/dL  Up to 48 hours............<12.0 mg/dL  3-5 days..................<15.0 mg/dL  6-29 days.................<15.0 mg/dL       Alkaline Phosphatase   Date Value Ref Range Status   06/12/2024 65 55 - 135 U/L Final     AST   Date Value Ref Range Status   06/12/2024 16 10 - 40 U/L Final     ALT   Date Value Ref Range Status   06/12/2024 20 10 - 44 U/L Final     Anion Gap   Date Value Ref Range Status   06/12/2024 11 8 - 16 mmol/L Final     eGFR   Date Value Ref Range Status   06/12/2024 >60.0 >60 mL/min/1.73 m^2 Final       ABG  No results found for: "PH", "PO2", "PCO2"        Personal Diagnostic Review  I have personally reviewed the following data and added my own interpretation as below:  CT Chest 5/21/24 images personally reviewed and shows stable LLL 1 cm partial solid lung nodule unchanged from Feb evaluation.  Biodesix blood test lower risk for malignancy      6/20/2024     2:20 PM 6/18/2024     5:10 PM 5/29/2024     2:47 PM 2/21/2024    10:05 AM 2/16/2024     2:40 PM 1/22/2024     1:07 PM 12/14/2023     3:31 PM   Pulmonary Function Tests   SpO2 96 % 96 %  97 %   99 %   Height 5' 5" (1.651 m) 5' 5" (1.651 m) 5' 5" (1.651 m) 5' 5" (1.651 m) 5' 5" (1.651 m) 5' 5" (1.651 m) 5' 5" (1.651 m)   Weight 102 kg (224 lb 13.9 oz) 100.7 kg (222 lb) 101 kg (222 lb 10.6 oz) 101 kg (222 lb 10.6 oz) 99.9 kg (220 lb 3.8 oz) 102.1 kg (225 lb)    BMI (Calculated) 37.4 36.9 37.1 37.1 36.6 37.4          Assessment:       1. Solitary pulmonary nodule        Outpatient Encounter Medications as of 6/20/2024   Medication Sig Dispense Refill    biotin 1 mg Cap Take by mouth.      cartilage/collagen/bor/hyalur (JOINT HEALTH ORAL) Take by mouth.      esomeprazole (NEXIUM) 20 MG capsule Take 20 mg by mouth before breakfast.      hydroCHLOROthiazide (HYDRODIURIL) 25 MG tablet TAKE 1 TABLET(25 MG) BY MOUTH EVERY DAY 90 tablet 1    " ibuprofen (ADVIL,MOTRIN) 800 MG tablet TAKE 1 TABLET(800 MG) BY MOUTH EVERY 6 HOURS AS NEEDED FOR PAIN 60 tablet 1    lovastatin (MEVACOR) 40 MG tablet TAKE 1 TABLET(40 MG) BY MOUTH EVERY EVENING 90 tablet 3    meloxicam (MOBIC) 15 MG tablet Take 15 mg by mouth.      montelukast (SINGULAIR) 10 mg tablet Take 1 tablet (10 mg total) by mouth every evening. 30 tablet 0    ofloxacin (OCUFLOX) 0.3 % ophthalmic solution Place 1 drop into both eyes every 6 (six) hours.      olmesartan (BENICAR) 40 MG tablet Take 1 tablet (40 mg total) by mouth once daily. 90 tablet 3    olopatadine (PATANOL) 0.1 % ophthalmic solution Place 1 drop into both eyes 2 (two) times daily. 5 mL 0    TURMERIC ORAL Take by mouth.      celecoxib (CELEBREX) 200 MG capsule Take 1 capsule (200 mg total) by mouth once daily. With food and water for maximum of 15 consecutive days (Patient not taking: Reported on 6/20/2024) 30 capsule 0    fluticasone propionate (CUTIVATE) 0.005 % ointment Apply to affected areas of face BID prn irritation. Do not use for longer than 2 weeks in a row. (Patient not taking: Reported on 6/20/2024) 60 g 0    loratadine-pseudoephedrine  mg (CLARITIN-D 24 HOUR)  mg per 24 hr tablet Take 1 tablet by mouth once daily. for 10 days (Patient not taking: Reported on 6/20/2024)      omega-3 fatty acids/fish oil (FISH OIL-OMEGA-3 FATTY ACIDS) 300-1,000 mg capsule Take by mouth once daily. (Patient not taking: Reported on 6/20/2024)      RSVPreF3 antigen-AS01E, PF, (AREXVY, PF,) 120 mcg/0.5 mL SusR vaccine Inject into the muscle. 1 each 0    traZODone (DESYREL) 50 MG tablet Take 1 tablet (50 mg total) by mouth every evening. (Patient not taking: Reported on 6/20/2024) 90 tablet 1     Facility-Administered Encounter Medications as of 6/20/2024   Medication Dose Route Frequency Provider Last Rate Last Admin    ropivacaine 0.2% [facility] PainPRO Pump infusion 500 ML   Perineural Continuous Cornell Vela MD    New Bag at 07/26/23 1657     1. Solitary pulmonary nodule  - CT Chest Without Contrast; Future    Plan:     Problem List Items Addressed This Visit          Pulmonary    Solitary pulmonary nodule - Primary    Current Assessment & Plan     Semi-solid and 10mm. Increase in size and kristopher densitiy between 2021 and 224. Consideration for lepidic growth adenocarcinoma.   [employer] testing shows lower risk profile and repeat CT at 3 months shows stability  Yearly LDCT until 5 years out.         Relevant Orders    CT Chest Without Contrast       Please note Overview Notes are historic documentation. Please review A/P for current updates.  No follow-ups on file.    Future Appointments   Date Time Provider Department Center   6/24/2024  3:00 PM Avril Emery DPM [facility] VENU [address]   8/26/2024  4:00 PM NEUROLOGY, EMG CLINIC [facility] NEURO [place]         Anderson Grey MD

## 2024-06-27 ENCOUNTER — OFFICE VISIT (OUTPATIENT)
Dept: PODIATRY | Facility: CLINIC | Age: 69
End: 2024-06-27
Payer: MEDICARE

## 2024-06-27 VITALS
HEART RATE: 77 BPM | HEIGHT: 65 IN | DIASTOLIC BLOOD PRESSURE: 78 MMHG | SYSTOLIC BLOOD PRESSURE: 129 MMHG | BODY MASS INDEX: 37.47 KG/M2 | WEIGHT: 224.88 LBS

## 2024-06-27 DIAGNOSIS — M79.671 RIGHT FOOT PAIN: ICD-10-CM

## 2024-06-27 DIAGNOSIS — M87.874 OTHER OSTEONECROSIS, RIGHT FOOT: Primary | ICD-10-CM

## 2024-06-27 PROCEDURE — 99999 PR PBB SHADOW E&M-EST. PATIENT-LVL IV: CPT | Mod: PBBFAC,,, | Performed by: PODIATRIST

## 2024-06-27 PROCEDURE — 99203 OFFICE O/P NEW LOW 30 MIN: CPT | Mod: S$PBB,,, | Performed by: PODIATRIST

## 2024-06-27 PROCEDURE — 99214 OFFICE O/P EST MOD 30 MIN: CPT | Mod: PBBFAC,PN | Performed by: PODIATRIST

## 2024-07-01 ENCOUNTER — PROCEDURE VISIT (OUTPATIENT)
Dept: NEUROLOGY | Facility: CLINIC | Age: 69
End: 2024-07-01
Payer: MEDICARE

## 2024-07-01 DIAGNOSIS — M79.641 RIGHT HAND PAIN: ICD-10-CM

## 2024-07-01 DIAGNOSIS — G56.01 RIGHT CARPAL TUNNEL SYNDROME: ICD-10-CM

## 2024-07-01 PROCEDURE — 95885 MUSC TST DONE W/NERV TST LIM: CPT | Mod: PBBFAC,PN | Performed by: PHYSICAL MEDICINE & REHABILITATION

## 2024-07-01 PROCEDURE — 95911 NRV CNDJ TEST 9-10 STUDIES: CPT | Mod: PBBFAC,PN | Performed by: PHYSICAL MEDICINE & REHABILITATION

## 2024-07-01 PROCEDURE — 95885 MUSC TST DONE W/NERV TST LIM: CPT | Mod: 26,S$PBB,59, | Performed by: PHYSICAL MEDICINE & REHABILITATION

## 2024-07-01 PROCEDURE — 95886 MUSC TEST DONE W/N TEST COMP: CPT | Mod: 26,S$PBB,, | Performed by: PHYSICAL MEDICINE & REHABILITATION

## 2024-07-01 PROCEDURE — 95911 NRV CNDJ TEST 9-10 STUDIES: CPT | Mod: 26,S$PBB,, | Performed by: PHYSICAL MEDICINE & REHABILITATION

## 2024-07-01 PROCEDURE — 95886 MUSC TEST DONE W/N TEST COMP: CPT | Mod: PBBFAC,PN | Performed by: PHYSICAL MEDICINE & REHABILITATION

## 2024-07-01 NOTE — PROCEDURES
Test Date:  2024    Patient: diane garcia : 1955 Physician: Ruy Yuen D.O.   ID#: [id number] SEX: Female Ref. Phys: Ana Paula Baker NP     HPI: Diane Garcia is a 68 y.o.female who presents for NCS/EMG to evaluate CTS bilaterally.      PROCEDURE:  Prior to the procedure, the procedure was discussed in detail with the patient.  All questions were answered, and verbal consent was obtained.  For nerve conduction studies, a combination of surface electrodes, bar electrodes, and/or ring electrodes were used as needed.  For needle EMG, each site was cleaned and prepped in usual fashion with an alcohol pad.  A monopolar needle (28G) was used.  There was no significant bleeding, and bandages were applied as needed.  The procedure was tolerated without adverse effect.  The patient was instructed on post-procedure care including ice if needed for 10-15 minutes up to 4 times/day for any sore muscles.  I discussed with the patient that the data would be reviewed and a report sent to the referring provider, where any follow up questions regarding next steps should be directed.        NCV & EMG Findings:  Evaluation of the right median motor nerve showed prolonged distal onset latency.  The left median sensory nerve showed prolonged distal peak latency.  The right median sensory nerve showed prolonged distal onset latency, prolonged distal peak latency, and decreased conduction velocity.  All remaining nerves (as indicated in the following tables) were within normal limits.  All examined muscles (as indicated in the following table) showed no evidence of electrical instability.      IMPRESSIONS:  There is electrophysiologic evidence of a bilateral (sensory on the left, sensorimotor on the right) median mononeuropathy across the wrist (I.e. Carpal tunnel syndrome).  There is no motor axonal loss.  There is no active denervation.  This is graded as Moderate in severity on the right, and Mild on  the left.           ___________________________  Ruy Yuen D.O.        NCS+  Motor Nerve Results      Latency Amplitude F-Lat Segment Distance CV Comment   Site (ms) Norm (mV) Norm (ms)  (cm) (m/s) Norm    Left Median (APB)   Wrist 3.4  < 4.4 4.4  > 3.8         Elbow 7.7 - 4.0 -  Elbow-Wrist 23 53  > 51    Right Median (APB)   Wrist *4.9  < 4.4 6.6  > 3.8         Elbow 9.4 - 6.1 -  Elbow-Wrist 23 51  > 51    Left Ulnar (ADM)   Wrist 2.6  < 3.7 7.5  > 3.0         Bel Elbow 7.2 - 7.2 -  Bel Elbow-Wrist 25 54  > 52    Abv Elbow 8.7 - 6.7 -  Abv Elbow-Bel Elbow 8.5 57  > 43    Right Ulnar (ADM)   Wrist 2.8  < 3.7 9.5  > 3.0         Bel Elbow 7.3 - 7.8 -  Bel Elbow-Wrist 25 56  > 52    Abv Elbow 8.6 - 8.2 -  Abv Elbow-Bel Elbow 8 62  > 43      Sensory Nerve Results      Start Lat Latency (Peak) Amplitude (P-P) Segment Distance Start CV Comment   Site (ms) Norm (ms) (µV) Norm  (cm) (m/s) Norm    Left Median (Rec:Dig II)   Wrist 3.2  < 3.3 *4.1 21  > 8 Wrist-Dig II 14 44  > 42    Right Median (Rec:Dig II)   Wrist *3.8  < 3.3 *4.9 10  > 8 Wrist-Dig II 14 *37  > 42    Left Ulnar (Rec:Dig V)   Wrist 2.0  < 3.1 2.8 26  > 4 Wrist-Dig V 14 70  > 45    Right Ulnar (Rec:Dig V)   Wrist 2.2  < 3.1 3.1 12  > 4 Wrist-Dig V 14 64  > 45    Right Radial (Rec:Wrist)   Forearm 1.20  < 2.2 1.73 23  > 11 Forearm-Wrist 10 83 -      EMG+     Side Muscle Nerve Root Ins Act Fibs Psw Amp Dur Poly Recrt Int Pat Comment   Right Deltoid Axillary C5-C6 Nml Nml Nml Nml Nml 0 Nml Nml    Right Biceps Musculocut C5-C6 Nml Nml Nml Nml Nml 0 Nml Nml    Right Triceps Radial C6-C8 Nml Nml Nml Nml Nml 0 Nml Nml    Right Pronator Teres Median C6-C7 Nml Nml Nml Nml Nml 0 Nml Nml    Right APB Median C8-T1 Nml Nml Nml Nml Nml 0 Nml Nml    Left APB Median C8-T1 Nml Nml Nml Nml Nml 0 Nml Nml            Waveforms:    Motor              Sensory

## 2024-07-05 ENCOUNTER — TELEPHONE (OUTPATIENT)
Dept: ORTHOPEDICS | Facility: CLINIC | Age: 69
End: 2024-07-05
Payer: MEDICARE

## 2024-07-05 NOTE — PROGRESS NOTES
PODIATRY    PATIENT ID:  Diane Garcia is a 68 y.o. female.     CHIEF CONCERN:   Foot Problem (Pain on the side of the right foot)         MEDICAL DECISION MAKING:      Problem List Items Addressed This Visit    None  Visit Diagnoses       Other osteonecrosis, right foot    -  Primary    Relevant Orders    US Lower Extremity Arteries Bilateral    MRI Foot (Midfoot) Right W W/O Contrast    ORTHOTIC DEVICE (DME)    Right foot pain        Relevant Orders    ORTHOTIC DEVICE (DME)            I counseled the patient on the patient's conditions, their implications and medical management.    Prior imaging reviewed.   New imaging as ordered.  Recommend custom foot orthotics.  Prescription provided.   OTC NSAIDs as needed for pain.          HISTORY OF PRESENT ILLNESS:  Diane Garcia is a 68 y.o. female with concerns regarding: Foot Problem (Pain on the side of the right foot)   RIGHT foot pain lateral aspect.  No recent trauma.   She does have history of osteonecrosis.  She saw Dr. Salcedo last year. She also has seen Dr. Duenas at [facility].          Patient Active Problem List   Diagnosis    MELLISA (obstructive sleep apnea)    Severe obesity (BMI 35.0-39.9) with comorbidity    Gastroesophageal reflux disease    Former smoker    Essential hypertension    Mixed hyperlipidemia    Abnormal gait    Weakness    Primary osteoarthritis of both knees    Prediabetes    Aortic atherosclerosis    Solitary pulmonary nodule       Current Outpatient Medications on File Prior to Visit   Medication Sig Dispense Refill    biotin 1 mg Cap Take by mouth.      cartilage/collagen/bor/hyalur (JOINT HEALTH ORAL) Take by mouth.      celecoxib (CELEBREX) 200 MG capsule Take 1 capsule (200 mg total) by mouth once daily. With food and water for maximum of 15 consecutive days 30 capsule 0    esomeprazole (NEXIUM) 20 MG capsule Take 20 mg by mouth before breakfast.      fluticasone propionate (CUTIVATE) 0.005 % ointment Apply to affected  "areas of face BID prn irritation. Do not use for longer than 2 weeks in a row. 60 g 0    hydroCHLOROthiazide (HYDRODIURIL) 25 MG tablet TAKE 1 TABLET(25 MG) BY MOUTH EVERY DAY 90 tablet 1    ibuprofen (ADVIL,MOTRIN) 800 MG tablet TAKE 1 TABLET(800 MG) BY MOUTH EVERY 6 HOURS AS NEEDED FOR PAIN 60 tablet 1    lovastatin (MEVACOR) 40 MG tablet TAKE 1 TABLET(40 MG) BY MOUTH EVERY EVENING 90 tablet 3    meloxicam (MOBIC) 15 MG tablet Take 15 mg by mouth.      montelukast (SINGULAIR) 10 mg tablet Take 1 tablet (10 mg total) by mouth every evening. 30 tablet 0    ofloxacin (OCUFLOX) 0.3 % ophthalmic solution Place 1 drop into both eyes every 6 (six) hours.      olmesartan (BENICAR) 40 MG tablet Take 1 tablet (40 mg total) by mouth once daily. 90 tablet 3    olopatadine (PATANOL) 0.1 % ophthalmic solution Place 1 drop into both eyes 2 (two) times daily. 5 mL 0    omega-3 fatty acids/fish oil (FISH OIL-OMEGA-3 FATTY ACIDS) 300-1,000 mg capsule Take by mouth once daily.      RSVPreF3 antigen-AS01E, PF, (AREXVY, PF,) 120 mcg/0.5 mL SusR vaccine Inject into the muscle. 1 each 0    traZODone (DESYREL) 50 MG tablet Take 1 tablet (50 mg total) by mouth every evening. 90 tablet 1    TURMERIC ORAL Take by mouth.       Current Facility-Administered Medications on File Prior to Visit   Medication Dose Route Frequency Provider Last Rate Last Admin    ropivacaine 0.2% [facility] PainPRO Pump infusion 500 ML   Perineural Continuous Cornell Vela MD   New Bag at 07/26/23 0297       Review of patient's allergies indicates:   Allergen Reactions    Penicillins      nausea         ROS:   General ROS: negative for - chills, fever or night sweats  Respiratory ROS: no cough, shortness of breath, or wheezing  Cardiovascular ROS: no chest pain or dyspnea on exertion      EXAM:     Vitals:    06/27/24 1225   BP: 129/78   Pulse: 77   Weight: 102 kg (224 lb 13.9 oz)   Height: 5' 5" (1.651 m)        General:  Alert and Oriented x 3;  No acute " distress    Vascular:   Dorsalis Pedis:  present   Posterior Tibial:  present  Capillary refill time:  3 seconds  Temperature of toes warm to touch  Edema:  Trace       Neurological:     Sharp touch:  normal  Light touch: normal  Tinels Sign:  Absent  Mulders Click:   Absent      Dermatological:   Skin: warm, moist, and appropriate for age  Wounds/Ulcers:  Absent  Bruising:  Absent  Erythema:  Absent      Musculoskeletal:   Metatarsophalangeal range of motion:   diminished range of motion  Subtalar joint range of motion: diminished range of motion  Ankle joint range of motion:  full range of motion  5/5 muscle strength    Pending new imaging results.

## 2024-07-11 ENCOUNTER — TELEPHONE (OUTPATIENT)
Dept: ORTHOPEDICS | Facility: CLINIC | Age: 69
End: 2024-07-11
Payer: MEDICARE

## 2024-07-11 ENCOUNTER — OFFICE VISIT (OUTPATIENT)
Dept: ORTHOPEDICS | Facility: CLINIC | Age: 69
End: 2024-07-11
Payer: MEDICARE

## 2024-07-11 VITALS — WEIGHT: 224.88 LBS | HEIGHT: 65 IN | BODY MASS INDEX: 37.47 KG/M2

## 2024-07-11 DIAGNOSIS — G56.01 CARPAL TUNNEL SYNDROME, RIGHT: Primary | ICD-10-CM

## 2024-07-11 PROCEDURE — 99213 OFFICE O/P EST LOW 20 MIN: CPT | Mod: PBBFAC

## 2024-07-11 PROCEDURE — 99999 PR PBB SHADOW E&M-EST. PATIENT-LVL III: CPT | Mod: PBBFAC,,,

## 2024-07-11 NOTE — H&P (VIEW-ONLY)
[facility]  History & Physical  Orthopedics    SUBJECTIVE:     Chief Complaint:  right-hand pain and numbness    Referring Provider: Self, Aaareferral   HPI interval 07/11/2024  Patient is here for follow-up EMG for right-hand pain/numbness and tingling.  She reports her last  05/29/2024 CSI did not improve her symptoms.  She states she continues to have pain /numbness and tingling especially at night.  She notices having numbness and tingling she rides her bike.  She is tried wrist bracing which has not helped .  She does report pain along her medial aspect of her elbow and forearm.     05/29/2024  History of Present Illness:  Patient is a 68 y.o. right hand dominant female who presents today with complaints of right hand pain, numbness and tingling which began months ago.  She reports recently riding her bike a lot and noticing soreness of her right wrist.  Reports she experiences nocturnal numbness and tingling where she has to shake her hand at night.  Also reports intermittent numbness   f her entire hand during the day.  She reports she has wrist tried bracing at night,  1 brace has a past which seems to help and the other one with a bar that does not help.  She denies any neck pain.  She denies any hand trauma or hand surgery.  The patient is a/an retired.   Does a lot of/ gardening.  The patient denies any fevers, chills, N/V, D/C and presents for evaluation.    Review of patient's allergies indicates:   Allergen Reactions    Penicillins      nausea       Past Medical History:   Diagnosis Date    Arthritis     GERD (gastroesophageal reflux disease)     Hyperlipidemia     Hypertension      Past Surgical History:   Procedure Laterality Date    BREAST BIOPSY Left 2013    benign needle    COLONOSCOPY N/A 02/02/2023    Procedure: COLONOSCOPY;  Surgeon: Bob Grayson MD;  Location: [facility];  Service: Endoscopy;  Laterality: N/A;    ESOPHAGOGASTRODUODENOSCOPY N/A 02/02/2023     Procedure: EGD (ESOPHAGOGASTRODUODENOSCOPY);  Surgeon: Bob Grayson MD;  Location: [facility] ENDO;  Service: Endoscopy;  Laterality: N/A;    HYSTERECTOMY      OOPHORECTOMY      mary jo placed Right     femur right mary jo    mary jo removed Right     TOTAL KNEE ARTHROPLASTY Left 2023    Procedure: ARTHROPLASTY, KNEE, TOTAL;  Surgeon: NATALIO Greenwood MD;  Location: [facility] OR;  Service: Orthopedics;  Laterality: Left;  Regional w/Catheter, Adductor, Spinal, 0.2% Ropivacaine     Family History   Problem Relation Name Age of Onset    Cancer Mother Smoker         Lung    COPD Father Smoker     No Known Problems Sister      Melanoma Neg Hx       Social History     Tobacco Use    Smoking status: Former     Current packs/day: 0.00     Average packs/day: 2.0 packs/day for 25.0 years (50.0 ttl pk-yrs)     Types: Cigarettes     Start date: 1975     Quit date: 2000     Years since quittin.6    Smokeless tobacco: Never   Substance Use Topics    Alcohol use: Yes     Comment: A few drinks a week    Drug use: Never     Comment: last use >30 yrs ago         Review of Systems:  Constitutional: Denies fever/chills  Neurological: Denies numbness/tingling (any exceptions noted in orthopaedic exam)   Psychiatric/Behavioral: Denies change in normal mood  Eyes: Denies change in vision  Cardiovascular: Denies chest pain  Respiratory: Denies shortness of breath  Hematologic/Lymphatic: Denies easy bleeding/bruising   Skin: Denies new rash or skin lesions   Gastrointestinal: Denies nausea/vomitting/diarrhea, change in bowel habits, abdominal pain   Allergic/Immunologic: Denies adverse reactions to current medications  Musculoskeletal: see HPI      OBJECTIVE:     Vital Signs (Most Recent)       Physical Exam:  Gen:  No acute distress  CV:  Peripherally well-perfused.  Pulses 2+ bilaterally.  Lungs:  Normal respiratory effort.  Abdomen:  Soft, non-tender, non-distended  Head/Neck:  Normocephalic.  Atraumatic. No TTP, AROM and  PROM intact without pain  Neuro:  CN intact without deficit, SILT throughout B/L Upper & Lower Extremities    Right Hand/Wrist Examination:    Observation/Inspection:  Swelling  none    Deformity  none  Discoloration  none     Scars   none    Atrophy  none     HAND/WRIST EXAMINATION:  Finkelstein's Test   neg  WHAT Test    neg  Snuff box tenderness   Neg  Peralta's Test    Neg  Hook of Hamate Tenderness  Neg  CMC grind    Neg  Circumduction test   Neg  +Tenderness along medial aspect of right elbow  Neurovascular Exam:  Digits WWP, brisk CR < 3s throughout  NVI motor/LTS to M/R/U nerves, radial pulse 2+  Tinel's Test - Carpal Tunnel  Pos right   Tinel's Test - Cubital Tunnel  Neg  Jaylene                                      POS    ROM hand full, painless    ROM wrist full, painless    ROM elbow full, painless    Abdomen not guarded  Respirations nonlabored  Perfusion intact    Diagnostic Results:   Xray right hand 05/29/2024   No acute fractures or dislocations noted in right-hand x-ray.  During noted bone spurs at the CMC 1st metacarpal  consistent with arthritis.  Imaging - I independently viewed the patient's imaging as well as the radiology report.    EMG - none     ASSESSMENT/PLAN:     There are no diagnoses linked to this encounter.       68 y.o. yo female with right carpal tunnel     Diane was seen today for pain and numbness.    Diagnoses and all orders for this visit:    Carpal tunnel syndrome, right          Plan:  EMG reviewed and discussed with patient.  EMG demonstrates right moderate CTS and mild left CTS.  Due to failed conservative treatment recommended surgical intervention.  Patient would like to proceed with surgical intervention.  Pathophysiology fully discussed with patient today.  Risks and benefits were discussed.  Surgical consents were completed.   Continue wrist bracing at night.  Discussed medial nerve exercises.  Anti-inflammatories for pain.

## 2024-07-11 NOTE — PROGRESS NOTES
[facility]  History & Physical  Orthopedics    SUBJECTIVE:     Chief Complaint:  right-hand pain and numbness    Referring Provider: Self, Aaareferral   HPI interval 07/11/2024  Patient is here for follow-up EMG for right-hand pain/numbness and tingling.  She reports her last  05/29/2024 CSI did not improve her symptoms.  She states she continues to have pain /numbness and tingling especially at night.  She notices having numbness and tingling she rides her bike.  She is tried wrist bracing which has not helped .  She does report pain along her medial aspect of her elbow and forearm.     05/29/2024  History of Present Illness:  Patient is a 68 y.o. right hand dominant female who presents today with complaints of right hand pain, numbness and tingling which began months ago.  She reports recently riding her bike a lot and noticing soreness of her right wrist.  Reports she experiences nocturnal numbness and tingling where she has to shake her hand at night.  Also reports intermittent numbness   f her entire hand during the day.  She reports she has wrist tried bracing at night,  1 brace has a past which seems to help and the other one with a bar that does not help.  She denies any neck pain.  She denies any hand trauma or hand surgery.  The patient is a/an retired.   Does a lot of/ gardening.  The patient denies any fevers, chills, N/V, D/C and presents for evaluation.    Review of patient's allergies indicates:   Allergen Reactions    Penicillins      nausea       Past Medical History:   Diagnosis Date    Arthritis     GERD (gastroesophageal reflux disease)     Hyperlipidemia     Hypertension      Past Surgical History:   Procedure Laterality Date    BREAST BIOPSY Left 2013    benign needle    COLONOSCOPY N/A 02/02/2023    Procedure: COLONOSCOPY;  Surgeon: Bob Grayson MD;  Location: [facility];  Service: Endoscopy;  Laterality: N/A;    ESOPHAGOGASTRODUODENOSCOPY N/A 02/02/2023     Procedure: EGD (ESOPHAGOGASTRODUODENOSCOPY);  Surgeon: Bob Grayson MD;  Location: [facility] ENDO;  Service: Endoscopy;  Laterality: N/A;    HYSTERECTOMY      OOPHORECTOMY      mary jo placed Right     femur right mary jo    mary jo removed Right     TOTAL KNEE ARTHROPLASTY Left 2023    Procedure: ARTHROPLASTY, KNEE, TOTAL;  Surgeon: NATALIO Greenwood MD;  Location: [facility] OR;  Service: Orthopedics;  Laterality: Left;  Regional w/Catheter, Adductor, Spinal, 0.2% Ropivacaine     Family History   Problem Relation Name Age of Onset    Cancer Mother Smoker         Lung    COPD Father Smoker     No Known Problems Sister      Melanoma Neg Hx       Social History     Tobacco Use    Smoking status: Former     Current packs/day: 0.00     Average packs/day: 2.0 packs/day for 25.0 years (50.0 ttl pk-yrs)     Types: Cigarettes     Start date: 1975     Quit date: 2000     Years since quittin.6    Smokeless tobacco: Never   Substance Use Topics    Alcohol use: Yes     Comment: A few drinks a week    Drug use: Never     Comment: last use >30 yrs ago         Review of Systems:  Constitutional: Denies fever/chills  Neurological: Denies numbness/tingling (any exceptions noted in orthopaedic exam)   Psychiatric/Behavioral: Denies change in normal mood  Eyes: Denies change in vision  Cardiovascular: Denies chest pain  Respiratory: Denies shortness of breath  Hematologic/Lymphatic: Denies easy bleeding/bruising   Skin: Denies new rash or skin lesions   Gastrointestinal: Denies nausea/vomitting/diarrhea, change in bowel habits, abdominal pain   Allergic/Immunologic: Denies adverse reactions to current medications  Musculoskeletal: see HPI      OBJECTIVE:     Vital Signs (Most Recent)       Physical Exam:  Gen:  No acute distress  CV:  Peripherally well-perfused.  Pulses 2+ bilaterally.  Lungs:  Normal respiratory effort.  Abdomen:  Soft, non-tender, non-distended  Head/Neck:  Normocephalic.  Atraumatic. No TTP, AROM and  PROM intact without pain  Neuro:  CN intact without deficit, SILT throughout B/L Upper & Lower Extremities    Right Hand/Wrist Examination:    Observation/Inspection:  Swelling  none    Deformity  none  Discoloration  none     Scars   none    Atrophy  none     HAND/WRIST EXAMINATION:  Finkelstein's Test   neg  WHAT Test    neg  Snuff box tenderness   Neg  Peralta's Test    Neg  Hook of Hamate Tenderness  Neg  CMC grind    Neg  Circumduction test   Neg  +Tenderness along medial aspect of right elbow  Neurovascular Exam:  Digits WWP, brisk CR < 3s throughout  NVI motor/LTS to M/R/U nerves, radial pulse 2+  Tinel's Test - Carpal Tunnel  Pos right   Tinel's Test - Cubital Tunnel  Neg  Jaylene                                      POS    ROM hand full, painless    ROM wrist full, painless    ROM elbow full, painless    Abdomen not guarded  Respirations nonlabored  Perfusion intact    Diagnostic Results:   Xray right hand 05/29/2024   No acute fractures or dislocations noted in right-hand x-ray.  During noted bone spurs at the CMC 1st metacarpal  consistent with arthritis.  Imaging - I independently viewed the patient's imaging as well as the radiology report.    EMG - none     ASSESSMENT/PLAN:     There are no diagnoses linked to this encounter.       68 y.o. yo female with right carpal tunnel     Diane was seen today for pain and numbness.    Diagnoses and all orders for this visit:    Carpal tunnel syndrome, right          Plan:  EMG reviewed and discussed with patient.  EMG demonstrates right moderate CTS and mild left CTS.  Due to failed conservative treatment recommended surgical intervention.  Patient would like to proceed with surgical intervention.  Pathophysiology fully discussed with patient today.  Risks and benefits were discussed.  Surgical consents were completed.   Continue wrist bracing at night.  Discussed medial nerve exercises.  Anti-inflammatories for pain.

## 2024-07-18 ENCOUNTER — TELEPHONE (OUTPATIENT)
Dept: ORTHOPEDICS | Facility: CLINIC | Age: 69
End: 2024-07-18
Payer: MEDICARE

## 2024-07-18 DIAGNOSIS — G56.01 CARPAL TUNNEL SYNDROME, RIGHT: Primary | ICD-10-CM

## 2024-07-18 DIAGNOSIS — M79.641 RIGHT HAND PAIN: ICD-10-CM

## 2024-07-18 RX ORDER — TRAMADOL HYDROCHLORIDE 50 MG/1
50 TABLET ORAL EVERY 6 HOURS PRN
Qty: 10 TABLET | Refills: 0 | Status: SHIPPED | OUTPATIENT
Start: 2024-07-18

## 2024-07-18 NOTE — TELEPHONE ENCOUNTER
Spoke c pt. Informed pt of 6:30 a.m. arrival time for surgery at the [facility]. Reminded pt of NPO status & PO appt. Pt expressed understanding & was thankful.

## 2024-07-18 NOTE — TELEPHONE ENCOUNTER
Spoke and she would like to move her sx up, she will contact us today to inform us if she can go 7/19

## 2024-07-19 ENCOUNTER — ANESTHESIA (OUTPATIENT)
Dept: SURGERY | Facility: HOSPITAL | Age: 69
End: 2024-07-19
Payer: MEDICARE

## 2024-07-19 ENCOUNTER — ANESTHESIA EVENT (OUTPATIENT)
Dept: SURGERY | Facility: HOSPITAL | Age: 69
End: 2024-07-19
Payer: MEDICARE

## 2024-07-19 ENCOUNTER — HOSPITAL ENCOUNTER (OUTPATIENT)
Facility: HOSPITAL | Age: 69
Discharge: HOME | End: 2024-07-19
Attending: ORTHOPAEDIC SURGERY | Admitting: ORTHOPAEDIC SURGERY
Payer: MEDICARE

## 2024-07-19 VITALS
HEIGHT: 65 IN | OXYGEN SATURATION: 95 % | TEMPERATURE: 98 F | SYSTOLIC BLOOD PRESSURE: 122 MMHG | WEIGHT: 224 LBS | HEART RATE: 67 BPM | BODY MASS INDEX: 37.32 KG/M2 | RESPIRATION RATE: 18 BRPM | DIASTOLIC BLOOD PRESSURE: 73 MMHG

## 2024-07-19 DIAGNOSIS — G56.01 CARPAL TUNNEL SYNDROME, RIGHT: Primary | ICD-10-CM

## 2024-07-19 PROCEDURE — 63600175 PHARM REV CODE 636 W HCPCS: Mod: JZ,JG | Performed by: ORTHOPAEDIC SURGERY

## 2024-07-19 PROCEDURE — 37000008 HC ANESTHESIA 1ST 15 MINUTES: Performed by: ORTHOPAEDIC SURGERY

## 2024-07-19 PROCEDURE — 36000707: Performed by: ORTHOPAEDIC SURGERY

## 2024-07-19 PROCEDURE — 37000009 HC ANESTHESIA EA ADD 15 MINS: Performed by: ORTHOPAEDIC SURGERY

## 2024-07-19 PROCEDURE — 25000003 PHARM REV CODE 250: Performed by: STUDENT IN AN ORGANIZED HEALTH CARE EDUCATION/TRAINING PROGRAM

## 2024-07-19 PROCEDURE — 71000015 HC POSTOP RECOV 1ST HR: Performed by: ORTHOPAEDIC SURGERY

## 2024-07-19 PROCEDURE — 25000003 PHARM REV CODE 250: Performed by: ORTHOPAEDIC SURGERY

## 2024-07-19 PROCEDURE — 64721 CARPAL TUNNEL SURGERY: CPT | Mod: RT,,, | Performed by: ORTHOPAEDIC SURGERY

## 2024-07-19 PROCEDURE — 63600175 PHARM REV CODE 636 W HCPCS: Performed by: NURSE ANESTHETIST, CERTIFIED REGISTERED

## 2024-07-19 PROCEDURE — 71000033 HC RECOVERY, INTIAL HOUR: Performed by: ORTHOPAEDIC SURGERY

## 2024-07-19 PROCEDURE — 25000003 PHARM REV CODE 250: Performed by: NURSE ANESTHETIST, CERTIFIED REGISTERED

## 2024-07-19 PROCEDURE — 36000706: Performed by: ORTHOPAEDIC SURGERY

## 2024-07-19 PROCEDURE — 25000003 PHARM REV CODE 250: Performed by: PHYSICIAN ASSISTANT

## 2024-07-19 PROCEDURE — 63600175 PHARM REV CODE 636 W HCPCS: Mod: JZ,JG | Performed by: STUDENT IN AN ORGANIZED HEALTH CARE EDUCATION/TRAINING PROGRAM

## 2024-07-19 RX ORDER — MUPIROCIN 20 MG/G
OINTMENT TOPICAL
Status: DISCONTINUED | OUTPATIENT
Start: 2024-07-19 | End: 2024-07-19 | Stop reason: HOSPADM

## 2024-07-19 RX ORDER — LABETALOL HYDROCHLORIDE 5 MG/ML
INJECTION, SOLUTION INTRAVENOUS
Status: DISCONTINUED | OUTPATIENT
Start: 2024-07-19 | End: 2024-07-19

## 2024-07-19 RX ORDER — ACETAMINOPHEN 500 MG
1000 TABLET ORAL
Status: COMPLETED | OUTPATIENT
Start: 2024-07-19 | End: 2024-07-19

## 2024-07-19 RX ORDER — OXYCODONE HYDROCHLORIDE 5 MG/1
5 TABLET ORAL
Status: DISCONTINUED | OUTPATIENT
Start: 2024-07-19 | End: 2024-07-19 | Stop reason: HOSPADM

## 2024-07-19 RX ORDER — BACITRACIN ZINC 500 UNIT/G
OINTMENT (GRAM) TOPICAL
Status: DISCONTINUED | OUTPATIENT
Start: 2024-07-19 | End: 2024-07-19 | Stop reason: HOSPADM

## 2024-07-19 RX ORDER — MIDAZOLAM HYDROCHLORIDE 1 MG/ML
INJECTION INTRAMUSCULAR; INTRAVENOUS
Status: DISCONTINUED | OUTPATIENT
Start: 2024-07-19 | End: 2024-07-19

## 2024-07-19 RX ORDER — GLUCAGON 1 MG
1 KIT INJECTION
Status: DISCONTINUED | OUTPATIENT
Start: 2024-07-19 | End: 2024-07-19 | Stop reason: HOSPADM

## 2024-07-19 RX ORDER — KETAMINE HCL IN 0.9 % NACL 50 MG/5 ML
SYRINGE (ML) INTRAVENOUS
Status: DISCONTINUED | OUTPATIENT
Start: 2024-07-19 | End: 2024-07-19

## 2024-07-19 RX ORDER — LIDOCAINE HYDROCHLORIDE 10 MG/ML
INJECTION, SOLUTION INTRAVENOUS
Status: DISCONTINUED | OUTPATIENT
Start: 2024-07-19 | End: 2024-07-19

## 2024-07-19 RX ORDER — ONDANSETRON HYDROCHLORIDE 2 MG/ML
4 INJECTION, SOLUTION INTRAVENOUS DAILY PRN
Status: DISCONTINUED | OUTPATIENT
Start: 2024-07-19 | End: 2024-07-19 | Stop reason: HOSPADM

## 2024-07-19 RX ORDER — DEXAMETHASONE SODIUM PHOSPHATE 4 MG/ML
INJECTION, SOLUTION INTRA-ARTICULAR; INTRALESIONAL; INTRAMUSCULAR; INTRAVENOUS; SOFT TISSUE
Status: DISCONTINUED | OUTPATIENT
Start: 2024-07-19 | End: 2024-07-19

## 2024-07-19 RX ORDER — PROPOFOL 10 MG/ML
VIAL (ML) INTRAVENOUS CONTINUOUS PRN
Status: DISCONTINUED | OUTPATIENT
Start: 2024-07-19 | End: 2024-07-19

## 2024-07-19 RX ORDER — ONDANSETRON HYDROCHLORIDE 2 MG/ML
INJECTION, SOLUTION INTRAVENOUS
Status: DISCONTINUED | OUTPATIENT
Start: 2024-07-19 | End: 2024-07-19

## 2024-07-19 RX ORDER — CLINDAMYCIN PHOSPHATE 900 MG/50ML
900 INJECTION, SOLUTION INTRAVENOUS
Status: COMPLETED | OUTPATIENT
Start: 2024-07-19 | End: 2024-07-19

## 2024-07-19 RX ORDER — BUPIVACAINE HYDROCHLORIDE 2.5 MG/ML
INJECTION, SOLUTION EPIDURAL; INFILTRATION; INTRACAUDAL
Status: DISCONTINUED | OUTPATIENT
Start: 2024-07-19 | End: 2024-07-19 | Stop reason: HOSPADM

## 2024-07-19 RX ORDER — FENTANYL CITRATE 50 UG/ML
25 INJECTION, SOLUTION INTRAMUSCULAR; INTRAVENOUS EVERY 5 MIN PRN
Status: DISCONTINUED | OUTPATIENT
Start: 2024-07-19 | End: 2024-07-19 | Stop reason: HOSPADM

## 2024-07-19 RX ORDER — DEXMEDETOMIDINE HYDROCHLORIDE 100 UG/ML
INJECTION, SOLUTION INTRAVENOUS
Status: DISCONTINUED | OUTPATIENT
Start: 2024-07-19 | End: 2024-07-19

## 2024-07-19 RX ORDER — LIDOCAINE HYDROCHLORIDE 10 MG/ML
INJECTION, SOLUTION EPIDURAL; INFILTRATION; INTRACAUDAL; PERINEURAL
Status: DISCONTINUED | OUTPATIENT
Start: 2024-07-19 | End: 2024-07-19 | Stop reason: HOSPADM

## 2024-07-19 RX ORDER — HALOPERIDOL 5 MG/ML
0.5 INJECTION INTRAMUSCULAR EVERY 10 MIN PRN
Status: DISCONTINUED | OUTPATIENT
Start: 2024-07-19 | End: 2024-07-19 | Stop reason: HOSPADM

## 2024-07-19 RX ADMIN — CLINDAMYCIN IN 5 PERCENT DEXTROSE 900 MG: 18 INJECTION, SOLUTION INTRAVENOUS at 09:07

## 2024-07-19 RX ADMIN — LABETALOL HYDROCHLORIDE 10 MG: 5 INJECTION, SOLUTION INTRAVENOUS at 09:07

## 2024-07-19 RX ADMIN — LIDOCAINE HYDROCHLORIDE 100 MG: 10 INJECTION, SOLUTION INTRAVENOUS at 09:07

## 2024-07-19 RX ADMIN — MUPIROCIN: 20 OINTMENT TOPICAL at 07:07

## 2024-07-19 RX ADMIN — ONDANSETRON 4 MG: 2 INJECTION INTRAMUSCULAR; INTRAVENOUS at 09:07

## 2024-07-19 RX ADMIN — MIDAZOLAM HYDROCHLORIDE 2 MG: 1 INJECTION, SOLUTION INTRAMUSCULAR; INTRAVENOUS at 08:07

## 2024-07-19 RX ADMIN — SODIUM CHLORIDE: 9 INJECTION, SOLUTION INTRAVENOUS at 08:07

## 2024-07-19 RX ADMIN — DEXAMETHASONE SODIUM PHOSPHATE 4 MG: 4 INJECTION, SOLUTION INTRAMUSCULAR; INTRAVENOUS at 09:07

## 2024-07-19 RX ADMIN — ACETAMINOPHEN 1000 MG: 500 TABLET ORAL at 07:07

## 2024-07-19 RX ADMIN — PROPOFOL 125 MCG/KG/MIN: 10 INJECTION, EMULSION INTRAVENOUS at 09:07

## 2024-07-19 RX ADMIN — DEXMEDETOMIDINE 20 MCG: 100 INJECTION, SOLUTION, CONCENTRATE INTRAVENOUS at 09:07

## 2024-07-19 RX ADMIN — Medication 30 MG: at 09:07

## 2024-07-19 NOTE — OP NOTE
[facility] ([facility])  Surgery Department  Operative Note    SUMMARY     Date of Procedure: 7/19/2024     Procedure: Procedure(s) (LRB):  RIGHT RELEASE, CARPAL TUNNEL (Right)     Surgeons and Role:     * Brandy Garg MD - Primary    Assisting Surgeon: Alex TOWNSEND    Pre-Operative Diagnosis: Carpal tunnel syndrome, right [G56.01]  Right hand pain [M79.641]    Post-Operative Diagnosis: Post-Op Diagnosis Codes:     * Carpal tunnel syndrome, right [G56.01]     * Right hand pain [M79.641]    Anesthesia: Local MAC    Technical Procedures Used: surgery    Description of the Findings of the Procedure:   IMPLANTS: None.   SPECIMENS: None.   COMPLICATIONS: None.   INDICATIONS FOR PROCEDURE: Ms Garcia is a 68-year-old female who had numbness   and tingling into her right hand. She has failed conservative treatment, EMG   was positive for carpal tunnel syndrome. Therefore, operative intervention was   deemed necessary. Risks and benefits were explained to the patient in clinic   since performed in clinic.   PROCEDURE IN DETAIL: After correct site was marked with the patient's   participation in the holding area, the patient was brought to the Operating   Room, placed in supine position, underwent MAC anesthesia. A well-padded   nonsterile tourniquet was placed on the right arm. A time-out was called for   correct site, procedure and patient to be indicated. Under sterile conditions,   an injection of lidocaine 1% was injected into the carpal tunnel space. The arm   was prepped and draped in normal sterile fashion. The incision was marked out   using Robbins cardinal lines. The arm was exsanguinated using Esmarch.   Tourniquet was insufflated to 250 mmHg and that is where it remained for a total   of 10 minutes. The incision was made down to the palmar fascia. The palmar   fascia was sharply incised. The transverse ligament was identified. It was   very thick in nature. It was sharply incised. Median nerve was  identified. A   Mosquito hemostat was passed from the undersurface of the transverse ligament   proximally and distally to free it from the median nerve. Metzenbaum scissors   were utilized to cut the transverse ligament proximally and distally. Once that   was completely released, a Mosquito hemostat was passed again to confirm a   complete release. Once that was performed, the area was irrigated with copious   amounts of normal saline. Nylon closed the skin. Sterile dressing was applied.   Tourniquet was deflated. Brisk capillary refill ensued. The patient was   transported to the Recovery Room in stable condition.   POSTOPERATIVE PLAN FOR THIS PATIENT: She is to keep her dressing clean, dry and   intact. We will see her back in 2 weeks for stitch removal.       Of note, pt had severe compression of the median nerve  The nerve color looked good  Significant Surgical Tasks Conducted by the Assistant(s), if Applicable: retraction    Complications: No    Estimated Blood Loss (EBL): * No values recorded between 7/19/2024 12:00 AM and 7/19/2024  8:42 AM *           Implants: * No implants in log *    Specimens:   Specimen (24h ago, onward)      None                    Condition: Good    Disposition: PACU - hemodynamically stable.    Attestation: I was present and scrubbed for the entire procedure.    Discharge Note    SUMMARY     Admit Date: 7/19/2024    Discharge Date and Time: No discharge date for patient encounter.    Hospital Course (synopsis of major diagnoses, care, treatment, and services provided during the course of the hospital stay): suregry     Final Diagnosis: Post-Op Diagnosis Codes:     * Carpal tunnel syndrome, right [G56.01]     * Right hand pain [M79.641]    Disposition: Home or Self Care    Follow Up/Patient Instructions:     Medications:  Reconciled Home Medications:      Medication List        START taking these medications      traMADoL 50 mg tablet  Commonly known as: ULTRAM  Take 1 tablet (50  mg total) by mouth every 6 (six) hours as needed for Pain.            CONTINUE taking these medications      AREXVY (PF) 120 mcg/0.5 mL Susr vaccine  Generic drug: RSVPreF3 antigen-AS01E (PF)  Inject into the muscle.     biotin 1 mg Cap  Take by mouth.     celecoxib 200 MG capsule  Commonly known as: CeleBREX  Take 1 capsule (200 mg total) by mouth once daily. With food and water for maximum of 15 consecutive days     esomeprazole 20 MG capsule  Commonly known as: NEXIUM  Take 20 mg by mouth before breakfast.     fish oil-omega-3 fatty acids 300-1,000 mg capsule  Take by mouth once daily.     fluticasone propionate 0.005 % ointment  Commonly known as: CUTIVATE  Apply to affected areas of face BID prn irritation. Do not use for longer than 2 weeks in a row.     hydroCHLOROthiazide 25 MG tablet  Commonly known as: HYDRODIURIL  TAKE 1 TABLET(25 MG) BY MOUTH EVERY DAY     ibuprofen 800 MG tablet  Commonly known as: ADVIL,MOTRIN  TAKE 1 TABLET(800 MG) BY MOUTH EVERY 6 HOURS AS NEEDED FOR PAIN     JOINT HEALTH ORAL  Take by mouth.     lovastatin 40 MG tablet  Commonly known as: MEVACOR  TAKE 1 TABLET(40 MG) BY MOUTH EVERY EVENING     meloxicam 15 MG tablet  Commonly known as: MOBIC  Take 15 mg by mouth.     ofloxacin 0.3 % ophthalmic solution  Commonly known as: OCUFLOX  Place 1 drop into both eyes every 6 (six) hours.     olmesartan 40 MG tablet  Commonly known as: BENICAR  Take 1 tablet (40 mg total) by mouth once daily.     olopatadine 0.1 % ophthalmic solution  Commonly known as: PATANOL  Place 1 drop into both eyes 2 (two) times daily.     traZODone 50 MG tablet  Commonly known as: DESYREL  Take 1 tablet (50 mg total) by mouth every evening.     TURMERIC ORAL  Take by mouth.            ASK your doctor about these medications      montelukast 10 mg tablet  Commonly known as: SINGULAIR  Take 1 tablet (10 mg total) by mouth every evening.  Ask about: Should I take this medication?            No discharge procedures on  file.

## 2024-07-19 NOTE — PLAN OF CARE
Pre op complete. Questions answered. Resting comfortably. Call light in reach. Personal belongings placed in locker. Friend will  pt after surgery.

## 2024-07-19 NOTE — ANESTHESIA POSTPROCEDURE EVALUATION
Anesthesia Post Evaluation    Patient: Diane Garcia    Procedure(s) Performed: Procedure(s) (LRB):  RIGHT RELEASE, CARPAL TUNNEL (Right)    Final Anesthesia Type: general      Patient location during evaluation: PACU  Patient participation: Yes- Able to Participate  Level of consciousness: awake and alert  Post-procedure vital signs: reviewed and stable  Pain management: adequate  Airway patency: patent  MELLISA mitigation strategies: Multimodal analgesia  PONV status at discharge: No PONV  Anesthetic complications: no      Cardiovascular status: blood pressure returned to baseline and hemodynamically stable  Respiratory status: unassisted  Hydration status: euvolemic  Follow-up not needed.              Vitals Value Taken Time   /74 07/19/24 1002   Temp 36.6 °C (97.9 °F) 07/19/24 0930   Pulse 66 07/19/24 1008   Resp 18 07/19/24 1001   SpO2 92 % 07/19/24 1008   Vitals shown include unfiled device data.      Event Time   Out of Recovery 10:00:00         Pain/Billy Score: Pain Rating Prior to Med Admin: 0 (7/19/2024  9:30 AM)  Billy Score: 10 (7/19/2024 10:00 AM)

## 2024-07-19 NOTE — TRANSFER OF CARE
"Anesthesia Transfer of Care Note    Patient: Diane Garcia    Procedure(s) Performed: Procedure(s) (LRB):  RIGHT RELEASE, CARPAL TUNNEL (Right)    Patient location: PACU    Anesthesia Type: general    Transport from OR: Transported from OR on 6-10 L/min O2 by face mask with adequate spontaneous ventilation    Post pain: adequate analgesia    Post assessment: no apparent anesthetic complications and tolerated procedure well    Post vital signs: stable    Level of consciousness: responds to stimulation and sedated    Nausea/Vomiting: no nausea/vomiting    Complications: none    Transfer of care protocol was followed      Last vitals: Visit Vitals  BP (!) 145/68 (BP Location: Left arm, Patient Position: Lying)   Pulse 64   Temp 36.5 °C (97.7 °F) (Oral)   Resp 16   Ht 5' 5" (1.651 m)   Wt 101.6 kg (224 lb)   SpO2 97%   Breastfeeding No   BMI 37.28 kg/m²     " Patient requests all Lab and Radiology Results on their Discharge Instructions

## 2024-07-19 NOTE — ANESTHESIA PREPROCEDURE EVALUATION
07/19/2024  Pre-operative evaluation for Procedure(s) (LRB):  RIGHT RELEASE, CARPAL TUNNEL (Right)    Diane Garcia is a 68 y.o. female     Patient Active Problem List   Diagnosis    MELLISA (obstructive sleep apnea)    Severe obesity (BMI 35.0-39.9) with comorbidity    Gastroesophageal reflux disease    Former smoker    Essential hypertension    Mixed hyperlipidemia    Abnormal gait    Weakness    Primary osteoarthritis of both knees    Prediabetes    Aortic atherosclerosis    Solitary pulmonary nodule    Carpal tunnel syndrome, right       Review of patient's allergies indicates:   Allergen Reactions    Penicillins      nausea       Current Facility-Administered Medications on File Prior to Encounter   Medication Dose Route Frequency Provider Last Rate Last Admin    ropivacaine 0.2% Nimbus PainPRO Pump infusion 500 ML   Perineural Continuous Cornell Vela MD   New Bag at 07/26/23 1400     Current Outpatient Medications on File Prior to Encounter   Medication Sig Dispense Refill    biotin 1 mg Cap Take by mouth.      cartilage/collagen/bor/hyalur (JOINT HEALTH ORAL) Take by mouth.      esomeprazole (NEXIUM) 20 MG capsule Take 20 mg by mouth before breakfast.      hydroCHLOROthiazide (HYDRODIURIL) 25 MG tablet TAKE 1 TABLET(25 MG) BY MOUTH EVERY DAY 90 tablet 1    ibuprofen (ADVIL,MOTRIN) 800 MG tablet TAKE 1 TABLET(800 MG) BY MOUTH EVERY 6 HOURS AS NEEDED FOR PAIN 60 tablet 1    lovastatin (MEVACOR) 40 MG tablet TAKE 1 TABLET(40 MG) BY MOUTH EVERY EVENING 90 tablet 3    ofloxacin (OCUFLOX) 0.3 % ophthalmic solution Place 1 drop into both eyes every 6 (six) hours.      olmesartan (BENICAR) 40 MG tablet Take 1 tablet (40 mg total) by mouth once daily. 90 tablet 3    olopatadine (PATANOL) 0.1 % ophthalmic solution Place 1 drop into both eyes 2 (two) times daily. 5 mL 0    omega-3 fatty acids/fish oil  (FISH OIL-OMEGA-3 FATTY ACIDS) 300-1,000 mg capsule Take by mouth once daily.      TURMERIC ORAL Take by mouth.      celecoxib (CELEBREX) 200 MG capsule Take 1 capsule (200 mg total) by mouth once daily. With food and water for maximum of 15 consecutive days 30 capsule 0    fluticasone propionate (CUTIVATE) 0.005 % ointment Apply to affected areas of face BID prn irritation. Do not use for longer than 2 weeks in a row. 60 g 0    meloxicam (MOBIC) 15 MG tablet Take 15 mg by mouth.      RSVPreF3 antigen-AS01E, PF, (AREXVY, PF,) 120 mcg/0.5 mL SusR vaccine Inject into the muscle. 1 each 0    traZODone (DESYREL) 50 MG tablet Take 1 tablet (50 mg total) by mouth every evening. 90 tablet 1       Past Surgical History:   Procedure Laterality Date    BREAST BIOPSY Left     benign needle    COLONOSCOPY N/A 2023    Procedure: COLONOSCOPY;  Surgeon: Bob Grayson MD;  Location: [facility];  Service: Endoscopy;  Laterality: N/A;    ESOPHAGOGASTRODUODENOSCOPY N/A 2023    Procedure: EGD (ESOPHAGOGASTRODUODENOSCOPY);  Surgeon: Bob Grayson MD;  Location: [facility];  Service: Endoscopy;  Laterality: N/A;    HYSTERECTOMY      OOPHORECTOMY      mary jo placed Right     femur right mary jo    mary jo removed Right     TOTAL KNEE ARTHROPLASTY Left 2023    Procedure: ARTHROPLASTY, KNEE, TOTAL;  Surgeon: NATALIO Greenwood MD;  Location: [facility];  Service: Orthopedics;  Laterality: Left;  Regional w/Catheter, Adductor, Spinal, 0.2% Ropivacaine       Social History     Socioeconomic History    Marital status:     Number of children: 1   Tobacco Use    Smoking status: Former     Current packs/day: 0.00     Average packs/day: 2.0 packs/day for 25.0 years (50.0 ttl pk-yrs)     Types: Cigarettes     Start date: 1975     Quit date: 2000     Years since quittin.6    Smokeless tobacco: Never   Substance and Sexual Activity    Alcohol use: Yes     Comment: A few drinks a week    Drug use:  "Never     Comment: last use >30 yrs ago     Sexual activity: Yes     Partners: Male   Social History Narrative    Stairs- 6 to enter     Social Determinants of Health     Financial Resource Strain: Low Risk  (2024)    Overall Financial Resource Strain (CARDIA)     Difficulty of Paying Living Expenses: Not hard at all   Food Insecurity: No Food Insecurity (2024)    Hunger Vital Sign     Worried About Running Out of Food in the Last Year: Never true     Ran Out of Food in the Last Year: Never true   Transportation Needs: No Transportation Needs (2024)    PRAPARE - Transportation     Lack of Transportation (Medical): No     Lack of Transportation (Non-Medical): No   Physical Activity: Insufficiently Active (2024)    Exercise Vital Sign     Days of Exercise per Week: 3 days     Minutes of Exercise per Session: 40 min   Stress: No Stress Concern Present (2024)    [place] [employer] - Occupational Stress Questionnaire     Feeling of Stress : Not at all   Housing Stability: Low Risk  (2024)    Housing Stability Vital Sign     Unable to Pay for Housing in the Last Year: No     Number of Places Lived in the Last Year: 1     Unstable Housing in the Last Year: No         CBC: No results for input(s): "WBC", "RBC", "HGB", "HCT", "PLT", "MCV", "MCH", "MCHC" in the last 72 hours.    CMP: No results for input(s): "NA", "K", "CL", "CO2", "BUN", "CREATININE", "GLU", "MG", "PHOS", "CALCIUM", "ALBUMIN", "PROT", "ALKPHOS", "ALT", "AST", "BILITOT" in the last 72 hours.    INR  No results for input(s): "PT", "INR", "PROTIME", "APTT" in the last 72 hours.        Diagnostic Studies:      EKD Echo:  No results found for this or any previous visit.       Pre-op Assessment    I have reviewed the Patient Summary Reports.     I have reviewed the Nursing Notes. I have reviewed the NPO Status.   I have reviewed the Medications.     Review of Systems  Cardiovascular:     Hypertension     "                                    Pulmonary:        Sleep Apnea                Hepatic/GI:     GERD             Musculoskeletal:  Arthritis                   Physical Exam  General: Well nourished and Cooperative    Airway:  Mallampati: II   Mouth Opening: Normal  TM Distance: Normal  Tongue: Normal  Neck ROM: Normal ROM    Chest/Lungs:  Clear to auscultation, Normal Respiratory Rate    Heart:  Rate: Normal  Rhythm: Regular Rhythm  Sounds: Normal        Anesthesia Plan  Type of Anesthesia, risks & benefits discussed:    Anesthesia Type: Gen Natural Airway  Intra-op Monitoring Plan: Standard ASA Monitors  Post Op Pain Control Plan: multimodal analgesia and IV/PO Opioids PRN  Induction:  IV  Airway Plan: Direct and Video, Post-Induction  Informed Consent: Informed consent signed with the Patient and all parties understand the risks and agree with anesthesia plan.  All questions answered.   ASA Score: 2    Ready For Surgery From Anesthesia Perspective.     .

## 2024-07-19 NOTE — BRIEF OP NOTE
[place] - Surgery ([facility])  Brief Operative Note    Surgery Date: 7/19/2024     Surgeons and Role:     * Brandy Garg MD - Primary    Assisting Surgeon: None    Pre-op Diagnosis:  Carpal tunnel syndrome, right [G56.01]  Right hand pain [M79.641]    Post-op Diagnosis:  Post-Op Diagnosis Codes:     * Carpal tunnel syndrome, right [G56.01]     * Right hand pain [M79.641]    Procedure(s) (LRB):  RIGHT RELEASE, CARPAL TUNNEL (Right)    Anesthesia: Local MAC    Operative Findings: Right carpal tunnel release    Estimated Blood Loss 3cc         Specimens:   Specimen (24h ago, onward)      None              Discharge Note    OUTCOME: Patient tolerated treatment/procedure well without complication and is now ready for discharge.    DISPOSITION: Home or Self Care    FINAL DIAGNOSIS:  Carpal tunnel syndrome, right    FOLLOWUP: In clinic    DISCHARGE INSTRUCTIONS:    Discharge Procedure Orders   Sponge bath only until clinic visit     Keep surgical extremity elevated     Leave dressing on - Keep it clean, dry, and intact until clinic visit     Notify your health care provider if you experience any of the following:  temperature >100.4     Notify your health care provider if you experience any of the following:  persistent nausea and vomiting or diarrhea     Notify your health care provider if you experience any of the following:  severe uncontrolled pain     Activity as tolerated

## 2024-07-19 NOTE — INTERVAL H&P NOTE
The patient has been examined and the H&P has been reviewed:    I concur with the findings and no changes have occurred since H&P was written.    Surgery risks, benefits and alternative options discussed and understood by patient/family.          Active Hospital Problems    Diagnosis  POA    *Carpal tunnel syndrome, right [G56.01]  Yes      Resolved Hospital Problems   No resolved problems to display.

## 2024-07-29 ENCOUNTER — TELEPHONE (OUTPATIENT)
Dept: ORTHOPEDICS | Facility: CLINIC | Age: 69
End: 2024-07-29
Payer: MEDICARE

## 2024-08-02 ENCOUNTER — OFFICE VISIT (OUTPATIENT)
Dept: ORTHOPEDICS | Facility: CLINIC | Age: 69
End: 2024-08-02
Payer: MEDICARE

## 2024-08-02 VITALS — WEIGHT: 224 LBS | BODY MASS INDEX: 37.32 KG/M2 | HEIGHT: 65 IN

## 2024-08-02 DIAGNOSIS — Z98.890 POST-OPERATIVE STATE: Primary | ICD-10-CM

## 2024-08-02 DIAGNOSIS — G56.01 RIGHT CARPAL TUNNEL SYNDROME: ICD-10-CM

## 2024-08-02 PROCEDURE — 99213 OFFICE O/P EST LOW 20 MIN: CPT | Mod: PBBFAC

## 2024-08-02 PROCEDURE — 99999 PR PBB SHADOW E&M-EST. PATIENT-LVL III: CPT | Mod: PBBFAC,,,

## 2024-08-02 NOTE — PROGRESS NOTES
08/02/2024    Ms. Garcia is here today for a post-operative visit.  She is 15 days status post right carpal tunnel release by Dr. Garg on 7/19/2024. She reports that she is well.  Pain is 4/10.  She is not taking pain medication .  She denies fever, chills, and sweats since the time of the surgery.  She denies any numbness or tingling.  She reports she has been may be doing much more with her right-hand.     Physical exam:    There were no vitals filed for this visit.  Vital signs are stable, patient is afebrile.  Patient is well dressed and well groomed, no acute distress.  Alert and oriented to person, place, and time.  Post op dressing taken down.  Incision is clean, dry and intact.  There is mild erythema or exudate.  Mild swelling at wrist There is no sign of any infection. She is NVI. Sutures removed without difficulty.  Patient able to make a full composite fist.  Small dermal dehiscence.  Steri-Strips applied with Mastisol        Assessment:   s/p right carpal tunnel release  Diane was seen today for post-op evaluation, carpal tunnel, numbness and pain.    Diagnoses and all orders for this visit:    Post-operative state    Right carpal tunnel syndrome             Plan:    Patient has a small dermal dehiscence applied Steri-Strips.  Discussed with patient wear wrist brace during activity and to be taken off at night.  Discussed with patient to keep the incision dry for the time being.  She will follow-up in 1 week for a wound check

## 2024-08-06 ENCOUNTER — TELEPHONE (OUTPATIENT)
Dept: ORTHOPEDICS | Facility: CLINIC | Age: 69
End: 2024-08-06
Payer: MEDICARE

## 2024-08-20 ENCOUNTER — OFFICE VISIT (OUTPATIENT)
Dept: PRIMARY CARE CLINIC | Facility: CLINIC | Age: 69
End: 2024-08-20
Payer: MEDICARE

## 2024-08-20 VITALS
BODY MASS INDEX: 37.61 KG/M2 | OXYGEN SATURATION: 97 % | RESPIRATION RATE: 18 BRPM | HEART RATE: 72 BPM | SYSTOLIC BLOOD PRESSURE: 124 MMHG | HEIGHT: 65 IN | WEIGHT: 225.75 LBS | DIASTOLIC BLOOD PRESSURE: 74 MMHG

## 2024-08-20 DIAGNOSIS — G89.29 CHRONIC FOOT PAIN, RIGHT: ICD-10-CM

## 2024-08-20 DIAGNOSIS — M25.512 CHRONIC LEFT SHOULDER PAIN: ICD-10-CM

## 2024-08-20 DIAGNOSIS — G89.29 CHRONIC LEFT SHOULDER PAIN: ICD-10-CM

## 2024-08-20 DIAGNOSIS — E78.2 MIXED HYPERLIPIDEMIA: ICD-10-CM

## 2024-08-20 DIAGNOSIS — M17.0 PRIMARY OSTEOARTHRITIS OF BOTH KNEES: ICD-10-CM

## 2024-08-20 DIAGNOSIS — E66.01 SEVERE OBESITY (BMI 35.0-39.9) WITH COMORBIDITY: ICD-10-CM

## 2024-08-20 DIAGNOSIS — M79.671 CHRONIC FOOT PAIN, RIGHT: ICD-10-CM

## 2024-08-20 DIAGNOSIS — I10 ESSENTIAL HYPERTENSION: ICD-10-CM

## 2024-08-20 DIAGNOSIS — M25.59 PAIN IN OTHER JOINT: Primary | ICD-10-CM

## 2024-08-20 PROCEDURE — G2211 COMPLEX E/M VISIT ADD ON: HCPCS | Mod: S$PBB,,, | Performed by: FAMILY MEDICINE

## 2024-08-20 PROCEDURE — 99215 OFFICE O/P EST HI 40 MIN: CPT | Mod: PBBFAC,PN | Performed by: FAMILY MEDICINE

## 2024-08-20 PROCEDURE — 99999 PR PBB SHADOW E&M-EST. PATIENT-LVL V: CPT | Mod: PBBFAC,,, | Performed by: FAMILY MEDICINE

## 2024-08-20 PROCEDURE — 99214 OFFICE O/P EST MOD 30 MIN: CPT | Mod: S$PBB,,, | Performed by: FAMILY MEDICINE

## 2024-08-20 RX ORDER — CELECOXIB 200 MG/1
200 CAPSULE ORAL DAILY
Qty: 30 CAPSULE | Refills: 0 | Status: SHIPPED | OUTPATIENT
Start: 2024-08-20

## 2024-08-20 NOTE — PROGRESS NOTES
"      /74 (BP Location: Left arm, Patient Position: Sitting, BP Method: Medium (Manual))   Pulse 72   Resp 18   Ht 5' 5" (1.651 m)   Wt 102.4 kg (225 lb 12 oz)   SpO2 97%   BMI 37.57 kg/m²       ===========              Diane Garcia is a 68 y.o. female     here for    Left shoulder pain. Has had "bursitis" in shoulder before.     She is right-handed. It is affecting her ADLs.    It is not affecting her sleep. She is having more generalized aches and pains kristopher arthralgias and wonders if there isn't more going on.      Patient queried and denies any further complaints      Patient Active Problem List   Diagnosis    MELLISA (obstructive sleep apnea)    Severe obesity (BMI 35.0-39.9) with comorbidity    Gastroesophageal reflux disease    Former smoker    Essential hypertension    Mixed hyperlipidemia    Abnormal gait    Weakness    Primary osteoarthritis of both knees    Prediabetes    Aortic atherosclerosis    Solitary pulmonary nodule    Carpal tunnel syndrome, right    Chronic left shoulder pain    Chronic foot pain, right       SURGICAL AND MEDICAL HISTORY: updated and reviewed.  Past Surgical History:   Procedure Laterality Date    BREAST BIOPSY Left 2013    benign needle    CARPAL TUNNEL RELEASE Right 7/19/2024    Procedure: RIGHT RELEASE, CARPAL TUNNEL;  Surgeon: Brandy Garg MD;  Location: [facility] OR;  Service: Orthopedics;  Laterality: Right;    COLONOSCOPY N/A 02/02/2023    Procedure: COLONOSCOPY;  Surgeon: Bob Grayson MD;  Location: [facility];  Service: Endoscopy;  Laterality: N/A;    ESOPHAGOGASTRODUODENOSCOPY N/A 02/02/2023    Procedure: EGD (ESOPHAGOGASTRODUODENOSCOPY);  Surgeon: Bob Grayson MD;  Location: [facility];  Service: Endoscopy;  Laterality: N/A;    HYSTERECTOMY      OOPHORECTOMY      mary jo placed Right     femur right mary jo    mary jo removed Right     TOTAL KNEE ARTHROPLASTY Left 07/26/2023    Procedure: ARTHROPLASTY, KNEE, TOTAL;  Surgeon: NATALIO Greenwood, " MD;  Location: [place] OR;  Service: Orthopedics;  Laterality: Left;  Regional w/Catheter, Adductor, Spinal, 0.2% Ropivacaine     ALLERGIES updated and reviewed.  Review of patient's allergies indicates:   Allergen Reactions    Penicillins      nausea       CURRENT OUTPATIENT MEDICATIONS updated and reviewed    Current Outpatient Medications:     biotin 1 mg Cap, Take by mouth., Disp: , Rfl:     cartilage/collagen/bor/hyalur (JOINT HEALTH ORAL), Take by mouth., Disp: , Rfl:     esomeprazole (NEXIUM) 20 MG capsule, Take 20 mg by mouth before breakfast., Disp: , Rfl:     hydroCHLOROthiazide (HYDRODIURIL) 25 MG tablet, TAKE 1 TABLET(25 MG) BY MOUTH EVERY DAY, Disp: 90 tablet, Rfl: 1    lovastatin (MEVACOR) 40 MG tablet, TAKE 1 TABLET(40 MG) BY MOUTH EVERY EVENING, Disp: 90 tablet, Rfl: 3    olmesartan (BENICAR) 40 MG tablet, Take 1 tablet (40 mg total) by mouth once daily., Disp: 90 tablet, Rfl: 3    omega-3 fatty acids/fish oil (FISH OIL-OMEGA-3 FATTY ACIDS) 300-1,000 mg capsule, Take by mouth once daily., Disp: , Rfl:     TURMERIC ORAL, Take by mouth., Disp: , Rfl:     celecoxib (CELEBREX) 200 MG capsule, Take 1 capsule (200 mg total) by mouth once daily. With food and water for maximum of 15 consecutive days, Disp: 30 capsule, Rfl: 0  No current facility-administered medications for this visit.    Facility-Administered Medications Ordered in Other Visits:     ropivacaine 0.2% [facility] PainPRO Pump infusion 500 ML, , Perineural, Continuous, Cornell Vela MD, New Bag at 07/26/23 3312    Review of Systems   Constitutional:  Negative for activity change, appetite change, chills, diaphoresis, fatigue, fever and unexpected weight change.   HENT:  Negative for congestion, ear discharge, ear pain, facial swelling, hearing loss, nosebleeds, postnasal drip, rhinorrhea, sinus pressure, sneezing, sore throat, tinnitus, trouble swallowing and voice change.    Eyes:  Negative for photophobia, pain, discharge, redness, itching  "and visual disturbance.   Respiratory:  Negative for cough, chest tightness, shortness of breath and wheezing.    Cardiovascular:  Negative for chest pain, palpitations and leg swelling.   Gastrointestinal:  Negative for abdominal distention, abdominal pain, anal bleeding, blood in stool, constipation, diarrhea, nausea, rectal pain and vomiting.   Endocrine: Negative for cold intolerance, heat intolerance, polydipsia, polyphagia and polyuria.   Genitourinary:  Negative for difficulty urinating, dysuria and flank pain.   Musculoskeletal:  Negative for arthralgias, back pain, joint swelling, myalgias and neck pain.   Skin:  Negative for rash.   Neurological:  Negative for dizziness, tremors, seizures, syncope, speech difficulty, weakness, light-headedness, numbness and headaches.   Psychiatric/Behavioral:  Negative for behavioral problems, confusion, decreased concentration, dysphoric mood, sleep disturbance and suicidal ideas. The patient is not nervous/anxious and is not hyperactive.        /74 (BP Location: Left arm, Patient Position: Sitting, BP Method: Medium (Manual))   Pulse 72   Resp 18   Ht 5' 5" (1.651 m)   Wt 102.4 kg (225 lb 12 oz)   SpO2 97%   BMI 37.57 kg/m²   Physical Exam  Vitals and nursing note reviewed.   Constitutional:       General: She is not in acute distress.     Appearance: Normal appearance. She is well-developed. She is not ill-appearing or toxic-appearing.   HENT:      Head: Normocephalic and atraumatic.      Right Ear: Tympanic membrane, ear canal and external ear normal.      Left Ear: Tympanic membrane, ear canal and external ear normal.      Nose: Nose normal.      Mouth/Throat:      Lips: Pink.      Mouth: Mucous membranes are moist.      Pharynx: No oropharyngeal exudate or posterior oropharyngeal erythema.   Eyes:      General: No scleral icterus.        Right eye: No discharge.         Left eye: No discharge.      Extraocular Movements: Extraocular movements intact.     "  Conjunctiva/sclera: Conjunctivae normal.   Cardiovascular:      Rate and Rhythm: Normal rate and regular rhythm.      Pulses: Normal pulses.      Heart sounds: Normal heart sounds. No murmur heard.  Pulmonary:      Effort: Pulmonary effort is normal. No respiratory distress.      Breath sounds: Normal breath sounds. No wheezing or rales.   Abdominal:      General: Bowel sounds are normal. There is no distension.      Palpations: Abdomen is soft. There is no mass.      Tenderness: There is no abdominal tenderness. There is no right CVA tenderness, left CVA tenderness, guarding or rebound.      Hernia: No hernia is present.   Musculoskeletal:      Cervical back: Normal range of motion and neck supple. No rigidity or tenderness.   Lymphadenopathy:      Cervical: No cervical adenopathy.   Skin:     General: Skin is warm and dry.   Neurological:      General: No focal deficit present.      Mental Status: She is alert. Mental status is at baseline.   Psychiatric:         Mood and Affect: Mood normal.         Behavior: Behavior normal. Behavior is cooperative.         ASSESSMENT/PLAN    Diane was seen today for shoulder pain.    Diagnoses and all orders for this visit:    Pain in other joint  -     Sedimentation rate; Future  -     C-Reactive Protein; Future  -     Rheumatoid Factor; Future  -     AMBER Screen w/Reflex; Future  -     Anti-Smith Antibody; Future  -     Sjogrens syndrome-A extractable nuclear antibody; Future  -     Uric Acid; Future  -     CK; Future  -     Cyclic Citrullinated Peptide Antibody, IgG; Future    Chronic left shoulder pain  -     X-Ray Shoulder 2 or More Views Left; Future    Chronic foot pain, right  -     Ambulatory referral/consult to Podiatry; Future  -     X-Ray Foot Complete Right; Future    Essential hypertension    Mixed hyperlipidemia    Severe obesity (BMI 35.0-39.9) with comorbidity    Primary osteoarthritis of both knees    Other orders  -     celecoxib (CELEBREX) 200 MG capsule;  Take 1 capsule (200 mg total) by mouth once daily. With food and water for maximum of 15 consecutive days            Most recent some lab results reviewed with patient.  Any new prescription medications gone over in detail including reason for taking the medication, most common possible side effects and possible costs, etcetera.    Chronic conditions updated. Other than changes or additions as above, cont current medications and maintain follow-up with specialists if indicated.     Cornell Giraldo MD  A dictation device was used to produce this document. Use of such devices sometimes results in grammatical errors or replacement of words that sound similarly.        Est4  Time spent in the evaluation and management of this patient exceeded 30min and greater than 50% of this time was in face-to-face time with the patient on day of the clinic visit.   This includes face-to-face time and non face-to-face time and includes the following:  --preparing to see the patient (eg, obtaining and/or reviewing old records such as, when applicable, primary care notes, specialist notes, hospital notes, review of laboratory tests, and/or radiographic and/or cardiology or other studies)  --performing a medically appropriate review of systems and examination and/or evaluation  --reviewing and independently interpreting results (not separately reported; eg, lab results) and communicating results to the patient and/or family/caregiver  --placing orders and/or reviewing other physician's orders which can both include medications, laboratory studies, radiographic studies, procedures, referrals etcetera and   --counseling and educating the patient and/or family member/caregiver regarding the treatment plan  --documentation of the visit in the electronic health record  --communicating with other health care providers regarding referrals, studies, follow-up, etc

## 2024-08-21 ENCOUNTER — HOSPITAL ENCOUNTER (OUTPATIENT)
Dept: RADIOLOGY | Facility: HOSPITAL | Age: 69
Discharge: HOME | End: 2024-08-21
Attending: FAMILY MEDICINE
Payer: MEDICARE

## 2024-08-21 DIAGNOSIS — M79.671 CHRONIC FOOT PAIN, RIGHT: ICD-10-CM

## 2024-08-21 DIAGNOSIS — M25.512 CHRONIC LEFT SHOULDER PAIN: ICD-10-CM

## 2024-08-21 DIAGNOSIS — G89.29 CHRONIC LEFT SHOULDER PAIN: ICD-10-CM

## 2024-08-21 DIAGNOSIS — G89.29 CHRONIC FOOT PAIN, RIGHT: ICD-10-CM

## 2024-08-21 PROCEDURE — 73630 X-RAY EXAM OF FOOT: CPT | Mod: TC,PN,RT

## 2024-08-21 PROCEDURE — 73630 X-RAY EXAM OF FOOT: CPT | Mod: 26,RT,, | Performed by: RADIOLOGY

## 2024-08-21 PROCEDURE — 73030 X-RAY EXAM OF SHOULDER: CPT | Mod: 26,LT,, | Performed by: RADIOLOGY

## 2024-08-21 PROCEDURE — 73030 X-RAY EXAM OF SHOULDER: CPT | Mod: TC,PN,LT

## 2024-08-22 ENCOUNTER — PATIENT MESSAGE (OUTPATIENT)
Dept: PRIMARY CARE CLINIC | Facility: CLINIC | Age: 69
End: 2024-08-22
Payer: MEDICARE

## 2024-08-22 DIAGNOSIS — S92.251G CLOSED DISPLACED FRACTURE OF NAVICULAR BONE OF RIGHT FOOT WITH DELAYED HEALING, SUBSEQUENT ENCOUNTER: Primary | ICD-10-CM

## 2024-08-26 PROBLEM — G89.29 CHRONIC LEFT SHOULDER PAIN: Status: ACTIVE | Noted: 2024-08-26

## 2024-08-26 PROBLEM — M25.512 CHRONIC LEFT SHOULDER PAIN: Status: ACTIVE | Noted: 2024-08-26

## 2024-08-26 PROBLEM — M79.671 CHRONIC FOOT PAIN, RIGHT: Status: ACTIVE | Noted: 2024-08-26

## 2024-08-26 PROBLEM — G89.29 CHRONIC FOOT PAIN, RIGHT: Status: ACTIVE | Noted: 2024-08-26

## 2024-08-27 ENCOUNTER — HOSPITAL ENCOUNTER (OUTPATIENT)
Dept: RADIOLOGY | Facility: HOSPITAL | Age: 69
Discharge: HOME | End: 2024-08-27
Attending: FAMILY MEDICINE
Payer: MEDICARE

## 2024-08-27 DIAGNOSIS — S92.251G CLOSED DISPLACED FRACTURE OF NAVICULAR BONE OF RIGHT FOOT WITH DELAYED HEALING, SUBSEQUENT ENCOUNTER: ICD-10-CM

## 2024-08-27 PROCEDURE — 73701 CT LOWER EXTREMITY W/DYE: CPT | Mod: TC,RT

## 2024-08-27 PROCEDURE — 25500020 PHARM REV CODE 255: Performed by: FAMILY MEDICINE

## 2024-08-27 PROCEDURE — 73701 CT LOWER EXTREMITY W/DYE: CPT | Mod: 26,RT,, | Performed by: RADIOLOGY

## 2024-08-27 RX ADMIN — IOHEXOL 75 ML: 350 INJECTION, SOLUTION INTRAVENOUS at 12:08

## 2024-08-28 DIAGNOSIS — E78.2 MIXED HYPERLIPIDEMIA: ICD-10-CM

## 2024-08-28 NOTE — TELEPHONE ENCOUNTER
Care Due:                  Date            Visit Type   Department     Provider  --------------------------------------------------------------------------------                                MYCHART                              FOLLOWUP/OF  [facility] PRIMARY  Last Visit: 08-      [facility] VISIT   CARE           Cornell Giraldo                              EP -                              PRIMARY      [facility]  Next Visit: 09-      CARE ([facility])   CARE           Cornell Giraldo                                                            Last  Test          Frequency    Reason                     Performed    Due Date  --------------------------------------------------------------------------------    CBC.........  12 months..  celecoxib................  07- 06-    Health [facility] Embedded Care Due Messages. Reference number: [id number].   8/28/2024 11:35:21 AM CDT

## 2024-08-29 ENCOUNTER — OFFICE VISIT (OUTPATIENT)
Dept: PODIATRY | Facility: CLINIC | Age: 69
End: 2024-08-29
Payer: MEDICARE

## 2024-08-29 ENCOUNTER — OFFICE VISIT (OUTPATIENT)
Dept: SPORTS MEDICINE | Facility: CLINIC | Age: 69
End: 2024-08-29
Payer: MEDICARE

## 2024-08-29 VITALS
DIASTOLIC BLOOD PRESSURE: 84 MMHG | HEART RATE: 67 BPM | SYSTOLIC BLOOD PRESSURE: 150 MMHG | WEIGHT: 225.75 LBS | BODY MASS INDEX: 37.61 KG/M2 | HEIGHT: 65 IN

## 2024-08-29 VITALS
SYSTOLIC BLOOD PRESSURE: 124 MMHG | WEIGHT: 225.75 LBS | DIASTOLIC BLOOD PRESSURE: 74 MMHG | BODY MASS INDEX: 37.61 KG/M2 | OXYGEN SATURATION: 100 % | HEIGHT: 65 IN | HEART RATE: 72 BPM

## 2024-08-29 DIAGNOSIS — M79.671 CHRONIC FOOT PAIN, RIGHT: Primary | ICD-10-CM

## 2024-08-29 DIAGNOSIS — M21.961 ACQUIRED FOOT DEFORMITY, RIGHT: ICD-10-CM

## 2024-08-29 DIAGNOSIS — M87.076 AVASCULAR NECROSIS OF BONE OF FOOT: ICD-10-CM

## 2024-08-29 DIAGNOSIS — G89.29 CHRONIC FOOT PAIN, RIGHT: Primary | ICD-10-CM

## 2024-08-29 DIAGNOSIS — M25.512 CHRONIC LEFT SHOULDER PAIN: Primary | ICD-10-CM

## 2024-08-29 DIAGNOSIS — G89.29 CHRONIC LEFT SHOULDER PAIN: Primary | ICD-10-CM

## 2024-08-29 PROCEDURE — 99999PBSHW PR PBB SHADOW TECHNICAL ONLY FILED TO HB: Mod: PBBFAC,,,

## 2024-08-29 PROCEDURE — 99213 OFFICE O/P EST LOW 20 MIN: CPT | Mod: PBBFAC,27,25 | Performed by: ORTHOPAEDIC SURGERY

## 2024-08-29 PROCEDURE — 99999 PR PBB SHADOW E&M-EST. PATIENT-LVL III: CPT | Mod: PBBFAC,,, | Performed by: STUDENT IN AN ORGANIZED HEALTH CARE EDUCATION/TRAINING PROGRAM

## 2024-08-29 PROCEDURE — 99999 PR PBB SHADOW E&M-EST. PATIENT-LVL III: CPT | Mod: PBBFAC,,, | Performed by: ORTHOPAEDIC SURGERY

## 2024-08-29 PROCEDURE — 99214 OFFICE O/P EST MOD 30 MIN: CPT | Mod: S$PBB,,, | Performed by: STUDENT IN AN ORGANIZED HEALTH CARE EDUCATION/TRAINING PROGRAM

## 2024-08-29 PROCEDURE — 20610 DRAIN/INJ JOINT/BURSA W/O US: CPT | Mod: PBBFAC | Performed by: ORTHOPAEDIC SURGERY

## 2024-08-29 PROCEDURE — 99213 OFFICE O/P EST LOW 20 MIN: CPT | Mod: PBBFAC | Performed by: STUDENT IN AN ORGANIZED HEALTH CARE EDUCATION/TRAINING PROGRAM

## 2024-08-29 PROCEDURE — 99214 OFFICE O/P EST MOD 30 MIN: CPT | Mod: 25,S$PBB,, | Performed by: ORTHOPAEDIC SURGERY

## 2024-08-29 RX ORDER — LOVASTATIN 40 MG/1
40 TABLET ORAL NIGHTLY
Qty: 90 TABLET | Refills: 3 | Status: SHIPPED | OUTPATIENT
Start: 2024-08-29

## 2024-08-29 RX ADMIN — TRIAMCINOLONE ACETONIDE 40 MG: 40 INJECTION, SUSPENSION INTRA-ARTICULAR; INTRAMUSCULAR at 03:08

## 2024-08-29 NOTE — PROGRESS NOTES
Subjective:     Patient    Diane Garcia is a 68 y.o. female.    Problem    Suffered injury of the right foot last Summer, developed a navicular fracture that was treated in a boot for 6 months by Dr Duenas; the pain somewhat improved over that time and she was discharged by Dr Duenas. Has not been in a boot since the beginning of the year but has still been having pain and swelling in the right midfoot, more so pain laterally and swelling medially. The pain and swelling have been worsening. Recent X ray and CT by PCP concerning for end stage avascular necrosis of navicular and severe STJ arthritis.      History    History obtained from patient and review of medical records.     Past Medical History:   Diagnosis Date    Arthritis     GERD (gastroesophageal reflux disease)     Hyperlipidemia     Hypertension        Past Surgical History:   Procedure Laterality Date    BREAST BIOPSY Left 2013    benign needle    CARPAL TUNNEL RELEASE Right 7/19/2024    Procedure: RIGHT RELEASE, CARPAL TUNNEL;  Surgeon: Brandy aGrg MD;  Location: [facility];  Service: Orthopedics;  Laterality: Right;    COLONOSCOPY N/A 02/02/2023    Procedure: COLONOSCOPY;  Surgeon: Bob Grayson MD;  Location: [facility];  Service: Endoscopy;  Laterality: N/A;    ESOPHAGOGASTRODUODENOSCOPY N/A 02/02/2023    Procedure: EGD (ESOPHAGOGASTRODUODENOSCOPY);  Surgeon: Bob Grayson MD;  Location: [facility];  Service: Endoscopy;  Laterality: N/A;    HYSTERECTOMY      OOPHORECTOMY      mary jo placed Right     femur right mary jo    mary jo removed Right     TOTAL KNEE ARTHROPLASTY Left 07/26/2023    Procedure: ARTHROPLASTY, KNEE, TOTAL;  Surgeon: NATALIO Greenwood MD;  Location: [facility];  Service: Orthopedics;  Laterality: Left;  Regional w/Catheter, Adductor, Spinal, 0.2% Ropivacaine        Objective:     Vitals  Wt Readings from Last 1 Encounters:   08/29/24 102.4 kg (225 lb 12 oz)     Temp Readings from Last 1 Encounters:   07/19/24  97.9 °F (36.6 °C) (Temporal)     BP Readings from Last 1 Encounters:   24 (!) 150/84     Pulse Readings from Last 1 Encounters:   24 67       Dermatological Exam    Skin:  Pedal hair growth, skin color, and skin texture normal on right     Nails:  All nails normal in length, thickness, color    Vascular Exam    Arteries:  Posterior tibial artery palpable on right  Dorsalis pedis artery palpable on right     Veins:  Superficial veins unremarkable on right     Swellin+ nonpitting on right     Neurological Exam    [place] touch test:  Right foot protective sensation intact     Musculoskeletal Exam    Footwear:  Casual on right  Casual on left    Gait Exam:   Ambulatory Status: Ambulatory  Gait: Antalgic  Assistive Devices: None    Foot Progression Angle:  Normal on right  Normal on left    Right Lower Extremity Additional Findings:  Moderate pain on palpation of dorsolateral midfoot, mild pain on palpation of medial midfoot, reproduced with axial compression  Right foot and ankle function, strength, and range of motion unremarkable except as noted above.     Left Lower Extremity Additional Findings:  Left foot and ankle function, strength, and range of motion unremarkable except as noted above.    Imaging and Other Tests    Imaging:  Independently reviewed and interpreted imaging, findings are as follows:     24 right foot CT: stage 5 navicular AVN with collapse of the navicular and realignment of talus directly onto cuneiforms, also with severe STJ arthritis     Assessment:     Encounter Diagnoses   Name Primary?    Chronic foot pain, right Yes    Avascular necrosis of bone of foot     Acquired foot deformity, right         Plan:     I counseled the patient on her conditions, their implications and medical management.    Right foot navicular AVN, pain: chronic exacerbated  -End stage AVN of right navicular with near total collapse. Recommended surgical intervention for faster more reliable  healing. Alternatively could attempt further immobilization in surgical boot with progression towards an orthosis. Reviewed potential risks, benefits, alternatives including risk of worsening pain, instability, and foot deformity in absence of surgical intervention. Patient opted for conservative treatment for now.   -Right tall surgical boot.       Return to clinic in 3-4 weeks, call sooner PRN.

## 2024-08-29 NOTE — PROGRESS NOTES
CC: Left shoulder pain    DATE OF PROCEDURE: 7/26/2023   PROCEDURES PERFORMED:   Left total knee arthroplasty (CPT 94321)    68 y.o. Female presents with new issue of left shoulder pain. She is right hand dominant. She is a retired restaurant owner. Complaint is left shoulder pain for many years and has worsened since last 6 weeks. Atraumatic onset.  Pain localizes to anterolateral aspect of shoulder. Worse with activities like lifting. Pain is 5-6 on a VAS. Pain is not disruptive to sleep at night but shoulder feels stiff in the morning. Better with rest. Denies neck pain or radicular symptoms. Treatment thus far has included activity modifications, rest, and oral medication like Ibuprofen. Here today to discuss diagnosis and treatment options.     PMHx notable for HTN, GERD, HLD.   Negative for tobacco. Former   Negative for diabetes. Last A1C: 5.7 07/05/23    PAST MEDICAL HISTORY:   Past Medical History:   Diagnosis Date    Arthritis     GERD (gastroesophageal reflux disease)     Hyperlipidemia     Hypertension      PAST SURGICAL HISTORY:  Past Surgical History:   Procedure Laterality Date    BREAST BIOPSY Left 2013    benign needle    CARPAL TUNNEL RELEASE Right 7/19/2024    Procedure: RIGHT RELEASE, CARPAL TUNNEL;  Surgeon: Brandy Garg MD;  Location: [facility];  Service: Orthopedics;  Laterality: Right;    COLONOSCOPY N/A 02/02/2023    Procedure: COLONOSCOPY;  Surgeon: Bob Grayson MD;  Location: [facility];  Service: Endoscopy;  Laterality: N/A;    ESOPHAGOGASTRODUODENOSCOPY N/A 02/02/2023    Procedure: EGD (ESOPHAGOGASTRODUODENOSCOPY);  Surgeon: Bob Grayson MD;  Location: [facility];  Service: Endoscopy;  Laterality: N/A;    HYSTERECTOMY      OOPHORECTOMY      mary jo placed Right     femur right mary jo    mary jo removed Right     TOTAL KNEE ARTHROPLASTY Left 07/26/2023    Procedure: ARTHROPLASTY, KNEE, TOTAL;  Surgeon: NATALIO Greenwood MD;  Location: [facility];  Service:  Orthopedics;  Laterality: Left;  Regional w/Catheter, Adductor, Spinal, 0.2% Ropivacaine     FAMILY HISTORY:  Family History   Problem Relation Name Age of Onset    Cancer Mother Smoker         Lung    COPD Father Smoker     No Known Problems Sister      Melanoma Neg Hx       MEDICATIONS:    Current Outpatient Medications:     biotin 1 mg Cap, Take by mouth., Disp: , Rfl:     cartilage/collagen/bor/hyalur (JOINT HEALTH ORAL), Take by mouth., Disp: , Rfl:     celecoxib (CELEBREX) 200 MG capsule, Take 1 capsule (200 mg total) by mouth once daily. With food and water for maximum of 15 consecutive days, Disp: 30 capsule, Rfl: 0    esomeprazole (NEXIUM) 20 MG capsule, Take 20 mg by mouth before breakfast., Disp: , Rfl:     hydroCHLOROthiazide (HYDRODIURIL) 25 MG tablet, TAKE 1 TABLET(25 MG) BY MOUTH EVERY DAY, Disp: 90 tablet, Rfl: 1    lovastatin (MEVACOR) 40 MG tablet, Take 1 tablet (40 mg total) by mouth every evening., Disp: 90 tablet, Rfl: 3    olmesartan (BENICAR) 40 MG tablet, Take 1 tablet (40 mg total) by mouth once daily., Disp: 90 tablet, Rfl: 3    omega-3 fatty acids/fish oil (FISH OIL-OMEGA-3 FATTY ACIDS) 300-1,000 mg capsule, Take by mouth once daily., Disp: , Rfl:     TURMERIC ORAL, Take by mouth., Disp: , Rfl:   No current facility-administered medications for this visit.    Facility-Administered Medications Ordered in Other Visits:     ropivacaine 0.2% [facility] PainPRO Pump infusion 500 ML, , Perineural, Continuous, Cornell Vela MD, New Bag at 07/26/23 9017    ALLERGIES:  Review of patient's allergies indicates:   Allergen Reactions    Penicillins      nausea        REVIEW OF SYSTEMS:  Constitution: Negative. Negative for chills, fever and night sweats.    Hematologic/Lymphatic: Negative for bleeding problem. Does not bruise/bleed easily.   Skin: Negative for dry skin, itching and rash.   Musculoskeletal: Negative for falls. Positive for left shoulder pain and muscle weakness.  "    All other review of symptoms were reviewed and found to be noncontributory.    PHYSICAL EXAMINATION:  Vitals:  BP (!) 150/84   Pulse 67   Ht 5' 5" (1.651 m)   Wt 102.4 kg (225 lb 12 oz)   BMI 37.57 kg/m²    General: Well-developed well-nourished 68 y.o. femalein no acute distress   Cardiovascular: Regular rhythm by palpation of distal pulse, normal color and temperature, no concerning varicosities on symptomatic side   Lungs: No labored breathing or wheezing appreciated   Neuro: Alert and oriented ×3   Psychiatric: well oriented to person, place and time, demonstrates normal mood and affect   Skin: No rashes, lesions or ulcers, normal temperature, turgor, and texture on uninvolved extremity    Ortho/SPM Exam  Examination of the left shoulder demonstrates predominant pain to palpation of the posterior glenohumeral joint line.  Positive compression rotation test for typical pain.  Localized deep.  Positive glenohumeral grind test.  Otherwise she is nontender over Codman's point and the proximal biceps groove. Negative AC tenderness. Active forward elevation to 160, ER with arm at side to 50, IR to T12. Passive FE to 180, ER to 60. 5-/5 resisted supraspinatus testing. 5-/5 resisted infraspinatus testing.  Normal belly press test.  Stable shoulder. No midline neck tenderness. Negative Spurling's maneuver.     IMAGING:  Xrays 08/21/24 including AP, Outlet and Axillary Lateral of Left shoulder are ordered / images reviewed by me:  Mild glenohumeral degenerative changes.  No significant joint space narrowing.    ASSESSMENT:      ICD-10-CM ICD-9-CM   1. Chronic left shoulder pain  M25.512 719.41    G89.29 338.29     Suspected some degree of underlying symptomatic biceps labral pathology.    PLAN:     -Findings and treatment options were discussed with the patient.  Pain localized deep in the left shoulder on exam of my history.  Findings most consistent with some degree of symptomatic biceps labral pathology, " chondral disease.  Films showed no significant DJD.  She has intact overhead range of motion with fairly good resisted cuff strength on exam.  Conservative care recommended.  -GH CSI today  -Discussed exacerbating activities to avoid.  Could consider formal PT in the future depending upon response to injection.  -Gave her contact information for colleague, Roger Wright MD. Her right foot continues to bother her.  Navicular stress fracture w/ AVN previously diagnosed.  Prior treatment per Roselyn Duenas and Ray Salcedo.  She wishes for an additional opinion.  -RTC PRN  -All questions answered      Large Joint Aspiration/Injection: L glenohumeral    Date/Time: 8/29/2024 3:45 PM    Performed by: NATALIO Greenwood MD  Authorized by: NATALIO Greenwood MD    Consent Done?:  Yes (Verbal)  Indications:  Pain  Site marked: the procedure site was marked    Timeout: prior to procedure the correct patient, procedure, and site was verified    Prep: patient was prepped and draped in usual sterile fashion      Local anesthesia used?: Yes    Local anesthetic:  Co-phenylcaine spray (0.2% Naropin)  Anesthetic total (ml):  4      Details:  Needle Size:  22 G  Approach:  Superior  Location:  Shoulder  Site:  L glenohumeral  Medications:  40 mg triamcinolone acetonide 40 mg/mL  Patient tolerance:  Patient tolerated the procedure well with no immediate complications

## 2024-09-02 RX ORDER — TRIAMCINOLONE ACETONIDE 40 MG/ML
40 INJECTION, SUSPENSION INTRA-ARTICULAR; INTRAMUSCULAR
Status: DISCONTINUED | OUTPATIENT
Start: 2024-08-29 | End: 2024-09-02 | Stop reason: HOSPADM

## 2024-09-20 ENCOUNTER — TELEPHONE (OUTPATIENT)
Dept: PODIATRY | Facility: CLINIC | Age: 69
End: 2024-09-20
Payer: MEDICARE

## 2024-09-20 NOTE — TELEPHONE ENCOUNTER
Left VM in regards to rescheduling 9/26 appointment to 9/30, due to Dr. Loredo having surgery that afternoon. Instructed patient to call back with any questions or concerns.    CHRISTIE Amos

## 2024-09-23 ENCOUNTER — OFFICE VISIT (OUTPATIENT)
Dept: PRIMARY CARE CLINIC | Facility: CLINIC | Age: 69
End: 2024-09-23
Payer: MEDICARE

## 2024-09-23 VITALS
HEART RATE: 74 BPM | SYSTOLIC BLOOD PRESSURE: 130 MMHG | BODY MASS INDEX: 37.61 KG/M2 | WEIGHT: 225.75 LBS | DIASTOLIC BLOOD PRESSURE: 70 MMHG | OXYGEN SATURATION: 97 % | HEIGHT: 65 IN

## 2024-09-23 DIAGNOSIS — Z00.00 ROUTINE MEDICAL EXAM: Primary | ICD-10-CM

## 2024-09-23 DIAGNOSIS — E78.2 MIXED HYPERLIPIDEMIA: ICD-10-CM

## 2024-09-23 DIAGNOSIS — Z87.891 FORMER SMOKER: ICD-10-CM

## 2024-09-23 DIAGNOSIS — R73.03 PREDIABETES: ICD-10-CM

## 2024-09-23 DIAGNOSIS — M25.512 CHRONIC LEFT SHOULDER PAIN: ICD-10-CM

## 2024-09-23 DIAGNOSIS — G89.29 CHRONIC LEFT SHOULDER PAIN: ICD-10-CM

## 2024-09-23 DIAGNOSIS — R79.9 ABNORMAL FINDING OF BLOOD CHEMISTRY, UNSPECIFIED: ICD-10-CM

## 2024-09-23 DIAGNOSIS — I10 ESSENTIAL HYPERTENSION: ICD-10-CM

## 2024-09-23 DIAGNOSIS — I70.0 AORTIC ATHEROSCLEROSIS: ICD-10-CM

## 2024-09-23 PROCEDURE — 99397 PER PM REEVAL EST PAT 65+ YR: CPT | Mod: GZ,S$PBB,, | Performed by: FAMILY MEDICINE

## 2024-09-23 PROCEDURE — 99214 OFFICE O/P EST MOD 30 MIN: CPT | Mod: PBBFAC,PN | Performed by: FAMILY MEDICINE

## 2024-09-23 PROCEDURE — 99999 PR PBB SHADOW E&M-EST. PATIENT-LVL IV: CPT | Mod: PBBFAC,,, | Performed by: FAMILY MEDICINE

## 2024-09-23 RX ORDER — ESOMEPRAZOLE MAGNESIUM 40 MG/1
40 CAPSULE, DELAYED RELEASE ORAL
Qty: 90 CAPSULE | Refills: 3 | Status: SHIPPED | OUTPATIENT
Start: 2024-09-23

## 2024-09-23 RX ORDER — METHYLPREDNISOLONE 4 MG/1
TABLET ORAL
Qty: 1 EACH | Refills: 0 | Status: SHIPPED | OUTPATIENT
Start: 2024-09-23 | End: 2024-10-14

## 2024-09-23 RX ORDER — METHOCARBAMOL 500 MG/1
500 TABLET, FILM COATED ORAL 3 TIMES DAILY
Qty: 30 TABLET | Refills: 0 | Status: SHIPPED | OUTPATIENT
Start: 2024-09-23

## 2024-09-24 ENCOUNTER — LAB VISIT (OUTPATIENT)
Dept: LAB | Facility: HOSPITAL | Age: 69
End: 2024-09-24
Attending: FAMILY MEDICINE
Payer: MEDICARE

## 2024-09-24 DIAGNOSIS — R79.9 ABNORMAL FINDING OF BLOOD CHEMISTRY, UNSPECIFIED: ICD-10-CM

## 2024-09-24 DIAGNOSIS — R73.03 PREDIABETES: ICD-10-CM

## 2024-09-24 DIAGNOSIS — Z00.00 ROUTINE MEDICAL EXAM: ICD-10-CM

## 2024-09-24 LAB
ALBUMIN SERPL BCP-MCNC: 3.8 G/DL (ref 3.5–5.2)
ALP SERPL-CCNC: 61 U/L (ref 55–135)
ALT SERPL W/O P-5'-P-CCNC: 17 U/L (ref 10–44)
ANION GAP SERPL CALC-SCNC: 8 MMOL/L (ref 8–16)
AST SERPL-CCNC: 14 U/L (ref 10–40)
BILIRUB SERPL-MCNC: 0.8 MG/DL (ref 0.1–1)
BUN SERPL-MCNC: 19 MG/DL (ref 8–23)
CALCIUM SERPL-MCNC: 9.5 MG/DL (ref 8.7–10.5)
CHLORIDE SERPL-SCNC: 107 MMOL/L (ref 95–110)
CHOLEST SERPL-MCNC: 162 MG/DL (ref 120–199)
CHOLEST/HDLC SERPL: 3.6 {RATIO} (ref 2–5)
CO2 SERPL-SCNC: 26 MMOL/L (ref 23–29)
CREAT SERPL-MCNC: 0.7 MG/DL (ref 0.5–1.4)
ERYTHROCYTE [DISTWIDTH] IN BLOOD BY AUTOMATED COUNT: 13.5 % (ref 11.5–14.5)
EST. GFR  (NO RACE VARIABLE): >60 ML/MIN/1.73 M^2
ESTIMATED AVG GLUCOSE: 114 MG/DL (ref 68–131)
GLUCOSE SERPL-MCNC: 100 MG/DL (ref 70–110)
HBA1C MFR BLD: 5.6 % (ref 4–5.6)
HCT VFR BLD AUTO: 40.2 % (ref 37–48.5)
HDLC SERPL-MCNC: 45 MG/DL (ref 40–75)
HDLC SERPL: 27.8 % (ref 20–50)
HGB BLD-MCNC: 12.8 G/DL (ref 12–16)
LDLC SERPL CALC-MCNC: 90 MG/DL (ref 63–159)
MCH RBC QN AUTO: 29.6 PG (ref 27–31)
MCHC RBC AUTO-ENTMCNC: 31.8 G/DL (ref 32–36)
MCV RBC AUTO: 93 FL (ref 82–98)
NONHDLC SERPL-MCNC: 117 MG/DL
PLATELET # BLD AUTO: 308 K/UL (ref 150–450)
PMV BLD AUTO: 10.9 FL (ref 9.2–12.9)
POTASSIUM SERPL-SCNC: 4.1 MMOL/L (ref 3.5–5.1)
PROT SERPL-MCNC: 7.3 G/DL (ref 6–8.4)
RBC # BLD AUTO: 4.33 M/UL (ref 4–5.4)
SODIUM SERPL-SCNC: 141 MMOL/L (ref 136–145)
TRIGL SERPL-MCNC: 135 MG/DL (ref 30–150)
TSH SERPL DL<=0.005 MIU/L-ACNC: 1.49 UIU/ML (ref 0.4–4)
WBC # BLD AUTO: 7.27 K/UL (ref 3.9–12.7)

## 2024-09-24 PROCEDURE — 85027 COMPLETE CBC AUTOMATED: CPT | Performed by: FAMILY MEDICINE

## 2024-09-24 PROCEDURE — 80061 LIPID PANEL: CPT | Performed by: FAMILY MEDICINE

## 2024-09-24 PROCEDURE — 36415 COLL VENOUS BLD VENIPUNCTURE: CPT | Mod: PN | Performed by: FAMILY MEDICINE

## 2024-09-24 PROCEDURE — 83036 HEMOGLOBIN GLYCOSYLATED A1C: CPT | Performed by: FAMILY MEDICINE

## 2024-09-24 PROCEDURE — 84443 ASSAY THYROID STIM HORMONE: CPT | Performed by: FAMILY MEDICINE

## 2024-09-24 PROCEDURE — 80053 COMPREHEN METABOLIC PANEL: CPT | Performed by: FAMILY MEDICINE

## 2024-09-26 NOTE — PROGRESS NOTES
"      /70 (BP Location: Left arm, Patient Position: Sitting, BP Method: Medium (Manual))   Pulse 74   Ht 5' 5" (1.651 m)   Wt 102.4 kg (225 lb 12 oz)   SpO2 97%   BMI 37.57 kg/m²       ===========              Diane Garcia is a 69 y.o. female     here for    Annual exam.    Health maintenance reviewed with patient in detail inc any recent labs and studies and needs for future screening labs.  Age-appropriate vaccines and other age-appropriate screening studies reviewed with patient in detail.  Sleep health reviewed with patient.  Skin health regarding possible skin cancer screening reviewed with patient.  General regularity of bowel movements and urinations reviewed with patient including any possibility of urine leakage.  Vision screening reviewed with patient.      Patient queried and denies any further complaints      Patient Active Problem List   Diagnosis    MELLISA (obstructive sleep apnea)    Severe obesity (BMI 35.0-39.9) with comorbidity    Gastroesophageal reflux disease    Former smoker    Essential hypertension    Mixed hyperlipidemia    Abnormal gait    Weakness    Primary osteoarthritis of both knees    Prediabetes    Aortic atherosclerosis    Solitary pulmonary nodule    Carpal tunnel syndrome, right    Chronic left shoulder pain    Chronic foot pain, right       SURGICAL AND MEDICAL HISTORY: updated and reviewed.  Past Surgical History:   Procedure Laterality Date    BREAST BIOPSY Left 2013    benign needle    CARPAL TUNNEL RELEASE Right 7/19/2024    Procedure: RIGHT RELEASE, CARPAL TUNNEL;  Surgeon: Brandy Garg MD;  Location: [facility] OR;  Service: Orthopedics;  Laterality: Right;    COLONOSCOPY N/A 02/02/2023    Procedure: COLONOSCOPY;  Surgeon: Bob Grayson MD;  Location: [facility] ENDO;  Service: Endoscopy;  Laterality: N/A;    ESOPHAGOGASTRODUODENOSCOPY N/A 02/02/2023    Procedure: EGD (ESOPHAGOGASTRODUODENOSCOPY);  Surgeon: Bob Grayson MD;  Location: [facility] " ENDO;  Service: Endoscopy;  Laterality: N/A;    HYSTERECTOMY      OOPHORECTOMY      mary jo placed Right     femur right mary jo    mary jo removed Right     TOTAL KNEE ARTHROPLASTY Left 07/26/2023    Procedure: ARTHROPLASTY, KNEE, TOTAL;  Surgeon: NATALIO Greenwood MD;  Location: [facility];  Service: Orthopedics;  Laterality: Left;  Regional w/Catheter, Adductor, Spinal, 0.2% Ropivacaine     ALLERGIES updated and reviewed.  Review of patient's allergies indicates:   Allergen Reactions    Penicillins      nausea       CURRENT OUTPATIENT MEDICATIONS updated and reviewed    Current Outpatient Medications:     biotin 1 mg Cap, Take by mouth., Disp: , Rfl:     cartilage/collagen/bor/hyalur (JOINT HEALTH ORAL), Take by mouth., Disp: , Rfl:     celecoxib (CELEBREX) 200 MG capsule, Take 1 capsule (200 mg total) by mouth once daily. With food and water for maximum of 15 consecutive days, Disp: 30 capsule, Rfl: 0    esomeprazole (NEXIUM) 20 MG capsule, Take 20 mg by mouth before breakfast., Disp: , Rfl:     hydroCHLOROthiazide (HYDRODIURIL) 25 MG tablet, TAKE 1 TABLET(25 MG) BY MOUTH EVERY DAY, Disp: 90 tablet, Rfl: 1    lovastatin (MEVACOR) 40 MG tablet, Take 1 tablet (40 mg total) by mouth every evening., Disp: 90 tablet, Rfl: 3    olmesartan (BENICAR) 40 MG tablet, Take 1 tablet (40 mg total) by mouth once daily., Disp: 90 tablet, Rfl: 3    omega-3 fatty acids/fish oil (FISH OIL-OMEGA-3 FATTY ACIDS) 300-1,000 mg capsule, Take by mouth once daily., Disp: , Rfl:     TURMERIC ORAL, Take by mouth., Disp: , Rfl:     esomeprazole (NEXIUM) 40 MG capsule, Take 1 capsule (40 mg total) by mouth before breakfast., Disp: 90 capsule, Rfl: 3    methocarbamoL (ROBAXIN) 500 MG Tab, Take 1 tablet (500 mg total) by mouth 3 (three) times daily., Disp: 30 tablet, Rfl: 0    methylPREDNISolone (MEDROL DOSEPACK) 4 mg tablet, use as directed, Disp: 1 each, Rfl: 0  No current facility-administered medications for this visit.    Facility-Administered  "Medications Ordered in Other Visits:     ropivacaine 0.2% Nimbus PainPRO Pump infusion 500 ML, , Perineural, Continuous, Cornell Vela MD, New Bag at 07/26/23 4099    Review of Systems   Constitutional:  Negative for activity change, appetite change, chills, diaphoresis, fatigue, fever and unexpected weight change.   HENT:  Negative for congestion, ear discharge, ear pain, facial swelling, hearing loss, nosebleeds, postnasal drip, rhinorrhea, sinus pressure, sneezing, sore throat, tinnitus, trouble swallowing and voice change.    Eyes:  Negative for photophobia, pain, discharge, redness, itching and visual disturbance.   Respiratory:  Negative for cough, chest tightness, shortness of breath and wheezing.    Cardiovascular:  Negative for chest pain, palpitations and leg swelling.   Gastrointestinal:  Negative for abdominal distention, abdominal pain, anal bleeding, blood in stool, constipation, diarrhea, nausea, rectal pain and vomiting.   Endocrine: Negative for cold intolerance, heat intolerance, polydipsia, polyphagia and polyuria.   Genitourinary:  Negative for difficulty urinating, dysuria and flank pain.   Musculoskeletal:  Negative for arthralgias, back pain, joint swelling, myalgias and neck pain.   Skin:  Negative for rash.   Neurological:  Negative for dizziness, tremors, seizures, syncope, speech difficulty, weakness, light-headedness, numbness and headaches.   Psychiatric/Behavioral:  Negative for behavioral problems, confusion, decreased concentration, dysphoric mood, sleep disturbance and suicidal ideas. The patient is not nervous/anxious and is not hyperactive.        /70 (BP Location: Left arm, Patient Position: Sitting, BP Method: Medium (Manual))   Pulse 74   Ht 5' 5" (1.651 m)   Wt 102.4 kg (225 lb 12 oz)   SpO2 97%   BMI 37.57 kg/m²   Physical Exam  Vitals and nursing note reviewed.   Constitutional:       General: She is not in acute distress.     Appearance: Normal appearance. " She is well-developed. She is not ill-appearing or toxic-appearing.   HENT:      Head: Normocephalic and atraumatic.      Right Ear: Tympanic membrane, ear canal and external ear normal.      Left Ear: Tympanic membrane, ear canal and external ear normal.      Nose: Nose normal.      Mouth/Throat:      Lips: Pink.      Mouth: Mucous membranes are moist.      Pharynx: No oropharyngeal exudate or posterior oropharyngeal erythema.   Eyes:      General: No scleral icterus.        Right eye: No discharge.         Left eye: No discharge.      Extraocular Movements: Extraocular movements intact.      Conjunctiva/sclera: Conjunctivae normal.   Cardiovascular:      Rate and Rhythm: Normal rate and regular rhythm.      Pulses: Normal pulses.      Heart sounds: Normal heart sounds. No murmur heard.  Pulmonary:      Effort: Pulmonary effort is normal. No respiratory distress.      Breath sounds: Normal breath sounds. No wheezing or rales.   Abdominal:      General: Bowel sounds are normal. There is no distension.      Palpations: Abdomen is soft. There is no mass.      Tenderness: There is no abdominal tenderness. There is no right CVA tenderness, left CVA tenderness, guarding or rebound.      Hernia: No hernia is present.   Musculoskeletal:      Cervical back: Normal range of motion and neck supple. No rigidity or tenderness.   Lymphadenopathy:      Cervical: No cervical adenopathy.   Skin:     General: Skin is warm and dry.   Neurological:      General: No focal deficit present.      Mental Status: She is alert. Mental status is at baseline.   Psychiatric:         Mood and Affect: Mood normal.         Behavior: Behavior normal. Behavior is cooperative.         ASSESSMENT/PLAN    Diane was seen today for annual exam.    Diagnoses and all orders for this visit:    Routine medical exam  -     CBC Without Differential; Future  -     Comprehensive Metabolic Panel; Future  -     Lipid Panel; Future  -     TSH; Future  -      "Hemoglobin A1C; Future    Chronic left shoulder pain  -     Ambulatory referral/consult to Physical/Occupational Therapy; Future  -     methylPREDNISolone (MEDROL DOSEPACK) 4 mg tablet; use as directed  -     methocarbamoL (ROBAXIN) 500 MG Tab; Take 1 tablet (500 mg total) by mouth 3 (three) times daily.      Abnormal finding of blood chemistry, unspecified  -     CBC Without Differential; Future  -     Lipid Panel; Future  -     Hemoglobin A1C; Future    Prediabetes  -     TSH; Future  See A1c above.  Low carb diet.  Weight loss by calorie restriction and exercise.  Monitor.  Aortic atherosclerosis  Incidental finding on imaging.  Low-fat diet.  Consider statin.  Essential hypertension  Good control.  Continue low-sodium (2 g) diet.  Continue present management.  Monitor Q 6-12 months.    Mixed hyperlipidemia  Low-fat diet.  Weight loss by calorie restriction and exercise.  Consider statin.  Monitor    Former smoker  Continue cessation.  GERD.  GERD "hygiene".  Esomeprazole.  -     esomeprazole (NEXIUM) 40 MG capsule; Take 1 capsule (40 mg total) by mouth before breakfast.        Most recent some lab results reviewed with patient.  Any new prescription medications gone over in detail including reason for taking the medication, most common possible side effects and possible costs, etcetera.    Chronic conditions updated. Other than changes or additions as above, cont current medications and maintain follow-up with specialists if indicated.     Cornell Giraldo MD  A dictation device was used to produce this document. Use of such devices sometimes results in grammatical errors or replacement of words that sound similarly.                    "

## 2024-09-27 ENCOUNTER — OFFICE VISIT (OUTPATIENT)
Dept: ORTHOPEDICS | Facility: CLINIC | Age: 69
End: 2024-09-27
Payer: MEDICARE

## 2024-09-27 VITALS — BODY MASS INDEX: 37.57 KG/M2 | HEIGHT: 65 IN

## 2024-09-27 DIAGNOSIS — M19.079 ARTHRITIS OF FOOT: Primary | ICD-10-CM

## 2024-09-27 PROCEDURE — 99213 OFFICE O/P EST LOW 20 MIN: CPT | Mod: PBBFAC | Performed by: SURGERY

## 2024-09-27 PROCEDURE — 99999 PR PBB SHADOW E&M-EST. PATIENT-LVL III: CPT | Mod: PBBFAC,,, | Performed by: SURGERY

## 2024-09-27 NOTE — PROGRESS NOTES
"SUBJECTIVE:    Ms. Garcia is here today for a follow up visit.  She is referred by Dr. Greenwood.   She has a history of a right foot navicular injury which occurred more than 6 months ago.  She has seen multiple providers for this injury.  Initially she was told treated nonoperatively.  However over time she has seen providers who have advised surgery.  She presents for further discussion of surgical and nonsurgical options.        OBJECTIVE:      Vitals:    09/27/24 1442   Height: 5' 5" (1.651 m)   PainSc:   2   PainLoc: Foot       Lower Extremity Exam  She has mild swelling about the talonavicular joint, she was a prominence about the lateral aspect of the navicular.  She has a normal plantar grade foot.  She was able to perform a toe raise.  She is neurovascularly intact.  She does not have any planovalgus, pes planus, or cavovarus deformity.      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 Dorsal protrusion of the dorsal fragment and plantar protrusion of the smaller plantar fragment. Severe talonavicular osteoarthritis as evidenced by severe joint space narrowing with subchondral sclerosis and cyst formation   Subtalar arthrosis.    ASSESSMENT:   1. Talonavicular and subtalar arthrosis      PLAN:  There are no diagnoses linked to this encounter.    I discussed her pathology and my proposed treatment algorithm.  I do not feel she needs to rush to surgery at any time right now, she has no deformity, she is able to ambulate with a plantar grade foot, and she was minimal swelling or pain.  She had deformity persists or that piece further extruded superiorly, this may force her to take a more acute approach and potentially undergo surgery in a more expedited time for him, however I find this highly unlikely to occur given the chronicity of this injury.  Furthermore if she really wants to " avoid surgery, this can be managed nonoperatively at this time.  Discussed foot wear options including midfoot rocker bottoms and other management.  She can follow up with me in 3 months with standing x-rays of the right foot.  Discussed warning signs for which to present to the ER immediately including swelling, cracking or popping in the joint, notable prominences about the foot, fevers, chills, or other symptoms.  Patient expressed understanding.    Roger Wright MD  [facility]  Orthopedic Surgery      This note was done with voice recognition software. Please excuse any errors missed in proof reading.

## 2024-11-28 DIAGNOSIS — I10 ESSENTIAL HYPERTENSION: ICD-10-CM

## 2024-11-28 NOTE — TELEPHONE ENCOUNTER
No care due was identified.  [facility] Embedded Care Due Messages. Reference number: [id number].   11/28/2024 5:17:02 AM CST

## 2024-11-29 RX ORDER — OLMESARTAN MEDOXOMIL 40 MG/1
40 TABLET ORAL
Qty: 90 TABLET | Refills: 3 | Status: SHIPPED | OUTPATIENT
Start: 2024-11-29

## 2024-11-29 RX ORDER — HYDROCHLOROTHIAZIDE 25 MG/1
TABLET ORAL
Qty: 90 TABLET | Refills: 3 | Status: SHIPPED | OUTPATIENT
Start: 2024-11-29

## 2024-11-29 NOTE — TELEPHONE ENCOUNTER
Refill Decision Note   Diane Garcia  is requesting a refill authorization.  Brief Assessment and Rationale for Refill:  Approve     Medication Therapy Plan:         Comments:     Note composed:6:53 AM 11/29/2024

## 2024-12-04 NOTE — PRE-PROCEDURE INSTRUCTIONS
Chart review; triage plan initiated.    Spoke to patient regarding upcoming scheduled surgery.  Name, , and allergies verified.  Medical, surgical, and anesthesia history reviewed.  Instructed to hold vitamins, supplements and NSAIDs for one week prior to surgery (last day can take is ). Informed that the remaining medication instructions will be provided at the POC visit. Pt verbalized understanding.    HIPAA x2  SW Mei pt is still in rehab. All appts need to be rescheduled. Plan for d/c from rehab possibly next week. May need hospice to help with her care. Pt couldn't have PET done d/t having COVID. I asked that she keep us updated on pt status so that we can arrange her appts accordingly. Mei verbalized understanding and was appreciative of my call. Updated the pts team with this information.

## 2025-01-14 DIAGNOSIS — Z00.00 ENCOUNTER FOR MEDICARE ANNUAL WELLNESS EXAM: ICD-10-CM

## 2025-02-19 DIAGNOSIS — Z78.0 MENOPAUSE: ICD-10-CM

## 2025-03-24 ENCOUNTER — LAB VISIT (OUTPATIENT)
Dept: LAB | Facility: HOSPITAL | Age: 70
End: 2025-03-24
Attending: FAMILY MEDICINE
Payer: MEDICARE

## 2025-03-24 ENCOUNTER — OFFICE VISIT (OUTPATIENT)
Dept: PRIMARY CARE CLINIC | Facility: CLINIC | Age: 70
End: 2025-03-24
Payer: MEDICARE

## 2025-03-24 VITALS
HEIGHT: 65 IN | HEART RATE: 83 BPM | DIASTOLIC BLOOD PRESSURE: 64 MMHG | WEIGHT: 224 LBS | SYSTOLIC BLOOD PRESSURE: 126 MMHG | BODY MASS INDEX: 37.32 KG/M2 | OXYGEN SATURATION: 96 %

## 2025-03-24 DIAGNOSIS — E66.01 SEVERE OBESITY (BMI 35.0-39.9) WITH COMORBIDITY: ICD-10-CM

## 2025-03-24 DIAGNOSIS — M25.50 ARTHRALGIA, UNSPECIFIED JOINT: Primary | ICD-10-CM

## 2025-03-24 DIAGNOSIS — M25.50 ARTHRALGIA, UNSPECIFIED JOINT: ICD-10-CM

## 2025-03-24 LAB
CK SERPL-CCNC: 20 U/L (ref 20–180)
CRP SERPL-MCNC: 2.9 MG/L
ERYTHROCYTE [SEDIMENTATION RATE] IN BLOOD BY PHOTOMETRIC METHOD: 7 MM/HR

## 2025-03-24 PROCEDURE — 85652 RBC SED RATE AUTOMATED: CPT

## 2025-03-24 PROCEDURE — 99214 OFFICE O/P EST MOD 30 MIN: CPT | Mod: PBBFAC,PN | Performed by: FAMILY MEDICINE

## 2025-03-24 PROCEDURE — 86140 C-REACTIVE PROTEIN: CPT

## 2025-03-24 PROCEDURE — 36415 COLL VENOUS BLD VENIPUNCTURE: CPT | Mod: PN

## 2025-03-24 PROCEDURE — 82550 ASSAY OF CK (CPK): CPT

## 2025-03-24 PROCEDURE — 99999 PR PBB SHADOW E&M-EST. PATIENT-LVL IV: CPT | Mod: PBBFAC,,, | Performed by: FAMILY MEDICINE

## 2025-03-24 PROCEDURE — 99214 OFFICE O/P EST MOD 30 MIN: CPT | Mod: S$PBB,,, | Performed by: FAMILY MEDICINE

## 2025-03-24 RX ORDER — METHYLPREDNISOLONE 4 MG/1
TABLET ORAL
Qty: 1 EACH | Refills: 0 | Status: SHIPPED | OUTPATIENT
Start: 2025-03-24 | End: 2025-04-14

## 2025-03-26 ENCOUNTER — RESULTS FOLLOW-UP (OUTPATIENT)
Dept: PRIMARY CARE CLINIC | Facility: CLINIC | Age: 70
End: 2025-03-26

## 2025-03-27 PROBLEM — I70.0 AORTIC ATHEROSCLEROSIS: Status: RESOLVED | Noted: 2023-06-22 | Resolved: 2025-03-27

## 2025-03-27 PROBLEM — M25.50 ARTHRALGIA: Status: ACTIVE | Noted: 2025-03-27

## 2025-03-27 NOTE — PROGRESS NOTES
"      /64 (BP Location: Right arm, Patient Position: Sitting)   Pulse 83   Ht 5' 5" (1.651 m)   Wt 101.6 kg (223 lb 15.8 oz)   SpO2 96%   BMI 37.27 kg/m²       ===========              Diane Garcia is a 69 y.o. female           History of Present Illness              69-year-old female complains of increasing arthralgias.  No myalgias specifically.  She is on a statin.  Previous CK was normal.      Patient queried and denies any further complaints      Problem List[1]    SURGICAL AND MEDICAL HISTORY: updated and reviewed.  Past Surgical History:   Procedure Laterality Date    BREAST BIOPSY Left 2013    benign needle    CARPAL TUNNEL RELEASE Right 7/19/2024    Procedure: RIGHT RELEASE, CARPAL TUNNEL;  Surgeon: Brandy Garg MD;  Location: [facility];  Service: Orthopedics;  Laterality: Right;    COLONOSCOPY N/A 02/02/2023    Procedure: COLONOSCOPY;  Surgeon: Bob Grayson MD;  Location: [facility];  Service: Endoscopy;  Laterality: N/A;    ESOPHAGOGASTRODUODENOSCOPY N/A 02/02/2023    Procedure: EGD (ESOPHAGOGASTRODUODENOSCOPY);  Surgeon: Bob Grayson MD;  Location: [facility];  Service: Endoscopy;  Laterality: N/A;    HYSTERECTOMY      OOPHORECTOMY      mary jo placed Right     femur right mary jo    mary jo removed Right     TOTAL KNEE ARTHROPLASTY Left 07/26/2023    Procedure: ARTHROPLASTY, KNEE, TOTAL;  Surgeon: NATALIO Greenwood MD;  Location: [facility];  Service: Orthopedics;  Laterality: Left;  Regional w/Catheter, Adductor, Spinal, 0.2% Ropivacaine     ALLERGIES updated and reviewed.  Review of patient's allergies indicates:   Allergen Reactions    Penicillins      nausea       CURRENT OUTPATIENT MEDICATIONS updated and reviewed  Current Medications[2]    Review of Systems   Constitutional:  Negative for activity change, appetite change, chills, diaphoresis, fatigue, fever and unexpected weight change.   HENT:  Negative for congestion, ear discharge, ear pain, facial swelling, " "hearing loss, nosebleeds, postnasal drip, rhinorrhea, sinus pressure, sneezing, sore throat, tinnitus, trouble swallowing and voice change.    Eyes:  Negative for photophobia, pain, discharge, redness, itching and visual disturbance.   Respiratory:  Negative for cough, chest tightness, shortness of breath and wheezing.    Cardiovascular:  Negative for chest pain, palpitations and leg swelling.   Gastrointestinal:  Negative for abdominal distention, abdominal pain, anal bleeding, blood in stool, constipation, diarrhea, nausea, rectal pain and vomiting.   Endocrine: Negative for cold intolerance, heat intolerance, polydipsia, polyphagia and polyuria.   Genitourinary:  Negative for difficulty urinating, dysuria and flank pain.   Musculoskeletal:  Positive for arthralgias. Negative for back pain, joint swelling, myalgias and neck pain.   Skin:  Negative for rash.   Neurological:  Negative for dizziness, tremors, seizures, syncope, speech difficulty, weakness, light-headedness, numbness and headaches.   Psychiatric/Behavioral:  Negative for behavioral problems, confusion, decreased concentration, dysphoric mood, sleep disturbance and suicidal ideas. The patient is not nervous/anxious and is not hyperactive.        /64 (BP Location: Right arm, Patient Position: Sitting)   Pulse 83   Ht 5' 5" (1.651 m)   Wt 101.6 kg (223 lb 15.8 oz)   SpO2 96%   BMI 37.27 kg/m²   Physical Exam  Vitals and nursing note reviewed.   Constitutional:       General: She is not in acute distress.     Appearance: Normal appearance. She is not ill-appearing or diaphoretic.   HENT:      Head: Normocephalic and atraumatic.   Eyes:      General: No scleral icterus.        Right eye: No discharge.         Left eye: No discharge.      Conjunctiva/sclera: Conjunctivae normal.   Skin:     General: Skin is warm and dry.   Neurological:      Mental Status: She is alert.   Psychiatric:         Mood and Affect: Mood normal.         Behavior: " Behavior normal.           ASSESSMENT/PLAN    Assessment & Plan                Diagnoses and all orders for this visit:    Arthralgia, unspecified joint  -     Ambulatory referral/consult to Rheumatology; Future  -     C-Reactive Protein; Future  -     Sedimentation rate; Future  -     CK; Future  Medrol Dosepak.  Take as directed.  Continue weight loss by calorie restriction and exercise.  Check labs above.  Referral as above.  Severe obesity (BMI 35.0-39.9) with comorbidity  Weight loss by calorie restriction and exercise.  Monitor.          Most recent some lab results reviewed with patient.  Any new prescription medications gone over in detail including reason for taking the medication, most common possible side effects and possible costs, etcetera.    Chronic conditions updated. Other than changes or additions as above, cont current medications and maintain follow-up with specialists if indicated.     - Cautioned the patient against excessive use of internet searches for interpreting results before professional review.     Cornell Giraldo MD    Disclaimer:  This clinical note was created with the assistance of WhoSay, an AI-powered documentation tool.  Portions of this note may also have been dictated with the assistance of a computer-assisted dictation program.  While the healthcare provider has reviewed the content, AI-assisted documentation and/or dictation programs may contain inadvertent errors or omissions.  Patients are encouraged to discuss questions or clarifications regarding their care directly with the provider.                         [1]   Patient Active Problem List  Diagnosis    MELLISA (obstructive sleep apnea)    Severe obesity (BMI 35.0-39.9) with comorbidity    Gastroesophageal reflux disease    Former smoker    Essential hypertension    Mixed hyperlipidemia    Abnormal gait    Weakness    Primary osteoarthritis of both knees    Prediabetes    Solitary pulmonary nodule    Carpal tunnel  syndrome, right    Chronic left shoulder pain    Chronic foot pain, right    Arthralgia   [2]   Current Outpatient Medications:     biotin 1 mg Cap, Take by mouth., Disp: , Rfl:     cartilage/collagen/bor/hyalur (JOINT HEALTH ORAL), Take by mouth., Disp: , Rfl:     esomeprazole (NEXIUM) 20 MG capsule, Take 20 mg by mouth before breakfast., Disp: , Rfl:     esomeprazole (NEXIUM) 40 MG capsule, Take 1 capsule (40 mg total) by mouth before breakfast., Disp: 90 capsule, Rfl: 3    hydroCHLOROthiazide (HYDRODIURIL) 25 MG tablet, TAKE 1 TABLET(25 MG) BY MOUTH EVERY DAY, Disp: 90 tablet, Rfl: 3    lovastatin (MEVACOR) 40 MG tablet, Take 1 tablet (40 mg total) by mouth every evening., Disp: 90 tablet, Rfl: 3    methocarbamoL (ROBAXIN) 500 MG Tab, Take 1 tablet (500 mg total) by mouth 3 (three) times daily., Disp: 30 tablet, Rfl: 0    olmesartan (BENICAR) 40 MG tablet, TAKE 1 TABLET(40 MG) BY MOUTH EVERY DAY, Disp: 90 tablet, Rfl: 3    omega-3 fatty acids/fish oil (FISH OIL-OMEGA-3 FATTY ACIDS) 300-1,000 mg capsule, Take by mouth once daily., Disp: , Rfl:     TURMERIC ORAL, Take by mouth., Disp: , Rfl:     methylPREDNISolone (MEDROL DOSEPACK) 4 mg tablet, use as directed, Disp: 1 each, Rfl: 0  No current facility-administered medications for this visit.    Facility-Administered Medications Ordered in Other Visits:     ropivacaine 0.2% Nimbus PainPRO Pump infusion 500 ML, , Perineural, Continuous, Cornell Vela MD, New Bag at 07/26/23 4596

## 2025-04-25 NOTE — PROGRESS NOTES
CC: Right knee osteoarthritis, Right shoulder pain     DATE OF PROCEDURE: 7/26/2023   PROCEDURES PERFORMED:   Left total knee arthroplasty (CPT 46968)    Diane Garcia returns today for 2 complaints of both right knee pain and right shoulder pain.  She is a patient well known to me.  At last appointment I saw her for her left shoulder as well.  Diagnosed with some degree of underlying suspected glenohumeral degenerative changes.  At last appointment, 8/29/2024, patient underwent left shoulder glenohumeral CSI. Patient reports 85% relief for 7 months.  Ongoing relief.  The left side is not particularly bothersome at this time.  The right side however has been painful over the last few months.  Localizes posteriorly.  Worse with repetitive overhead activity.  Better with rest.  She denies any known antecedent trauma.  No prior injections.  No PT for either shoulder.  Additionally she has ongoing intermittent right knee pain related to DJD.  Doing well with the left knee.  She is requesting a repeat steroid injection for the right knee.    Prior Hx 1/222024:   Diane Garcia, presents today for follow up appointment of her left knee. Patient is just under 6 months status post above procedure.  States the knee feels very good.  Has some intermittent mild discomfort with going up and downstairs but nothing too significant.  Very pleased with her outcome to this point.  She states that the right navicular issue she has had is getting better.  She is out of the boot.  She still has some pain with ambulation but heading in the right direction.  Please see prior notes for details.  She also has known right knee osteoarthritis.  She thinks she might want to proceed with a right total knee arthroplasty over the summer.  She would like to do a repeat right knee steroid injection today.    Prior Hx 10/19/2023:  69 y.o. Female returns for scheduled follow-up of her left knee.  States the knee feels good.  Has some  intermittent pain going up and down stairs and getting in and out of a car.  Localizes anteriorly.  Usually transient and not too significant.  Overall making good progress.  She seems pleased.  Remains in a walking boot for her right foot.  Seeing Dr. Field Deunas for this.  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  I do not have his records for review.  She does feel that the foot is doing better.  She also states that she is very happy we proceeded with her left total knee replacement despite her right foot issue.    REVIEW OF SYSTEMS:   Constitution: Negative. Negative for chills, fever and night sweats.    Hematologic/Lymphatic: Negative for bleeding problem. Does not bruise/bleed easily.   Skin: Negative for dry skin, itching and rash.   Musculoskeletal: Negative for falls. Positive for right knee and shoulder pain and  muscle weakness.     PAST MEDICAL HISTORY:   Past Medical History:   Diagnosis Date    Arthritis     GERD (gastroesophageal reflux disease)     Hyperlipidemia     Hypertension      PAST SURGICAL HISTORY:   Past Surgical History:   Procedure Laterality Date    BREAST BIOPSY Left 2013    benign needle    CARPAL TUNNEL RELEASE Right 7/19/2024    Procedure: RIGHT RELEASE, CARPAL TUNNEL;  Surgeon: Brandy Garg MD;  Location: [facility];  Service: Orthopedics;  Laterality: Right;    COLONOSCOPY N/A 02/02/2023    Procedure: COLONOSCOPY;  Surgeon: Bob Grayson MD;  Location: [facility];  Service: Endoscopy;  Laterality: N/A;    ESOPHAGOGASTRODUODENOSCOPY N/A 02/02/2023    Procedure: EGD (ESOPHAGOGASTRODUODENOSCOPY);  Surgeon: Bob Grayson MD;  Location: [facility];  Service: Endoscopy;  Laterality: N/A;    HYSTERECTOMY      OOPHORECTOMY      mary jo placed Right     femur right mary jo    mary jo removed Right     TOTAL KNEE ARTHROPLASTY Left 07/26/2023    Procedure: ARTHROPLASTY, KNEE, TOTAL;  Surgeon: NATALIO Greenwood  MD;  Location: [facility] OR;  Service: Orthopedics;  Laterality: Left;  Regional w/Catheter, Adductor, Spinal, 0.2% Ropivacaine     FAMILY HISTORY:   Family History   Problem Relation Name Age of Onset    Cancer Mother Smoker         Lung    COPD Father Smoker     No Known Problems Sister      Melanoma Neg Hx       SOCIAL HISTORY:   Social History     Socioeconomic History    Marital status:     Number of children: 1   Tobacco Use    Smoking status: Former     Current packs/day: 0.00     Average packs/day: 2.0 packs/day for 25.0 years (50.0 ttl pk-yrs)     Types: Cigarettes     Start date: 1975     Quit date: 2000     Years since quittin.4    Smokeless tobacco: Never   Substance and Sexual Activity    Alcohol use: Yes     Comment: A few drinks a week    Drug use: Never     Comment: last use >30 yrs ago     Sexual activity: Yes     Partners: Male   Social History Narrative    Stairs- 6 to enter     Social Drivers of Health     Financial Resource Strain: Low Risk  (2025)    Overall Financial Resource Strain (CARDIA)     Difficulty of Paying Living Expenses: Not hard at all   Food Insecurity: No Food Insecurity (2025)    Hunger Vital Sign     Worried About Running Out of Food in the Last Year: Never true     Ran Out of Food in the Last Year: Never true   Transportation Needs: No Transportation Needs (2025)    PRAPARE - Transportation     Lack of Transportation (Medical): No     Lack of Transportation (Non-Medical): No   Physical Activity: Sufficiently Active (2025)    Exercise Vital Sign     Days of Exercise per Week: 5 days     Minutes of Exercise per Session: 40 min   Stress: No Stress Concern Present (2025)    [employer] [place] [employer] - Occupational Stress Questionnaire     Feeling of Stress : Not at all   Housing Stability: Low Risk  (2025)    Housing Stability Vital Sign     Unable to Pay for Housing in the Last Year: No      "Number of Times Moved in the Last Year: 0     Homeless in the Last Year: No     MEDICATIONS:     Current Outpatient Medications:     biotin 1 mg Cap, Take by mouth., Disp: , Rfl:     cartilage/collagen/bor/hyalur (JOINT HEALTH ORAL), Take by mouth., Disp: , Rfl:     esomeprazole (NEXIUM) 20 MG capsule, Take 20 mg by mouth before breakfast., Disp: , Rfl:     esomeprazole (NEXIUM) 40 MG capsule, Take 1 capsule (40 mg total) by mouth before breakfast., Disp: 90 capsule, Rfl: 3    hydroCHLOROthiazide (HYDRODIURIL) 25 MG tablet, TAKE 1 TABLET(25 MG) BY MOUTH EVERY DAY, Disp: 90 tablet, Rfl: 3    lovastatin (MEVACOR) 40 MG tablet, Take 1 tablet (40 mg total) by mouth every evening., Disp: 90 tablet, Rfl: 3    methocarbamoL (ROBAXIN) 500 MG Tab, Take 1 tablet (500 mg total) by mouth 3 (three) times daily., Disp: 30 tablet, Rfl: 0    olmesartan (BENICAR) 40 MG tablet, TAKE 1 TABLET(40 MG) BY MOUTH EVERY DAY, Disp: 90 tablet, Rfl: 3    omega-3 fatty acids/fish oil (FISH OIL-OMEGA-3 FATTY ACIDS) 300-1,000 mg capsule, Take by mouth once daily., Disp: , Rfl:     TURMERIC ORAL, Take by mouth., Disp: , Rfl:   No current facility-administered medications for this visit.    Facility-Administered Medications Ordered in Other Visits:     ropivacaine 0.2% Nimbus PainPRO Pump infusion 500 ML, , Perineural, Continuous, Cornell Vela MD, New Bag at 07/26/23 0017    ALLERGIES:   Review of patient's allergies indicates:   Allergen Reactions    Penicillins      nausea      PHYSICAL EXAMINATION:  /79   Pulse 67   Ht 5' 5" (1.651 m)   Wt 99.8 kg (220 lb 0.3 oz)   BMI 36.61 kg/m²   General: Well-developed well-nourished 69 y.o. femalein no acute distress   Cardiovascular: Regular rhythm by palpation of distal pulse, normal color and temperature, no concerning varicosities on symptomatic side   Lungs: No labored breathing or wheezing appreciated   Neuro: Alert and oriented ×3   Psychiatric: well oriented to " person, place and time, demonstrates normal mood and affect   Skin: No rashes, lesions or ulcers, normal temperature, turgor, and texture on involved extremity    Ortho/SPM Exam  Exam of the right knee again shows full active extension, active assisted flexion to 120° with some pain.  Positive patellar crepitus and grind.  Pain over the medial and lateral joint lines.  Pain medially with varus load.  Standing varus alignment.  Ligamentously stable.  No significant peripheral vascular disease.  2+ dorsalis pedis pulse.    Exam of the left knee shows painless arc of motion.  Nontender to palpation.  Well-healed midline incision.  Ligamentously stable.    Examination of the right shoulder demonstrates predominant pain to palpation of the posterior glenohumeral joint line.  Positive compression rotation test for typical pain.  Localizes deep.  Positive glenohumeral grind test.  Otherwise she is nontender over Codman's point and the proximal biceps groove. Negative AC tenderness. Active forward elevation to 160, ER with arm at side to 50, IR to T12. Passive FE to 180, ER to 60. 5-/5 resisted supraspinatus testing.  No pain on resisted scaption.  5-/5 resisted infraspinatus testing.  Normal belly press test.  Stable shoulder. No midline neck tenderness. Negative Spurling's maneuver.     IMAGING:  X-rays including standing, weight bearing AP and flexion bilateral knees, BILATERAL knee lateral and sunrise views ordered and images reviewed by me show:    Advanced tricompartmental DJD of the right knee.  Bone-on-bone articulation medially. KL4.  Well-fixed and positioned left total knee prosthesis.    Xrays including AP, Outlet and Axillary Lateral of Right shoulder are ordered / images reviewed by me:   Mild glenohumeral degenerative changes.  Advanced AC arthropathy.    ASSESSMENT:      ICD-10-CM ICD-9-CM   1. Primary osteoarthritis of right knee  M17.11 715.16   2. Chronic pain of right knee  M25.561 719.46    G89.29  338.29   3. Chronic right shoulder pain  M25.511 719.41    G89.29 338.29   4. History of left knee replacement  Z96.652 V43.65       PLAN:     Clinically the patient is doing well with her left knee.  She has no significant pain on repeat exam of the left shoulder.  Chief complaint of right shoulder pain localized posteriorly and deep.  Similar presentation to what we saw with her left shoulder back last year.  She is requesting a steroid injection.  I think that is reasonable.  She does have fairly good cuff strength on exam without significant pain on resisted cuff testing.  I do think she would benefit from formal physical therapy for both shoulders.  PT referral placed.  She does not feel that she is ready for knee replacement on the right side.  Continue rehab for that.  -R glenohumeral CSI   -R knee CSI  -PT referral to [facility] for bilateral shoulders    Large Joint Aspiration/Injection: R knee    Date/Time: 4/28/2025 3:30 PM    Performed by: NATALIO Greenwood MD  Authorized by: NATALIO Greenwood MD    Consent Done?:  Yes (Verbal)  Indications:  Pain  Site marked: the procedure site was marked    Timeout: prior to procedure the correct patient, procedure, and site was verified    Prep: patient was prepped and draped in usual sterile fashion      Local anesthesia used?: Yes    Local anesthetic:  Co-phenylcaine spray (0.2% Naropin)  Anesthetic total (ml):  4      Details:  Needle Size:  22 G  Ultrasonic Guidance for needle placement?: No    Approach:  Lateral  Location:  Knee  Site:  R knee  Medications:  40 mg triamcinolone acetonide 40 mg/mL  Patient tolerance:  Patient tolerated the procedure well with no immediate complications  Large Joint Aspiration/Injection: R glenohumeral    Date/Time: 4/28/2025 3:30 PM    Performed by: NATALIO Greenwood MD  Authorized by: NATALIO Greenwood MD    Consent Done?:  Yes (Verbal)  Indications:  Pain  Site marked: the procedure site was marked    Timeout: prior to  procedure the correct patient, procedure, and site was verified    Prep: patient was prepped and draped in usual sterile fashion      Local anesthesia used?: Yes    Local anesthetic:  Co-phenylcaine spray (0.2% Naropin)  Anesthetic total (ml):  4      Details:  Needle Size:  22 G  Approach:  Superior  Location:  Shoulder  Site:  R glenohumeral  Medications:  40 mg triamcinolone acetonide 40 mg/mL  Patient tolerance:  Patient tolerated the procedure well with no immediate complications

## 2025-04-28 ENCOUNTER — HOSPITAL ENCOUNTER (OUTPATIENT)
Dept: RADIOLOGY | Facility: HOSPITAL | Age: 70
Discharge: HOME | End: 2025-04-28
Attending: ORTHOPAEDIC SURGERY
Payer: MEDICARE

## 2025-04-28 ENCOUNTER — OFFICE VISIT (OUTPATIENT)
Dept: SPORTS MEDICINE | Facility: CLINIC | Age: 70
End: 2025-04-28
Payer: MEDICARE

## 2025-04-28 ENCOUNTER — TELEPHONE (OUTPATIENT)
Dept: PRIMARY CARE CLINIC | Facility: CLINIC | Age: 70
End: 2025-04-28
Payer: MEDICARE

## 2025-04-28 VITALS
WEIGHT: 220 LBS | HEART RATE: 67 BPM | SYSTOLIC BLOOD PRESSURE: 132 MMHG | BODY MASS INDEX: 36.65 KG/M2 | HEIGHT: 65 IN | DIASTOLIC BLOOD PRESSURE: 79 MMHG

## 2025-04-28 DIAGNOSIS — Z12.31 ENCOUNTER FOR SCREENING MAMMOGRAM FOR BREAST CANCER: Primary | ICD-10-CM

## 2025-04-28 DIAGNOSIS — G89.29 CHRONIC PAIN OF RIGHT KNEE: ICD-10-CM

## 2025-04-28 DIAGNOSIS — M25.511 RIGHT SHOULDER PAIN, UNSPECIFIED CHRONICITY: ICD-10-CM

## 2025-04-28 DIAGNOSIS — G89.29 CHRONIC RIGHT SHOULDER PAIN: ICD-10-CM

## 2025-04-28 DIAGNOSIS — Z96.652 HISTORY OF LEFT KNEE REPLACEMENT: ICD-10-CM

## 2025-04-28 DIAGNOSIS — M25.511 CHRONIC RIGHT SHOULDER PAIN: ICD-10-CM

## 2025-04-28 DIAGNOSIS — M25.561 CHRONIC PAIN OF RIGHT KNEE: ICD-10-CM

## 2025-04-28 DIAGNOSIS — M25.561 RIGHT KNEE PAIN, UNSPECIFIED CHRONICITY: ICD-10-CM

## 2025-04-28 DIAGNOSIS — M17.11 PRIMARY OSTEOARTHRITIS OF RIGHT KNEE: Primary | ICD-10-CM

## 2025-04-28 PROCEDURE — 20610 DRAIN/INJ JOINT/BURSA W/O US: CPT | Mod: PBBFAC,RT | Performed by: ORTHOPAEDIC SURGERY

## 2025-04-28 PROCEDURE — 73564 X-RAY EXAM KNEE 4 OR MORE: CPT | Mod: 26,50,, | Performed by: RADIOLOGY

## 2025-04-28 PROCEDURE — 73030 X-RAY EXAM OF SHOULDER: CPT | Mod: 26,RT,, | Performed by: RADIOLOGY

## 2025-04-28 PROCEDURE — 73030 X-RAY EXAM OF SHOULDER: CPT | Mod: TC,RT

## 2025-04-28 PROCEDURE — 99215 OFFICE O/P EST HI 40 MIN: CPT | Mod: 25,S$PBB,, | Performed by: ORTHOPAEDIC SURGERY

## 2025-04-28 PROCEDURE — 99999 PR PBB SHADOW E&M-EST. PATIENT-LVL IV: CPT | Mod: PBBFAC,,, | Performed by: ORTHOPAEDIC SURGERY

## 2025-04-28 PROCEDURE — 73564 X-RAY EXAM KNEE 4 OR MORE: CPT | Mod: TC,50

## 2025-04-28 PROCEDURE — 99214 OFFICE O/P EST MOD 30 MIN: CPT | Mod: PBBFAC,25 | Performed by: ORTHOPAEDIC SURGERY

## 2025-04-28 PROCEDURE — 99999PBSHW PR PBB SHADOW TECHNICAL ONLY FILED TO HB: Mod: PBBFAC,,,

## 2025-04-28 RX ADMIN — TRIAMCINOLONE ACETONIDE 40 MG: 40 INJECTION, SUSPENSION INTRA-ARTICULAR; INTRAMUSCULAR at 03:04

## 2025-04-28 NOTE — TELEPHONE ENCOUNTER
----- Message from Sally Cantu sent at 4/28/2025 10:30 AM CDT -----  Regarding: orders needed  ORDER REQUESTOrder Needed: Mammo Digital Screening Bilat w/ TomoAdditional Info:  last mmg 10/03/2023, please place orders and contact pt to schedule, thanks!

## 2025-04-29 RX ORDER — TRIAMCINOLONE ACETONIDE 40 MG/ML
40 INJECTION, SUSPENSION INTRA-ARTICULAR; INTRAMUSCULAR
Status: DISCONTINUED | OUTPATIENT
Start: 2025-04-28 | End: 2025-04-29 | Stop reason: HOSPADM

## 2025-05-01 NOTE — PROGRESS NOTES
Outpatient Rehab    Physical Therapy Evaluation    Patient Name: Diane Garcia  MRN: [id number]  YOB: 1955  Encounter Date: 5/5/2025    Therapy Diagnosis:   Encounter Diagnosis   Name Primary?    Chronic pain of both shoulders      Physician: NATALIO Greenwood MD    Physician Orders: Eval and Treat  Medical Diagnosis: Chronic right shoulder pain    Visit # / Visits Authorized:  1 / 1  Insurance Authorization Period: 4/28/2025 to 4/28/2026  Date of Evaluation: 5/5/2025  Plan of Care Certification: 5/5/2025 to 7/14/2025     Time In: 1330   Time Out: 1436  Total Time (in minutes): 66   Total Billable Time (in minutes): 35    Intake Outcome Measure for FOTO Survey    Therapist reviewed FOTO scores for Diane Garcia on 5/5/2025.   FOTO report - see Media section or FOTO account episode details.     Intake Score: 62%         Subjective   History of Present Illness  Diane is a 69 y.o. female who reports to physical therapy with a chief concern of Bilateral Shoulder Pain.     The patient reports a medical diagnosis of Chronic right shoulder pain.    Diagnostic tests related to this condition: X-ray.   X-Ray Details: (4/28/2025) - Mild moderate DJD AC joint and minimal mild DJD glenohumeral joint with hypertrophic change anterior humeral head.    Dominant Hand: Right  History of Present Condition/Illness: Bilateral shoulder pain that started in March. Received a CSI from Dr. Greenwood for her left shoulder which resolved her symptoms. Right shoulder began to hurt the same so she received another CSI which has resolved her symptoms again. She denies any numbness or tingling. Used to have aching symptoms beyond the elbow. Had carpal tunnel surgery in the right hand approximately 6 months ago. She denies any mechanism of injury or trauma. Possible overuse injury from raising her granddaughter up.    Activities of Daily Living  Social history was obtained from Patient.               Previously independent with  activities of daily living? Yes     Currently independent with activities of daily living? Yes          Previously independent with instrumental activities of daily living? Yes     Currently independent with instrumental activities of daily living? Yes              Pain     Patient reports a current pain level of 0/10. Pain at best is reported as 0/10. Pain at worst is reported as 10/10.   Location: Anterior Shoulder  Clinical Progression (since onset): Stable  Pain Qualities: Aching, Sharp  Pain-Relieving Factors: Medications - prescription, Activity modification  Pain-Aggravating Factors: Sleeping, Reaching, Other (Comment)  Other Pain-Aggravating Factors: Carrying and lifting         Review of Systems  Patient denies: Bladder Incontinence, Bowel Incontinence, Chest Pain, Dizziness, Fainting, Fever, Headache, Lower Extremity Neurological Deficits, Motion Sickness, Nausea, Night Sweats/Chills, Night Pain, Saddle Numbness, Shortness of Breath, Sleep Disturbance, Tinnitus, Weight Gain, and Weight Loss        Treatment History  Treatments  Previously Received Treatments: Yes  Currently Receiving Treatments: No  Additional Treatment Details: Cortisone injection on 4/28/2025    Living Arrangements  Living Situation  Housing: Home independently  Living Arrangements: Alone  Support Systems: Children        Employment  Patient does not report that: Does the patient's condition impact their ability to work?  Employment Status: Retired   Former restaurant owner.      Past Medical History/Physical Systems Review:   Diane Garcia  has a past medical history of Arthritis, GERD (gastroesophageal reflux disease), Hyperlipidemia, and Hypertension.    Diane Garcia  has a past surgical history that includes Hysterectomy; Oophorectomy; Esophagogastroduodenoscopy (N/A, 02/02/2023); Colonoscopy (N/A, 02/02/2023); mary jo placed (Right); mary jo removed (Right); Total knee arthroplasty (Left, 07/26/2023); Breast biopsy (Left, 2013); and  Carpal tunnel release (Right, 7/19/2024).    Diane has a current medication list which includes the following prescription(s): biotin, cartilage/collagen/bor/hyalur, esomeprazole, esomeprazole, hydrochlorothiazide, lovastatin, methocarbamol, olmesartan, fish oil-omega-3 fatty acids, and turmeric, and the following Facility-Administered Medications: ropivacaine hcl/pf.    Review of patient's allergies indicates:   Allergen Reactions    Penicillins      nausea        Objective   Posture  Patient presents with a Forward head position. Flat lumbar spine and Increased thoracic kyphosis is observed.     Bilateral scapulae are: Depressed         Anterior right SC joint    Upper Extremity Sensation  Right Upper Extremity Sensation  Intact: Light Touch       Left Upper Extremity Sensation  Intact: Light Touch            Right Dermatomes  Right Cervical Dermatome Light Touch  Intact: C4, C5, C6, C7, and C8  Right Upper Thoracic Dermatome Light Touch  Intact: T1       Left Dermatomes  Left Cervical Dermatome Light Touch  Intact: C4, C5, C6, C7, and C8  Left Upper Thoracic Dermatome Light Touch  Intact: T1           Right Upper Extremity Reflexes       Biceps, C5-C6: Normal (2+)    Brachioradialis, C6: Normal (2+)    Triceps, C7: Normal (2+)    Jordan's Sign: No    Left Upper Extremity Reflexes       Biceps, C5-C6: Normal (2+)    Brachioradialis, C6: Normal (2+)    Triceps, C7: Normal (2+)    Jordan's Sign: No        Subcranial Range of Motion   Active Restricted? Passive Restricted? Pain   Flexion         Protraction         Retraction           Cervical Range of Motion   Active (deg) Passive (deg) Pain   Flexion 40       Extension 35       Right Lateral Flexion 25       Right Rotation 55       Left Lateral Flexion 25       Left Rotation 55                Shoulder Range of Motion  Right Shoulder   Active (deg) Passive (deg) Pain   Flexion 160 180     Extension         Scaption         ABduction 180 180     ADduction          Horizontal ABduction         Horizontal ADduction         External Rotation (Shoulder ABducted 0 degrees)         External Rotation (Shoulder ABducted 45 degrees)         External Rotation (Shoulder ABducted 90 degrees) 80 95     Internal Rotation (Shoulder ABducted 0 degrees)         Internal Rotation (Shoulder ABducted 45 degrees)         Internal Rotation (Shoulder ABducted 90 degrees) 55 65       Left Shoulder   Active (deg) Passive (deg) Pain   Flexion 160 180     Extension         Scaption         ABduction 180 180     ADduction         Horizontal ABduction         Horizontal ADduction         External Rotation (Shoulder ABducted 0 degrees)         External Rotation (Shoulder ABducted 45 degrees)         External Rotation (Shoulder ABducted 90 degrees) 80 95     Internal Rotation (Shoulder ABducted 0 degrees)         Internal Rotation (Shoulder ABducted 45 degrees)         Internal Rotation (Shoulder ABducted 90 degrees) 60 70                   Shoulder Strength - Planes of Motion   Right Strength Right Pain Left Strength Left  Pain   Flexion 4   4     Extension           ABduction 4   4+     ADduction           Horizontal ABduction           Horizontal ADduction           Internal Rotation 0° 5   5     Internal Rotation 90° 4   4+     External Rotation 0° 4   4     External Rotation 90° 4+   4+         Shoulder Strength - Scapular Stabilizing Muscles   Right Strength Right Pain Left Strength Left  Pain   Lower Trapezius 3   3     Middle Trapezius 3   3     Rhomboids                            Cervical Screen  Tests  Negative: CPR for Cervical Radiculopathy, Right Spurling's A, Left Spurling's A, Right Spurling's B, Left Spurling's B, Right Distraction, Left Distraction, Right ULTT2a (Median Variation), and Left ULTT2a (Median Variation)  Cervical Range of Motion  Less than 60 degrees rotation to involved side? Yes  Flexion WNL? No  Extension WNL? No  Thoracic Range of Motion             Cervical/Thoracic  Special Tests            Cervical Tests  Negative: Right Distraction, Left Distraction, Right Spurling's A, and Left Spurling's A  Negative: Right Spurling's B, Left Spurling's B, Right ULTT2a (Median Variation), and Left ULTT2a (Median Variation)           Shoulder Special Tests  Rotator Cuff Tests  Positive: Right Empty Can and Left Empty Can  Negative: Right Drop Arm and Left Drop Arm  Impingement Tests  Positive: Right Infraspinatus Muscle and Left Infraspinatus Muscle  Negative: Right Cross Body ADduction, Left Cross Body ADduction, Right Neer's, Left Neer's, Right Painful Arc, Left Painful Arc, Right Posterior Impingement Sign, and Left Posterior Impingement Sign  Shoulder Neural Tension Tests  Negative: Right ULTT2a (Median Variation) and Left ULTT2a (Median Variation)       Elbow Special Tests  Elbow Neural Tension Tests  Negative: Right ULTT2a (Median Variation) and Left ULTT2a (Median Variation)       Wrist/Hand Special Tests  Upper Limb Tension Tests  Negative: Right ULTT2a (Median Variation) and Left ULTT2a (Median Variation)             Shoulder Joint Mobility  Right Shoulder Mobility  Hypomobile: Sternoclavicular (Anterior displacement), Posterior Capsule Mobility, and Inferior Capsule Mobility  Left Shoulder Mobility  Hypomobile: Sternoclavicular, Posterior Capsule Mobility, and Inferior Capsule Mobility            Right Spinal Mobility  Hypomobile: Thoracic Mobility  Left Spinal Mobility  Hypomobile: Left Thoracic Spine Mobility              Treatment:  Therapeutic Exercise  TE 1: Supine thoracic extension - x10  TE 2: Sidelying external rotation - x10 bilateral  TE 3: Standing Theraband internal rotation - green; x10 bilateral  TE 4: wall slide - x10  Manual Therapy  MT 1: Bilateral glenohumeral posterior-inferior mobilizations - grade III-IV  Therapeutic Activity  TA 10: Education, questions, and answers    Time Entry(in minutes):  PT Evaluation (Low) Time Entry: 20  Manual Therapy Time Entry:  10  Therapeutic Activity Time Entry: 15  Therapeutic Exercise Time Entry: 10    Assessment & Plan   Assessment  Diane presents with a condition of Low complexity.   Presentation of Symptoms: Stable  Will Comorbidities Impact Care: No       Functional Limitations: Reaching, Range of motion, Pain when reaching, Completing self-care activities  Impairments: Impaired physical strength, Pain with functional activity, Abnormal or restricted range of motion    Patient Goal for Therapy (PT): Be able to reach and lift without shoulder pain.  Prognosis: Good  Assessment Details: Diane is a 69 y.o. female referred to outpatient Physical Therapy with a medical diagnosis of Chronic right shoulder pain. Patient presents with decreased range of motion, decreased strength, and joint hypomobility. Signs and symptoms are consistent with rotator cuff related pain. Screened for possible cervical radiculopathy with unremarkable findings possible to the active corticosteroid injection. She will benefit from skilled physical therapy addressing her joint mobility, shoulder strength, and proprioceptive retraining.    Plan  From a physical therapy perspective, the patient would benefit from: Skilled Rehab Services    Planned therapy interventions include: Therapeutic exercise, Therapeutic activities, Neuromuscular re-education, Manual therapy, and ADLs/IADLs.            Visit Frequency: 1 times Per Week for 10 Weeks.       This plan was discussed with Patient.   Discussion participants: Agreed Upon Plan of Care             Patient's spiritual, cultural, and educational needs considered and patient agreeable to plan of care and goals.     Education  Education was done with Patient. The patient's learning style includes Demonstration and Listening. The patient Demonstrates understanding and Verbalizes understanding.         Anatomy and Pathology. Symptom management and plan of care progressions. Home Exercise Program.       Goals:   Active        FOTO       Patient will demonstrate and improvement in FOTO score from 62% to greater than or equal to 70%.        Start:  05/05/25               Pain       Patient will report decreased pain at worst on the VAS from 10 / 10 to less than or equal to 2 / 10.        Start:  05/05/25               Patient Goal       Patient will be able to perform a lift and carry of greater than or equal to 25 pounds for at least 50 feet.        Start:  05/05/25               Range of Motion       Patient will demonstrate improvement in active / passive range of motion to end range with no symptoms.       Start:  05/05/25               Strength       Patient will demonstrate an improvement in strength by at least one muscle grade or 2.5 kg as compared to initial measurements.        Start:  05/05/25                Suman Caputo, PT, DPT, OCS

## 2025-05-05 ENCOUNTER — CLINICAL SUPPORT (OUTPATIENT)
Dept: REHABILITATION | Facility: HOSPITAL | Age: 70
End: 2025-05-05
Attending: ORTHOPAEDIC SURGERY
Payer: MEDICARE

## 2025-05-05 DIAGNOSIS — M25.511 CHRONIC PAIN OF BOTH SHOULDERS: ICD-10-CM

## 2025-05-05 DIAGNOSIS — M25.512 CHRONIC PAIN OF BOTH SHOULDERS: ICD-10-CM

## 2025-05-05 DIAGNOSIS — G89.29 CHRONIC PAIN OF BOTH SHOULDERS: ICD-10-CM

## 2025-05-05 PROCEDURE — 97161 PT EVAL LOW COMPLEX 20 MIN: CPT | Mod: PO | Performed by: PHYSICAL THERAPIST

## 2025-05-05 PROCEDURE — 97140 MANUAL THERAPY 1/> REGIONS: CPT | Mod: PO | Performed by: PHYSICAL THERAPIST

## 2025-05-05 PROCEDURE — 97530 THERAPEUTIC ACTIVITIES: CPT | Mod: PO | Performed by: PHYSICAL THERAPIST

## 2025-05-05 PROCEDURE — 97110 THERAPEUTIC EXERCISES: CPT | Mod: PO | Performed by: PHYSICAL THERAPIST

## 2025-05-09 ENCOUNTER — HOSPITAL ENCOUNTER (OUTPATIENT)
Dept: RADIOLOGY | Facility: HOSPITAL | Age: 70
Discharge: HOME | End: 2025-05-09
Attending: FAMILY MEDICINE
Payer: MEDICARE

## 2025-05-09 VITALS — BODY MASS INDEX: 36.65 KG/M2 | HEIGHT: 65 IN | WEIGHT: 220 LBS

## 2025-05-09 DIAGNOSIS — Z12.31 ENCOUNTER FOR SCREENING MAMMOGRAM FOR BREAST CANCER: ICD-10-CM

## 2025-05-09 PROCEDURE — 77063 BREAST TOMOSYNTHESIS BI: CPT | Mod: 26,,, | Performed by: RADIOLOGY

## 2025-05-09 PROCEDURE — 77067 SCR MAMMO BI INCL CAD: CPT | Mod: TC

## 2025-05-09 PROCEDURE — 77067 SCR MAMMO BI INCL CAD: CPT | Mod: 26,,, | Performed by: RADIOLOGY

## 2025-05-13 ENCOUNTER — CLINICAL SUPPORT (OUTPATIENT)
Dept: REHABILITATION | Facility: HOSPITAL | Age: 70
End: 2025-05-13
Payer: MEDICARE

## 2025-05-13 ENCOUNTER — RESULTS FOLLOW-UP (OUTPATIENT)
Dept: PRIMARY CARE CLINIC | Facility: CLINIC | Age: 70
End: 2025-05-13

## 2025-05-13 DIAGNOSIS — G89.29 CHRONIC PAIN OF BOTH SHOULDERS: Primary | ICD-10-CM

## 2025-05-13 DIAGNOSIS — M25.511 CHRONIC PAIN OF BOTH SHOULDERS: Primary | ICD-10-CM

## 2025-05-13 DIAGNOSIS — M25.512 CHRONIC PAIN OF BOTH SHOULDERS: Primary | ICD-10-CM

## 2025-05-13 PROCEDURE — 97110 THERAPEUTIC EXERCISES: CPT | Mod: PO | Performed by: PHYSICAL THERAPIST

## 2025-05-13 PROCEDURE — 97140 MANUAL THERAPY 1/> REGIONS: CPT | Mod: PO | Performed by: PHYSICAL THERAPIST

## 2025-05-13 NOTE — PROGRESS NOTES
Outpatient Rehab    Physical Therapy Visit    Patient Name: Diane Garcia  MRN: [id number]  YOB: 1955  Encounter Date: 5/13/2025    Therapy Diagnosis:   Encounter Diagnosis   Name Primary?    Chronic pain of both shoulders Yes     Physician: NATALIO Greenwood MD    Physician Orders: Eval and Treat  Medical Diagnosis: Chronic right shoulder pain    Visit # / Visits Authorized:  1 / 12  Insurance Authorization Period: 4/30/2025 to 12/31/2025  Date of Evaluation: 5/5/2025  Plan of Care Certification: 5/5/2025 to 7/14/2025      PT/PTA: PT   Number of PTA visits since last PT visit:0  Time In: 1400   Time Out: 1430  Total Time (in minutes): 30   Total Billable Time (in minutes): 24    FOTO:  Intake Score: 62%  Survey Score 2:  %  Survey Score 3:  %    Precautions:       Subjective   Returns to the clinic with no symptoms in the shoulders. Will be leaving on Friday for a vacation..  Pain reported as 0/10. Bilateral shoulders (Right > Left)    Objective            Treatment:  Therapeutic Exercise  TE 1: Supine thoracic extension - x20  TE 2: Sidelying external rotation - 2#; 3x10 bilateral  TE 3: Standing Theraband internal rotation - blue; 3x10 bilateral  TE 4: wall slide - 2x12  Manual Therapy  MT 1: Bilateral glenohumeral posterior-inferior mobilizations - grade III-IV  Therapeutic Activity  TA 10: Education, questions, and answers    Time Entry(in minutes):  Manual Therapy Time Entry: 10  Therapeutic Exercise Time Entry: 14    Assessment & Plan   Assessment: Diane returns to the clinic for the first follow-up appointment. Patient had to leave early today and is leaving on vacation for 2 weeks; therefore, no new exercises were added. Performed and reviewed exercises in HEP with mild cuing needed for correct performance. No exacerbations in symptoms upon completion. Reassess upon return.  Evaluation/Treatment Tolerance: Patient tolerated treatment well    Patient will continue to benefit from skilled  outpatient physical therapy to address the deficits listed in the problem list box on initial evaluation, provide pt/family education and to maximize pt's level of independence in the home and community environment.     Patient's spiritual, cultural, and educational needs considered and patient agreeable to plan of care and goals.     Education  Education was done with Patient. The patient's learning style includes Demonstration and Listening. The patient Demonstrates understanding and Verbalizes understanding.         Anatomy and Pathology. Symptom management and plan of care progressions. Home Exercise Program.       Plan: Continue with documented plan of care and progress patient as indicated.    Goals:   Active       FOTO       Patient will demonstrate and improvement in FOTO score from 62% to greater than or equal to 70%.  (Progressing)       Start:  05/05/25               Pain       Patient will report decreased pain at worst on the VAS from 10 / 10 to less than or equal to 2 / 10.  (Progressing)       Start:  05/05/25               Patient Goal       Patient will be able to perform a lift and carry of greater than or equal to 25 pounds for at least 50 feet.  (Progressing)       Start:  05/05/25               Range of Motion       Patient will demonstrate improvement in active / passive range of motion to end range with no symptoms. (Progressing)       Start:  05/05/25               Strength       Patient will demonstrate an improvement in strength by at least one muscle grade or 2.5 kg as compared to initial measurements.  (Progressing)       Start:  05/05/25                Suman Caputo, PT, DPT, OCS

## 2025-06-03 ENCOUNTER — OFFICE VISIT (OUTPATIENT)
Dept: PRIMARY CARE CLINIC | Facility: CLINIC | Age: 70
End: 2025-06-03
Payer: MEDICARE

## 2025-06-03 ENCOUNTER — HOSPITAL ENCOUNTER (OUTPATIENT)
Dept: RADIOLOGY | Facility: HOSPITAL | Age: 70
Discharge: HOME | End: 2025-06-03
Attending: FAMILY MEDICINE
Payer: MEDICARE

## 2025-06-03 ENCOUNTER — CLINICAL SUPPORT (OUTPATIENT)
Dept: REHABILITATION | Facility: HOSPITAL | Age: 70
End: 2025-06-03
Payer: MEDICARE

## 2025-06-03 VITALS
WEIGHT: 217.13 LBS | HEART RATE: 62 BPM | RESPIRATION RATE: 18 BRPM | HEIGHT: 65 IN | OXYGEN SATURATION: 99 % | SYSTOLIC BLOOD PRESSURE: 128 MMHG | DIASTOLIC BLOOD PRESSURE: 72 MMHG | BODY MASS INDEX: 36.18 KG/M2

## 2025-06-03 DIAGNOSIS — M25.512 CHRONIC PAIN OF BOTH SHOULDERS: Primary | ICD-10-CM

## 2025-06-03 DIAGNOSIS — G89.29 CHRONIC PAIN OF BOTH SHOULDERS: Primary | ICD-10-CM

## 2025-06-03 DIAGNOSIS — H60.501 ACUTE OTITIS EXTERNA OF RIGHT EAR, UNSPECIFIED TYPE: ICD-10-CM

## 2025-06-03 DIAGNOSIS — M25.511 CHRONIC PAIN OF BOTH SHOULDERS: Primary | ICD-10-CM

## 2025-06-03 DIAGNOSIS — K59.00 CONSTIPATION, UNSPECIFIED CONSTIPATION TYPE: ICD-10-CM

## 2025-06-03 DIAGNOSIS — K59.04 CHRONIC IDIOPATHIC CONSTIPATION: Primary | ICD-10-CM

## 2025-06-03 DIAGNOSIS — R19.7 OVERFLOW DIARRHEA: ICD-10-CM

## 2025-06-03 PROCEDURE — 97140 MANUAL THERAPY 1/> REGIONS: CPT | Mod: PO | Performed by: PHYSICAL THERAPIST

## 2025-06-03 PROCEDURE — 99999 PR PBB SHADOW E&M-EST. PATIENT-LVL IV: CPT | Mod: PBBFAC,,, | Performed by: FAMILY MEDICINE

## 2025-06-03 PROCEDURE — 97530 THERAPEUTIC ACTIVITIES: CPT | Mod: PO | Performed by: PHYSICAL THERAPIST

## 2025-06-03 PROCEDURE — 74019 RADEX ABDOMEN 2 VIEWS: CPT | Mod: TC,PN

## 2025-06-03 PROCEDURE — 97110 THERAPEUTIC EXERCISES: CPT | Mod: PO | Performed by: PHYSICAL THERAPIST

## 2025-06-03 PROCEDURE — 99214 OFFICE O/P EST MOD 30 MIN: CPT | Mod: PBBFAC,25,PN | Performed by: FAMILY MEDICINE

## 2025-06-03 PROCEDURE — 99214 OFFICE O/P EST MOD 30 MIN: CPT | Mod: S$PBB,,, | Performed by: FAMILY MEDICINE

## 2025-06-03 PROCEDURE — 74019 RADEX ABDOMEN 2 VIEWS: CPT | Mod: 26,,, | Performed by: RADIOLOGY

## 2025-06-03 RX ORDER — IBUPROFEN 800 MG/1
800 TABLET, FILM COATED ORAL 2 TIMES DAILY
COMMUNITY
Start: 2024-12-17

## 2025-06-03 RX ORDER — CIPROFLOXACIN AND DEXAMETHASONE 3; 1 MG/ML; MG/ML
4 SUSPENSION/ DROPS AURICULAR (OTIC) 2 TIMES DAILY
Qty: 7.5 ML | Refills: 0 | Status: SHIPPED | OUTPATIENT
Start: 2025-06-03 | End: 2025-06-08

## 2025-06-04 ENCOUNTER — RESULTS FOLLOW-UP (OUTPATIENT)
Dept: PRIMARY CARE CLINIC | Facility: CLINIC | Age: 70
End: 2025-06-04

## 2025-06-04 ENCOUNTER — TELEPHONE (OUTPATIENT)
Dept: PRIMARY CARE CLINIC | Facility: CLINIC | Age: 70
End: 2025-06-04
Payer: MEDICARE

## 2025-06-04 ENCOUNTER — HOSPITAL ENCOUNTER (EMERGENCY)
Facility: OTHER | Age: 70
Discharge: HOME | End: 2025-06-04
Payer: MEDICARE

## 2025-06-04 VITALS
DIASTOLIC BLOOD PRESSURE: 79 MMHG | SYSTOLIC BLOOD PRESSURE: 143 MMHG | TEMPERATURE: 98 F | HEIGHT: 65 IN | WEIGHT: 215 LBS | RESPIRATION RATE: 16 BRPM | OXYGEN SATURATION: 96 % | BODY MASS INDEX: 35.82 KG/M2 | HEART RATE: 68 BPM

## 2025-06-04 DIAGNOSIS — K59.00 CONSTIPATION, UNSPECIFIED CONSTIPATION TYPE: Primary | ICD-10-CM

## 2025-06-04 DIAGNOSIS — K76.89 HEPATIC CYST: ICD-10-CM

## 2025-06-04 DIAGNOSIS — R91.1 PULMONARY NODULE: ICD-10-CM

## 2025-06-04 DIAGNOSIS — N28.1 RENAL CYST: ICD-10-CM

## 2025-06-04 DIAGNOSIS — K57.90 DIVERTICULOSIS: ICD-10-CM

## 2025-06-04 DIAGNOSIS — R16.0 HEPATOMEGALY: ICD-10-CM

## 2025-06-04 LAB
ABSOLUTE EOSINOPHIL (OHS): 0.13 K/UL
ABSOLUTE MONOCYTE (OHS): 0.55 K/UL (ref 0.3–1)
ABSOLUTE NEUTROPHIL COUNT (OHS): 6.11 K/UL (ref 1.8–7.7)
ALBUMIN SERPL BCP-MCNC: 4.1 G/DL (ref 3.5–5.2)
ALP SERPL-CCNC: 56 UNIT/L (ref 40–150)
ALT SERPL W/O P-5'-P-CCNC: 18 UNIT/L (ref 10–44)
ANION GAP (OHS): 13 MMOL/L (ref 8–16)
AST SERPL-CCNC: 16 UNIT/L (ref 11–45)
BASOPHILS # BLD AUTO: 0.05 K/UL
BASOPHILS NFR BLD AUTO: 0.6 %
BILIRUB SERPL-MCNC: 0.9 MG/DL (ref 0.1–1)
BUN SERPL-MCNC: 22 MG/DL (ref 8–23)
CALCIUM SERPL-MCNC: 9.3 MG/DL (ref 8.7–10.5)
CHLORIDE SERPL-SCNC: 106 MMOL/L (ref 95–110)
CO2 SERPL-SCNC: 23 MMOL/L (ref 23–29)
CREAT SERPL-MCNC: 0.7 MG/DL (ref 0.5–1.4)
ERYTHROCYTE [DISTWIDTH] IN BLOOD BY AUTOMATED COUNT: 13.3 % (ref 11.5–14.5)
GFR SERPLBLD CREATININE-BSD FMLA CKD-EPI: >60 ML/MIN/1.73/M2
GLUCOSE SERPL-MCNC: 93 MG/DL (ref 70–110)
HCT VFR BLD AUTO: 39.8 % (ref 37–48.5)
HCV AB SERPL QL IA: NEGATIVE
HGB BLD-MCNC: 13.1 GM/DL (ref 12–16)
HIV 1+2 AB+HIV1 P24 AG SERPL QL IA: NEGATIVE
HOLD SPECIMEN: 1
IMM GRANULOCYTES # BLD AUTO: 0.01 K/UL (ref 0–0.04)
IMM GRANULOCYTES NFR BLD AUTO: 0.1 % (ref 0–0.5)
LYMPHOCYTES # BLD AUTO: 2.01 K/UL (ref 1–4.8)
MCH RBC QN AUTO: 29.2 PG (ref 27–31)
MCHC RBC AUTO-ENTMCNC: 32.9 G/DL (ref 32–36)
MCV RBC AUTO: 89 FL (ref 82–98)
NUCLEATED RBC (/100WBC) (OHS): 0 /100 WBC
PLATELET # BLD AUTO: 305 K/UL (ref 150–450)
PMV BLD AUTO: 9.7 FL (ref 9.2–12.9)
POTASSIUM SERPL-SCNC: 3.8 MMOL/L (ref 3.5–5.1)
PROT SERPL-MCNC: 8 GM/DL (ref 6–8.4)
RBC # BLD AUTO: 4.49 M/UL (ref 4–5.4)
RELATIVE EOSINOPHIL (OHS): 1.5 %
RELATIVE LYMPHOCYTE (OHS): 22.7 % (ref 18–48)
RELATIVE MONOCYTE (OHS): 6.2 % (ref 4–15)
RELATIVE NEUTROPHIL (OHS): 68.9 % (ref 38–73)
SODIUM SERPL-SCNC: 142 MMOL/L (ref 136–145)
WBC # BLD AUTO: 8.86 K/UL (ref 3.9–12.7)

## 2025-06-04 PROCEDURE — 80053 COMPREHEN METABOLIC PANEL: CPT

## 2025-06-04 PROCEDURE — 25000003 PHARM REV CODE 250

## 2025-06-04 PROCEDURE — 87389 HIV-1 AG W/HIV-1&-2 AB AG IA: CPT

## 2025-06-04 PROCEDURE — 99285 EMERGENCY DEPT VISIT HI MDM: CPT | Mod: 25

## 2025-06-04 PROCEDURE — 86803 HEPATITIS C AB TEST: CPT

## 2025-06-04 PROCEDURE — 85025 COMPLETE CBC W/AUTO DIFF WBC: CPT

## 2025-06-04 PROCEDURE — 25500020 PHARM REV CODE 255

## 2025-06-04 RX ORDER — SODIUM CHLORIDE 9 MG/ML
500 INJECTION, SOLUTION INTRAVENOUS
Status: COMPLETED | OUTPATIENT
Start: 2025-06-04 | End: 2025-06-04

## 2025-06-04 RX ORDER — SYRING-NEEDL,DISP,INSUL,0.3 ML 29 G X1/2"
296 SYRINGE, EMPTY DISPOSABLE MISCELLANEOUS
Status: COMPLETED | OUTPATIENT
Start: 2025-06-04 | End: 2025-06-04

## 2025-06-04 RX ORDER — PSEUDOEPHEDRINE/ACETAMINOPHEN 30MG-500MG
100 TABLET ORAL
Status: COMPLETED | OUTPATIENT
Start: 2025-06-04 | End: 2025-06-04

## 2025-06-04 RX ADMIN — MAGNESIUM CITRATE 296 ML: 1.75 LIQUID ORAL at 09:06

## 2025-06-04 RX ADMIN — IOHEXOL 100 ML: 350 INJECTION, SOLUTION INTRAVENOUS at 06:06

## 2025-06-04 RX ADMIN — Medication 100 ML: at 09:06

## 2025-06-04 RX ADMIN — SODIUM CHLORIDE 500 ML: 9 INJECTION, SOLUTION INTRAVENOUS at 09:06

## 2025-06-04 NOTE — ED TRIAGE NOTES
Patient presents to ED with complaints of constipation, last bowel movement x3 weeks ago, abdominal cramping on right side and mid-abdomen, feels bloated, reports taking OTC medicines but they aren't working, AAOx4, also c/o dizziness yesterday, drunk Pedialyte now resolved, denies SOB, cough, and fever.

## 2025-06-05 NOTE — ED NOTES
""Brown bomb" performed on patient with no relief after 2 attempts. Pt unable to retain fluids for more than a few minutes before having the urge to expel. Pt endorses nausea after drinking oral mag citrate. Provider notified.   "

## 2025-06-05 NOTE — ED PROVIDER NOTES
Encounter Date: 6/4/2025       History     Chief Complaint   Patient presents with    Constipation     Constipation, LBM x 3 weeks. Reports taking multiple laxatives, stool softeners, and enemas without relief. Mild abdominal pain reported     69-year-old female with history of hypertension, hyperlipidemia, GERD presents for 3 weeks of constipation.  Reports mild abdominal pressure but no significant pain.  Patient states she has been seen by her PCP who recommended she come to the ED for further evaluation.  She states she had an x-ray performed which revealed a significant amount of stool buildup.  Reports still passing gas.  States she has tried over-the-counter medications such as milk of magnesia and Metamucil which have not relieved her symptoms.  Patient denies any new medications that could be causing her constipation.  Endorses hysterectomy many years ago but no other abdominal surgeries.  Denies fever or chills.  This is the extent of the patient's complaint at this time.    The history is provided by the patient.     Review of patient's allergies indicates:   Allergen Reactions    Penicillins      nausea     Past Medical History:   Diagnosis Date    Arthritis     GERD (gastroesophageal reflux disease)     Hyperlipidemia     Hypertension      Past Surgical History:   Procedure Laterality Date    BREAST BIOPSY Left 2013    benign needle    CARPAL TUNNEL RELEASE Right 7/19/2024    Procedure: RIGHT RELEASE, CARPAL TUNNEL;  Surgeon: Brandy Garg MD;  Location: [facility] OR;  Service: Orthopedics;  Laterality: Right;    COLONOSCOPY N/A 02/02/2023    Procedure: COLONOSCOPY;  Surgeon: Bob Grayson MD;  Location: [facility];  Service: Endoscopy;  Laterality: N/A;    ESOPHAGOGASTRODUODENOSCOPY N/A 02/02/2023    Procedure: EGD (ESOPHAGOGASTRODUODENOSCOPY);  Surgeon: Bob Grayson MD;  Location: [facility];  Service: Endoscopy;  Laterality: N/A;    HYSTERECTOMY      OOPHORECTOMY      mary jo  placed Right     femur right mary jo    mary jo removed Right     TOTAL KNEE ARTHROPLASTY Left 07/26/2023    Procedure: ARTHROPLASTY, KNEE, TOTAL;  Surgeon: NATALIO Greenwood MD;  Location: [facility] OR;  Service: Orthopedics;  Laterality: Left;  Regional w/Catheter, Adductor, Spinal, 0.2% Ropivacaine     Family History   Problem Relation Name Age of Onset    Cancer Mother Smoker         Lung    COPD Father Smoker     No Known Problems Sister      Melanoma Neg Hx       Social History[1]  Review of Systems  Per hpi  Physical Exam     Initial Vitals [06/04/25 1648]   BP Pulse Resp Temp SpO2   (!) 143/86 69 18 98.2 °F (36.8 °C) 97 %      MAP       --         Physical Exam    Nursing note and vitals reviewed.  Constitutional: Vital signs are normal. She appears well-developed and well-nourished. She is cooperative.   HENT:   Head: Normocephalic and atraumatic.   Eyes: Conjunctivae and lids are normal.   Neck: Trachea normal. No thyroid mass present.   Cardiovascular:  Normal rate and regular rhythm.           Pulmonary/Chest: No respiratory distress.   Abdominal: Abdomen is soft. There is abdominal tenderness.   Decreased bowel sounds     Neurological: She is alert and oriented to person, place, and time. GCS eye subscore is 4. GCS verbal subscore is 5. GCS motor subscore is 6.   Skin: Skin is warm, dry and intact. No rash noted.   Psychiatric: She has a normal mood and affect. Her speech is normal and behavior is normal. Thought content normal.         ED Course   Procedures  Labs Reviewed   COMPREHENSIVE METABOLIC PANEL - Normal       Result Value    Sodium 142      Potassium 3.8      Chloride 106      CO2 23      Glucose 93      BUN 22      Creatinine 0.7      Calcium 9.3      Protein Total 8.0      Albumin 4.1      Bilirubin Total 0.9      ALP 56      AST 16      ALT 18      Anion Gap 13      eGFR >60     CBC WITH DIFFERENTIAL - Normal    WBC 8.86      RBC 4.49      HGB 13.1      HCT 39.8      MCV 89      MCH 29.2      MCHC 32.9       RDW 13.3      Platelet Count 305      MPV 9.7      Nucleated RBC 0      Neut % 68.9      Lymph % 22.7      Mono % 6.2      Eos % 1.5      Basophil % 0.6      Imm Grans % 0.1      Neut # 6.11      Lymph # 2.01      Mono # 0.55      Eos # 0.13      Baso # 0.05      Imm Grans # 0.01     HIV 1 / 2 ANTIBODY - Normal    HIV 1/2 Ag/Ab Negative     HEPATITIS C ANTIBODY - Normal    Hep C Ab Interp Negative     CBC W/ AUTO DIFFERENTIAL    Narrative:     The following orders were created for panel order CBC auto differential.  Procedure                               Abnormality         Status                     ---------                               -----------         ------                     CBC with Differential[[id number]]       Normal              Final result                 Please view results for these tests on the individual orders.   HEP C VIRUS HOLD SPECIMEN    Extra Tube 1            Imaging Results               CT Abdomen Pelvis With IV Contrast NO Oral Contrast (Final result)  Result time 06/04/25 19:07:55      Final result by Isabel Penaloza MD (06/04/25 19:07:55)                   Impression:      No small bowel obstruction detected.    Hepatomegaly.  Small right hepatic cysts.    Small right renal cysts.    Colonic diverticulosis.    Focal ground-glass lesion in the left lower lobe.  Consider follow-up in 3 months.    This report was flagged in Epic as abnormal.    Partial atelectasis of the right middle lobe.      Electronically signed by: Isabel Penaloza  Date:    06/04/2025  Time:    19:07               Narrative:    EXAMINATION:  CT OF ABDOMEN PELVIS WITH    CLINICAL HISTORY:  Bowel obstruction suspected;    TECHNIQUE:  5 mm enhanced axial images were obtained from the lung bases through the greater trochanters.  100 mL of Omnipaque 350 was injected.    COMPARISON:  CT chest 05/21/2024    FINDINGS:  The liver is enlarged.  There are 2 closely approximated small hepatic cysts along the margin  of the right lobe of the liver.    Spleen, pancreas, left kidney and adrenal glands are unremarkable. The gallbladder contains no calcified gallstones.    There are 2 right renal cysts, measuring less than the 11 mm.    There is a tiny outpouching arising from the 2nd portion of the duodenum or duodenal diverticula.    There is no small bowel obstruction.    There is no definite evidence for abdominal adenopathy or ascites.  Moderate atherosclerotic changes are seen.    There is colonic diverticulosis without evidence of acute diverticulitis.  There are no pelvic masses or adenopathy.  The uterus is surgically absent.    There is no free fluid in the pelvis.    At the lung bases, there is a ground-glass 10 mm left lower lobe pulmonary nodule, which was seen on the prior CT chest 05/21/2024 exam.  Partial atelectasis of the right middle lobe is seen.  Mild bibasilar atelectatic changes are present.    There is no 12 x 7 mm irregular sclerotic density at the right ilium.  Vacuum phenomena is seen at bilateral SI joints consistent with degenerative change.  Facet arthrosis is seen at the L5.                                       Medications   iohexoL (OMNIPAQUE 350) injection 100 mL (100 mLs Intravenous Given 6/4/25 1843)   glycerin 99.5% topical solution 100 mL (100 mLs Rectal Given 6/4/25 2125)     And   magnesium citrate solution 296 mL (296 mLs Rectal Given 6/4/25 2124)     And   0.9% NaCl infusion (0 mLs Rectal Stopped 6/4/25 2214)   magnesium citrate solution 296 mL (296 mLs Oral Given 6/4/25 2125)     Medical Decision Making  This is an evaluation of a well-appearing afebrile 69-year-old female for constipation.  Vital signs are stable.  Labs unremarkable.  CT abdomen with many incidental findings but no emergent conditions or small-bowel obstruction.  Attempted brown bomb enema today in the ED without success.  Discussed with patient addition of more conservative measures to help with her constipation such as  increasing fiber in her diet, milk of magnesia, daily laxative.  Advised patient to follow up with Gastroenterology regarding her constipation and the incidental findings of her CT scan.  Advised to follow up with PCP.  Given strict return precautions.  Patient has verbalized understanding and agreement to this plan.  All questions answered and patient is stable for discharge.    Differential diagnosis includes but not limited to:  Functional constipation, IBS, medication side effect, hypothyroidism, diet, dehydration, pregnancy, cancer, electrolyte abnormality, bowel obstruction        Problems Addressed:  Constipation, unspecified constipation type: acute illness or injury  Diverticulosis: acute illness or injury  Hepatic cyst: acute illness or injury  Hepatomegaly: acute illness or injury  Pulmonary nodule: acute illness or injury  Renal cyst: acute illness or injury    Amount and/or Complexity of Data Reviewed  Labs: ordered.  Radiology: ordered. Decision-making details documented in ED Course.    Risk  OTC drugs.  Prescription drug management.               ED Course as of 06/04/25 2340   Wed Jun 04, 2025   1918 CT Abdomen Pelvis With IV Contrast NO Oral Contrast(!)  No small bowel obstruction detected.     Hepatomegaly.  Small right hepatic cysts.     Small right renal cysts.     Colonic diverticulosis.     Focal ground-glass lesion in the left lower lobe.  Consider follow-up in 3 months.     This report was flagged in Epic as abnormal.     Partial atelectasis of the right middle lobe.   [RM]      ED Course User Index  [RM] Radha Gordon PA-C                           Clinical Impression:  Final diagnoses:  [K59.00] Constipation, unspecified constipation type (Primary)  [K76.89] Hepatic cyst  [K57.90] Diverticulosis  [R16.0] Hepatomegaly  [R91.1] Pulmonary nodule  [N28.1] Renal cyst              Launch MDCalc MDM  MDCalc MDM Module  Jun 04 2025 11:40 PM [Radha Gordon]  Data:  -  Test/documents/historian: 3 tests ordered  Risk: CT Abd+Pelvis W contr IV (Iodinated IV contrast in patient w/ elevated risk)             [1]   Social History  Tobacco Use    Smoking status: Former     Current packs/day: 0.00     Average packs/day: 2.0 packs/day for 25.0 years (50.0 ttl pk-yrs)     Types: Cigarettes     Start date: 1975     Quit date: 2000     Years since quittin.5    Smokeless tobacco: Never   Vaping Use    Vaping status: Never Used   Substance Use Topics    Alcohol use: Yes     Comment: A few drinks a week    Drug use: Never     Comment: last use >30 yrs ago         Radha Gordon PA-C  [id number]

## 2025-06-05 NOTE — ED NOTES
Pt transported to [facility] via wheelchair by [facility]. Pt ambulated to bed without assistance. Bed in lowest position, bed wheels locked. Pt instructed on how to use call light and call light placed within reach. Pt resting comfortably and instructed to call with any further needs. Will continue to monitor.

## 2025-06-05 NOTE — DISCHARGE INSTRUCTIONS
--please increase your daily dietary fiber intake with increase fruits, vegetables, whole grains, increased water intake, quinoa, increased beans/legumes  --I recommend taking over-the-counter Metamucil with each meal.  May try docusate  --May use daily MiraLax  Milk of magnesia  --I recommend using over-the-counter magnesium supplements.  --May use over-the-counter rectal enema  --May try coffee in the morning to assist with softening stool    I have placed a referral to Gastroenterology.  Please follow up with them as soon as possible

## 2025-06-12 ENCOUNTER — HOSPITAL ENCOUNTER (OUTPATIENT)
Dept: RADIOLOGY | Facility: HOSPITAL | Age: 70
Discharge: HOME | End: 2025-06-12
Attending: INTERNAL MEDICINE
Payer: MEDICARE

## 2025-06-12 DIAGNOSIS — R91.1 SOLITARY PULMONARY NODULE: ICD-10-CM

## 2025-06-12 PROCEDURE — 71250 CT THORAX DX C-: CPT | Mod: TC

## 2025-06-15 PROBLEM — K59.00 CONSTIPATION: Status: ACTIVE | Noted: 2025-06-15

## 2025-06-15 NOTE — PROGRESS NOTES
"      /72 (BP Location: Left arm, Patient Position: Sitting)   Pulse 62   Resp 18   Ht 5' 5" (1.651 m)   Wt 98.5 kg (217 lb 2.5 oz)   SpO2 99%   BMI 36.14 kg/m²       ===========              Diane Garcia is a 69 y.o. female           History of Present Illness    CHIEF COMPLAINT:  Ms. Garcia presents with constipation    HPI:  Ms. Garcia reports constipation for the past 3-4 weeks, a significant change from her previous normal bowel movements every 1-2 days. She describes the situation as extremely distressing, requiring multiple interventions. She has tried laxatives, MiraLax, and Milk of Magnesia, with only Milk of Magnesia providing some relief. The resulting bowel movements are entirely liquid, and she feels she is not having proper defecation. She reports feeling bloated and dizzy, but denies pain.    In addition to constipation, she mentions discomfort in the ear and throat. She reports pain more on the external part than deep in the canal, suggesting external ear discomfort. The practitioner describes it as similar to swimmer's ear. She also mentions a scratchy feeling in the throat.    She denies any recent medication changes, dehydration, or use of pain medications or antibiotics prior to symptom onset. Her urine is light in color. She reports drinking large amounts of water, especially when taking Milk of Magnesia, as advised by her sister who had a similar problem.    She is currently taking Lovastatin     She denies seeing any blood in the stool              Patient queried and denies any further complaints      Problem List[1]    SURGICAL AND MEDICAL HISTORY: updated and reviewed.  Past Surgical History:   Procedure Laterality Date    BREAST BIOPSY Left 2013    benign needle    CARPAL TUNNEL RELEASE Right 7/19/2024    Procedure: RIGHT RELEASE, CARPAL TUNNEL;  Surgeon: Brandy Garg MD;  Location: [facility] OR;  Service: Orthopedics;  Laterality: Right;    COLONOSCOPY N/A " 02/02/2023    Procedure: COLONOSCOPY;  Surgeon: Bob Grayson MD;  Location: [facility] ENDO;  Service: Endoscopy;  Laterality: N/A;    ESOPHAGOGASTRODUODENOSCOPY N/A 02/02/2023    Procedure: EGD (ESOPHAGOGASTRODUODENOSCOPY);  Surgeon: Bob Grayson MD;  Location: [facility] ENDO;  Service: Endoscopy;  Laterality: N/A;    HYSTERECTOMY      OOPHORECTOMY      mary jo placed Right     femur right mary jo    mary jo removed Right     TOTAL KNEE ARTHROPLASTY Left 07/26/2023    Procedure: ARTHROPLASTY, KNEE, TOTAL;  Surgeon: NATALIO Greenwood MD;  Location: [facility] OR;  Service: Orthopedics;  Laterality: Left;  Regional w/Catheter, Adductor, Spinal, 0.2% Ropivacaine     ALLERGIES updated and reviewed.  Review of patient's allergies indicates:   Allergen Reactions    Penicillins      nausea       CURRENT OUTPATIENT MEDICATIONS updated and reviewed  Current Medications[2]    Review of Systems   Constitutional:  Negative for activity change, appetite change, chills, diaphoresis, fatigue, fever and unexpected weight change.   HENT:  Negative for congestion, ear discharge, ear pain, facial swelling, hearing loss, nosebleeds, postnasal drip, rhinorrhea, sinus pressure, sneezing, sore throat, tinnitus, trouble swallowing and voice change.    Eyes:  Negative for photophobia, pain, discharge, redness, itching and visual disturbance.   Respiratory:  Negative for cough, chest tightness, shortness of breath and wheezing.    Cardiovascular:  Negative for chest pain, palpitations and leg swelling.   Gastrointestinal:  Positive for abdominal pain and constipation. Negative for abdominal distention, anal bleeding, blood in stool, diarrhea, nausea, rectal pain and vomiting.   Endocrine: Negative for cold intolerance, heat intolerance, polydipsia, polyphagia and polyuria.   Genitourinary:  Negative for difficulty urinating, dysuria and flank pain.   Musculoskeletal:  Negative for arthralgias, back pain, joint swelling, myalgias and neck pain.  "  Skin:  Negative for rash.   Neurological:  Negative for dizziness, tremors, seizures, syncope, speech difficulty, weakness, light-headedness, numbness and headaches.   Psychiatric/Behavioral:  Negative for behavioral problems, confusion, decreased concentration, dysphoric mood, sleep disturbance and suicidal ideas. The patient is not nervous/anxious and is not hyperactive.        /72 (BP Location: Left arm, Patient Position: Sitting)   Pulse 62   Resp 18   Ht 5' 5" (1.651 m)   Wt 98.5 kg (217 lb 2.5 oz)   SpO2 99%   BMI 36.14 kg/m²   Physical Exam  Vitals and nursing note reviewed.   Constitutional:       General: She is not in acute distress.     Appearance: Normal appearance. She is well-developed. She is not ill-appearing or toxic-appearing.   HENT:      Head: Normocephalic and atraumatic.      Right Ear: Tympanic membrane, ear canal and external ear normal.      Left Ear: Tympanic membrane, ear canal and external ear normal.      Nose: Nose normal.      Mouth/Throat:      Lips: Pink.      Mouth: Mucous membranes are moist.      Pharynx: No oropharyngeal exudate or posterior oropharyngeal erythema.   Eyes:      General: No scleral icterus.        Right eye: No discharge.         Left eye: No discharge.      Extraocular Movements: Extraocular movements intact.      Conjunctiva/sclera: Conjunctivae normal.   Cardiovascular:      Rate and Rhythm: Normal rate and regular rhythm.      Pulses: Normal pulses.      Heart sounds: Normal heart sounds. No murmur heard.  Pulmonary:      Effort: Pulmonary effort is normal. No respiratory distress.      Breath sounds: Normal breath sounds. No wheezing or rales.   Abdominal:      General: Bowel sounds are decreased. There is no distension.      Palpations: Abdomen is soft. There is no mass.      Tenderness: There is no abdominal tenderness. There is no right CVA tenderness, left CVA tenderness, guarding or rebound.      Hernia: No hernia is present. "   Musculoskeletal:      Cervical back: Normal range of motion and neck supple. No rigidity or tenderness.   Lymphadenopathy:      Cervical: No cervical adenopathy.   Skin:     General: Skin is warm and dry.   Neurological:      General: No focal deficit present.      Mental Status: She is alert. Mental status is at baseline.   Psychiatric:         Mood and Affect: Mood normal.         Behavior: Behavior normal. Behavior is cooperative.           ASSESSMENT/PLAN    Assessment & Plan    IMPRESSION:  - Suspect overflow diarrhea due to significant constipation, with backed-up stool causing liquid stool to pass around impaction.  - Concerned about potential electrolyte imbalance due to excessive water intake with laxatives.  - Noted dry ear canal with redness suggestive of swimmer's ear.  - Observed significant abdominal gas on exam.  - Considered impact of Lovastatin on constipation and explained its drying effect on the bowels.    Overflow diarrhea secondary to chronic idiopathic constipation.  Explained mechanism of action.  Do not recommend antidiarrheals.  See below.    CHRONIC IDIOPATHIC CONSTIPATION:  - Ms. Garcia has been constipated for 3-4 weeks, with last normal bowel movement more than 3 weeks ago, requiring multiple laxatives and large amounts of water for relief.  - Recommend using 2 Fleet enemas for immediate relief.  - Discontinued Lovastatin temporarily for a few days as it may contribute to constipation.  - Continue Milk of Magnesia as needed.  Flat and upright of the abdomen x-rays today.      Acute OTITIS EXTERNA, RIGHT EAR, unspecified type:  - Ms. Garcia reports pain and redness in the right ear, with a dry canal and no cerumen production.  - Examination confirmed redness and soreness consistent with otitis externa.    Ciprodex otic.  Four drops into affected ear b.i.d. for 5 days.  Let us know if not improving.  - Advised to apply a few drops of baby oil in ears if experiencing pruritus after  completion of Ciprodex otic..                        Most recent some lab results reviewed with patient.  Any new prescription medications gone over in detail including reason for taking the medication, most common possible side effects and possible costs, etcetera.    Chronic conditions updated. Other than changes or additions as above, cont current medications and maintain follow-up with specialists if indicated.     - Cautioned the patient against excessive use of internet searches for interpreting results before professional review.     Cornell Giraldo MD    Disclaimer:  This clinical note was created with the assistance of DN2K, an [employer]-powered documentation tool.  Portions of this note may also have been dictated with the assistance of a computer-assisted dictation program.  While the healthcare provider has reviewed the content, AI-assisted documentation and/or dictation programs may contain inadvertent errors or omissions.  Patients are encouraged to discuss questions or clarifications regarding their care directly with the provider.                         [1]   Patient Active Problem List  Diagnosis    MELLISA (obstructive sleep apnea)    Severe obesity (BMI 35.0-39.9) with comorbidity    Gastroesophageal reflux disease    Former smoker    Essential hypertension    Mixed hyperlipidemia    Abnormal gait    Weakness    Primary osteoarthritis of both knees    Prediabetes    Solitary pulmonary nodule    Carpal tunnel syndrome, right    Chronic pain of both shoulders    Chronic foot pain, right    Arthralgia   [2]   Current Outpatient Medications:     biotin 1 mg Cap, Take by mouth., Disp: , Rfl:     cartilage/collagen/bor/hyalur (JO[employer] HEALTH ORAL), Take by mouth., Disp: , Rfl:     esomeprazole (NEXIUM) 20 MG capsule, Take 20 mg by mouth before breakfast., Disp: , Rfl:     esomeprazole (NEXIUM) 40 MG capsule, Take 1 capsule (40 mg total) by mouth before breakfast., Disp: 90 capsule, Rfl: 3     hydroCHLOROthiazide (HYDRODIURIL) 25 MG tablet, TAKE 1 TABLET(25 MG) BY MOUTH EVERY DAY, Disp: 90 tablet, Rfl: 3    lovastatin (MEVACOR) 40 MG tablet, Take 1 tablet (40 mg total) by mouth every evening., Disp: 90 tablet, Rfl: 3    olmesartan (BENICAR) 40 MG tablet, TAKE 1 TABLET(40 MG) BY MOUTH EVERY DAY, Disp: 90 tablet, Rfl: 3    omega-3 fatty acids/fish oil (FISH OIL-OMEGA-3 FATTY ACIDS) 300-1,000 mg capsule, Take by mouth once daily., Disp: , Rfl:     TURMERIC ORAL, Take by mouth., Disp: , Rfl:     ibuprofen (ADVIL,MOTRIN) 800 MG tablet, Take 800 mg by mouth 2 (two) times daily. (Patient not taking: Reported on 6/3/2025), Disp: , Rfl:     methocarbamoL (ROBAXIN) 500 MG Tab, Take 1 tablet (500 mg total) by mouth 3 (three) times daily. (Patient not taking: Reported on 6/3/2025), Disp: 30 tablet, Rfl: 0  No current facility-administered medications for this visit.    Facility-Administered Medications Ordered in Other Visits:     ropivacaine 0.2% Nimbus PainPRO Pump infusion 500 ML, , Perineural, Continuous, Cornell Vela MD, New Bag at 07/26/23 [id number]

## 2025-06-16 ENCOUNTER — OFFICE VISIT (OUTPATIENT)
Dept: PULMONOLOGY | Facility: CLINIC | Age: 70
End: 2025-06-16
Payer: MEDICARE

## 2025-06-16 VITALS
DIASTOLIC BLOOD PRESSURE: 71 MMHG | WEIGHT: 218.25 LBS | OXYGEN SATURATION: 97 % | HEIGHT: 65 IN | SYSTOLIC BLOOD PRESSURE: 114 MMHG | BODY MASS INDEX: 36.36 KG/M2 | HEART RATE: 68 BPM

## 2025-06-16 DIAGNOSIS — Z87.891 FORMER SMOKER: ICD-10-CM

## 2025-06-16 DIAGNOSIS — E66.01 SEVERE OBESITY (BMI 35.0-39.9) WITH COMORBIDITY: ICD-10-CM

## 2025-06-16 DIAGNOSIS — R91.1 SOLITARY PULMONARY NODULE: Primary | ICD-10-CM

## 2025-06-16 PROCEDURE — 99999 PR PBB SHADOW E&M-EST. PATIENT-LVL III: CPT | Mod: PBBFAC,,, | Performed by: INTERNAL MEDICINE

## 2025-06-16 PROCEDURE — 99213 OFFICE O/P EST LOW 20 MIN: CPT | Mod: PBBFAC | Performed by: INTERNAL MEDICINE

## 2025-06-16 NOTE — PROGRESS NOTES
Subjective:       Patient ID: Diane Garcia is a 69 y.o. female.  The patient's last visit with me was on 6/20/2024.     No new complaints. Had some Gi issues a few weeks ago now resolved. Remains active in a bike riding group daily.    Follow-up  History of Present Illness             Review of Systems    Objective:      Vitals:    06/16/25 1323   BP: 114/71   Pulse: 68     Wt Readings from Last 3 Encounters:   06/16/25 99 kg (218 lb 4.1 oz)   06/04/25 97.5 kg (215 lb)   06/03/25 98.5 kg (217 lb 2.5 oz)     Temp Readings from Last 3 Encounters:   06/04/25 98.4 °F (36.9 °C) (Oral)   07/19/24 97.9 °F (36.6 °C) (Temporal)   06/18/24 98 °F (36.7 °C) (Oral)     BP Readings from Last 3 Encounters:   06/16/25 114/71   06/04/25 (!) 143/79   06/03/25 128/72     Pulse Readings from Last 3 Encounters:   06/16/25 68   06/04/25 68   06/03/25 62       Physical Exam   Constitutional: She appears well-developed and well-nourished.   Pulmonary/Chest: Effort normal.   Vitals reviewed.    CBC  Lab Results   Component Value Date    WBC 8.86 06/04/2025    HGB 13.1 06/04/2025    HCT 39.8 06/04/2025    MCV 89 06/04/2025     06/04/2025         CMP  Sodium   Date Value Ref Range Status   06/04/2025 142 136 - 145 mmol/L Final   09/24/2024 141 136 - 145 mmol/L Final     Potassium   Date Value Ref Range Status   06/04/2025 3.8 3.5 - 5.1 mmol/L Final   09/24/2024 4.1 3.5 - 5.1 mmol/L Final     Chloride   Date Value Ref Range Status   06/04/2025 106 95 - 110 mmol/L Final   09/24/2024 107 95 - 110 mmol/L Final     CO2   Date Value Ref Range Status   06/04/2025 23 23 - 29 mmol/L Final   09/24/2024 26 23 - 29 mmol/L Final     Glucose   Date Value Ref Range Status   06/04/2025 93 70 - 110 mg/dL Final   09/24/2024 100 70 - 110 mg/dL Final     BUN   Date Value Ref Range Status   06/04/2025 22 8 - 23 mg/dL Final     Creatinine   Date Value Ref Range Status   06/04/2025 0.7 0.5 - 1.4 mg/dL Final     Calcium   Date Value Ref Range Status    06/04/2025 9.3 8.7 - 10.5 mg/dL Final   09/24/2024 9.5 8.7 - 10.5 mg/dL Final     Protein Total   Date Value Ref Range Status   06/04/2025 8.0 6.0 - 8.4 gm/dL Final     Total Protein   Date Value Ref Range Status   09/24/2024 7.3 6.0 - 8.4 g/dL Final     Albumin   Date Value Ref Range Status   06/04/2025 4.1 3.5 - 5.2 g/dL Final   09/24/2024 3.8 3.5 - 5.2 g/dL Final     Total Bilirubin   Date Value Ref Range Status   09/24/2024 0.8 0.1 - 1.0 mg/dL Final     Comment:     For infants and newborns, interpretation of results should be based  on gestational age, weight and in agreement with clinical  observations.    Premature Infant recommended reference ranges:  Up to 24 hours.............<8.0 mg/dL  Up to 48 hours............<12.0 mg/dL  3-5 days..................<15.0 mg/dL  6-29 days.................<15.0 mg/dL       Bilirubin Total   Date Value Ref Range Status   06/04/2025 0.9 0.1 - 1.0 mg/dL Final     Comment:     For infants and newborns, interpretation of results should be based   on gestational age, weight and in agreement with clinical   observations.    Premature Infant recommended reference ranges:   0-24 hours:  <8.0 mg/dL   24-48 hours: <12.0 mg/dL   3-5 days:    <15.0 mg/dL   6-29 days:   <15.0 mg/dL     Alkaline Phosphatase   Date Value Ref Range Status   09/24/2024 61 55 - 135 U/L Final     ALP   Date Value Ref Range Status   06/04/2025 56 40 - 150 unit/L Final     AST   Date Value Ref Range Status   06/04/2025 16 11 - 45 unit/L Final   09/24/2024 14 10 - 40 U/L Final     ALT   Date Value Ref Range Status   06/04/2025 18 10 - 44 unit/L Final   09/24/2024 17 10 - 44 U/L Final     Anion Gap   Date Value Ref Range Status   06/04/2025 13 8 - 16 mmol/L Final     eGFR   Date Value Ref Range Status   06/04/2025 >60 >60 mL/min/1.73/m2 Final     Comment:     Estimated GFR calculated using the CKD-EPI creatinine (2021) equation.   09/24/2024 >60.0 >60 mL/min/1.73 m^2 Final       ABG  No results found for:  ""PH", "PO2", "PCO2"        Personal Diagnostic Review  I have personally reviewed the following data and added my own interpretation as below:  CT Chest 6/12/25 images personally reviewed and shows LLL 10 mm nodule with partial GGO stable from prior. Stable RML inferior atelectasis      6/16/2025     1:23 PM 6/4/2025    11:46 PM 6/4/2025     7:16 PM 6/4/2025     4:48 PM 6/3/2025     9:58 AM 5/9/2025     8:44 AM 4/28/2025     3:32 PM   Pulmonary Function Tests   SpO2 97 % 96 % 98 % 97 % 99 %     Height 5' 5" (1.651 m)   5' 5" (1.651 m) 5' 5" (1.651 m) 5' 5" (1.651 m) 5' 5" (1.651 m)   Weight 99 kg (218 lb 4.1 oz)   97.5 kg (215 lb) 98.5 kg (217 lb 2.5 oz) 99.8 kg (220 lb) 99.8 kg (220 lb 0.3 oz)   BMI (Calculated) 36.3   35.8 36.1 36.6 36.6         Assessment:       1. Solitary pulmonary nodule    2. Former smoker    3. Severe obesity (BMI 35.0-39.9) with comorbidity        Encounter Medications[1]  1. Solitary pulmonary nodule  - CT Chest Without Contrast; Future    2. Former smoker    3. Severe obesity (BMI 35.0-39.9) with comorbidity    Plan:     Problem List Items Addressed This Visit          Pulmonary    Solitary pulmonary nodule - Primary    Current Assessment & Plan   Semi-solid and 10mm. Increase in size and kristopher densitiy between 2021 and 2024. Consideration for lepidic growth adenocarcinoma.   BioDesix testing showed lower risk profile and repeat CT at 3 and 15 months show stability  Yearly LDCT until 5 years out.         Relevant Orders    CT Chest Without Contrast       Endocrine    Severe obesity (BMI 35.0-39.9) with comorbidity    Current Assessment & Plan   Discussed daily exercise- rides her bike 10-15 miles daily.            Other    Former smoker    Current Assessment & Plan   Quit over 15 years ago. Does not require lung cancer screening once diagnostics complete            Assessment & Plan               Please note Overview Notes are historic documentation. Please review A/P for current " updates.  No follow-ups on file.    Future Appointments   Date Time Provider Department Center   2025  2:00 PM Suman Caputo, PT, DPT, OCS [facility]   2025  2:00 PM Suman Caputo, PT, DPT, OCS [facility]   10/3/2025  2:00 PM Imtiaz Chin MD [facility] Jan Cydney Grey MD             [1]  Outpatient Encounter Medications as of 2025   Medication Sig Dispense Refill    biotin 1 mg Cap Take by mouth.      cartilage/collagen/bor/hyalur (JOINT HEALTH ORAL) Take by mouth.      esomeprazole (NEXIUM) 20 MG capsule Take 20 mg by mouth before breakfast.      esomeprazole (NEXIUM) 40 MG capsule Take 1 capsule (40 mg total) by mouth before breakfast. 90 capsule 3    hydroCHLOROthiazide (HYDRODIURIL) 25 MG tablet TAKE 1 TABLET(25 MG) BY MOUTH EVERY DAY 90 tablet 3    lovastatin (MEVACOR) 40 MG tablet Take 1 tablet (40 mg total) by mouth every evening. 90 tablet 3    olmesartan (BENICAR) 40 MG tablet TAKE 1 TABLET(40 MG) BY MOUTH EVERY DAY 90 tablet 3    omega-3 fatty acids/fish oil (FISH OIL-OMEGA-3 FATTY ACIDS) 300-1,000 mg capsule Take by mouth once daily.      TURMERIC ORAL Take by mouth.      [] ciprofloxacin-dexAMETHasone 0.3-0.1% (CIPRODEX) 0.3-0.1 % DrpS Place 4 drops into both ears 2 (two) times daily. for 5 days 7.5 mL 0    ibuprofen (ADVIL,MOTRIN) 800 MG tablet Take 800 mg by mouth 2 (two) times daily. (Patient not taking: Reported on 2025)      methocarbamoL (ROBAXIN) 500 MG Tab Take 1 tablet (500 mg total) by mouth 3 (three) times daily. (Patient not taking: Reported on 2025) 30 tablet 0     Facility-Administered Encounter Medications as of 2025   Medication Dose Route Frequency Provider Last Rate Last Admin    ropivacaine 0.2% Nimbus PainPRO Pump infusion 500 ML   Perineural Continuous Cornell Vela MD   New Bag at [phone]

## 2025-06-16 NOTE — ASSESSMENT & PLAN NOTE
Semi-solid and 10mm. Increase in size and kristopher densitiy between 2021 and 2024. Consideration for lepidic growth adenocarcinoma.   BioDesix testing showed lower risk profile and repeat CT at 3 and 15 months show stability  Yearly LDCT until 5 years out.

## 2025-06-24 ENCOUNTER — CLINICAL SUPPORT (OUTPATIENT)
Dept: REHABILITATION | Facility: HOSPITAL | Age: 70
End: 2025-06-24
Payer: MEDICARE

## 2025-06-24 DIAGNOSIS — M25.512 CHRONIC PAIN OF BOTH SHOULDERS: Primary | ICD-10-CM

## 2025-06-24 DIAGNOSIS — G89.29 CHRONIC PAIN OF BOTH SHOULDERS: Primary | ICD-10-CM

## 2025-06-24 DIAGNOSIS — M25.511 CHRONIC PAIN OF BOTH SHOULDERS: Primary | ICD-10-CM

## 2025-06-24 PROCEDURE — 97530 THERAPEUTIC ACTIVITIES: CPT | Mod: PO | Performed by: PHYSICAL THERAPIST

## 2025-06-24 PROCEDURE — 97112 NEUROMUSCULAR REEDUCATION: CPT | Mod: PO | Performed by: PHYSICAL THERAPIST

## 2025-06-24 PROCEDURE — 97140 MANUAL THERAPY 1/> REGIONS: CPT | Mod: PO | Performed by: PHYSICAL THERAPIST

## 2025-06-24 NOTE — PROGRESS NOTES
Outpatient Rehab    Physical Therapy Visit    Patient Name: Diane Garcia  MRN: [id number]  YOB: 1955  Encounter Date: 6/24/2025    Therapy Diagnosis:   Encounter Diagnosis   Name Primary?    Chronic pain of both shoulders Yes     Physician: NATALIO Greenwood MD    Physician Orders: Eval and Treat  Medical Diagnosis: Chronic right shoulder pain  Surgical Diagnosis: Not applicable for this Episode   Surgical Date: Not applicable for this Episode  Days Since Last Surgery: Not applicable for this Episode    Visit # / Visits Authorized:  3 / 12  Insurance Authorization Period: 4/30/2025 to 12/31/2025  Date of Evaluation: 5/5/2025  Plan of Care Certification: 5/5/2025 to 7/14/2025      PT/PTA: PT   Number of PTA visits since last PT visit:0  Time In: 1400   Time Out: 1500  Total Time (in minutes): 60   Total Billable Time (in minutes): 45    FOTO:  Intake Score: 62%  Survey Score 2:  %  Survey Score 3:  %    Precautions:       Subjective   Returns to the clinic following a prolonged absence. Maintains full shoulder ROM, but has some pain with pure abduction and when sleeping with her arms overhead at night..  Pain reported as 3/10. Bilateral shoulders (Right > Left)    Objective      Shoulder Range of Motion  Right Shoulder   Active (deg) Passive (deg) Pain   Flexion 180 180     Extension         Scaption         ABduction 180 180 Yes   ADduction         Horizontal ABduction         Horizontal ADduction         External Rotation (Shoulder ABducted 0 degrees)         External Rotation (Shoulder ABducted 45 degrees)         External Rotation (Shoulder ABducted 90 degrees) 90 95 Yes   Internal Rotation (Shoulder ABducted 0 degrees)         Internal Rotation (Shoulder ABducted 45 degrees)         Internal Rotation (Shoulder ABducted 90 degrees) 60 65 Yes     Left Shoulder   Active (deg) Passive (deg) Pain   Flexion 180 180     Extension         Scaption         ABduction 180 180     ADduction          Horizontal ABduction         Horizontal ADduction         External Rotation (Shoulder ABducted 0 degrees)         External Rotation (Shoulder ABducted 45 degrees)         External Rotation (Shoulder ABducted 90 degrees) 90 95     Internal Rotation (Shoulder ABducted 0 degrees)         Internal Rotation (Shoulder ABducted 45 degrees)         Internal Rotation (Shoulder ABducted 90 degrees) 60 70                      Treatment:  Therapeutic Exercise  TE 1: Supine thoracic extension - x20  TE 2: Sidelying external rotation - 2#; 3x10 bilateral  TE 5: Theraband Er at 90 with overhead lift - green; x20 bilateral  Manual Therapy  MT 1: Bilateral glenohumeral posterior-inferior mobilizations at end range - grade III-IV  Balance/Neuromuscular Re-Education  NMR 1: Prone Middle Trapezius - Level 1; x20  NMR 2: Prone Lower Trapezius - Level 1; x20  NMR 3: wall slide - 2x12  NMR 4: Wall internal rotation - x20 bilateral  Therapeutic Activity  TA 9: Assessment as above  TA 10: Education, questions, and answers    Time Entry(in minutes):  Manual Therapy Time Entry: 11  Neuromuscular Re-Education Time Entry: 16  Therapeutic Activity Time Entry: 10  Therapeutic Exercise Time Entry: 8    Assessment & Plan   Assessment: Diane returns to the clinic with negative pain with flexion, but minor pain with pure abduction and end range external rotation. Demonstrate bilateral glenohumeral anterior glide (right > left). Reviewed importance of home exercise program compliance for symptom relief, especially in the absence of regular PT appointments. Increased internal rotation work with resultant decrease in symptoms upon completion of today's session.  Evaluation/Treatment Tolerance: Patient tolerated treatment well    The patient will continue to benefit from skilled outpatient physical therapy in order to address the deficits listed in the problem list on the initial evaluation, provide patient and family education, and maximize the  patients level of independence in the home and community environments.     The patient's spiritual, cultural, and educational needs were considered, and the patient is agreeable to the plan of care and goals.     Education  Education was done with Patient. The patient's learning style includes Demonstration and Listening. The patient Demonstrates understanding and Verbalizes understanding.         Anatomy and Pathology. Symptom management and plan of care progressions. Home Exercise Program and compliance.       Plan: Continue with documented plan of care and progress patient as indicated.    Goals:   Active       FOTO       Patient will demonstrate and improvement in FOTO score from 62% to greater than or equal to 70%.  (Progressing)       Start:  05/05/25    Expected End:  07/14/25               Pain       Patient will report decreased pain at worst on the VAS from 10 / 10 to less than or equal to 2 / 10.  (Progressing)       Start:  05/05/25    Expected End:  07/14/25               Patient Goal       Patient will be able to perform a lift and carry of greater than or equal to 25 pounds for at least 50 feet.  (Progressing)       Start:  05/05/25    Expected End:  07/14/25               Range of Motion       Patient will demonstrate improvement in active / passive range of motion to end range with no symptoms. (Progressing)       Start:  05/05/25    Expected End:  07/14/25               Strength       Patient will demonstrate an improvement in strength by at least one muscle grade or 2.5 kg as compared to initial measurements.  (Progressing)       Start:  05/05/25    Expected End:  07/14/25                Suman Caputo, PT, DPT, OCS

## 2025-06-27 LAB — CRC RECOMMENDATION EXT: NORMAL

## 2025-06-30 ENCOUNTER — CLINICAL SUPPORT (OUTPATIENT)
Dept: REHABILITATION | Facility: HOSPITAL | Age: 70
End: 2025-06-30
Payer: MEDICARE

## 2025-06-30 DIAGNOSIS — G89.29 CHRONIC PAIN OF BOTH SHOULDERS: Primary | ICD-10-CM

## 2025-06-30 DIAGNOSIS — M25.511 CHRONIC PAIN OF BOTH SHOULDERS: Primary | ICD-10-CM

## 2025-06-30 DIAGNOSIS — M25.512 CHRONIC PAIN OF BOTH SHOULDERS: Primary | ICD-10-CM

## 2025-06-30 PROCEDURE — 97530 THERAPEUTIC ACTIVITIES: CPT | Mod: PO | Performed by: PHYSICAL THERAPIST

## 2025-06-30 PROCEDURE — 97112 NEUROMUSCULAR REEDUCATION: CPT | Mod: PO | Performed by: PHYSICAL THERAPIST

## 2025-06-30 PROCEDURE — 97110 THERAPEUTIC EXERCISES: CPT | Mod: PO | Performed by: PHYSICAL THERAPIST

## 2025-06-30 NOTE — PROGRESS NOTES
Outpatient Rehab    Physical Therapy Visit    Patient Name: Diane Garcia  MRN: [id number]  YOB: 1955  Encounter Date: 6/30/2025    Therapy Diagnosis:   Encounter Diagnosis   Name Primary?    Chronic pain of both shoulders Yes     Physician: NATALIO Greenwood MD    Physician Orders: Eval and Treat  Medical Diagnosis: Chronic right shoulder pain  Surgical Diagnosis: Not applicable for this Episode   Surgical Date: Not applicable for this Episode  Days Since Last Surgery: Not applicable for this Episode    Visit # / Visits Authorized:  4 / 12  Insurance Authorization Period: 4/30/2025 to 12/31/2025  Date of Evaluation: 5/5/2025  Plan of Care Certification: 5/5/2025 to 7/14/2025      PT/PTA: PT   Number of PTA visits since last PT visit:0  Time In: 1010   Time Out: 1100  Total Time (in minutes): 50   Total Billable Time (in minutes): 40    FOTO:  Intake Score: 62%  Survey Score 2:  %  Survey Score 3:  %    Precautions:       Subjective   Continues to have a small painful arc of motion with flexion and abduction..  Pain reported as 3/10. Bilateral shoulders (Right > Left)    Objective      Shoulder Special Tests  Impingement Tests  Positive: Right Infraspinatus Muscle, Left Infraspinatus Muscle, Right Painful Arc, Left Painful Arc, Right Renetta's, and Left Renetta's  Negative: Right Posterior Impingement Sign and Left Posterior Impingement Sign              Treatment:  Therapeutic Exercise  TE 1: Supine thoracic extension - x20  TE 4: UBE - forward / backwards; 6 minutes  Balance/Neuromuscular Re-Education  NMR 1: Prone Middle Trapezius - Level 1; x20  NMR 2: Prone Lower Trapezius - Level 1; x20  NMR 3: Wall slide with shrug - 2x12  NMR 5: Standing Serratus Rock Backs - x30  NMR 6: Theraband internal rotation at 90 abduction - blue; x20  Therapeutic Activity  TA 9: Assessment as above  TA 10: Education, questions, and answers    Time Entry(in minutes):  Neuromuscular Re-Education Time Entry:  20  Therapeutic Activity Time Entry: 10  Therapeutic Exercise Time Entry: 10    Assessment & Plan   Assessment: Diane returns to the clinic with an ongoing painful arc of motion with onset and resolution around 120 degrees flexion. Symptoms resolve with scapular posterior tilt. Continued promoting exercises to improve scapular posterior tilt, scapular upwards rotation, and shoulder internal rotation. Pain free flexion upon completion of today's session.  Evaluation/Treatment Tolerance: Patient tolerated treatment well    The patient will continue to benefit from skilled outpatient physical therapy in order to address the deficits listed in the problem list on the initial evaluation, provide patient and family education, and maximize the patients level of independence in the home and community environments.     The patient's spiritual, cultural, and educational needs were considered, and the patient is agreeable to the plan of care and goals.     Education  Education was done with Patient. The patient's learning style includes Demonstration and Listening. The patient Demonstrates understanding and Verbalizes understanding.         Anatomy and Pathology. Symptom management and plan of care progressions. Home Exercise Program.       Plan: Continue with documented plan of care and progress patient as indicated.    Goals:   Active       FOTO       Patient will demonstrate and improvement in FOTO score from 62% to greater than or equal to 70%.  (Progressing)       Start:  05/05/25    Expected End:  07/14/25               Pain       Patient will report decreased pain at worst on the VAS from 10 / 10 to less than or equal to 2 / 10.  (Progressing)       Start:  05/05/25    Expected End:  07/14/25               Patient Goal       Patient will be able to perform a lift and carry of greater than or equal to 25 pounds for at least 50 feet.  (Progressing)       Start:  05/05/25    Expected End:  07/14/25               Range  of Motion       Patient will demonstrate improvement in active / passive range of motion to end range with no symptoms. (Progressing)       Start:  05/05/25    Expected End:  07/14/25               Strength       Patient will demonstrate an improvement in strength by at least one muscle grade or 2.5 kg as compared to initial measurements.  (Progressing)       Start:  05/05/25    Expected End:  07/14/25                Suman Caputo, PT, DPT, OCS

## 2025-07-02 ENCOUNTER — PATIENT OUTREACH (OUTPATIENT)
Dept: ADMINISTRATIVE | Facility: HOSPITAL | Age: 70
End: 2025-07-02
Payer: MEDICARE

## 2025-07-02 NOTE — PROGRESS NOTES
Care Coordination Encounter Details:       MyChart Portal Status:         [x]  Reviewed MyChart Portal Status offered / enrolled if applicable        Additional Notes:     MyChart Outcomes: Pt is enrolled & active          Updates Requested / Reviewed:        Updated Care Coordination Note, Care Everywhere, Care Team Updated, and Immunizations Reconciliation Completed or Queried: [place]         Health Maintenance Screening(s) Due:      Health Maintenance Topics Overdue:      VBHM Score: 1     Osteoporosis Screening    Shingles/Zoster Vaccine                  Health Maintenance Topic(s) Outreach Outcomes & Actions Taken:    Colorectal Cancer Screening - Outreach Outcomes & Actions Taken  : External Records Uploaded, Care Team Updated, & History Updated if Applicable    Osteoporosis Screening - Outreach Outcomes & Actions Taken  : called pt to schedule, left VM, portal message sent. Order is in place        Chronic Disease Management:     Diabetes Measures        Lab Results   Component Value Date    HGBA1C 5.6 09/24/2024           [x]  Reviewed chart for active Diabetes diagnosis     []  Scheduled necessary follow up appointments if needed         Additional Notes:             Hypertension Measures        BP Readings from Last 1 Encounters:   06/16/25 114/71           [x]  Reviewed chart for active Hypertension diagnosis     []  Reviewed & documented Home BP Cuff     []  Documented a Remote BP if needed & applicable     []  Scheduled necessary follow up appointments with Primary Care if needed         Additional Notes:             Provider Team Continuity:     Last PCP Visit Date: 6/3/2025          [x]  Reviewed Primary Care Provider Visits, Annual Wellness Visit, and Future          Appointments to ensure appointments have been scheduled and/or           completed        Additional Notes:             Social Determinants of Health          [x]  Reviewed, completed, and/or updated the following sections:                   Food Insecurity, Transportation Needs, Financial Resource Strain,                 Tobacco Use        Additional Notes:             Care Management, Digital Medicine, and/or Education Referrals    OPCM Risk Score: 30.3         Next Steps - Referral Actions: Digital Medicine Outcomes and Actions Taken: Pt Declined or Not Eligible        Additional Notes:

## 2025-08-12 ENCOUNTER — OFFICE VISIT (OUTPATIENT)
Dept: DERMATOLOGY | Facility: CLINIC | Age: 70
End: 2025-08-12
Payer: MEDICARE

## 2025-08-12 DIAGNOSIS — L57.0 MULTIPLE ACTINIC KERATOSES: Primary | ICD-10-CM

## 2025-08-12 DIAGNOSIS — L81.4 LENTIGINES: ICD-10-CM

## 2025-08-12 DIAGNOSIS — L82.1 SEBORRHEIC KERATOSES: ICD-10-CM

## 2025-08-12 DIAGNOSIS — Z85.828 HISTORY OF SKIN CANCER: ICD-10-CM

## 2025-08-12 PROCEDURE — 99999 PR PBB SHADOW E&M-EST. PATIENT-LVL III: CPT | Mod: PBBFAC,,, | Performed by: DERMATOLOGY

## 2025-08-12 PROCEDURE — 99213 OFFICE O/P EST LOW 20 MIN: CPT | Mod: PBBFAC,PO | Performed by: DERMATOLOGY

## 2025-08-12 RX ORDER — TRETINOIN 1 MG/G
CREAM TOPICAL
Qty: 20 G | Refills: 6 | Status: SHIPPED | OUTPATIENT
Start: 2025-08-12

## 2025-08-19 ENCOUNTER — PATIENT MESSAGE (OUTPATIENT)
Dept: SLEEP MEDICINE | Facility: CLINIC | Age: 70
End: 2025-08-19
Payer: MEDICARE

## (undated) DEVICE — DRESSING N ADH OIL EMUL 3X3

## (undated) DEVICE — GLOVE SENSICARE PI SURG 8

## (undated) DEVICE — WRAP KNEE ACCU THERM GEL PACK

## (undated) DEVICE — BETADINE OPTHALMIC SOL 5% 30ML

## (undated) DEVICE — ELECTRODE REM PLYHSV RETURN 9

## (undated) DEVICE — BLADE SAGITTAL 18 X 1.27 X 90M

## (undated) DEVICE — GLOVE BIOGEL PI MICRO SZ 6.5

## (undated) DEVICE — COVER CAMERA OPERATING ROOM

## (undated) DEVICE — GLOVE SENSICARE PI GRN 8.5

## (undated) DEVICE — TOWEL OR XRAY BLUE 17X26IN

## (undated) DEVICE — KIT IRR SUCTION HND PIECE

## (undated) DEVICE — GOWN ECLIPSE REINF LVL4 TWL XL

## (undated) DEVICE — TOURNIQUET SB QC DP 18X4IN

## (undated) DEVICE — UNDERGLOVE BIOGEL PI SZ 6.5 LF

## (undated) DEVICE — GLOVE SENSICARE PI GRN 7.5

## (undated) DEVICE — ALCOHOL 70% ISO RUBBING 16OZ

## (undated) DEVICE — TOURNIQUET SB QC DP 34X4IN

## (undated) DEVICE — SUT VICRYL BR 1 GEN 27 CT-1

## (undated) DEVICE — SOL IRR SOD CHL .9% POUR

## (undated) DEVICE — BOWL CEMENT

## (undated) DEVICE — BLADE SURG #15 CARBON STEEL

## (undated) DEVICE — SPONGE COTTON TRAY 4X4IN

## (undated) DEVICE — HOOD T7 W/ PEEL AWAY LENS

## (undated) DEVICE — SUT VICRYL 3-0 27 SH

## (undated) DEVICE — SOL POVIDONE SCRUB IODINE 4 OZ

## (undated) DEVICE — GLOVE BIOGEL PI MICRO INDIC 7

## (undated) DEVICE — NDL 22GA X1 1/2 REG BEVEL

## (undated) DEVICE — SUT 4/0 18IN ETHILON BL P3

## (undated) DEVICE — NDL HYPO STD REG BVL 18GX1.5IN

## (undated) DEVICE — DRAPE STERI INSTRUMENT 1018

## (undated) DEVICE — KIT TOTAL KNEE TKOFG OMC

## (undated) DEVICE — DRAPE T EXTRM SURG 121X128X90

## (undated) DEVICE — GLOVE SENSICARE PI GRN 8

## (undated) DEVICE — SYR B-D DISP CONTROL 10CC100/C

## (undated) DEVICE — BANDAGE MATRIX HK LOOP 2IN 5YD

## (undated) DEVICE — DRAPE STERI-DRAPE 1000 17X11IN

## (undated) DEVICE — DRESSING AQUACEL AG ADV 3.5X12

## (undated) DEVICE — BANDAGE GAUZE 6PLY FLUFF 2X3

## (undated) DEVICE — GLOVE BIOGEL ECLIPSE SZ 7

## (undated) DEVICE — SPONGE GAUZE 16PLY 4X4

## (undated) DEVICE — BANDAGE ESMARK 6X12

## (undated) DEVICE — WRAP PROTECTIVE LEG POS STRL

## (undated) DEVICE — SOL NACL IRR 1000ML BTL

## (undated) DEVICE — PIN FIX TEMP 1/8X2.5IN LF STER
Type: IMPLANTABLE DEVICE | Site: KNEE | Status: NON-FUNCTIONAL
Removed: 2023-07-26

## (undated) DEVICE — SOL NACL IRR 3000ML

## (undated) DEVICE — Device

## (undated) DEVICE — STAPLER SKIN REGULAR

## (undated) DEVICE — ADHESIVE DERMABOND ADVANCED

## (undated) DEVICE — GLOVE SENSICARE PI SURG 7.5

## (undated) DEVICE — BLADE SAW STERN .076MM 6.1MM L

## (undated) DEVICE — SUT 0 VICRYL / CT-1